# Patient Record
Sex: FEMALE | Race: WHITE | NOT HISPANIC OR LATINO | Employment: OTHER | URBAN - METROPOLITAN AREA
[De-identification: names, ages, dates, MRNs, and addresses within clinical notes are randomized per-mention and may not be internally consistent; named-entity substitution may affect disease eponyms.]

---

## 2017-01-24 ENCOUNTER — TELEPHONE (OUTPATIENT)
Dept: PULMONOLOGY | Facility: CLINIC | Age: 77
End: 2017-01-24

## 2017-01-24 NOTE — TELEPHONE ENCOUNTER
----- Message from Christina Chauhan sent at 1/24/2017 10:07 AM CST -----  Contact: Casie Rock  Patient stated that she had an order in for  anoro ellipta, but there is no orders in, Please call he rat [phone].    thanks  Td

## 2017-01-26 DIAGNOSIS — J44.9 COPD, VERY SEVERE: Chronic | ICD-10-CM

## 2017-01-26 NOTE — TELEPHONE ENCOUNTER
----- Message from Krishan Hansen sent at 1/26/2017  9:49 AM CST -----  [facility], #[phone] needs a order for the pt's inhaler faxed to [phone]

## 2017-05-12 ENCOUNTER — TELEPHONE (OUTPATIENT)
Dept: PULMONOLOGY | Facility: CLINIC | Age: 77
End: 2017-05-12

## 2017-05-12 NOTE — TELEPHONE ENCOUNTER
Need a updated phone number for contact number is incorrect. Was unable to contact pt to confirm appt

## 2017-05-15 ENCOUNTER — OFFICE VISIT (OUTPATIENT)
Dept: PULMONOLOGY | Facility: CLINIC | Age: 77
End: 2017-05-15
Payer: MEDICARE

## 2017-05-15 VITALS
RESPIRATION RATE: 18 BRPM | HEIGHT: 65 IN | SYSTOLIC BLOOD PRESSURE: 140 MMHG | OXYGEN SATURATION: 90 % | BODY MASS INDEX: 25.75 KG/M2 | WEIGHT: 154.56 LBS | DIASTOLIC BLOOD PRESSURE: 62 MMHG | HEART RATE: 77 BPM

## 2017-05-15 DIAGNOSIS — J96.11 CHRONIC RESPIRATORY FAILURE WITH HYPOXIA: ICD-10-CM

## 2017-05-15 DIAGNOSIS — C34.12 MALIGNANT NEOPLASM OF UPPER LOBE OF LEFT LUNG: Primary | Chronic | ICD-10-CM

## 2017-05-15 DIAGNOSIS — J44.9 COPD, VERY SEVERE: Chronic | ICD-10-CM

## 2017-05-15 DIAGNOSIS — F17.210 CIGARETTE SMOKER: Chronic | ICD-10-CM

## 2017-05-15 PROCEDURE — 99999 PR PBB SHADOW E&M-EST. PATIENT-LVL III: CPT | Mod: PBBFAC,,, | Performed by: INTERNAL MEDICINE

## 2017-05-15 PROCEDURE — 99215 OFFICE O/P EST HI 40 MIN: CPT | Mod: 25,S$PBB,, | Performed by: INTERNAL MEDICINE

## 2017-05-15 PROCEDURE — 99213 OFFICE O/P EST LOW 20 MIN: CPT | Mod: PBBFAC | Performed by: INTERNAL MEDICINE

## 2017-05-15 NOTE — ASSESSMENT & PLAN NOTE
Assistance with smoking cessation was offered, including:  [x]  Medications  []  Counseling  []  Printed Information on Smoking Cessation  []  Referral to a Smoking Cessation Program    Patient was counseled regarding smoking for 3-10 minutes.

## 2017-05-15 NOTE — PROGRESS NOTES
Subjective:      Established patient    Patient ID: Rachel Long is a 76 y.o. female.  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    Chief Complaint: COPD      HPI     Her lung cancer is in remission. COPD is stable.  She reports that she is dong well and denies any new onset respiratory symptoms. She denies cough sputum, hemoptysis,  pain with breathing, wheezing, asthma. She has relapsed with cigarette smoking. She currently admits to smoking 2 cigarettes per day. She is currently on supplemental oxygen at 3 L /min. Her current respiratory regimen is ANORO which provided some relief. She is adherent with her regimen. Her CAT score today is 18.      A full  review of systems, past , family  and social histories was performed except as mentioned in the note above, these are non contributory to the main issues discussed today.      Previous Report Reviewed: office notes and radiology reports     The following portions of the patient's history were reviewed and updated as appropriate: She  has a past medical history of Acute upper respiratory infection; Anemia; Bipolar mood disorder; Coordination abnormal; COPD exacerbation (5/9/2016); Coronary artery disease; Difficulty walking; Hypertension; Hypothyroidism, adult; Insomnia; Malignant neoplasm of colon; Muscle weakness; Neuralgia and neuritis; Schizoaffective disorder; SOB (shortness of breath); Spinal stenosis; Thyroid disease; and Urinary tract infection.  She  has a past surgical history that includes Coronary stent placement; Colon surgery; and Hysterectomy (1970's).  Her family history is not on file.  She  reports that she has been smoking Cigarettes.  She has  "smoked for the past 50.00 years. She does not have any smokeless tobacco history on file. She reports that she does not drink alcohol or use illicit drugs.  She has a current medication list which includes the following prescription(s): abilify, acetaminophen, albuterol, alrex, baclofen, brexpiprazole, clonidine, cyanocobalamin, cyclosporine, diazepam, docusate sodium, donepezil, duloxetine, erythromycin, fluticasone, guaifenesin, hydrocodone-acetaminophen 5-325mg, hydroxyzine pamoate, lactulose, levothyroxine, lisinopril, multivitamin, oxcarbazepine, pataday, polyvinyl alcohol (artificial tears), potassium chloride, sertraline, tolterodine, tramadol, triamcinolone acetonide 0.1%, umeclidinium-vilanterol, white petrolatum-mineral oil, zolpidem, and albuterol-ipratropium 2.5mg-0.5mg/3ml.  She is allergic to sulfa (sulfonamide antibiotics) and benadryl [diphenhydramine hcl]..    Review of Systems   Constitutional: Negative for chills, fatigue and night sweats.   HENT: Negative for nosebleeds, postnasal drip, congestion and hearing loss.    Respiratory: Positive for shortness of breath and dyspnea on extertion. Negative for cough, sputum production and wheezing.    Cardiovascular: Positive for chest pain. Negative for palpitations and leg swelling.   Genitourinary: Positive for hematuria. Negative for difficulty urinating.   Endocrine: Negative for cold intolerance and heat intolerance.    Musculoskeletal: Positive for back pain. Negative for arthralgias.   Skin: Positive for rash.        Skin cancer   Gastrointestinal: Positive for nausea. Negative for vomiting and acid reflux.        Constipation alternating with diarrhea.    Neurological: Positive for dizziness, weakness and headaches.   Hematological: Does not bruise/bleed easily and no excessive bruising.   All other systems reviewed and are negative.       Objective:     BP (!) 140/62  Pulse 77  Resp 18  Ht 5' 5" (1.651 m)  Wt 70.1 kg (154 lb 8.7 oz)  LMP  " (LMP Unknown)  SpO2 (!) 90%  BMI 25.72 kg/m2  Body mass index is 25.72 kg/(m^2).     Physical Exam   Constitutional: She appears well-developed and well-nourished.   HENT:   Head: Normocephalic and atraumatic.   Eyes: Pupils are equal, round, and reactive to light.   Neck: Normal range of motion.   Cardiovascular: Normal rate and regular rhythm.    No murmur heard.  Pulmonary/Chest: Effort normal. She has no decreased breath sounds. She has no wheezes. She has rales (Bibasilar) in the right lower field and the left lower field.   Abdominal: Soft. Bowel sounds are normal.   Musculoskeletal: Normal range of motion.   Neurological: She is alert.   Masked facies   Skin: Skin is warm and dry.   Psychiatric: She has a normal mood and affect. Her behavior is normal.   Nursing note and vitals reviewed.      Personal Diagnostic Review      Exercise pulse oximetry:    Oxygen Saturation on room air at rest : 92%  HR 71  Oxygen Saturation on room air with exercise: 90%  %        Assessment:     1. Malignant neoplasm of upper lobe of left lung Stable   2. COPD, very severe Stable   3. Cigarette smoker Stable   4. Chronic respiratory failure with hypoxia      Outpatient Encounter Prescriptions as of 5/15/2017   Medication Sig Dispense Refill    ABILIFY 5 mg Tab Take 1 tablet by mouth every evening.      acetaminophen (TYLENOL) 325 MG tablet Take 325 mg by mouth every 6 (six) hours as needed. Give 2 by mouth every 4 hours PRN      albuterol (ACCUNEB) 1.25 mg/3 mL Nebu Take 6 mLs (2.5 mg total) by nebulization every 6 (six) hours as needed. 30 vial 0    ALREX 0.2 % DrpS Place 1 drop into both eyes once daily.      baclofen (LIORESAL) 10 MG tablet Take 10 mg by mouth 3 (three) times daily.      brexpiprazole (REXULTI) 1 mg Tab Take by mouth.      clonidine (CATAPRES) 0.1 MG tablet Take 0.2 mg by mouth 3 (three) times daily.       cyanocobalamin 1,000 mcg/mL injection Inject 1,000 mcg into the skin every 30 days.       cycloSPORINE (RESTASIS) 0.05 % ophthalmic emulsion 1 drop 2 (two) times daily.      diazepam (VALIUM) 2 MG tablet Take 2 mg by mouth daily as needed for Anxiety.      docusate sodium (COLACE) 100 MG capsule Take 100 mg by mouth 2 (two) times daily.      donepezil (ARICEPT) 5 MG tablet Take 5 mg by mouth every evening.      duloxetine (CYMBALTA) 30 MG capsule       erythromycin (ILOTYCIN) ophthalmic ointment Place into both eyes every evening.      fluticasone (FLONASE) 50 mcg/actuation nasal spray 1 spray by Each Nare route 2 (two) times daily.      guaifenesin (MUCINEX) 600 mg 12 hr tablet Take 600 mg by mouth once daily.      hydrocodone-acetaminophen 5-325mg (NORCO) 5-325 mg per tablet Take 1 tablet by mouth every 6 (six) hours as needed for Pain.      hydrOXYzine pamoate (VISTARIL) 25 MG Cap Take 25 mg by mouth 3 (three) times daily.      lactulose (CEPHULAC) 10 gram packet Take 20 g by mouth daily as needed.      levothyroxine (SYNTHROID) 88 MCG tablet Take 88 mcg by mouth before breakfast.      lisinopril (PRINIVIL,ZESTRIL) 40 MG tablet Take 40 mg by mouth once daily.      MULTIVITAMIN ORAL Take by mouth once daily.      oxcarbazepine (TRILEPTAL) 150 MG Tab Take 150 mg by mouth every evening.      PATADAY 0.2 % Drop       polyvinyl alcohol, artificial tears, (ARTIFICIAL TEARS, POLYVIN ALC,) 1.4 % ophthalmic solution 1 drop as needed.      potassium chloride (KLOR-CON) 10 MEQ TbSR Take 40 mEq by mouth once daily.       sertraline (ZOLOFT) 100 MG tablet Take 100 mg by mouth once daily.      tolterodine (DETROL LA) 4 MG 24 hr capsule Take 4 mg by mouth once daily.      tramadol (ULTRAM) 50 mg tablet Take 50 mg by mouth every 6 (six) hours as needed.      triamcinolone acetonide 0.1% (KENALOG) 0.1 % cream       umeclidinium-vilanterol (ANORO ELLIPTA) 62.5-25 mcg/actuation DsDv Inhale 1 puff into the lungs once daily. 30 each 11    white petrolatum-mineral oil (ARTIFICIAL TEARS,  CODEY/MIN,) 83-15 % Oint 4 (four) times daily as needed.      zolpidem (AMBIEN) 5 MG Tab Take 5 mg by mouth nightly as needed.      albuterol-ipratropium 2.5mg-0.5mg/3mL (DUO-NEB) 0.5 mg-3 mg(2.5 mg base)/3 mL nebulizer solution Take 3 mLs by nebulization every 6 (six) hours while awake. 120 vial 11     No facility-administered encounter medications on file as of 5/15/2017.      Orders Placed This Encounter   Procedures    X-Ray Chest PA And Lateral     Standing Status:   Future     Standing Expiration Date:   6/25/2018    Six Minute Walk Test to qualify for Home Oxygen     Standing Status:   Future     Standing Expiration Date:   5/15/2018    Complete PFT with bronchodilator     Standing Status:   Future     Standing Expiration Date:   6/25/2018    Stress test, pulmonary     Standing Status:   Future     Standing Expiration Date:   6/25/2018    PULM - Arterial Blood Gases--in addition to PFT only     Standing Status:   Future     Standing Expiration Date:   5/15/2018     Plan:     Discussed diagnosis, its evaluation, treatment and usual course. All questions answered.    COPD, very severe  COPD ROS: taking medications as instructed, no medication side effects noted, no significant ongoing wheezing or shortness of breath, using bronchodilator MDI less than twice a week.   New concerns: None.   Exam: appears well, vitals normal, no respiratory distress, acyanotic, normal RR.   Assessment:  COPD well controlled.   Plan: ANORO, ACCUNEB. Current treatment plan is effective, no change in therapy.     -     Complete PFT with bronchodilator; Future; Expected date: 11/15/17  -     PULM - Arterial Blood Gases--in addition to PFT only; Future; Expected date: 11/15/17  -     X-Ray Chest PA And Lateral; Future; Expected date: 11/15/17  -     Stress test, pulmonary; Future; Expected date: 11/15/17    Return in 6 months.     Malignant neoplasm of upper lobe of left lung  Stable and controlled. Follow up with oncology as  previously scheduled.     Cigarette smoker  Assistance with smoking cessation was offered, including:  [x]  Medications  []  Counseling  []  Printed Information on Smoking Cessation  []  Referral to a Smoking Cessation Program    Patient was counseled regarding smoking for 3-10 minutes.        Chronic respiratory failure with hypoxia  Continue oxygen supplementation.         TIME SPENT WITH PATIENT: Time spent: 40 minutes in face to face  discussion concerning diagnosis, prognosis, review of lab and test results, benefits of treatment as well as management of disease, counseling of patient and coordination of care between various health  care providers . Greater than half the time spent was used for coordination of care and counseling of patient.       Return in about 6 months (around 11/15/2017) for COPD, Lung Cancer, Chronic Respiratory Failure, Tobacco use disorder.

## 2017-05-15 NOTE — PATIENT INSTRUCTIONS
Lung Anatomy  Your lungs take air in to give your body oxygen, which the body needs to work. Your lungs, like all the tissues in your body, are made up of billions of tiny specialized cells. Old lung cells die and are replaced by new, identical lung cells. This natural process helps ensure healthy lungs.    Date Last Reviewed: 11/1/2016  © 2266-4291 [employer]. [address] [place] [address]. All rights reserved. This information is not intended as a substitute for professional medical care. Always follow your healthcare professional's instructions.

## 2017-05-15 NOTE — ASSESSMENT & PLAN NOTE
COPD ROS: taking medications as instructed, no medication side effects noted, no significant ongoing wheezing or shortness of breath, using bronchodilator MDI less than twice a week.   New concerns: None.   Exam: appears well, vitals normal, no respiratory distress, acyanotic, normal RR.   Assessment:  COPD well controlled.   Plan: ANORO, ACCUNEB. Current treatment plan is effective, no change in therapy.     -     Complete PFT with bronchodilator; Future; Expected date: 11/15/17  -     PULM - Arterial Blood Gases--in addition to PFT only; Future; Expected date: 11/15/17  -     X-Ray Chest PA And Lateral; Future; Expected date: 11/15/17  -     Stress test, pulmonary; Future; Expected date: 11/15/17    Return in 6 months.

## 2017-05-16 ENCOUNTER — TELEPHONE (OUTPATIENT)
Dept: PULMONOLOGY | Facility: CLINIC | Age: 77
End: 2017-05-16

## 2017-05-16 NOTE — TELEPHONE ENCOUNTER
----- Message from Fabiola Nicholson sent at 5/16/2017 10:08 AM CDT -----  Shelryn with [facility] at [phone]//states pt has 5 appts in November and she is needing earlier appts//need in morning//please call to reschedule//thanks/nicole

## 2017-05-16 NOTE — TELEPHONE ENCOUNTER
Message left for Sherlyn to return call to clinic. Appointments rescheduled for an earlier time and mailed to [place] age

## 2017-09-22 ENCOUNTER — HOSPITAL ENCOUNTER (EMERGENCY)
Facility: HOSPITAL | Age: 77
Discharge: HOME | End: 2017-09-22
Attending: EMERGENCY MEDICINE
Payer: MEDICARE

## 2017-09-22 VITALS
OXYGEN SATURATION: 100 % | TEMPERATURE: 99 F | BODY MASS INDEX: 25.83 KG/M2 | SYSTOLIC BLOOD PRESSURE: 163 MMHG | WEIGHT: 155 LBS | HEART RATE: 81 BPM | DIASTOLIC BLOOD PRESSURE: 84 MMHG | RESPIRATION RATE: 22 BRPM | HEIGHT: 65 IN

## 2017-09-22 DIAGNOSIS — R19.7 DIARRHEA: ICD-10-CM

## 2017-09-22 DIAGNOSIS — R41.0 CONFUSION: ICD-10-CM

## 2017-09-22 LAB
ALBUMIN SERPL BCP-MCNC: 3.5 G/DL
ALP SERPL-CCNC: 91 U/L
ALT SERPL W/O P-5'-P-CCNC: 9 U/L
ANION GAP SERPL CALC-SCNC: 10 MMOL/L
AST SERPL-CCNC: 17 U/L
BASOPHILS # BLD AUTO: 0.01 K/UL
BASOPHILS NFR BLD: 0.1 %
BILIRUB SERPL-MCNC: 0.3 MG/DL
BILIRUB UR QL STRIP: NEGATIVE
BUN SERPL-MCNC: 16 MG/DL
CALCIUM SERPL-MCNC: 10 MG/DL
CHLORIDE SERPL-SCNC: 97 MMOL/L
CLARITY UR: CLEAR
CO2 SERPL-SCNC: 30 MMOL/L
COLOR UR: YELLOW
CREAT SERPL-MCNC: 0.8 MG/DL
DIFFERENTIAL METHOD: ABNORMAL
EOSINOPHIL # BLD AUTO: 0.1 K/UL
EOSINOPHIL NFR BLD: 1.8 %
ERYTHROCYTE [DISTWIDTH] IN BLOOD BY AUTOMATED COUNT: 16.9 %
EST. GFR  (AFRICAN AMERICAN): >60 ML/MIN/1.73 M^2
EST. GFR  (NON AFRICAN AMERICAN): >60 ML/MIN/1.73 M^2
GLUCOSE SERPL-MCNC: 94 MG/DL
GLUCOSE UR QL STRIP: NEGATIVE
HCT VFR BLD AUTO: 41.7 %
HGB BLD-MCNC: 13.4 G/DL
HGB UR QL STRIP: ABNORMAL
KETONES UR QL STRIP: NEGATIVE
LEUKOCYTE ESTERASE UR QL STRIP: NEGATIVE
LIPASE SERPL-CCNC: 34 U/L
LYMPHOCYTES # BLD AUTO: 1.1 K/UL
LYMPHOCYTES NFR BLD: 15.2 %
MCH RBC QN AUTO: 27 PG
MCHC RBC AUTO-ENTMCNC: 32.1 G/DL
MCV RBC AUTO: 84 FL
MONOCYTES # BLD AUTO: 0.9 K/UL
MONOCYTES NFR BLD: 11.5 %
NEUTROPHILS # BLD AUTO: 5.3 K/UL
NEUTROPHILS NFR BLD: 71.4 %
NITRITE UR QL STRIP: NEGATIVE
PH UR STRIP: 6 [PH] (ref 5–8)
PLATELET # BLD AUTO: 183 K/UL
PMV BLD AUTO: 10.5 FL
POTASSIUM SERPL-SCNC: 4.3 MMOL/L
PROT SERPL-MCNC: 7.6 G/DL
PROT UR QL STRIP: NEGATIVE
RBC # BLD AUTO: 4.96 M/UL
SODIUM SERPL-SCNC: 137 MMOL/L
SP GR UR STRIP: 1.01 (ref 1–1.03)
URN SPEC COLLECT METH UR: ABNORMAL
UROBILINOGEN UR STRIP-ACNC: NEGATIVE EU/DL
WBC # BLD AUTO: 7.41 K/UL

## 2017-09-22 PROCEDURE — 83690 ASSAY OF LIPASE: CPT

## 2017-09-22 PROCEDURE — 99285 EMERGENCY DEPT VISIT HI MDM: CPT | Mod: 25

## 2017-09-22 PROCEDURE — 80053 COMPREHEN METABOLIC PANEL: CPT

## 2017-09-22 PROCEDURE — 85025 COMPLETE CBC W/AUTO DIFF WBC: CPT

## 2017-09-22 PROCEDURE — P9612 CATHETERIZE FOR URINE SPEC: HCPCS

## 2017-09-22 PROCEDURE — 25500020 PHARM REV CODE 255: Performed by: EMERGENCY MEDICINE

## 2017-09-22 PROCEDURE — 81003 URINALYSIS AUTO W/O SCOPE: CPT

## 2017-09-22 RX ORDER — DIPHENOXYLATE HYDROCHLORIDE AND ATROPINE SULFATE 2.5; .025 MG/1; MG/1
1 TABLET ORAL 4 TIMES DAILY PRN
Qty: 8 TABLET | Refills: 0 | Status: SHIPPED | OUTPATIENT
Start: 2017-09-22 | End: 2017-10-02

## 2017-09-22 RX ORDER — IPRATROPIUM BROMIDE 42 UG/1
2 SPRAY, METERED NASAL 4 TIMES DAILY
COMMUNITY
End: 2018-08-02

## 2017-09-22 RX ORDER — LORATADINE 10 MG/1
10 TABLET ORAL DAILY
COMMUNITY
End: 2018-08-02

## 2017-09-22 RX ADMIN — IOHEXOL 75 ML: 350 INJECTION, SOLUTION INTRAVENOUS at 07:09

## 2017-09-22 NOTE — ED PROVIDER NOTES
SCRIBE #1 NOTE: I, Tara Newby, am scribing for, and in the presence of, Seth Rock MD. I have scribed the entire note.      History      Chief Complaint   Patient presents with    Flank Pain     bilateral for 2 days. diagnosed with kidney stones       Review of patient's allergies indicates:   Allergen Reactions    Sulfa (sulfonamide antibiotics)     Benadryl [diphenhydramine hcl] Anxiety        HPI   HPI    9/22/2017, 5:18 PM   History obtained from the patient      History of Present Illness: Rachel Long is a 77 y.o. female patient who presents to the Emergency Department for bilateral flank pain which onset gradually 2 months ago. Symptoms are constant and moderate in severity. Pt was recently dx with nephrolithiasis. Pt also c/o diarrhea onset one month ago. Pt states no relief with imodium. Pt has a change in mental status oonset one month ago. Pt has been more forgetful.  Pt fell 1 week ago and hit her head while trying to retrieve shoes from underneath the bed. Pt currently lives in the nursing home. No mitigating or exacerbating factors reported. No associated sxs. Patient denies any fever, n/v, abd pain, CP, SOB, hematochezia, dysuria, hematuria, frequency, urgency, difficulty urinating, HA, dizziness, numbness, weakness, and all other sxs at this time. No further complaints or concerns at this time.       Arrival mode: Personal vehicle    PCP: Annie Breaux MD       Past Medical History:  Past Medical History:   Diagnosis Date    Acute upper respiratory infection     Anemia     Bipolar mood disorder     Coordination abnormal     COPD exacerbation 5/9/2016    Coronary artery disease     Difficulty walking     Hypertension     on meds    Hypothyroidism, adult     Insomnia     Malignant neoplasm of colon     Muscle weakness     Neuralgia and neuritis     Schizoaffective disorder     SOB (shortness of breath)     Spinal stenosis     Thyroid disease     Urinary tract infection         Past Surgical History:  Past Surgical History:   Procedure Laterality Date    COLON SURGERY      CORONARY STENT PLACEMENT      HYSTERECTOMY  1970's         Family History:  History reviewed. No pertinent family history.    Social History:  Social History     Social History Main Topics    Smoking status: Current Every Day Smoker     Years: 50.00     Types: Cigarettes    Smokeless tobacco: Never Used    Alcohol use No    Drug use: No    Sexual activity: No       ROS   Review of Systems   Constitutional: Negative for fever.   HENT: Negative for sore throat.    Respiratory: Negative for shortness of breath.    Cardiovascular: Negative for chest pain.   Gastrointestinal: Positive for diarrhea. Negative for abdominal distention, abdominal pain, anal bleeding, blood in stool, constipation, nausea and vomiting.   Genitourinary: Positive for flank pain (bilateral). Negative for decreased urine volume, difficulty urinating, dysuria, frequency and hematuria.   Musculoskeletal: Negative for back pain.   Skin: Negative for rash.   Neurological: Negative for dizziness, weakness, light-headedness, numbness and headaches.   Hematological: Does not bruise/bleed easily.   Psychiatric/Behavioral: Positive for confusion.     Physical Exam      Initial Vitals [09/22/17 1700]   BP Pulse Resp Temp SpO2   (!) 161/85 80 (!) 24 98.6 °F (37 °C) 97 %      MAP       110.33          Physical Exam  Nursing Notes and Vital Signs Reviewed.  Constitutional: Patient is in no acute distress. Well-developed and well-nourished.  Head: Atraumatic. Normocephalic.  Eyes: PERRL. EOM intact. Conjunctivae are not pale. No scleral icterus.  ENT: Mucous membranes are moist. Oropharynx is clear and symmetric.    Neck: Supple. Full ROM. No lymphadenopathy.  Cardiovascular: Regular rate. Regular rhythm. No murmurs, rubs, or gallops. Distal pulses are 2+ and symmetric.  Pulmonary/Chest: No respiratory distress. Clear to auscultation bilaterally. No  "wheezing, rales, or rhonchi.  Abdominal: Soft and non-distended.  There is no tenderness.  No rebound, guarding, or rigidity. Good bowel sounds. No pulsatile masses.  Genitourinary: No CVA tenderness  Musculoskeletal: Moves all extremities. No obvious deformities. No edema. No calf tenderness.  Skin: Warm and dry.  Neurological:  Alert, awake, and appropriate.  Normal speech.  No acute focal neurological deficits are appreciated. Grossly neurologically intact.  Psychiatric: Normal affect. Oriented to person, place, and time. Good eye contact. Appropriate in content.    ED Course    Procedures  ED Vital Signs:  Vitals:    09/22/17 1700 09/22/17 1701 09/22/17 1750   BP: (!) 161/85 (!) 154/80 (!) 166/81   Pulse: 80 79 77   Resp: (!) 24     Temp: 98.6 °F (37 °C)     TempSrc: Oral     SpO2: 97% 98% 99%   Weight: 70.3 kg (155 lb)     Height: 5' 5" (1.651 m)         Abnormal Lab Results:  Labs Reviewed   CBC W/ AUTO DIFFERENTIAL - Abnormal; Notable for the following:        Result Value    RDW 16.9 (*)     Lymph% 15.2 (*)     All other components within normal limits   COMPREHENSIVE METABOLIC PANEL - Abnormal; Notable for the following:     CO2 30 (*)     ALT 9 (*)     All other components within normal limits   URINALYSIS - Abnormal; Notable for the following:     Occult Blood UA Trace (*)     All other components within normal limits   LIPASE        All Lab Results:  Results for orders placed or performed during the hospital encounter of 09/22/17   CBC auto differential   Result Value Ref Range    WBC 7.41 3.90 - 12.70 K/uL    RBC 4.96 4.00 - 5.40 M/uL    Hemoglobin 13.4 12.0 - 16.0 g/dL    Hematocrit 41.7 37.0 - 48.5 %    MCV 84 82 - 98 fL    MCH 27.0 27.0 - 31.0 pg    MCHC 32.1 32.0 - 36.0 g/dL    RDW 16.9 (H) 11.5 - 14.5 %    Platelets 183 150 - 350 K/uL    MPV 10.5 9.2 - 12.9 fL    Gran # 5.3 1.8 - 7.7 K/uL    Lymph # 1.1 1.0 - 4.8 K/uL    Mono # 0.9 0.3 - 1.0 K/uL    Eos # 0.1 0.0 - 0.5 K/uL    Baso # 0.01 0.00 - " 0.20 K/uL    Gran% 71.4 38.0 - 73.0 %    Lymph% 15.2 (L) 18.0 - 48.0 %    Mono% 11.5 4.0 - 15.0 %    Eosinophil% 1.8 0.0 - 8.0 %    Basophil% 0.1 0.0 - 1.9 %    Differential Method Automated    Comprehensive metabolic panel   Result Value Ref Range    Sodium 137 136 - 145 mmol/L    Potassium 4.3 3.5 - 5.1 mmol/L    Chloride 97 95 - 110 mmol/L    CO2 30 (H) 23 - 29 mmol/L    Glucose 94 70 - 110 mg/dL    BUN, Bld 16 8 - 23 mg/dL    Creatinine 0.8 0.5 - 1.4 mg/dL    Calcium 10.0 8.7 - 10.5 mg/dL    Total Protein 7.6 6.0 - 8.4 g/dL    Albumin 3.5 3.5 - 5.2 g/dL    Total Bilirubin 0.3 0.1 - 1.0 mg/dL    Alkaline Phosphatase 91 55 - 135 U/L    AST 17 10 - 40 U/L    ALT 9 (L) 10 - 44 U/L    Anion Gap 10 8 - 16 mmol/L    eGFR if African American >60 >60 mL/min/1.73 m^2    eGFR if non African American >60 >60 mL/min/1.73 m^2   Lipase   Result Value Ref Range    Lipase 34 4 - 60 U/L   Urinalysis   Result Value Ref Range    Specimen UA Urine, Catheterized     Color, UA Yellow Yellow, Straw, [place]    Appearance, UA Clear Clear    pH, UA 6.0 5.0 - 8.0    Specific Gravity, UA 1.010 1.005 - 1.030    Protein, UA Negative Negative    Glucose, UA Negative Negative    Ketones, UA Negative Negative    Bilirubin (UA) Negative Negative    Occult Blood UA Trace (A) Negative    Nitrite, UA Negative Negative    Urobilinogen, UA Negative <2.0 EU/dL    Leukocytes, UA Negative Negative       Imaging Results:  Imaging Results          CT Head Without Contrast (Final result)  Result time 09/22/17 19:23:02    Final result by Malick Bucio MD (09/22/17 19:23:02)                 Impression:         No acute findings        All CT scans at this facility use dose modulation, iterative reconstruction, and/or weight based dosing when appropriate to reduce radiation dose to as low as reasonably achievable.       Electronically signed by: MALICK BUCIO MD  Date:     09/22/17  Time:    19:23              Narrative:    CT HEAD WITHOUT  CONTRAST    Date: 09/22/17 18:39:46    Clinical indication: Disorientation, transient alteration of awareness     Technique:  Standard noncontrast CT scan of the brain.      Findings:  There is mild to moderate patchy white matter low density microangiopathy. Ventricular system is normal. No evidence of intracranial hemorrhage or acute focal parenchymal abnormality. The cranium is intact. Visualized paranasal sinuses and mastoid air cells are clear.                             CT Abdomen Pelvis With Contrast (Final result)  Result time 09/22/17 19:30:17    Final result by Malick Bucio MD (09/22/17 19:30:17)                 Impression:      No acute or suspicious findings.    All CT scans at this facility use dose modulation, iterative reconstruction and/or weight based dosing when appropriate to reduce radiation dose to as low as reasonably achievable.         Electronically signed by: MALICK BUCIO MD  Date:     09/22/17  Time:    19:30              Narrative:    Exam: CT ABDOMEN PELVIS WITH CONTRAST,    Date:  09/22/17 18:39:46    History: Bilateral flank pain with onset gradually 2 months ago, diarrhea onset one month ago    Comparison:  7/21/16 CT    Findings: No abnormality of the liver, spleen, pancreas, gallbladder, kidneys, or right adrenal is seen. The previously described 17 mm left adrenal nodule is stable and unchanged since 7/21/16. There is a staple line in the right colon. No evidence of acute inflammatory process in the abdomen or pelvis. No free fluid. Evidence of hysterectomy. Diffuse advanced degenerative disc disease of the lumbar spine. The lung bases are clear.                             X-Ray Chest 1 View (Final result)  Result time 09/22/17 18:12:15    Final result by Malick Bucio MD (09/22/17 18:12:15)                 Impression:     No acute findings. Small right suprahilar nodular opacity as above.      Electronically signed by: MALICK BUCIO MD  Date:      09/22/17  Time:    18:12              Narrative:    History: Disorientation, shortness of breath    Normal heart size. No infiltrates. Small nodular opacity in the right suprahilar region may be associated with the anterior first rib. Can't exclude pulmonary nodule.. Recommend repeat with PA and lateral. Stimulator leads in the thoracic spinal canal.                                      The Emergency Provider reviewed the vital signs and test results, which are outlined above.    ED Discussion     7:49 PM: Reassessed pt at this time. Discussed with pt all pertinent ED information and results. Discussed pt dx and plan of tx. Gave pt all f/u and return to the ED instructions. All questions and concerns were addressed at this time. Pt expresses understanding of information and instructions, and is comfortable with plan to discharge. Pt is stable for discharge.      ED Medication(s):  Medications   omnipaque 350 iohexol 75 mL (75 mLs Intravenous Given 9/22/17 1918)       New Prescriptions    DIPHENOXYLATE-ATROPINE 2.5-0.025 MG (LOMOTIL) 2.5-0.025 MG PER TABLET    Take 1 tablet by mouth 4 (four) times daily as needed for Diarrhea.             Medical Decision Making    Medical Decision Making:   Clinical Tests:   Lab Tests: Ordered and Reviewed  Radiological Study: Ordered and Reviewed           Scribe Attestation:   Scribe #1: I performed the above scribed service and the documentation accurately describes the services I performed. I attest to the accuracy of the note.    Attending:   Physician Attestation Statement for Scribe #1: I, Seth Rock MD, personally performed the services described in this documentation, as scribed by Tara Newby, in my presence, and it is both accurate and complete.          Clinical Impression       ICD-10-CM ICD-9-CM   1. Confusion R41.0 298.9   2. Diarrhea R19.7 787.91   3. Confusion R41.0 298.9       Disposition:   Disposition: Discharged  Condition: Stable         Seth CURRY  MD Pranav  09/26/17 0900

## 2017-09-22 NOTE — ED NOTES
Pt resting in ER stretcher, aaox4, rr e/u, NAD noted. Pt remains on BP and pulse ox with vss noted. Bed low and locked, call light in reach, side rails up x2.Daughter-in-law at bedside.  Pt and family verbalized understanding of status and POC; denies further needs. Will continue to monitor.

## 2017-09-23 NOTE — ED NOTES
Pt back from CT.  Awaiting disposition.  Daughter at bedside. No complaints at this time. SR up x2.

## 2018-08-01 ENCOUNTER — DOCUMENTATION ONLY (OUTPATIENT)
Dept: HEMATOLOGY/ONCOLOGY | Facility: CLINIC | Age: 78
End: 2018-08-01

## 2018-08-01 NOTE — PROGRESS NOTES
Nurse received message to follow up on Ms Long who was scheduled  an EP visit for Vaginal bleeding on [facility] NP today.   Halima read last hematologist noted, contact Dr Mir to advise on Ms Long's bleeding,  and he explained to [facility] to have nursing home set Ms Long up with GYN for her vag bleeding.  Ms esqueda received the call, she is ms long's nurse today at the nursing home.  she was informed by nurse to  have Ms Long see GYN, as per Dr Mir.  Ms Esqueda, the nurse stated I am getting her an appointment to be seen with GYN.   The nursing home has stated they will get ms long an appointment to be seen by GYN.

## 2018-08-02 ENCOUNTER — HOSPITAL ENCOUNTER (EMERGENCY)
Facility: HOSPITAL | Age: 78
Discharge: HOME | End: 2018-08-02
Attending: EMERGENCY MEDICINE
Payer: MEDICARE

## 2018-08-02 ENCOUNTER — OFFICE VISIT (OUTPATIENT)
Dept: OBSTETRICS AND GYNECOLOGY | Facility: CLINIC | Age: 78
End: 2018-08-02
Payer: MEDICARE

## 2018-08-02 VITALS
SYSTOLIC BLOOD PRESSURE: 145 MMHG | HEIGHT: 65 IN | WEIGHT: 144.5 LBS | TEMPERATURE: 99 F | OXYGEN SATURATION: 97 % | HEART RATE: 95 BPM | BODY MASS INDEX: 24.07 KG/M2 | RESPIRATION RATE: 17 BRPM | DIASTOLIC BLOOD PRESSURE: 83 MMHG

## 2018-08-02 VITALS
SYSTOLIC BLOOD PRESSURE: 138 MMHG | DIASTOLIC BLOOD PRESSURE: 80 MMHG | BODY MASS INDEX: 24.36 KG/M2 | HEIGHT: 65 IN | WEIGHT: 146.19 LBS

## 2018-08-02 DIAGNOSIS — S41.112A SKIN TEAR OF LEFT UPPER ARM WITHOUT COMPLICATION, INITIAL ENCOUNTER: ICD-10-CM
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
## 2018-08-02 DIAGNOSIS — S09.93XA FACIAL TRAUMA, INITIAL ENCOUNTER: ICD-10-CM

## 2018-08-02 DIAGNOSIS — R55 SYNCOPE: Primary | ICD-10-CM

## 2018-08-02 LAB
ALBUMIN SERPL BCP-MCNC: 3.9 G/DL
ALP SERPL-CCNC: 101 U/L
ALT SERPL W/O P-5'-P-CCNC: 11 U/L
ANION GAP SERPL CALC-SCNC: 9 MMOL/L
APTT BLDCRRT: 24.5 SEC
AST SERPL-CCNC: 17 U/L
BASOPHILS # BLD AUTO: 0.01 K/UL
BASOPHILS NFR BLD: 0.2 %
BILIRUB SERPL-MCNC: 0.4 MG/DL
BUN SERPL-MCNC: 31 MG/DL
CALCIUM SERPL-MCNC: 9.9 MG/DL
CHLORIDE SERPL-SCNC: 103 MMOL/L
CO2 SERPL-SCNC: 24 MMOL/L
CREAT SERPL-MCNC: 0.9 MG/DL
DIFFERENTIAL METHOD: ABNORMAL
EOSINOPHIL # BLD AUTO: 0.1 K/UL
EOSINOPHIL NFR BLD: 1.7 %
ERYTHROCYTE [DISTWIDTH] IN BLOOD BY AUTOMATED COUNT: 18.3 %
EST. GFR  (AFRICAN AMERICAN): >60 ML/MIN/1.73 M^2
EST. GFR  (NON AFRICAN AMERICAN): >60 ML/MIN/1.73 M^2
GLUCOSE SERPL-MCNC: 102 MG/DL
HCT VFR BLD AUTO: 35.2 %
HGB BLD-MCNC: 11.8 G/DL
INR PPP: 1
LYMPHOCYTES # BLD AUTO: 1.7 K/UL
LYMPHOCYTES NFR BLD: 28.6 %
MCH RBC QN AUTO: 26.5 PG
MCHC RBC AUTO-ENTMCNC: 33.5 G/DL
MCV RBC AUTO: 79 FL
MONOCYTES # BLD AUTO: 0.6 K/UL
MONOCYTES NFR BLD: 10.7 %
NEUTROPHILS # BLD AUTO: 3.4 K/UL
NEUTROPHILS NFR BLD: 58.8 %
PLATELET # BLD AUTO: 159 K/UL
PMV BLD AUTO: 9.5 FL
POTASSIUM SERPL-SCNC: 4.6 MMOL/L
PROT SERPL-MCNC: 7.4 G/DL
PROTHROMBIN TIME: 10 SEC
RBC # BLD AUTO: 4.46 M/UL
SODIUM SERPL-SCNC: 136 MMOL/L
TROPONIN I SERPL DL<=0.01 NG/ML-MCNC: 0.01 NG/ML
WBC # BLD AUTO: 5.77 K/UL

## 2018-08-02 PROCEDURE — 25000003 PHARM REV CODE 250: Performed by: EMERGENCY MEDICINE

## 2018-08-02 PROCEDURE — 99203 OFFICE O/P NEW LOW 30 MIN: CPT | Mod: S$PBB,,, | Performed by: NURSE PRACTITIONER

## 2018-08-02 PROCEDURE — 36415 COLL VENOUS BLD VENIPUNCTURE: CPT

## 2018-08-02 PROCEDURE — 85730 THROMBOPLASTIN TIME PARTIAL: CPT

## 2018-08-02 PROCEDURE — 99284 EMERGENCY DEPT VISIT MOD MDM: CPT | Mod: 25,27

## 2018-08-02 PROCEDURE — 85025 COMPLETE CBC W/AUTO DIFF WBC: CPT

## 2018-08-02 PROCEDURE — 99999 PR PBB SHADOW E&M-EST. PATIENT-LVL III: CPT | Mod: PBBFAC,,, | Performed by: NURSE PRACTITIONER

## 2018-08-02 PROCEDURE — 84484 ASSAY OF TROPONIN QUANT: CPT

## 2018-08-02 PROCEDURE — 99213 OFFICE O/P EST LOW 20 MIN: CPT | Mod: PBBFAC,25 | Performed by: NURSE PRACTITIONER

## 2018-08-02 PROCEDURE — 93010 ELECTROCARDIOGRAM REPORT: CPT | Mod: ,,, | Performed by: INTERNAL MEDICINE

## 2018-08-02 PROCEDURE — 80053 COMPREHEN METABOLIC PANEL: CPT

## 2018-08-02 PROCEDURE — 85610 PROTHROMBIN TIME: CPT

## 2018-08-02 RX ORDER — AMLODIPINE BESYLATE 10 MG/1
TABLET ORAL DAILY
Status: ON HOLD | COMMUNITY
Start: 2018-08-01 | End: 2020-01-03 | Stop reason: HOSPADM

## 2018-08-02 RX ORDER — ARIPIPRAZOLE 5 MG/1
TABLET ORAL DAILY
COMMUNITY
End: 2018-08-02

## 2018-08-02 RX ORDER — QUETIAPINE FUMARATE 200 MG/1
150 TABLET, FILM COATED ORAL NIGHTLY
COMMUNITY
Start: 2018-07-27 | End: 2018-12-27 | Stop reason: SDUPTHER

## 2018-08-02 RX ORDER — DULOXETIN HYDROCHLORIDE 60 MG/1
60 CAPSULE, DELAYED RELEASE ORAL 2 TIMES DAILY
Status: ON HOLD | COMMUNITY
Start: 2018-07-30 | End: 2023-04-06

## 2018-08-02 RX ORDER — ALPRAZOLAM 0.25 MG/1
0.25 TABLET ORAL DAILY
Status: ON HOLD | COMMUNITY
Start: 2018-07-30 | End: 2020-01-03 | Stop reason: HOSPADM

## 2018-08-02 RX ADMIN — BACITRACIN, NEOMYCIN, POLYMYXIN B 1 EACH: 400; 3.5; 5 OINTMENT TOPICAL at 05:08

## 2018-08-02 NOTE — ED PROVIDER NOTES
"SCRIBE #1 NOTE: I, Maegan Burton, am scribing for, and in the presence of, Alice Valencia MD. I have scribed the entire note.      History      Chief Complaint   Patient presents with    Fall     Caregiver states, "She fell and hit her face and her arms and they wanted her to come and get a CT of her head."       Review of patient's allergies indicates:   Allergen Reactions    Sulfa (sulfonamide antibiotics)     Benadryl [diphenhydramine hcl] Anxiety        HPI   HPI    8/2/2018, 4:38 PM   History obtained from the patient      History of Present Illness: Rachel Long is a 78 y.o. female patient with HTN who presents to the Emergency Department for evaluation of syncope which onset suddenly PTA while in the bathroom. Sxs are episodic and moderate in severity. She states that she fell and hit her head and now has pain and bruising to the L orbital region. She also has a skin tear to the L forearm. She reports no modifying factors or other sxs. Patient denies neck pain, back pain, CP, SOB, pain to the BUE and BLE, hip pain, additional wounds, visual disturbances, and all other sxs at this time. No further complaints or concerns at this time.       Arrival mode: Personal vehicle    PCP: Sharmaine English MD       Past Medical History:  Past Medical History:   Diagnosis Date    Acute upper respiratory infection     Anemia     Bipolar mood disorder     Coordination abnormal     COPD exacerbation 5/9/2016    Coronary artery disease     Difficulty walking     Hypertension     on meds    Hypothyroidism, adult     Insomnia     Malignant neoplasm of colon     Muscle weakness     Neuralgia and neuritis     Schizoaffective disorder     SOB (shortness of breath)     Spinal stenosis     Thyroid disease     Urinary tract infection        Past Surgical History:  Past Surgical History:   Procedure Laterality Date    COLON SURGERY      CORONARY STENT PLACEMENT      HYSTERECTOMY  1970's         Family " History:  History reviewed. No pertinent family history.    Social History:  Social History     Tobacco Use    Smoking status: Former Smoker     Years: 50.00     Types: Cigarettes    Smokeless tobacco: Former User   Substance and Sexual Activity    Alcohol use: No    Drug use: No    Sexual activity: No       ROS   Review of Systems   Constitutional: Negative for chills, diaphoresis and fever.   HENT: Negative for sore throat.    Eyes: Negative for visual disturbance.   Respiratory: Negative for shortness of breath.    Cardiovascular: Negative for chest pain.   Gastrointestinal: Negative for nausea and vomiting.   Genitourinary: Negative for dysuria.   Musculoskeletal: Negative for arthralgias, back pain, gait problem, joint swelling and neck pain.        (+) facial pain   Skin: Positive for color change (facial bruising) and wound (skin tear to L forearm). Negative for rash.   Neurological: Positive for syncope. Negative for seizures, weakness and light-headedness.   Hematological: Does not bruise/bleed easily.   All other systems reviewed and are negative.      Physical Exam      Initial Vitals [08/02/18 1542]   BP Pulse Resp Temp SpO2   131/88 110 20 98.7 °F (37.1 °C) (!) 94 %      MAP       --          Physical Exam  Nursing Notes and Vital Signs Reviewed.  Constitutional: Patient is in no acute distress. Awake and alert. Elderly. Well-nourished.   Head: Normocephalic. No contusion, facial instability, or step-offs. There is no head or scalp trauma.  Eyes: PERRL. EOM intact. Conjunctivae are not pale. No scleral icterus. Ecchymosis to the L inferior orbital ridge and L superior orbital ridge.  ENT: Mucous membranes are moist. Oropharynx is clear and symmetric.    Neck: Supple. Full ROM. No midline C-spine TTP.  Cardiovascular: Regular rate. Regular rhythm. No murmurs, rubs, or gallops. Distal pulses are 2+ and symmetric.  Pulmonary/Chest: No respiratory distress. Clear to auscultation bilaterally. No  "wheezing, rales, or rhonchi.  Abdominal: Soft and non-distended.  There is no tenderness.  No rebound, guarding, or rigidity.  Good bowel sounds.    Musculoskeletal: Moves all extremities. No obvious deformities or TTP to the L forearm. No edema. No calf tenderness. Pelvis is stable.  Skin: Warm and dry. Skin tear to dorsum L mid forearm with no active bleeding.  Neurological:  GCS 15. Alert, awake, and appropriate. Oriented x3.  Normal speech. Normal strength in BUE and BLE. No acute focal neurological deficits are appreciated.  Psychiatric: Normal affect. Good eye contact. Appropriate in content.    ED Course    Procedures  ED Vital Signs:  Vitals:    08/02/18 1542 08/02/18 1557 08/02/18 1558 08/02/18 1636   BP: 131/88   (!) 146/86   Pulse: 110 95 96 93   Resp: 20      Temp: 98.7 °F (37.1 °C)      TempSrc: Oral      SpO2: (!) 94%      Weight: 65.5 kg (144 lb 8.2 oz)      Height: 5' 5" (1.651 m)       08/02/18 1638 08/02/18 1640 08/02/18 1700 08/02/18 1736   BP: 129/77 135/82 (!) 168/78 (!) 145/83   Pulse: 100 103 88 95   Resp:   17 17   Temp:       TempSrc:       SpO2:   96% 97%   Weight:       Height:           Abnormal Lab Results:  Labs Reviewed   CBC W/ AUTO DIFFERENTIAL - Abnormal; Notable for the following components:       Result Value    Hemoglobin 11.8 (*)     Hematocrit 35.2 (*)     MCV 79 (*)     MCH 26.5 (*)     RDW 18.3 (*)     All other components within normal limits   COMPREHENSIVE METABOLIC PANEL - Abnormal; Notable for the following components:    BUN, Bld 31 (*)     All other components within normal limits   TROPONIN I   PROTIME-INR   APTT        All Lab Results:  Results for orders placed or performed during the hospital encounter of 08/02/18   CBC auto differential   Result Value Ref Range    WBC 5.77 3.90 - 12.70 K/uL    RBC 4.46 4.00 - 5.40 M/uL    Hemoglobin 11.8 (L) 12.0 - 16.0 g/dL    Hematocrit 35.2 (L) 37.0 - 48.5 %    MCV 79 (L) 82 - 98 fL    MCH 26.5 (L) 27.0 - 31.0 pg    MCHC 33.5 " 32.0 - 36.0 g/dL    RDW 18.3 (H) 11.5 - 14.5 %    Platelets 159 150 - 350 K/uL    MPV 9.5 9.2 - 12.9 fL    Gran # (ANC) 3.4 1.8 - 7.7 K/uL    Lymph # 1.7 1.0 - 4.8 K/uL    Mono # 0.6 0.3 - 1.0 K/uL    Eos # 0.1 0.0 - 0.5 K/uL    Baso # 0.01 0.00 - 0.20 K/uL    Gran% 58.8 38.0 - 73.0 %    Lymph% 28.6 18.0 - 48.0 %    Mono% 10.7 4.0 - 15.0 %    Eosinophil% 1.7 0.0 - 8.0 %    Basophil% 0.2 0.0 - 1.9 %    Differential Method Automated    Comprehensive metabolic panel   Result Value Ref Range    Sodium 136 136 - 145 mmol/L    Potassium 4.6 3.5 - 5.1 mmol/L    Chloride 103 95 - 110 mmol/L    CO2 24 23 - 29 mmol/L    Glucose 102 70 - 110 mg/dL    BUN, Bld 31 (H) 8 - 23 mg/dL    Creatinine 0.9 0.5 - 1.4 mg/dL    Calcium 9.9 8.7 - 10.5 mg/dL    Total Protein 7.4 6.0 - 8.4 g/dL    Albumin 3.9 3.5 - 5.2 g/dL    Total Bilirubin 0.4 0.1 - 1.0 mg/dL    Alkaline Phosphatase 101 55 - 135 U/L    AST 17 10 - 40 U/L    ALT 11 10 - 44 U/L    Anion Gap 9 8 - 16 mmol/L    eGFR if African American >60 >60 mL/min/1.73 m^2    eGFR if non African American >60 >60 mL/min/1.73 m^2   Troponin I #1   Result Value Ref Range    Troponin I 0.009 0.000 - 0.026 ng/mL   Protime-INR   Result Value Ref Range    Prothrombin Time 10.0 9.0 - 12.5 sec    INR 1.0 0.8 - 1.2   APTT   Result Value Ref Range    aPTT 24.5 21.0 - 32.0 sec     Imaging Results:  Imaging Results          CT Head Without Contrast (Final result)  Result time 08/02/18 17:32:51    Final result by Jose J Schulz MD (08/02/18 17:32:51)                 Impression:      No acute findings.    All CT scans at this facility are performed  using dose modulation techniques as appropriate to performed exam including the following:  automated exposure control; adjustment of mA and/or kV according to the patients size (this includes techniques or standardized protocols for targeted exams where dose is matched to indication/reason for exam: i.e. extremities or head);  iterative reconstruction  technique.      Electronically signed by: Jose J Schulz MD  Date:    08/02/2018  Time:    17:32             Narrative:    EXAMINATION:  CT HEAD WITHOUT CONTRAST    CLINICAL HISTORY:  head injury;    FINDINGS:  No intracranial hemorrhage or acute findings are demonstrated.  There is some encephalomalacia in the left temporal lobe.  The visualized paranasal sinuses are clear. The calvarium is intact.                               CT Maxillofacial Without Contrast (Final result)  Result time 08/02/18 17:32:04    Final result by Jose J Schulz MD (08/02/18 17:32:04)                 Impression:      No acute findings are identified.    All CT scans at this facility are performed  using dose modulation techniques as appropriate to performed exam including the following:  automated exposure control; adjustment of mA and/or kV according to the patients size (this includes techniques or standardized protocols for targeted exams where dose is matched to indication/reason for exam: i.e. extremities or head);  iterative reconstruction technique.      Electronically signed by: Jose J Schulz MD  Date:    08/02/2018  Time:    17:32             Narrative:    EXAMINATION:  CT MAXILLOFACIAL WITHOUT CONTRAST    CLINICAL HISTORY:  Facial trauma, fx suspected;    FINDINGS:  T mild mucosal thickening right maxillary sinus.  The remainder of the sinuses are clear.    No fractures are identified.    The orbital structures appear intact.    No intracranial hemorrhage is identified.                               The EKG was ordered, reviewed, and independently interpreted by the ED provider.  Interpretation time: 15:57  Rate: 92 BPM  Rhythm: NSR with sinus arrhythmia  Interpretation: No acute ST changes. No STEMI.    The Emergency Provider reviewed the vital signs and test results, which are outlined above.    ED Discussion     CT head ordered, patient with head trauma s/p fall, GCS is 15.     5:36 PM: Discussed with pt all pertinent ED  information and results. Discussed pt dx and plan of tx. Gave pt all f/u and return to the ED instructions. All questions and concerns were addressed at this time. Pt expresses understanding of information and instructions, and is comfortable with plan to discharge. Pt is stable for discharge.    Patient presents with a syncopal or near-syncopal episode. I have no suspicion that the event is secondary to an arrhythmia such as WPW, prolonged QT syndrome, or ventricular tachycardia. I have no suspicion for a seizure episode, intracranial hemorrhage, pulmonary embolus, myocardial infarction, sepsis, ectopic pregnancy, hemorrhagic shock, or hypoglycemia. Based on my evaluation, there is no emergent medical condition. Syncope precautions were discussed with the patient and/or caretaker, specifically not to swim or bathe unattended, not to operate motor vehicles or other machinery, and to avoid heights or other areas where falls may occur until cleared by primary care physician. Patient is safe for discharge.      ED Medication(s):  Medications   neomycin-bacitracnZn-polymyxnB packet 1 each (1 each Topical (Top) Given 8/2/18 3104)       Discharge Medication List as of 8/2/2018  5:40 PM          Follow-up Information     Sharmaine English MD. Schedule an appointment as soon as possible for a visit in 2 days.    Specialty:  Internal Medicine  Why:  Return to the Emergency Room, If symptoms worsen  Contact information:  [address] [facility] [address]  [phone]                    Medical Decision Making    Medical Decision Making:   Clinical Tests:   Lab Tests: Ordered and Reviewed  Radiological Study: Ordered and Reviewed  Medical Tests: Ordered and Reviewed  Pt's [place] syncope score is 0 which puts her at low risk for adverse event.           Scribe Attestation:   Scribe #1: I performed the above scribed service and the documentation accurately describes the services I performed. I attest to the accuracy of  the note.    Attending:   Physician Attestation Statement for Scribe #1: I, Alice Valencia MD, personally performed the services described in this documentation, as scribed by Maegan Burton, in my presence, and it is both accurate and complete.          Clinical Impression       ICD-10-CM ICD-9-CM   1. Syncope R55 780.2   2. Facial trauma, initial encounter S09.93XA 959.09   3. Skin tear of left upper arm without complication, initial encounter S41.112A 880.03       Disposition:   Disposition: Discharged  Condition: Stable         Alice Valencia MD  08/03/18 1046       Alice Valencia MD  08/22/18 1245

## 2018-08-02 NOTE — PROGRESS NOTES
"CC: Fall    Rachel Long is a 77 y.o. female  presents for c/o " falling in the bathroom, hitting her head, left eye and having LOC".Patient reports that she fell at the nursing home last night.  Patient is presenting with caregiver from nursing home. Patient is s/p benign hysterectomy and was coming to see GYN NP for pink discharge, that has resolved.     Past Medical History:   Diagnosis Date    Acute upper respiratory infection     Anemia     Bipolar mood disorder     Coordination abnormal     COPD exacerbation 2016    Coronary artery disease     Difficulty walking     Hypertension     on meds    Hypothyroidism, adult     Insomnia     Malignant neoplasm of colon     Muscle weakness     Neuralgia and neuritis     Schizoaffective disorder     SOB (shortness of breath)     Spinal stenosis     Thyroid disease     Urinary tract infection      Past Surgical History:   Procedure Laterality Date    COLON SURGERY      CORONARY STENT PLACEMENT      HYSTERECTOMY       History reviewed. No pertinent family history.  Social History   Substance Use Topics    Smoking status: Former Smoker     Years: 50.00     Types: Cigarettes    Smokeless tobacco: Former User    Alcohol use No     OB History      Para Term  AB Living    4 4 4     4    SAB TAB Ectopic Multiple Live Births                       /80 (BP Location: Right arm, Patient Position: Sitting, BP Method: Medium (Manual))   Ht 5' 5" (1.651 m)   Wt 66.3 kg (146 lb 2.6 oz)   LMP  (LMP Unknown)   BMI 24.32 kg/m²     ROS:  HEAD: HPI  CHEST: Denies chest pain or shortness of breath.   CARDIOVASCULAR: Denies palpitations or left sided chest pain.   ABDOMEN: No abdominal pain, constipation, diarrhea, nausea, vomiting or rectal bleeding.   URINARY: No frequency, dysuria, hematuria, or burning on urination.  REPRODUCTIVE: See HPI.   PE:   APPEARANCE: Well nourished, well developed, in no acute distress.  AFFECT: WNL, " alert and oriented     1. Fall, initial encounter      Patient sent to ED for CT scan and additional evaluation.

## 2018-08-28 NOTE — PROGRESS NOTES
Subjective:       Patient ID: Rachel Long is a 78 y.o. female.    Chief Complaint: Female  Problem and Lung Cancer (follow-up)    HPI:  Patient presents by herself today with a complaint of bright red discharge for approximately 2 days in her underwear and in the toilet.  She thinks this occurred about 6 weeks ago.  She has not had any further episodes.    Patient denies any abdominal or vaginal pain.  No dysuria.  She denies any sexual encounters prior to onset.  She is unsure if the bloody discharge was coming from her vagina verses in her urine.    In review of her records in Care everywhere she does have a history of gross hematuria that was being worked up by an outside urologist in September of 2017.  It appears that the patient has not followed up since then.    Patient states that she has not had any more episodes of bleeding.    The patient is known to our department for a history of left upper lung squamous cell carcinoma for which she was diagnosed in 2016.  Patient underwent stereotactic radiation therapy for clinical stage I squamous cell carcinoma of the left upper lobe on June 14, 2016.  Her last visit with our department and oncologist- Dr. Mir wast 7/21/18.  At that time there was a decrease in the size of the mass in the left upper lobe there is no evidence of metastatic disease on PET scan.  It was recommended that she follow up every 4-6 months with scans, at that point the patient was lost to follow-up.    Patient relates being in several different nursing homes due to the [place] in 2016 and is now on [facility].     Review of Systems   Constitutional: Negative.  Negative for appetite change, chills, fatigue, fever and unexpected weight change.   HENT: Negative.    Eyes: Negative.    Respiratory: Negative.  Negative for shortness of breath.    Cardiovascular: Negative.  Negative for chest pain and leg swelling.   Gastrointestinal: Negative.  Negative for abdominal distention, abdominal  pain, constipation, diarrhea, nausea, rectal pain and vomiting.        No reflux   Endocrine: Negative.    Genitourinary: Positive for hematuria. Negative for dysuria and frequency.   Musculoskeletal: Negative.    Skin: Negative.    Allergic/Immunologic: Negative.    Neurological: Negative.  Negative for light-headedness.   Hematological: Negative.  Negative for adenopathy.   Psychiatric/Behavioral: Negative.    Breast: Denies any breast lumps- has lance saline implants  Pt somewhat forgetful with instructions- asked same questions several times  Objective:      Physical Exam   Constitutional: She is oriented to person, place, and time. She appears well-developed and well-nourished.   HENT:   Head: Normocephalic and atraumatic.   Right Ear: External ear normal.   Left Ear: External ear normal.   Mouth/Throat: No oropharyngeal exudate.   Eyes: Conjunctivae and EOM are normal. Pupils are equal, round, and reactive to light. Right eye exhibits no discharge. Left eye exhibits no discharge. No scleral icterus.   Neck: Normal range of motion. Neck supple. No thyromegaly present.   Cardiovascular: Normal rate, regular rhythm and normal heart sounds.   Pulmonary/Chest: Effort normal and breath sounds normal. Right breast exhibits no inverted nipple, no mass, no nipple discharge, no skin change and no tenderness. Left breast exhibits no inverted nipple, no mass, no nipple discharge, no skin change and no tenderness.   Lance implants are soft and mobile.    Abdominal: Soft. Bowel sounds are normal.   Musculoskeletal: Normal range of motion. She exhibits no edema.        Right shoulder: She exhibits no crepitus and normal strength.   Lymphadenopathy:        Head (right side): No submental, no submandibular, no tonsillar, no preauricular, no posterior auricular and no occipital adenopathy present.        Head (left side): No submental, no submandibular, no tonsillar, no preauricular, no posterior auricular and no occipital  adenopathy present.     She has no cervical adenopathy.        Right cervical: No superficial cervical and no posterior cervical adenopathy present.       Left cervical: No superficial cervical and no posterior cervical adenopathy present.     She has no axillary adenopathy.        Right: No supraclavicular adenopathy present.        Left: No supraclavicular adenopathy present.   Neurological: She is alert and oriented to person, place, and time. She has normal reflexes.   Skin: Skin is warm and dry. No rash noted. No erythema. No pallor.   Psychiatric: She has a normal mood and affect. Her behavior is normal. Judgment and thought content normal.       Assessment:       1. Malignant neoplasm of upper lobe of left lung    2. Cigarette smoker    3. COPD, very severe    4. Chronic respiratory failure with hypoxia    5. History of hematuria        Plan:   1.       Due to history of gross hematuria one year ago- was seeing Dr. Dena Barcenas- will obtain a urinalysis, cbc, cmp iron and ferritin  2.        History of left upper lobe lung cancer lost to follow-up- recommend CT of chest/abdomen and pelvis for follow-up.   3.        Referred to gyn for exam to make sure no abnormality noted to cause bleeding.   4.        RTC in one month for review of labs and ct results- information written in nursing home progress notes.

## 2018-08-30 ENCOUNTER — OFFICE VISIT (OUTPATIENT)
Dept: OBSTETRICS AND GYNECOLOGY | Facility: CLINIC | Age: 78
End: 2018-08-30
Payer: MEDICARE

## 2018-08-30 ENCOUNTER — OFFICE VISIT (OUTPATIENT)
Dept: HEMATOLOGY/ONCOLOGY | Facility: CLINIC | Age: 78
End: 2018-08-30
Payer: MEDICARE

## 2018-08-30 ENCOUNTER — LAB VISIT (OUTPATIENT)
Dept: LAB | Facility: HOSPITAL | Age: 78
End: 2018-08-30
Attending: INTERNAL MEDICINE
Payer: MEDICARE

## 2018-08-30 VITALS
SYSTOLIC BLOOD PRESSURE: 132 MMHG | BODY MASS INDEX: 24.61 KG/M2 | WEIGHT: 147.69 LBS | HEART RATE: 80 BPM | HEIGHT: 65 IN | DIASTOLIC BLOOD PRESSURE: 79 MMHG | TEMPERATURE: 98 F | OXYGEN SATURATION: 96 %

## 2018-08-30 VITALS
SYSTOLIC BLOOD PRESSURE: 132 MMHG | WEIGHT: 222.25 LBS | HEIGHT: 65 IN | BODY MASS INDEX: 37.03 KG/M2 | DIASTOLIC BLOOD PRESSURE: 72 MMHG

## 2018-08-30 DIAGNOSIS — F17.210 CIGARETTE SMOKER: ICD-10-CM

## 2018-08-30 DIAGNOSIS — C34.12 MALIGNANT NEOPLASM OF UPPER LOBE OF LEFT LUNG: Chronic | ICD-10-CM

## 2018-08-30 DIAGNOSIS — C34.12 MALIGNANT NEOPLASM OF UPPER LOBE OF LEFT LUNG: Primary | Chronic | ICD-10-CM

## 2018-08-30 DIAGNOSIS — J96.11 CHRONIC RESPIRATORY FAILURE WITH HYPOXIA: ICD-10-CM

## 2018-08-30 DIAGNOSIS — J44.9 COPD, VERY SEVERE: Chronic | ICD-10-CM

## 2018-08-30 DIAGNOSIS — Z87.448 HISTORY OF HEMATURIA: ICD-10-CM

## 2018-08-30 DIAGNOSIS — N95.2 VAGINAL ATROPHY: Primary | ICD-10-CM

## 2018-08-30 LAB
BACTERIA #/AREA URNS HPF: ABNORMAL /HPF
BILIRUB UR QL STRIP: NEGATIVE
CLARITY UR: CLEAR
COLOR UR: YELLOW
GLUCOSE UR QL STRIP: NEGATIVE
HGB UR QL STRIP: NEGATIVE
KETONES UR QL STRIP: NEGATIVE
LEUKOCYTE ESTERASE UR QL STRIP: ABNORMAL
MICROSCOPIC COMMENT: ABNORMAL
NITRITE UR QL STRIP: NEGATIVE
PH UR STRIP: 6 [PH] (ref 5–8)
PROT UR QL STRIP: NEGATIVE
RBC #/AREA URNS HPF: 5 /HPF (ref 0–4)
SP GR UR STRIP: 1.02 (ref 1–1.03)
SQUAMOUS #/AREA URNS HPF: 5 /HPF
URN SPEC COLLECT METH UR: ABNORMAL
UROBILINOGEN UR STRIP-ACNC: NEGATIVE EU/DL
WBC #/AREA URNS HPF: 10 /HPF (ref 0–5)

## 2018-08-30 PROCEDURE — 99214 OFFICE O/P EST MOD 30 MIN: CPT | Mod: S$PBB,,, | Performed by: NURSE PRACTITIONER

## 2018-08-30 PROCEDURE — 99999 PR PBB SHADOW E&M-EST. PATIENT-LVL III: CPT | Mod: PBBFAC,,, | Performed by: NURSE PRACTITIONER

## 2018-08-30 PROCEDURE — 81000 URINALYSIS NONAUTO W/SCOPE: CPT

## 2018-08-30 PROCEDURE — 99999 PR PBB SHADOW E&M-EST. PATIENT-LVL V: CPT | Mod: PBBFAC,,, | Performed by: NURSE PRACTITIONER

## 2018-08-30 PROCEDURE — 99213 OFFICE O/P EST LOW 20 MIN: CPT | Mod: S$PBB,,, | Performed by: NURSE PRACTITIONER

## 2018-08-30 PROCEDURE — 99213 OFFICE O/P EST LOW 20 MIN: CPT | Mod: PBBFAC | Performed by: NURSE PRACTITIONER

## 2018-08-30 PROCEDURE — 99215 OFFICE O/P EST HI 40 MIN: CPT | Mod: PBBFAC,27 | Performed by: NURSE PRACTITIONER

## 2018-08-30 RX ORDER — OLOPATADINE HYDROCHLORIDE 2 MG/ML
SOLUTION/ DROPS OPHTHALMIC
COMMUNITY
Start: 2018-08-27 | End: 2018-09-18

## 2018-08-30 RX ORDER — ARIPIPRAZOLE 5 MG/1
1 TABLET ORAL DAILY
Status: ON HOLD | COMMUNITY
Start: 2014-10-03 | End: 2020-01-03 | Stop reason: HOSPADM

## 2018-08-30 RX ORDER — TEMAZEPAM 15 MG/1
15 CAPSULE ORAL
COMMUNITY
Start: 2018-08-06 | End: 2018-09-05

## 2018-08-30 RX ORDER — LOTEPREDNOL ETABONATE 2 MG/ML
1 SUSPENSION/ DROPS OPHTHALMIC DAILY
Status: ON HOLD | COMMUNITY
Start: 2018-08-27 | End: 2020-01-03 | Stop reason: HOSPADM

## 2018-08-30 RX ORDER — BENZTROPINE MESYLATE 1 MG/1
TABLET ORAL
Status: ON HOLD | COMMUNITY
End: 2023-04-06

## 2018-08-30 NOTE — PROGRESS NOTES
"CC: Follow-up vaginal spotting     Rachel oLng is a 78 y.o. female  presents for follow-up to vaginal spotting. Patient was seen several weeks ago after falling in her bathroom at the nursing home. Patient was sent to ED for additional evaluation. Patient reports slight " pink vaginal discharge" that has resolved. Is  Not sexually active and is s/p benign hysterectomy. No pelvic pain.     Past Medical History:   Diagnosis Date    Acute upper respiratory infection     Anemia     Bipolar mood disorder     Coordination abnormal     COPD exacerbation 2016    Coronary artery disease     Difficulty walking     Hypertension     on meds    Hypothyroidism, adult     Insomnia     Malignant neoplasm of colon     Muscle weakness     Neuralgia and neuritis     Schizoaffective disorder     SOB (shortness of breath)     Spinal stenosis     Thyroid disease     Urinary tract infection      Past Surgical History:   Procedure Laterality Date    COLON SURGERY      CORONARY STENT PLACEMENT      HYSTERECTOMY  's     Family History   Problem Relation Age of Onset    Asthma Father     Eczema Father     No Known Problems Mother     Other Brother         shoulder surgery      Social History     Tobacco Use    Smoking status: Former Smoker     Years: 50.00     Types: Cigarettes    Smokeless tobacco: Former User   Substance Use Topics    Alcohol use: No    Drug use: No     OB History      Para Term  AB Living    4 4 4     4    SAB TAB Ectopic Multiple Live Births                       /72   Ht 5' 5" (1.651 m)   Wt 100.8 kg (222 lb 3.6 oz)   LMP  (LMP Unknown)   BMI 36.98 kg/m²     ROS:  GENERAL: Denies weight gain or weight loss. Feeling well overall.   SKIN: Denies rash or lesions.   HEAD: Denies head injury or headache.   NODES: Denies enlarged lymph nodes.   CHEST: Denies chest pain or shortness of breath.   CARDIOVASCULAR: Denies palpitations or left sided chest pain. "   ABDOMEN: No abdominal pain, constipation, diarrhea, nausea, vomiting or rectal bleeding.   URINARY: No frequency, dysuria, hematuria, or burning on urination.  REPRODUCTIVE: See HPI.   BREASTS: The patient performs breast self-examination and denies pain, lumps, or nipple discharge.   HEMATOLOGIC: No easy bruisability or excessive bleeding.   MUSCULOSKELETAL: Denies joint pain or swelling.   NEUROLOGIC: Denies syncope or weakness.   PSYCHIATRIC: Denies depression, anxiety or mood swings.    PE:   APPEARANCE: Well nourished, well developed, in no acute distress.  AFFECT: WNL, alert and oriented x 3.  PELVIC: Normal external female genitalia without lesions. Normal hair distribution. Adequate perineal body, normal urethral meatus. Vagina atrophic without lesions or discharge. No significant cystocele or rectocele. Bimanual exam shows uterus and cervix to be surgically absent. Adnexa without masses or tenderness.  RECTAL: Rectovaginal exam confirms above with normal sphincter tone, no masses.  EXTREMITIES: No edema.     PLAN:  Issue has resolve, no additional evaluation needed  Patient was counseled today on A.C.S. Pap guidelines and recommendations for yearly pelvic exams, mammograms and monthly self breast exams; to see her PCP for other health maintenance.

## 2018-09-03 DIAGNOSIS — D50.0 IRON DEFICIENCY ANEMIA DUE TO CHRONIC BLOOD LOSS: Primary | ICD-10-CM

## 2018-09-06 DIAGNOSIS — D50.8 OTHER IRON DEFICIENCY ANEMIA: Primary | ICD-10-CM

## 2018-09-06 DIAGNOSIS — Z85.118 PERSONAL HISTORY OF LUNG CANCER: ICD-10-CM

## 2018-09-12 ENCOUNTER — HOSPITAL ENCOUNTER (OUTPATIENT)
Dept: RADIOLOGY | Facility: HOSPITAL | Age: 78
Discharge: HOME | End: 2018-09-12
Attending: NURSE PRACTITIONER
Payer: MEDICARE

## 2018-09-12 DIAGNOSIS — F17.210 CIGARETTE SMOKER: ICD-10-CM

## 2018-09-12 DIAGNOSIS — C34.12 MALIGNANT NEOPLASM OF UPPER LOBE OF LEFT LUNG: Chronic | ICD-10-CM

## 2018-09-12 DIAGNOSIS — J44.9 COPD, VERY SEVERE: Chronic | ICD-10-CM

## 2018-09-12 DIAGNOSIS — J96.11 CHRONIC RESPIRATORY FAILURE WITH HYPOXIA: ICD-10-CM

## 2018-09-12 DIAGNOSIS — Z87.448 HISTORY OF HEMATURIA: ICD-10-CM

## 2018-09-12 PROCEDURE — 25500020 PHARM REV CODE 255: Performed by: NURSE PRACTITIONER

## 2018-09-12 PROCEDURE — 74178 CT ABD&PLV WO CNTR FLWD CNTR: CPT | Mod: TC

## 2018-09-12 RX ADMIN — IOHEXOL 30 ML: 350 INJECTION, SOLUTION INTRAVENOUS at 01:09

## 2018-09-12 RX ADMIN — IOHEXOL 75 ML: 350 INJECTION, SOLUTION INTRAVENOUS at 03:09

## 2018-09-12 NOTE — PROGRESS NOTES
Small bilateral nares emboli reported on CT scan performed this afternoon 9/12/2018.  I phoned her nursing home and spoke to her nurse.  Her vital signs are stable without tachycardia or hypoxia.  We will proceed with Eliquis 10 mg by mouth twice a day ×7 days then 5 mg twice a day.   Patient will need to be monitored closely for bleeding as she has a history of hematuria.  ER precautions were reported to the nurse and she will follow-up as scheduled.

## 2018-09-18 ENCOUNTER — OFFICE VISIT (OUTPATIENT)
Dept: HEMATOLOGY/ONCOLOGY | Facility: CLINIC | Age: 78
End: 2018-09-18
Payer: MEDICARE

## 2018-09-18 VITALS
WEIGHT: 145.75 LBS | HEART RATE: 92 BPM | SYSTOLIC BLOOD PRESSURE: 120 MMHG | BODY MASS INDEX: 24.28 KG/M2 | TEMPERATURE: 98 F | HEIGHT: 65 IN | DIASTOLIC BLOOD PRESSURE: 78 MMHG | OXYGEN SATURATION: 97 % | RESPIRATION RATE: 18 BRPM

## 2018-09-18 DIAGNOSIS — C34.12 MALIGNANT NEOPLASM OF UPPER LOBE OF LEFT LUNG: Primary | Chronic | ICD-10-CM

## 2018-09-18 DIAGNOSIS — R91.1 LUNG NODULE SEEN ON IMAGING STUDY: ICD-10-CM

## 2018-09-18 PROCEDURE — 99999 PR PBB SHADOW E&M-EST. PATIENT-LVL III: CPT | Mod: PBBFAC,,, | Performed by: INTERNAL MEDICINE

## 2018-09-18 PROCEDURE — 99215 OFFICE O/P EST HI 40 MIN: CPT | Mod: S$PBB,,, | Performed by: INTERNAL MEDICINE

## 2018-09-18 PROCEDURE — 99213 OFFICE O/P EST LOW 20 MIN: CPT | Mod: PBBFAC,PO | Performed by: INTERNAL MEDICINE

## 2018-09-18 NOTE — PROGRESS NOTES
Hematology/Oncology Office Note    Reason for referral:  Squamous cell carcinoma left upper lung    CC:  Lung cancer    Referred by:  No ref. 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  PET scan has failed to identify distant metastases.  She has a history of medication-induced parkinsonian symptoms and she remains on therapy to control significant bipolar disease.  She has no specific complaints today and reports that shortness of breath is chronic stable.  She denies significant pains or focal deficits.  Performance status = 2     I have reviewed and updated the HPI, ROS, PMHx, Social Hx, Family Hx and treatment history.      Today's visit:  Patient has been lost to follow-up for the previous 2 years.  She presents today to discuss results of CT scan performed last week.  She has no complaints today and reports that she has had no bleeding complications while on Eliquis    Past Medical History:   Diagnosis Date    Acute upper respiratory infection     Anemia     Bipolar mood disorder     Coordination abnormal     COPD exacerbation 5/9/2016    Coronary artery disease     Difficulty walking     Hypertension     on meds    Hypothyroidism, adult     Insomnia     Malignant neoplasm of colon     Muscle weakness     Neuralgia and neuritis     Schizoaffective disorder     SOB (shortness of breath)     Spinal stenosis     Thyroid disease     Urinary tract infection          Social History:  Current every day smoker with a history of 50 pack years      Family History: family history includes Asthma in her father; Eczema in her father; No Known Problems in her mother; Other in her  "brother.  No reported family history of malignancy      HPI    Review of Systems   Constitutional: Positive for fatigue. Negative for activity change, appetite change, chills, diaphoresis, fever and unexpected weight change.   HENT: Negative for congestion, dental problem, drooling, ear discharge, ear pain, hearing loss, nosebleeds, postnasal drip, sneezing, tinnitus, trouble swallowing and voice change.    Eyes: Negative.  Negative for visual disturbance.   Respiratory: Negative for apnea, cough, shortness of breath, wheezing and stridor.    Cardiovascular: Negative for chest pain, palpitations and leg swelling.   Gastrointestinal: Negative for abdominal distention, blood in stool, constipation, nausea and vomiting.   Endocrine: Negative.    Genitourinary: Negative for difficulty urinating, dysuria, enuresis, frequency, genital sores, hematuria, pelvic pain and vaginal bleeding.   Musculoskeletal: Positive for arthralgias, back pain and myalgias. Negative for gait problem, joint swelling, neck pain and neck stiffness.   Skin: Negative for color change, pallor, rash and wound.   Allergic/Immunologic: Negative.    Neurological: Positive for tremors. Negative for dizziness, seizures, syncope, facial asymmetry, weakness, light-headedness, numbness and headaches.   Hematological: Negative for adenopathy. Does not bruise/bleed easily.   Psychiatric/Behavioral: Negative for agitation, behavioral problems, confusion and dysphoric mood. The patient is not nervous/anxious and is not hyperactive.        Objective:       Vitals:    09/18/18 0918   BP: 120/78   Pulse: 92   Resp: 18   Temp: 98 °F (36.7 °C)   TempSrc: Oral   SpO2: 97%   Weight: 66.1 kg (145 lb 11.6 oz)   Height: 5' 5" (1.651 m)     Physical Exam   Constitutional: She is oriented to person, place, and time. She appears well-developed and well-nourished. No distress.   HENT:   Head: Normocephalic and atraumatic.   Right Ear: External ear normal.   Left Ear: " External ear normal.   Nose: Nose normal.   Mouth/Throat: Uvula is midline, oropharynx is clear and moist and mucous membranes are normal. No oral lesions. Normal dentition. No oropharyngeal exudate.   Eyes: Conjunctivae and EOM are normal. Pupils are equal, round, and reactive to light. Right eye exhibits no discharge. Left eye exhibits no discharge.   Neck: Trachea normal and normal range of motion. Neck supple. No JVD present. No thyromegaly present.   Cardiovascular: Normal rate, regular rhythm, normal heart sounds and intact distal pulses. Exam reveals no gallop and no friction rub.   No murmur heard.  Pulmonary/Chest: Effort normal. No accessory muscle usage or stridor. No respiratory distress. She has no decreased breath sounds. She has no wheezes. She has no rhonchi. She has no rales. She exhibits no tenderness.   Abdominal: Soft. Bowel sounds are normal. She exhibits no distension and no mass. There is no tenderness. There is no rebound and no guarding.   Musculoskeletal: Normal range of motion. She exhibits no edema or deformity.   Lymphadenopathy:        Head (right side): No submental and no submandibular adenopathy present.        Head (left side): No submental and no submandibular adenopathy present.     She has no cervical adenopathy.     She has no axillary adenopathy.        Right: No supraclavicular adenopathy present.        Left: No supraclavicular adenopathy present.   Neurological: She is alert and oriented to person, place, and time. She displays normal reflexes. No cranial nerve deficit or sensory deficit. She exhibits normal muscle tone. Coordination normal.   Skin: Skin is warm and dry. No rash noted. She is not diaphoretic. No cyanosis. No pallor. Nails show no clubbing.   Psychiatric: She has a normal mood and affect. Her behavior is normal. Judgment and thought content normal.       Lab Results   Component Value Date    WBC 5.23 08/30/2018    HGB 12.1 08/30/2018    HCT 37.0 08/30/2018     MCV 81 (L) 08/30/2018     08/30/2018     Lab Results   Component Value Date    CREATININE 0.8 08/30/2018    BUN 23 08/30/2018     08/30/2018    K 4.7 08/30/2018     08/30/2018    CO2 26 08/30/2018     Lab Results   Component Value Date    ALT 5 (L) 08/30/2018    AST 14 08/30/2018    ALKPHOS 82 08/30/2018    BILITOT 0.4 08/30/2018 5/4/16 PET:   Hypermetabolic left upper lobe lung mass highly suspicious for bronchogenic malignancy.  No FDG avid regional lymphadenopathy or distant metastatic disease demonstrated.    7/21/16 CT of [facility]:    1. Findings consistent with treatment response with decrease in size of the mass within the left upper lobe.  No definite signs of metastatic disease within the chest, abdomen or pelvis.    2.  Enhancing nodule within the left upper quadrant adjacent to left adrenal gland likely representing a splenule and stable in size when compared to the prior study.        Assessment:       75-year-old female with 2.9 cm left upper lobe squamous cell carcinoma s/p definitive SBRT.  Patient was a poor surgical candidate therefore went on to SBRT which was completed on 6/10/16.  Restaging CT scans following SBRT demonstrated significant reduction in size of left upper lobe mass indicating positive response to treatment.  She was subsequently lost to follow-up and has not been seen since 20016.  She presents for follow-up today with repeat CT scans which demonstrate progression of left upper lobe mass and new right lower lobe nodule.  These findings were consistent with recurrent disease.  We will re-stage with PET scan and MRI of the brain.  She will follow up after imaging to discuss results    Non-small cell lung cancer/squamous cell carcinoma left upper lobe:  --s/p SBRT completed on 6/10/16  --09/2018 progression of nodule in left upper lobe and new nodule in right lower lobe  --PET scan/MRI of the brain  --follow up after imaging to discuss results    Small multiple  pulmonary emboli noted on CT scan performed in 09/2018:  --patient has been on Eliquis 10 mg p.o. b.i.d. for 1 week  --we will decrease dose to 5 mg p.o. B.i.d.

## 2018-09-21 ENCOUNTER — TELEPHONE (OUTPATIENT)
Dept: RADIOLOGY | Facility: HOSPITAL | Age: 78
End: 2018-09-21

## 2018-09-24 ENCOUNTER — HOSPITAL ENCOUNTER (OUTPATIENT)
Dept: RADIOLOGY | Facility: HOSPITAL | Age: 78
Discharge: HOME | End: 2018-09-24
Attending: INTERNAL MEDICINE
Payer: MEDICARE

## 2018-09-24 DIAGNOSIS — R91.1 LUNG NODULE SEEN ON IMAGING STUDY: ICD-10-CM

## 2018-09-24 DIAGNOSIS — C34.12 MALIGNANT NEOPLASM OF UPPER LOBE OF LEFT LUNG: Chronic | ICD-10-CM

## 2018-09-24 DIAGNOSIS — C34.91 MALIGNANT NEOPLASM OF RIGHT LUNG, UNSPECIFIED PART OF LUNG: Primary | ICD-10-CM

## 2018-09-24 DIAGNOSIS — C34.91 MALIGNANT NEOPLASM OF RIGHT LUNG, UNSPECIFIED PART OF LUNG: ICD-10-CM

## 2018-09-24 PROCEDURE — 78815 PET IMAGE W/CT SKULL-THIGH: CPT | Mod: TC

## 2018-09-24 PROCEDURE — 70470 CT HEAD/BRAIN W/O & W/DYE: CPT | Mod: TC

## 2018-09-24 PROCEDURE — 25500020 PHARM REV CODE 255: Performed by: INTERNAL MEDICINE

## 2018-09-24 PROCEDURE — 78815 PET IMAGE W/CT SKULL-THIGH: CPT | Mod: 26,PI,, | Performed by: RADIOLOGY

## 2018-09-24 PROCEDURE — A9552 F18 FDG: HCPCS

## 2018-09-24 RX ADMIN — IOHEXOL 75 ML: 350 INJECTION, SOLUTION INTRAVENOUS at 12:09

## 2018-09-27 ENCOUNTER — OFFICE VISIT (OUTPATIENT)
Dept: HEMATOLOGY/ONCOLOGY | Facility: CLINIC | Age: 78
End: 2018-09-27
Payer: MEDICARE

## 2018-09-27 ENCOUNTER — INITIAL CONSULT (OUTPATIENT)
Dept: RADIATION ONCOLOGY | Facility: CLINIC | Age: 78
End: 2018-09-27
Payer: MEDICARE

## 2018-09-27 VITALS
DIASTOLIC BLOOD PRESSURE: 82 MMHG | HEART RATE: 71 BPM | WEIGHT: 145.06 LBS | HEIGHT: 65 IN | OXYGEN SATURATION: 98 % | SYSTOLIC BLOOD PRESSURE: 158 MMHG | BODY MASS INDEX: 24.17 KG/M2 | RESPIRATION RATE: 18 BRPM | TEMPERATURE: 98 F

## 2018-09-27 VITALS
BODY MASS INDEX: 24.17 KG/M2 | RESPIRATION RATE: 18 BRPM | TEMPERATURE: 98 F | HEART RATE: 71 BPM | HEIGHT: 65 IN | WEIGHT: 145.06 LBS | DIASTOLIC BLOOD PRESSURE: 82 MMHG | SYSTOLIC BLOOD PRESSURE: 158 MMHG | OXYGEN SATURATION: 98 %

## 2018-09-27 DIAGNOSIS — C34.12 MALIGNANT NEOPLASM OF UPPER LOBE OF LEFT LUNG: Primary | Chronic | ICD-10-CM

## 2018-09-27 DIAGNOSIS — C34.91 MALIGNANT NEOPLASM OF RIGHT LUNG, UNSPECIFIED PART OF LUNG: Primary | ICD-10-CM

## 2018-09-27 DIAGNOSIS — F17.210 CIGARETTE SMOKER: ICD-10-CM

## 2018-09-27 DIAGNOSIS — R91.1 LUNG NODULE SEEN ON IMAGING STUDY: ICD-10-CM

## 2018-09-27 DIAGNOSIS — J44.9 COPD, VERY SEVERE: ICD-10-CM

## 2018-09-27 DIAGNOSIS — C34.12 MALIGNANT NEOPLASM OF UPPER LOBE OF LEFT LUNG: ICD-10-CM

## 2018-09-27 PROCEDURE — 99999 PR PBB SHADOW E&M-EST. PATIENT-LVL IV: CPT | Mod: PBBFAC,,, | Performed by: INTERNAL MEDICINE

## 2018-09-27 PROCEDURE — 99215 OFFICE O/P EST HI 40 MIN: CPT | Mod: PBBFAC,27 | Performed by: RADIOLOGY

## 2018-09-27 PROCEDURE — 99214 OFFICE O/P EST MOD 30 MIN: CPT | Mod: PBBFAC | Performed by: INTERNAL MEDICINE

## 2018-09-27 PROCEDURE — 99205 OFFICE O/P NEW HI 60 MIN: CPT | Mod: S$PBB,,, | Performed by: RADIOLOGY

## 2018-09-27 PROCEDURE — 99999 PR PBB SHADOW E&M-EST. PATIENT-LVL V: CPT | Mod: PBBFAC,,, | Performed by: RADIOLOGY

## 2018-09-27 PROCEDURE — 99215 OFFICE O/P EST HI 40 MIN: CPT | Mod: S$PBB,,, | Performed by: INTERNAL MEDICINE

## 2018-09-27 RX ORDER — TEMAZEPAM 15 MG/1
15 CAPSULE ORAL NIGHTLY PRN
Status: ON HOLD | COMMUNITY
End: 2018-11-29 | Stop reason: HOSPADM

## 2018-09-27 RX ORDER — ALBUTEROL SULFATE 90 UG/1
2 AEROSOL, METERED RESPIRATORY (INHALATION) EVERY 6 HOURS PRN
Status: ON HOLD | COMMUNITY
End: 2023-04-06

## 2018-09-27 RX ORDER — ACETAMINOPHEN 325 MG/1
650 TABLET ORAL 4 TIMES DAILY PRN
Status: ON HOLD | COMMUNITY
End: 2023-04-06

## 2018-09-27 RX ORDER — UMECLIDINIUM BROMIDE AND VILANTEROL TRIFENATATE 62.5; 25 UG/1; UG/1
POWDER RESPIRATORY (INHALATION)
Status: ON HOLD | COMMUNITY
Start: 2018-09-24 | End: 2020-01-03 | Stop reason: HOSPADM

## 2018-09-27 RX ORDER — FERROUS SULFATE 325(65) MG
325 TABLET ORAL 2 TIMES DAILY
COMMUNITY
End: 2023-04-02

## 2018-09-27 NOTE — PROGRESS NOTES
Hematology/Oncology Office Note    Reason for referral:  Squamous cell carcinoma left upper lung    CC:  Lung cancer    Referred by:  No ref. 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  PET scan has failed to identify distant metastases.  She has a history of medication-induced parkinsonian symptoms and she remains on therapy to control significant bipolar disease.  She has no specific complaints today and reports that shortness of breath is chronic stable.  She denies significant pains or focal deficits.  Performance status = 2     I have reviewed and updated the HPI, ROS, PMHx, Social Hx, Family Hx and treatment history.      Today's visit:  Patient is without new complaints today.  She presents with her son and daughter-in-law to discuss results of PET scan and CT scan of the brain.    Past Medical History:   Diagnosis Date    Acute upper respiratory infection     Anemia     Bipolar mood disorder     Coordination abnormal     COPD exacerbation 5/9/2016    Coronary artery disease     Difficulty walking     Hypertension     on meds    Hypothyroidism, adult     Insomnia     Malignant neoplasm of colon     Muscle weakness     Neuralgia and neuritis     Schizoaffective disorder     SOB (shortness of breath)     Spinal stenosis     Thyroid disease     Urinary tract infection          Social History:  Current every day smoker with a history of 50 pack years      Family History: family history includes Asthma in her father; Eczema in her father; No Known Problems in her mother; Other in her brother.  No reported family history of malignancy      HPI    Review of Systems  "  Constitutional: Positive for fatigue. Negative for activity change, appetite change, chills, diaphoresis, fever and unexpected weight change.   HENT: Negative for congestion, dental problem, drooling, ear discharge, ear pain, hearing loss, nosebleeds, postnasal drip, rhinorrhea, sinus pain and sneezing.    Eyes: Negative.  Negative for visual disturbance.   Respiratory: Negative for apnea, cough, choking, chest tightness and shortness of breath.    Cardiovascular: Negative for chest pain and leg swelling.   Gastrointestinal: Negative for abdominal distention, abdominal pain, anal bleeding, blood in stool, constipation, diarrhea and nausea.   Endocrine: Negative.    Genitourinary: Negative for difficulty urinating, dysuria, enuresis, frequency, genital sores and hematuria.   Musculoskeletal: Positive for arthralgias, back pain and myalgias. Negative for gait problem, joint swelling, neck pain and neck stiffness.   Skin: Negative for color change, pallor, rash and wound.   Allergic/Immunologic: Negative.    Neurological: Negative for dizziness, syncope, facial asymmetry, light-headedness, numbness and headaches.   Hematological: Negative for adenopathy. Does not bruise/bleed easily.   Psychiatric/Behavioral: Negative for agitation, behavioral problems, confusion and dysphoric mood. The patient is not nervous/anxious and is not hyperactive.        Objective:       Vitals:    09/27/18 1339   BP: (!) 158/82   Pulse: 71   Resp: 18   Temp: 98.1 °F (36.7 °C)   TempSrc: Oral   SpO2: 98%   Weight: 65.8 kg (145 lb 1 oz)   Height: 5' 5" (1.651 m)     Physical Exam   Constitutional: She is oriented to person, place, and time. She appears well-developed and well-nourished. No distress.   HENT:   Head: Normocephalic and atraumatic.   Right Ear: External ear normal.   Left Ear: External ear normal.   Nose: Nose normal.   Mouth/Throat: Uvula is midline, oropharynx is clear and moist and mucous membranes are normal. No oral " lesions. Normal dentition. No oropharyngeal exudate.   Eyes: Conjunctivae and EOM are normal. Pupils are equal, round, and reactive to light. Right eye exhibits no discharge. Left eye exhibits no discharge.   Neck: Trachea normal and normal range of motion. Neck supple. No JVD present. No thyromegaly present.   Cardiovascular: Normal rate, regular rhythm, normal heart sounds and intact distal pulses. Exam reveals no gallop and no friction rub.   No murmur heard.  Pulmonary/Chest: Effort normal. No accessory muscle usage or stridor. No respiratory distress. She has no decreased breath sounds. She has no wheezes. She has no rhonchi. She has no rales. She exhibits no tenderness.   Abdominal: Soft. Bowel sounds are normal. She exhibits no distension and no mass. There is no tenderness. There is no rebound and no guarding.   Musculoskeletal: Normal range of motion. She exhibits no edema or deformity.   Lymphadenopathy:        Head (right side): No submental and no submandibular adenopathy present.        Head (left side): No submental and no submandibular adenopathy present.     She has no cervical adenopathy.     She has no axillary adenopathy.        Right: No supraclavicular adenopathy present.        Left: No supraclavicular adenopathy present.   Neurological: She is alert and oriented to person, place, and time. She displays normal reflexes. No cranial nerve deficit or sensory deficit. She exhibits normal muscle tone. Coordination normal.   Skin: Skin is warm and dry. Capillary refill takes less than 2 seconds. No abrasion, no bruising and no ecchymosis noted. Rash is not pustular and not urticarial. No cyanosis. Nails show no clubbing.   Psychiatric: She has a normal mood and affect. Her behavior is normal. Judgment and thought content normal.       Lab Results   Component Value Date    WBC 5.23 08/30/2018    HGB 12.1 08/30/2018    HCT 37.0 08/30/2018    MCV 81 (L) 08/30/2018     08/30/2018     Lab Results    Component Value Date    CREATININE 0.8 08/30/2018    BUN 23 08/30/2018     08/30/2018    K 4.7 08/30/2018     08/30/2018    CO2 26 08/30/2018     Lab Results   Component Value Date    ALT 5 (L) 08/30/2018    AST 14 08/30/2018    ALKPHOS 82 08/30/2018    BILITOT 0.4 08/30/2018 5/4/16 PET:   Hypermetabolic left upper lobe lung mass highly suspicious for bronchogenic malignancy.  No FDG avid regional lymphadenopathy or distant metastatic disease demonstrated.    7/21/16 CT of [facility]:    1. Findings consistent with treatment response with decrease in size of the mass within the left upper lobe.  No definite signs of metastatic disease within the chest, abdomen or pelvis.    2.  Enhancing nodule within the left upper quadrant adjacent to left adrenal gland likely representing a splenule and stable in size when compared to the prior study.        Assessment:       75-year-old female with 2.9 cm left upper lobe squamous cell carcinoma s/p definitive SBRT.  Patient was a poor surgical candidate therefore went on to SBRT which was completed on 6/10/16.  Restaging CT scans following SBRT demonstrated significant reduction in size of left upper lobe mass indicating positive response to treatment.  She was subsequently lost to follow-up and has not been seen since 20016.  She presents for follow-up today with repeat CT scans which demonstrate progression of left upper lobe mass and new right lower lobe nodule.    PET scan performed on 09/24/2018 was consistent with recurrent disease.  CT scan of the brain was negative for metastatic disease.  I have recommended radiation oncology evaluation for SBRT.  I informed the patient that Dr. Mora may want to repeat biopsy prior to treatment.  She and family verbalized understanding and are eager to meet with Dr. Mora.    Non-small cell lung cancer/squamous cell carcinoma left upper lobe:  --s/p SBRT completed on 6/10/16  --09/2018 progression of nodule in left upper  lobe and new nodule in right lower lobe      Small multiple pulmonary emboli noted on CT scan performed in 09/2018:  --Eliquis 5 mg p.o. b.i.d..
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
## 2018-09-27 NOTE — LETTER
September 27, 2018      Chace Mir Jr., MD  [address]  [facility] [address]           [facility] - Radiation Oncology  [address] [place] [address]  Phone: [phone]  Fax: [phone]          Patient: Rachel Long   MR Number: [id number]   YOB: 1940   Date of Visit: 9/27/2018       Dear Dr. Chace Mir Jr.:    Thank you for referring Rachel Long to me for evaluation. Attached you will find relevant portions of my assessment and plan of care.    If you have questions, please do not hesitate to call me. I look forward to following Rachel Long along with you.    Sincerely,    Dar Mora II, MD    Enclosure  CC:  No Recipients    If you would like to receive this communication electronically, please contact [email or web address] or [phone] to request more information on [employer] access.    For providers and/or their staff who would like to refer a patient to [facility], please contact us through our one-stop-shop provider referral line, [facility] [place], at [phone].    If you feel you have received this communication in error or would no longer like to receive these types of communications, please e-mail [email or web address]

## 2018-09-28 ENCOUNTER — TELEPHONE (OUTPATIENT)
Dept: HEMATOLOGY/ONCOLOGY | Facility: CLINIC | Age: 78
End: 2018-09-28

## 2018-09-28 ENCOUNTER — TELEPHONE (OUTPATIENT)
Dept: RADIATION ONCOLOGY | Facility: CLINIC | Age: 78
End: 2018-09-28

## 2018-09-28 NOTE — TELEPHONE ENCOUNTER
----- Message from Clifford Woodall sent at 9/28/2018 10:09 AM CDT -----  Contact: Tracey Daniel (Anne)  Please give Anne a call at [phone] regarding a update regarding the pt status.

## 2018-09-28 NOTE — TELEPHONE ENCOUNTER
Returned call to Anne at [facility] regarding Mrs Long's visit summary. Notes were faxed to Anne @  and f/u appt's reviewed. Mrs Rodríguez verbalized understanding.

## 2018-10-03 ENCOUNTER — TELEPHONE (OUTPATIENT)
Dept: RADIATION ONCOLOGY | Facility: CLINIC | Age: 78
End: 2018-10-03

## 2018-10-03 NOTE — TELEPHONE ENCOUNTER
Notified Anne at [facility] about patient's appt date change in radiation oncology with Dr Mora to Oct 18th at 1pm due to biopsy report will not be back on 10-11-18. Anne verbalized understanding. Patient's daughter Macie notified of the same.

## 2018-10-09 DIAGNOSIS — C34.12 MALIGNANT NEOPLASM OF UPPER LOBE OF LEFT LUNG: ICD-10-CM

## 2018-10-09 DIAGNOSIS — J44.9 COPD, VERY SEVERE: Primary | Chronic | ICD-10-CM

## 2018-10-09 DIAGNOSIS — R79.1 ABNORMAL BLOOD COAGULATION PROFILE: ICD-10-CM

## 2018-10-09 DIAGNOSIS — J96.11 CHRONIC RESPIRATORY FAILURE WITH HYPOXIA: ICD-10-CM

## 2018-10-09 DIAGNOSIS — I10 ESSENTIAL HYPERTENSION: ICD-10-CM

## 2018-10-09 DIAGNOSIS — G21.19 PARKINSONISM DUE TO DRUGS: ICD-10-CM

## 2018-10-11 DIAGNOSIS — C34.12 MALIGNANT NEOPLASM OF UPPER LOBE OF LEFT LUNG: Primary | ICD-10-CM

## 2018-10-12 ENCOUNTER — DOCUMENTATION ONLY (OUTPATIENT)
Dept: INFUSION THERAPY | Facility: HOSPITAL | Age: 78
End: 2018-10-12

## 2018-10-12 NOTE — PROGRESS NOTES
Patient was presented by Dr. Dar Mora and discussed at the [facility] multi-disciplinary Tumor Conference on Oct. 11, 2018, with a diagnosis of malignant neoplasm of upper lobe of left lung. The recommendation of the board was to perform biopsy to left lung lesion and obtain a PDL-1 on previous biopsy. Also, perform local radiation to both right and left sides.

## 2018-10-19 ENCOUNTER — TELEPHONE (OUTPATIENT)
Dept: RADIOLOGY | Facility: HOSPITAL | Age: 78
End: 2018-10-19

## 2018-10-19 NOTE — PROGRESS NOTES
[facility] [place]  RADIATION ONCOLOGY FOLLOW UP    Name: Rachel Long  : 1940      DIAGNOSIS:  Likely metachronous non-small cell lung cancer  1. 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 18 CT chest new right lower lobe 1.5 x 1.7 cm nodule and left lower lobe 2.6 x 3.5 cm opacity in area of prior lung cancer 2016  3. 18 PET scan 5.7 SUV left upper lobe area of prior SBRT, 3.7 SUV right lower lobe  4. 10/22/18 CT-guided biopsy left upper lobe prior area of stereotactic body radiation therapy returned squamous cell carcinoma    CURRENT STATUS: Rachel Long is a pleasant 78 y.o. female who presents today for follow-up.  She had a biopsy with the results as above.  She has some mild chest wall pain since the biopsy.  She has no dyspnea on exertion or shortness of breath at rest other than baseline.    PHYSICAL EXAM:   Constitutional: well appearing, no acute distress, ECOG 0 - Fully Active  Vitals:     Vitals:    10/25/18 1324   BP: (!) 160/90   Pulse: 81   Resp: 20   Temp: 97.5 °F (36.4 °C)     Laboratory & X-Ray Findings: Per above.      ASSESSMENT:  Biopsy-proven stereotactic body radiation therapy failure left lung, FDG avid new spiculated suspicious right lung medial lesion    PLAN:  The left lung can be treated with either ablated techniques with interventional Radiology or stereotactic body radiation therapy.  She has had prior stereotactic body radiation therapy and it is close to her major vessels, so I favor ablate of techniques and I have referred her to interventional Radiology to have this done as soon as possible.    Regarding the right lung lesion, I offered a biopsy but she was not very interested given that she has some chest wall pain from her left-sided biopsy.  This is reasonable given the new lesion, spiculation, history of lung cancer, biopsy proven left-sided recurrence and FDG uptake.  This is probably a  metachronous new lung cancer and we will treated with curative intent.  I will treated with stereotactic body radiation therapy 50 Gy in 5 fractions at the completion of her left lung cryotherapy.    We discussed the anticipated and possible acute and late toxicities of radiation.  I answered her and her family's questions to their apparent satisfaction.    JOBY Mora M.D.  Radiation Oncology  [facility] [address] [place] [address]  Ph: [phone]  [email or web address]

## 2018-10-22 ENCOUNTER — HOSPITAL ENCOUNTER (OUTPATIENT)
Facility: HOSPITAL | Age: 78
Discharge: HOME | End: 2018-10-22
Attending: RADIOLOGY | Admitting: RADIOLOGY
Payer: MEDICARE

## 2018-10-22 ENCOUNTER — TELEPHONE (OUTPATIENT)
Dept: RADIOLOGY | Facility: HOSPITAL | Age: 78
End: 2018-10-22

## 2018-10-22 VITALS
TEMPERATURE: 98 F | HEART RATE: 63 BPM | HEIGHT: 65 IN | RESPIRATION RATE: 20 BRPM | SYSTOLIC BLOOD PRESSURE: 152 MMHG | WEIGHT: 152 LBS | BODY MASS INDEX: 25.33 KG/M2 | DIASTOLIC BLOOD PRESSURE: 94 MMHG | OXYGEN SATURATION: 98 %

## 2018-10-22 DIAGNOSIS — C34.12 MALIGNANT NEOPLASM OF UPPER LOBE OF LEFT LUNG: Chronic | ICD-10-CM

## 2018-10-22 PROCEDURE — 88305 TISSUE EXAM BY PATHOLOGIST: CPT | Mod: 26,,, | Performed by: PATHOLOGY

## 2018-10-22 PROCEDURE — 63600175 PHARM REV CODE 636 W HCPCS: Performed by: RADIOLOGY

## 2018-10-22 PROCEDURE — 88305 TISSUE EXAM BY PATHOLOGIST: CPT | Performed by: PATHOLOGY

## 2018-10-22 PROCEDURE — 63600175 PHARM REV CODE 636 W HCPCS

## 2018-10-22 RX ORDER — MIDAZOLAM HYDROCHLORIDE 1 MG/ML
INJECTION INTRAMUSCULAR; INTRAVENOUS CODE/TRAUMA/SEDATION MEDICATION
Status: COMPLETED | OUTPATIENT
Start: 2018-10-22 | End: 2018-10-22

## 2018-10-22 RX ORDER — FENTANYL CITRATE 50 UG/ML
25 INJECTION, SOLUTION INTRAMUSCULAR; INTRAVENOUS ONCE
Status: COMPLETED | OUTPATIENT
Start: 2018-10-22 | End: 2018-10-22

## 2018-10-22 RX ORDER — FENTANYL CITRATE 50 UG/ML
50 INJECTION, SOLUTION INTRAMUSCULAR; INTRAVENOUS ONCE
Status: COMPLETED | OUTPATIENT
Start: 2018-10-22 | End: 2018-10-22

## 2018-10-22 RX ORDER — FENTANYL CITRATE 50 UG/ML
INJECTION, SOLUTION INTRAMUSCULAR; INTRAVENOUS CODE/TRAUMA/SEDATION MEDICATION
Status: COMPLETED | OUTPATIENT
Start: 2018-10-22 | End: 2018-10-22

## 2018-10-22 RX ADMIN — FENTANYL CITRATE 25 MCG: 50 INJECTION, SOLUTION INTRAMUSCULAR; INTRAVENOUS at 12:10

## 2018-10-22 RX ADMIN — MIDAZOLAM HYDROCHLORIDE 0.5 MG: 1 INJECTION, SOLUTION INTRAMUSCULAR; INTRAVENOUS at 10:10

## 2018-10-22 RX ADMIN — FENTANYL CITRATE 25 MCG: 50 INJECTION, SOLUTION INTRAMUSCULAR; INTRAVENOUS at 10:10

## 2018-10-22 RX ADMIN — FENTANYL CITRATE 50 MCG: 50 INJECTION, SOLUTION INTRAMUSCULAR; INTRAVENOUS at 11:10

## 2018-10-22 NOTE — NURSING
Report called to MAGDY Bernal at [facility]. Ok to d/c per Dr. combs's orders. Pt is stable, denies pain/sob. bandaid in place c/d/i. D/c instructions explained and copy given to patient. Pt verbalized understanding and all questions answered. Pt wheeled to transportation van provided by [facility] [place].

## 2018-10-22 NOTE — DISCHARGE SUMMARY
Sterile technique was performed in the left anterior chest, lidocaine was used as a local anesthetic.  Multiple samples taken from left chest mass.  Pt tolerated the procedure well with small pneumothorax.  Please see radiologist report for details. F/u with PCP and/or ordering physician.

## 2018-10-22 NOTE — DISCHARGE INSTRUCTIONS
Recovery After Procedural Sedation (Adult)  You have been given medicine by vein to make you sleep during your surgery. This may have included both a pain medicine and sleeping medicine. Most of the effects have worn off. But you may still have some drowsiness for the next 6 to 8 hours.  Home care  Follow these guidelines when you get home:  · For the next 8 hours, you should be watched by a responsible adult. This person should make sure your condition is not getting worse.  · Don't drink any alcohol for the next 24 hours.  · Don't drive, operate dangerous machinery, or make important business or personal decisions during the next 24 hours.  Note: Your healthcare provider may tell you not to take any medicine by mouth for pain or sleep in the next 4 hours. These medicines may react with the medicines you were given in the hospital. This could cause a much stronger response than usual.  Follow-up care  Follow up with your healthcare provider if you are not alert and back to your usual level of activity within 12 hours.  When to seek medical advice  Call your healthcare provider right away if any of these occur:  · Drowsiness gets worse  · Weakness or dizziness gets worse  · Repeated vomiting  · You can't be awakened   Date Last Reviewed: 10/18/2016  © 2214-4448 [employer]. [address] [place] [address], [place] [address]. All rights reserved. This information is not intended as a substitute for professional medical care. Always follow your healthcare professional's instructions.        CT-Guided Lung Biopsy  CT-guided lung biopsy is a procedure to collect small tissue samples from an abnormal area in your lung. During the procedure, an imaging method called CT (computed tomography) is used to show live pictures of your lung. Then a thin needle is used to remove the tissue samples. The samples are tested in a lab for cancer and other problems.     For the biopsy, a thin needle is used to remove samples of  tissue from an abnormal area in the lung.   Getting ready for your procedure  Follow any instructions from your healthcare provider.  Tell your provider about any medicines you are taking. It is important for your provider to know if you are taking any blood-thinning medicines or have a bleeding disorder.  You may need to stop taking all or some medicine before the procedure. This includes:  · All prescription medicines  · Blood-thinning medicines (anticoagulants)  · Over-the-counter medicines such as aspirin or ibuprofen  · Street drugs  · Herbs, vitamins, and other supplements  Also tell your provider if you:  · Are pregnant or think you may be pregnant  · Are breastfeeding  · Are allergic to or have intolerances to any medicines  · Have a chronic cough, new cough, or other illness  · Use oxygen therapy at home  · Smoke or drink alcohol on a regular basis  Follow any directions youre given for not eating or drinking before the procedure.  The day of your procedure  The procedure takes about 60 minutes. The entire procedure (including time to prepare and recover) takes several hours. Youll likely go home the same day.  Before the procedure begins:  · An IV (intravenous) line may be put into a vein in your hand or arm. This line supplies fluids and medicines.  · To keep you free of pain during the procedure, you may be given anesthesia. Depending on the type of anesthesia used, you may be awake, drowsy, or in a deep sleep for the procedure.  During the procedure:  · Youll lie on a CT scan table. You may be on your back, side, or stomach. Pictures of your lung are then taken using the CT scanner. This helps your provider find the best place to position the needle in your lungs.  · A kash is made on your skin where the needle will be inserted (biopsy site). The site is injected with numbing medicine.  · Using the CT pictures as a guide, the needle is passed through the numbed skin between your ribs and into your  lung. Samples of tissue are then removed from the abnormal area in your lung. The samples are sent to a lab to be checked for problems.  · When the procedure is complete, the needle is removed. Pressure is applied to the biopsy site to help stop any bleeding. The site is then bandaged.  After the procedure:  · Youll be taken to a room to rest until the anesthesia wears off.  · A chest X-ray may be done. This is to make sure there was no damage to your lungs or the area where the needle was placed.  · When its time for you to go home, have an adult family member or friend ready to drive you.  Recovering at home  You may cough up a small amount of blood shortly after the procedure. Later, you may also have some soreness around the biopsy site. Once at home, follow any instructions youre given. Be sure to:  · Take all medicines as directed.  · Care for the biopsy site as instructed.  · Check for signs of infection at the biopsy site (see below).  · Do not bathe or shower until your provider says it's OK. If you wish, you may wash with a sponge or washcloth.  · Avoid heavy lifting and other strenuous activities as directed.  · If you plan any air travel, ask your provider when you can do so. You may be told not to fly for a few weeks. This is because pressure changes may affect your lungs.  When to call your provider  Call 911 or your local emergency number if you have sudden, severe difficulty breathing. This could be a life-threatening emergency.  Call your healthcare provider for less severe symptoms that are still concerning. If you are unable to speak with your provider, go to the emergency room if you have any of the following symptoms:  · Fever of 100.4°F (38°C) or higher, or as directed by your provider  · Sudden chest pain, shortness of breath, or fainting  · Coughing up increasing amounts of blood  · Signs of infection at the biopsy site, such as increased redness or swelling, warmth, more pain, bleeding,  or bad-smelling drainage   Follow-up  The provider who ordered your test will discuss the biopsy results with you during a follow-up visit. Results are usually ready within 1 to 2 weeks. If more tests or treatments are needed, your provider will discuss these with you.  Risks and possible complications  All procedures have some risk. Possible risks of this procedure include:  · An air leak in your lung (pneumothorax). This may require a stay in the hospital and treatment to re-inflate the lung.  · Bleeding into or around the lung  · Infection in the skin or lung  · Injury to other structures in the chest  · Risks of anesthesia. This will be discussed with you before the procedure.   Date Last Reviewed: 6/11/2015  © 5103-1680 [employer]. [address] [place] [address], [place] [address]. All rights reserved. This information is not intended as a substitute for professional medical care. Always follow your healthcare professional's instructions.

## 2018-10-22 NOTE — SEDATION DOCUMENTATION
Pt lying on ct table supine with left arm above head. Pt vss, cardiac monitoring in place. Pt verbalized understanding and all questions answered

## 2018-10-25 ENCOUNTER — OFFICE VISIT (OUTPATIENT)
Dept: HEMATOLOGY/ONCOLOGY | Facility: CLINIC | Age: 78
End: 2018-10-25
Payer: MEDICARE

## 2018-10-25 ENCOUNTER — OFFICE VISIT (OUTPATIENT)
Dept: RADIATION ONCOLOGY | Facility: CLINIC | Age: 78
End: 2018-10-25
Payer: MEDICARE

## 2018-10-25 VITALS
OXYGEN SATURATION: 96 % | RESPIRATION RATE: 20 BRPM | OXYGEN SATURATION: 96 % | WEIGHT: 157.88 LBS | TEMPERATURE: 98 F | SYSTOLIC BLOOD PRESSURE: 160 MMHG | BODY MASS INDEX: 26.3 KG/M2 | HEIGHT: 65 IN | RESPIRATION RATE: 20 BRPM | DIASTOLIC BLOOD PRESSURE: 90 MMHG | BODY MASS INDEX: 26.3 KG/M2 | DIASTOLIC BLOOD PRESSURE: 90 MMHG | TEMPERATURE: 98 F | HEIGHT: 65 IN | HEART RATE: 81 BPM | SYSTOLIC BLOOD PRESSURE: 160 MMHG | HEART RATE: 81 BPM | WEIGHT: 157.88 LBS

## 2018-10-25 DIAGNOSIS — F17.210 CIGARETTE SMOKER: ICD-10-CM

## 2018-10-25 DIAGNOSIS — C34.12 MALIGNANT NEOPLASM OF UPPER LOBE OF LEFT LUNG: Primary | Chronic | ICD-10-CM

## 2018-10-25 DIAGNOSIS — C34.91 MALIGNANT NEOPLASM OF RIGHT LUNG, UNSPECIFIED PART OF LUNG: ICD-10-CM

## 2018-10-25 DIAGNOSIS — R91.1 LUNG NODULE SEEN ON IMAGING STUDY: ICD-10-CM

## 2018-10-25 DIAGNOSIS — J44.9 COPD, VERY SEVERE: ICD-10-CM

## 2018-10-25 PROCEDURE — 99999 PR PBB SHADOW E&M-EST. PATIENT-LVL III: CPT | Mod: PBBFAC,,, | Performed by: INTERNAL MEDICINE

## 2018-10-25 PROCEDURE — 99215 OFFICE O/P EST HI 40 MIN: CPT | Mod: PBBFAC | Performed by: RADIOLOGY

## 2018-10-25 PROCEDURE — 99213 OFFICE O/P EST LOW 20 MIN: CPT | Mod: PBBFAC,27 | Performed by: INTERNAL MEDICINE

## 2018-10-25 PROCEDURE — 99999 PR PBB SHADOW E&M-EST. PATIENT-LVL V: CPT | Mod: PBBFAC,,, | Performed by: RADIOLOGY

## 2018-10-25 PROCEDURE — 99214 OFFICE O/P EST MOD 30 MIN: CPT | Mod: S$PBB,,, | Performed by: RADIOLOGY

## 2018-10-25 PROCEDURE — 99215 OFFICE O/P EST HI 40 MIN: CPT | Mod: S$PBB,,, | Performed by: INTERNAL MEDICINE

## 2018-10-25 RX ORDER — CYCLOSPORINE 0.5 MG/ML
0.05 EMULSION OPHTHALMIC 2 TIMES DAILY
Status: ON HOLD | COMMUNITY
Start: 2018-10-01 | End: 2020-01-03 | Stop reason: HOSPADM

## 2018-10-25 NOTE — PROGRESS NOTES
Hematology/Oncology Office Note    Reason for referral:  Squamous cell carcinoma left upper lung    CC:  Lung cancer    Referred by:  No ref. 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  PET scan has failed to identify distant metastases.  She has a history of medication-induced parkinsonian symptoms and she remains on therapy to control significant bipolar disease.  She has no specific complaints today and reports that shortness of breath is chronic stable.  She denies significant pains or focal deficits.  Performance status = 2     I have reviewed and updated the HPI, ROS, PMHx, Social Hx, Family Hx and treatment history.      Today's visit:  Patient is without new complaints today.  She presents status post CT-guided biopsy of left upper lobe mass the.  She remains on Eliquis without reported bleeding complications.    Past Medical History:   Diagnosis Date    Acute upper respiratory infection     Anemia     Bipolar mood disorder     Coordination abnormal     COPD exacerbation 5/9/2016    Coronary artery disease     Difficulty walking     Hypertension     on meds    Hypothyroidism, adult     Insomnia     Malignant neoplasm of colon     Muscle weakness     Neuralgia and neuritis     Schizoaffective disorder     SOB (shortness of breath)     Spinal stenosis     Thyroid disease     Urinary tract infection          Social History:  Current every day smoker with a history of 50 pack years      Family History: family history includes Asthma in her father; Eczema in her father; No Known Problems in her mother; Other in her brother.  No reported family history of  "malignancy      HPI    Review of Systems   Constitutional: Negative for activity change, appetite change, chills, diaphoresis, fatigue, fever and unexpected weight change.   HENT: Negative for drooling, ear discharge, ear pain, hearing loss, nosebleeds, postnasal drip, rhinorrhea and sinus pain.    Eyes: Negative.    Respiratory: Negative for apnea, cough, choking, chest tightness and shortness of breath.    Cardiovascular: Negative for chest pain and leg swelling.   Gastrointestinal: Negative for abdominal distention, abdominal pain, anal bleeding, blood in stool, constipation and diarrhea.   Endocrine: Negative.    Genitourinary: Negative for difficulty urinating, dyspareunia, dysuria, enuresis, flank pain and frequency.   Musculoskeletal: Positive for arthralgias. Negative for back pain, gait problem, joint swelling, myalgias, neck pain and neck stiffness.   Skin: Negative for color change, pallor and rash.   Allergic/Immunologic: Negative.    Neurological: Negative for dizziness, seizures, syncope, speech difficulty, light-headedness, numbness and headaches.   Hematological: Negative for adenopathy. Does not bruise/bleed easily.   Psychiatric/Behavioral: Negative for agitation, behavioral problems, confusion, decreased concentration and dysphoric mood.       Objective:       Vitals:    10/25/18 1307   BP: (!) 160/90   Pulse: 81   Resp: 20   Temp: 97.5 °F (36.4 °C)   TempSrc: Oral   SpO2: 96%   Weight: 71.6 kg (157 lb 13.6 oz)   Height: 5' 5" (1.651 m)     Physical Exam   Constitutional: She is oriented to person, place, and time. She appears well-developed and well-nourished. No distress.   HENT:   Head: Normocephalic and atraumatic.   Right Ear: External ear normal.   Left Ear: External ear normal.   Nose: Nose normal.   Mouth/Throat: Uvula is midline, oropharynx is clear and moist and mucous membranes are normal. No oral lesions. Normal dentition. No oropharyngeal exudate.   Eyes: Conjunctivae and EOM are " normal. Pupils are equal, round, and reactive to light. Right eye exhibits no discharge. Left eye exhibits no discharge.   Neck: Trachea normal and normal range of motion. Neck supple. No JVD present. No thyromegaly present.   Cardiovascular: Normal rate, regular rhythm, normal heart sounds and intact distal pulses. Exam reveals no gallop and no friction rub.   No murmur heard.  Pulmonary/Chest: Effort normal. No stridor. No respiratory distress. She has decreased breath sounds. She has no wheezes. She has no rhonchi. She has no rales. She exhibits no tenderness.   Abdominal: Soft. Bowel sounds are normal. She exhibits no distension and no mass. There is no tenderness. There is no rebound and no guarding.   Musculoskeletal: Normal range of motion. She exhibits no edema or deformity.   Lymphadenopathy:        Head (right side): No submental and no submandibular adenopathy present.        Head (left side): No submental and no submandibular adenopathy present.     She has no cervical adenopathy.     She has no axillary adenopathy.        Right: No supraclavicular adenopathy present.        Left: No supraclavicular adenopathy present.   Neurological: She is alert and oriented to person, place, and time. She displays normal reflexes. No cranial nerve deficit or sensory deficit. She exhibits normal muscle tone. Coordination normal.   Skin: Skin is warm, dry and intact. Capillary refill takes less than 2 seconds. No rash noted. She is not diaphoretic.   Psychiatric: She has a normal mood and affect. Her behavior is normal. Judgment and thought content normal.       Lab Results   Component Value Date    WBC 4.81 10/22/2018    HGB 12.4 10/22/2018    HCT 37.6 10/22/2018    MCV 87 10/22/2018     (L) 10/22/2018     Lab Results   Component Value Date    CREATININE 0.8 08/30/2018    BUN 23 08/30/2018     08/30/2018    K 4.7 08/30/2018     08/30/2018    CO2 26 08/30/2018     Lab Results   Component Value Date     ALT 5 (L) 08/30/2018    AST 14 08/30/2018    ALKPHOS 82 08/30/2018    BILITOT 0.4 08/30/2018 5/4/16 PET:   Hypermetabolic left upper lobe lung mass highly suspicious for bronchogenic malignancy.  No FDG avid regional lymphadenopathy or distant metastatic disease demonstrated.    7/21/16 CT of [facility]:    1. Findings consistent with treatment response with decrease in size of the mass within the left upper lobe.  No definite signs of metastatic disease within the chest, abdomen or pelvis.    2.  Enhancing nodule within the left upper quadrant adjacent to left adrenal gland likely representing a splenule and stable in size when compared to the prior study.        Assessment:       75-year-old female with 2.9 cm left upper lobe squamous cell carcinoma s/p definitive SBRT.  Patient was a poor surgical candidate therefore went on to SBRT which was completed on 6/10/16.  Restaging CT scans following SBRT demonstrated significant reduction in size of left upper lobe mass indicating positive response to treatment.  She was subsequently lost to follow-up and has not been seen since 20016.  She presents for follow-up today with repeat CT scans which demonstrate progression of left upper lobe mass and new right lower lobe nodule.    PET scan performed on 09/24/2018 was consistent with recurrent disease.  CT scan of the brain was negative for metastatic disease.   Repeat biopsy of left upper lobe mass was consistent with squamous cell carcinoma with PDL1 pending.  The patient is planning to meet with Dr. Mora to discuss local regional treatment with SBRT and possibly cryotherapy.  Plan surveillance after the local therapy      Non-small cell lung cancer/squamous cell carcinoma left upper lobe:  --s/p SBRT completed on 6/10/16  --09/2018 progression of nodule in left upper lobe and new nodule in right lower lobe  --the patient to meet with Dr. Mora today to discuss local regional therapy with SBRT/cryotherapy      Small  multiple pulmonary emboli noted on CT scan performed in 09/2018:  --Eliquis 5 mg p.o. B.i.d..  --continue to monitor closely    Iron deficiency:  --currently on ferrous sulfate 325 mg p.o. B.i.d.  --consider endoscopies after addressing lung cancer        Distress Screening Results: Psychosocial Distress screening score of Distress Score: 0 noted and reviewed. No intervention indicated.

## 2018-10-26 ENCOUNTER — HOSPITAL ENCOUNTER (OUTPATIENT)
Dept: RADIOLOGY | Facility: HOSPITAL | Age: 78
Discharge: HOME | End: 2018-10-26
Attending: RADIOLOGY
Payer: MEDICARE

## 2018-10-26 ENCOUNTER — HOSPITAL ENCOUNTER (OUTPATIENT)
Dept: RADIATION THERAPY | Facility: HOSPITAL | Age: 78
Discharge: HOME | End: 2018-10-26
Attending: RADIOLOGY
Payer: MEDICARE

## 2018-10-26 PROCEDURE — 77290 THER RAD SIMULAJ FIELD CPLX: CPT | Mod: 26,,, | Performed by: RADIOLOGY

## 2018-10-26 PROCEDURE — 77263 THER RADIOLOGY TX PLNG CPLX: CPT | Mod: ,,, | Performed by: RADIOLOGY

## 2018-10-26 PROCEDURE — 77334 RADIATION TREATMENT AID(S): CPT | Mod: 26,,, | Performed by: RADIOLOGY

## 2018-10-26 PROCEDURE — 77290 THER RAD SIMULAJ FIELD CPLX: CPT | Mod: TC | Performed by: RADIOLOGY

## 2018-10-26 PROCEDURE — 77014 HC CT GUIDANCE RADIATION THERAPY FLDS PLACEMENT: CPT | Mod: TC | Performed by: RADIOLOGY

## 2018-10-26 PROCEDURE — 77334 RADIATION TREATMENT AID(S): CPT | Mod: TC | Performed by: RADIOLOGY

## 2018-11-01 ENCOUNTER — HOSPITAL ENCOUNTER (OUTPATIENT)
Dept: RADIATION THERAPY | Facility: HOSPITAL | Age: 78
Discharge: HOME | End: 2018-11-01
Attending: RADIOLOGY
Payer: MEDICARE

## 2018-11-06 ENCOUNTER — DOCUMENTATION ONLY (OUTPATIENT)
Dept: RADIATION ONCOLOGY | Facility: CLINIC | Age: 78
End: 2018-11-06

## 2018-11-06 PROCEDURE — 77014 HC CT GUIDANCE RADIATION THERAPY FLDS PLACEMENT: CPT | Mod: TC | Performed by: RADIOLOGY

## 2018-11-06 NOTE — PLAN OF CARE
Problem: Patient Care Overview  Goal: Plan of Care Review  Outcome: Ongoing (interventions implemented as appropriate)  Film only for xrt to lung today. Short term side effects handout and verbal instructions given. Contact info given. Skin care and side effects reviewed. Patient verbalized understanding.

## 2018-11-08 PROCEDURE — 77014 HC CT GUIDANCE RADIATION THERAPY FLDS PLACEMENT: CPT | Mod: TC,59 | Performed by: RADIOLOGY

## 2018-11-08 PROCEDURE — 77373 STRTCTC BDY RAD THER TX DLVR: CPT | Performed by: RADIOLOGY

## 2018-11-12 PROCEDURE — 77014 HC CT GUIDANCE RADIATION THERAPY FLDS PLACEMENT: CPT | Mod: TC,59 | Performed by: RADIOLOGY

## 2018-11-12 PROCEDURE — 77373 STRTCTC BDY RAD THER TX DLVR: CPT | Performed by: RADIOLOGY

## 2018-11-14 ENCOUNTER — DOCUMENTATION ONLY (OUTPATIENT)
Dept: RADIATION ONCOLOGY | Facility: CLINIC | Age: 78
End: 2018-11-14

## 2018-11-14 DIAGNOSIS — C34.12 MALIGNANT NEOPLASM OF UPPER LOBE OF LEFT LUNG: Primary | Chronic | ICD-10-CM

## 2018-11-14 DIAGNOSIS — C80.1 CANCER: Primary | ICD-10-CM

## 2018-11-14 PROCEDURE — 77373 STRTCTC BDY RAD THER TX DLVR: CPT | Performed by: RADIOLOGY

## 2018-11-14 PROCEDURE — 77014 HC CT GUIDANCE RADIATION THERAPY FLDS PLACEMENT: CPT | Mod: TC,59 | Performed by: RADIOLOGY

## 2018-11-14 NOTE — PLAN OF CARE
Problem: Patient Care Overview  Goal: Plan of Care Review  Outcome: Ongoing (interventions implemented as appropriate)  Day 3 xrt to lung. Cryoablation moved back to 28th to complete xrt. Tolerating therapy well. Will continue to monitor.

## 2018-11-15 PROCEDURE — 77373 STRTCTC BDY RAD THER TX DLVR: CPT | Performed by: RADIOLOGY

## 2018-11-15 PROCEDURE — 77014 HC CT GUIDANCE RADIATION THERAPY FLDS PLACEMENT: CPT | Mod: TC | Performed by: RADIOLOGY

## 2018-11-16 ENCOUNTER — DOCUMENTATION ONLY (OUTPATIENT)
Dept: RADIATION ONCOLOGY | Facility: CLINIC | Age: 78
End: 2018-11-16

## 2018-11-16 PROCEDURE — 77014 HC CT GUIDANCE RADIATION THERAPY FLDS PLACEMENT: CPT | Mod: TC,59 | Performed by: RADIOLOGY

## 2018-11-16 PROCEDURE — 77373 STRTCTC BDY RAD THER TX DLVR: CPT | Performed by: RADIOLOGY

## 2018-11-16 NOTE — PLAN OF CARE
Problem: Patient Care Overview  Goal: Plan of Care Review  Outcome: Outcome(s) achieved Date Met: 11/16/18  Completed xrt to lung today 11-16-18. Will make f/u appt.

## 2018-11-19 PROCEDURE — 77336 RADIATION PHYSICS CONSULT: CPT | Performed by: RADIOLOGY

## 2018-11-27 ENCOUNTER — ANESTHESIA EVENT (OUTPATIENT)
Dept: SURGERY | Facility: HOSPITAL | Age: 78
End: 2018-11-27
Payer: MEDICARE

## 2018-11-27 ENCOUNTER — TELEPHONE (OUTPATIENT)
Dept: RADIOLOGY | Facility: HOSPITAL | Age: 78
End: 2018-11-27

## 2018-11-27 DIAGNOSIS — M47.816 LUMBAR SPONDYLOSIS: ICD-10-CM

## 2018-11-27 DIAGNOSIS — J96.11 CHRONIC RESPIRATORY FAILURE WITH HYPOXIA: ICD-10-CM

## 2018-11-27 DIAGNOSIS — G21.19 PARKINSONISM DUE TO DRUGS: ICD-10-CM

## 2018-11-27 DIAGNOSIS — J44.9 COPD, VERY SEVERE: Chronic | ICD-10-CM

## 2018-11-27 DIAGNOSIS — C34.12 MALIGNANT NEOPLASM OF UPPER LOBE OF LEFT LUNG: Chronic | ICD-10-CM

## 2018-11-27 DIAGNOSIS — I10 ESSENTIAL HYPERTENSION: ICD-10-CM

## 2018-11-27 DIAGNOSIS — R79.1 ABNORMAL BLOOD COAGULATION PROFILE: Primary | ICD-10-CM

## 2018-11-28 ENCOUNTER — ANESTHESIA (OUTPATIENT)
Dept: SURGERY | Facility: HOSPITAL | Age: 78
End: 2018-11-28
Payer: MEDICARE

## 2018-11-28 ENCOUNTER — HOSPITAL ENCOUNTER (OUTPATIENT)
Dept: RADIOLOGY | Facility: HOSPITAL | Age: 78
Discharge: HOME | End: 2018-11-28
Attending: RADIOLOGY | Admitting: RADIOLOGY
Payer: MEDICARE

## 2018-11-28 ENCOUNTER — HOSPITAL ENCOUNTER (OUTPATIENT)
Facility: HOSPITAL | Age: 78
Discharge: HOME | End: 2018-11-29
Attending: RADIOLOGY | Admitting: INTERNAL MEDICINE
Payer: MEDICARE

## 2018-11-28 VITALS
TEMPERATURE: 98 F | BODY MASS INDEX: 25.99 KG/M2 | SYSTOLIC BLOOD PRESSURE: 152 MMHG | HEART RATE: 65 BPM | WEIGHT: 156 LBS | HEIGHT: 65 IN | RESPIRATION RATE: 18 BRPM | DIASTOLIC BLOOD PRESSURE: 87 MMHG | OXYGEN SATURATION: 97 %

## 2018-11-28 DIAGNOSIS — C34.12 MALIGNANT NEOPLASM OF UPPER LOBE OF LEFT LUNG: Chronic | ICD-10-CM

## 2018-11-28 DIAGNOSIS — R91.8 MASS OF LEFT LUNG: Primary | ICD-10-CM

## 2018-11-28 PROBLEM — F25.9 SCHIZOAFFECTIVE DISORDER: Status: ACTIVE | Noted: 2018-11-28

## 2018-11-28 PROCEDURE — 32994 ABLATE PULM TUMOR PERQ CRYBL: CPT

## 2018-11-28 PROCEDURE — 63600175 PHARM REV CODE 636 W HCPCS: Performed by: NURSE ANESTHETIST, CERTIFIED REGISTERED

## 2018-11-28 PROCEDURE — 37000009 HC ANESTHESIA EA ADD 15 MINS: Performed by: RADIOLOGY

## 2018-11-28 PROCEDURE — 63600175 PHARM REV CODE 636 W HCPCS

## 2018-11-28 PROCEDURE — 37000008 HC ANESTHESIA 1ST 15 MINUTES: Performed by: RADIOLOGY

## 2018-11-28 PROCEDURE — 63600175 PHARM REV CODE 636 W HCPCS: Performed by: RADIOLOGY

## 2018-11-28 PROCEDURE — 25000003 PHARM REV CODE 250: Performed by: NURSE ANESTHETIST, CERTIFIED REGISTERED

## 2018-11-28 PROCEDURE — 94640 AIRWAY INHALATION TREATMENT: CPT

## 2018-11-28 PROCEDURE — 25000003 PHARM REV CODE 250: Performed by: ANESTHESIOLOGY

## 2018-11-28 PROCEDURE — 25000003 PHARM REV CODE 250: Performed by: NURSE PRACTITIONER

## 2018-11-28 PROCEDURE — 77013 CT GUIDE FOR TISSUE ABLATION: CPT | Mod: TC

## 2018-11-28 PROCEDURE — 25000242 PHARM REV CODE 250 ALT 637 W/ HCPCS: Performed by: NURSE PRACTITIONER

## 2018-11-28 RX ORDER — SODIUM CHLORIDE 0.9 % (FLUSH) 0.9 %
3 SYRINGE (ML) INJECTION
Status: DISCONTINUED | OUTPATIENT
Start: 2018-11-28 | End: 2018-11-28 | Stop reason: HOSPADM

## 2018-11-28 RX ORDER — LEVOTHYROXINE SODIUM 88 UG/1
88 TABLET ORAL
Status: DISCONTINUED | OUTPATIENT
Start: 2018-11-29 | End: 2018-11-29 | Stop reason: HOSPADM

## 2018-11-28 RX ORDER — BENZTROPINE MESYLATE 1 MG/1
1 TABLET ORAL 2 TIMES DAILY
Status: DISCONTINUED | OUTPATIENT
Start: 2018-11-28 | End: 2018-11-29 | Stop reason: HOSPADM

## 2018-11-28 RX ORDER — ONDANSETRON 2 MG/ML
4 INJECTION INTRAMUSCULAR; INTRAVENOUS DAILY PRN
Status: DISCONTINUED | OUTPATIENT
Start: 2018-11-28 | End: 2018-11-28 | Stop reason: HOSPADM

## 2018-11-28 RX ORDER — LEVOFLOXACIN 500 MG/1
500 TABLET, FILM COATED ORAL DAILY
Status: DISCONTINUED | OUTPATIENT
Start: 2018-11-28 | End: 2018-11-29 | Stop reason: HOSPADM

## 2018-11-28 RX ORDER — SUCCINYLCHOLINE CHLORIDE 20 MG/ML
INJECTION INTRAMUSCULAR; INTRAVENOUS
Status: DISCONTINUED | OUTPATIENT
Start: 2018-11-28 | End: 2018-11-28

## 2018-11-28 RX ORDER — HYDROMORPHONE HYDROCHLORIDE 2 MG/ML
0.2 INJECTION, SOLUTION INTRAMUSCULAR; INTRAVENOUS; SUBCUTANEOUS EVERY 5 MIN PRN
Status: DISCONTINUED | OUTPATIENT
Start: 2018-11-28 | End: 2018-11-28 | Stop reason: HOSPADM

## 2018-11-28 RX ORDER — MEPERIDINE HYDROCHLORIDE 50 MG/ML
12.5 INJECTION INTRAMUSCULAR; INTRAVENOUS; SUBCUTANEOUS ONCE AS NEEDED
Status: DISCONTINUED | OUTPATIENT
Start: 2018-11-28 | End: 2018-11-28 | Stop reason: HOSPADM

## 2018-11-28 RX ORDER — PHENYLEPHRINE HYDROCHLORIDE 10 MG/ML
INJECTION INTRAVENOUS
Status: DISCONTINUED | OUTPATIENT
Start: 2018-11-28 | End: 2018-11-28

## 2018-11-28 RX ORDER — HYDROCODONE BITARTRATE AND ACETAMINOPHEN 5; 325 MG/1; MG/1
1 TABLET ORAL EVERY 6 HOURS PRN
Status: DISCONTINUED | OUTPATIENT
Start: 2018-11-28 | End: 2018-11-29 | Stop reason: HOSPADM

## 2018-11-28 RX ORDER — PROPOFOL 10 MG/ML
VIAL (ML) INTRAVENOUS
Status: DISCONTINUED | OUTPATIENT
Start: 2018-11-28 | End: 2018-11-28

## 2018-11-28 RX ORDER — DULOXETIN HYDROCHLORIDE 30 MG/1
60 CAPSULE, DELAYED RELEASE ORAL DAILY
Status: DISCONTINUED | OUTPATIENT
Start: 2018-11-29 | End: 2018-11-29 | Stop reason: HOSPADM

## 2018-11-28 RX ORDER — FLUTICASONE PROPIONATE 50 MCG
1 SPRAY, SUSPENSION (ML) NASAL 2 TIMES DAILY
Status: DISCONTINUED | OUTPATIENT
Start: 2018-11-28 | End: 2018-11-29 | Stop reason: HOSPADM

## 2018-11-28 RX ORDER — ROCURONIUM BROMIDE 10 MG/ML
INJECTION, SOLUTION INTRAVENOUS
Status: DISCONTINUED | OUTPATIENT
Start: 2018-11-28 | End: 2018-11-28

## 2018-11-28 RX ORDER — IPRATROPIUM BROMIDE AND ALBUTEROL SULFATE 2.5; .5 MG/3ML; MG/3ML
3 SOLUTION RESPIRATORY (INHALATION) EVERY 6 HOURS
Status: DISCONTINUED | OUTPATIENT
Start: 2018-11-28 | End: 2018-11-29 | Stop reason: HOSPADM

## 2018-11-28 RX ORDER — RAMELTEON 8 MG/1
8 TABLET ORAL NIGHTLY PRN
Status: DISCONTINUED | OUTPATIENT
Start: 2018-11-28 | End: 2018-11-29 | Stop reason: HOSPADM

## 2018-11-28 RX ORDER — OXYCODONE HYDROCHLORIDE 5 MG/1
5 TABLET ORAL
Status: DISCONTINUED | OUTPATIENT
Start: 2018-11-28 | End: 2018-11-28 | Stop reason: HOSPADM

## 2018-11-28 RX ORDER — FENTANYL CITRATE 50 UG/ML
25 INJECTION, SOLUTION INTRAMUSCULAR; INTRAVENOUS EVERY 5 MIN PRN
Status: DISCONTINUED | OUTPATIENT
Start: 2018-11-28 | End: 2018-11-28 | Stop reason: HOSPADM

## 2018-11-28 RX ORDER — SODIUM CHLORIDE 0.9 % (FLUSH) 0.9 %
3 SYRINGE (ML) INJECTION EVERY 8 HOURS
Status: DISCONTINUED | OUTPATIENT
Start: 2018-11-28 | End: 2018-11-28 | Stop reason: HOSPADM

## 2018-11-28 RX ORDER — SODIUM CHLORIDE 9 MG/ML
INJECTION, SOLUTION INTRAVENOUS CONTINUOUS
Status: DISCONTINUED | OUTPATIENT
Start: 2018-11-28 | End: 2018-11-29 | Stop reason: HOSPADM

## 2018-11-28 RX ORDER — AMLODIPINE BESYLATE 10 MG/1
10 TABLET ORAL DAILY
Status: DISCONTINUED | OUTPATIENT
Start: 2018-11-28 | End: 2018-11-29 | Stop reason: HOSPADM

## 2018-11-28 RX ORDER — LIDOCAINE HYDROCHLORIDE 10 MG/ML
INJECTION INFILTRATION; PERINEURAL
Status: DISCONTINUED | OUTPATIENT
Start: 2018-11-28 | End: 2018-11-28

## 2018-11-28 RX ORDER — SODIUM CHLORIDE, SODIUM LACTATE, POTASSIUM CHLORIDE, CALCIUM CHLORIDE 600; 310; 30; 20 MG/100ML; MG/100ML; MG/100ML; MG/100ML
INJECTION, SOLUTION INTRAVENOUS CONTINUOUS PRN
Status: DISCONTINUED | OUTPATIENT
Start: 2018-11-28 | End: 2018-11-28

## 2018-11-28 RX ORDER — IPRATROPIUM BROMIDE AND ALBUTEROL SULFATE 2.5; .5 MG/3ML; MG/3ML
3 SOLUTION RESPIRATORY (INHALATION) EVERY 6 HOURS
Status: DISCONTINUED | OUTPATIENT
Start: 2018-11-28 | End: 2018-11-28

## 2018-11-28 RX ORDER — ARIPIPRAZOLE 5 MG/1
5 TABLET ORAL DAILY
Status: DISCONTINUED | OUTPATIENT
Start: 2018-11-29 | End: 2018-11-29 | Stop reason: HOSPADM

## 2018-11-28 RX ORDER — ONDANSETRON 2 MG/ML
INJECTION INTRAMUSCULAR; INTRAVENOUS
Status: DISCONTINUED | OUTPATIENT
Start: 2018-11-28 | End: 2018-11-28

## 2018-11-28 RX ORDER — ALPRAZOLAM 0.25 MG/1
0.25 TABLET ORAL DAILY
Status: DISCONTINUED | OUTPATIENT
Start: 2018-11-29 | End: 2018-11-29 | Stop reason: HOSPADM

## 2018-11-28 RX ORDER — SODIUM CHLORIDE, SODIUM LACTATE, POTASSIUM CHLORIDE, CALCIUM CHLORIDE 600; 310; 30; 20 MG/100ML; MG/100ML; MG/100ML; MG/100ML
INJECTION, SOLUTION INTRAVENOUS CONTINUOUS
Status: DISCONTINUED | OUTPATIENT
Start: 2018-11-28 | End: 2018-11-28

## 2018-11-28 RX ADMIN — SUCCINYLCHOLINE CHLORIDE 120 MG: 20 INJECTION, SOLUTION INTRAMUSCULAR; INTRAVENOUS at 11:11

## 2018-11-28 RX ADMIN — QUETIAPINE 150 MG: 100 TABLET ORAL at 08:11

## 2018-11-28 RX ADMIN — HYDROCODONE BITARTRATE AND ACETAMINOPHEN 1 TABLET: 5; 325 TABLET ORAL at 05:11

## 2018-11-28 RX ADMIN — PROPOFOL 40 MG: 10 INJECTION, EMULSION INTRAVENOUS at 11:11

## 2018-11-28 RX ADMIN — PHENYLEPHRINE HYDROCHLORIDE 100 MCG: 10 INJECTION INTRAVENOUS at 12:11

## 2018-11-28 RX ADMIN — BENZTROPINE MESYLATE 1 MG: 1 TABLET ORAL at 08:11

## 2018-11-28 RX ADMIN — LEVOFLOXACIN 500 MG: 500 TABLET, FILM COATED ORAL at 05:11

## 2018-11-28 RX ADMIN — ROCURONIUM BROMIDE 5 MG: 10 INJECTION, SOLUTION INTRAVENOUS at 11:11

## 2018-11-28 RX ADMIN — FLUTICASONE PROPIONATE 50 MCG: 50 SPRAY, METERED NASAL at 08:11

## 2018-11-28 RX ADMIN — AMLODIPINE BESYLATE 10 MG: 10 TABLET ORAL at 05:11

## 2018-11-28 RX ADMIN — CEFTRIAXONE 1 G: 1 INJECTION, SOLUTION INTRAVENOUS at 12:11

## 2018-11-28 RX ADMIN — SODIUM CHLORIDE: 0.9 INJECTION, SOLUTION INTRAVENOUS at 05:11

## 2018-11-28 RX ADMIN — PROPOFOL 110 MG: 10 INJECTION, EMULSION INTRAVENOUS at 11:11

## 2018-11-28 RX ADMIN — SODIUM CHLORIDE, SODIUM LACTATE, POTASSIUM CHLORIDE, AND CALCIUM CHLORIDE: .6; .31; .03; .02 INJECTION, SOLUTION INTRAVENOUS at 11:11

## 2018-11-28 RX ADMIN — ONDANSETRON 4 MG: 2 INJECTION, SOLUTION INTRAMUSCULAR; INTRAVENOUS at 12:11

## 2018-11-28 RX ADMIN — OXYCODONE HYDROCHLORIDE 5 MG: 5 TABLET ORAL at 02:11

## 2018-11-28 RX ADMIN — LIDOCAINE HYDROCHLORIDE 50 MG: 10 INJECTION, SOLUTION INFILTRATION; PERINEURAL at 11:11

## 2018-11-28 RX ADMIN — IPRATROPIUM BROMIDE AND ALBUTEROL SULFATE 3 ML: .5; 3 SOLUTION RESPIRATORY (INHALATION) at 08:11

## 2018-11-28 NOTE — ASSESSMENT & PLAN NOTE
S/p CT guided lung mass cryoablation per IR today.   Dr. Mota with IR recommended overnight observation for hydration and pain control. He also recommended empiric antibiotics.    Monitor   Po Levaquin

## 2018-11-28 NOTE — H&P
[facility] Hospital Medicine  History & Physical    Patient Name: Rachel Long  MRN: [id number]  Admission Date: 11/28/2018  Attending Physician:Dr. Stone  'Primary Care Provider: Sharmaine English MD         Patient information was obtained from patient and ER records.     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 Dr. Mota with IR recommended overnight observation for hydration and pain control. He also recommended empiric antibiotics.   Pt feels well. + pain to left chest. Denies any other complaints         Past Medical History:   Diagnosis Date    Acute upper respiratory infection     Anemia     Bipolar mood disorder     Coordination abnormal     COPD exacerbation 5/9/2016    Coronary artery disease     Difficulty walking     Hypertension     on meds    Hypothyroidism, adult     Insomnia     Malignant neoplasm of colon     Muscle weakness     Neuralgia and neuritis     Schizoaffective disorder     SOB (shortness of breath)     Spinal stenosis     Thyroid disease     Urinary tract infection        Past Surgical History:   Procedure Laterality Date    COLON SURGERY      CORONARY STENT PLACEMENT      HYSTERECTOMY  1970's       Review of patient's allergies indicates:   Allergen Reactions    Benadryl [diphenhydramine hcl] Anxiety    Sulfa (sulfonamide antibiotics) Rash       No current facility-administered medications on file prior to encounter.      Current Outpatient Medications on File Prior to Encounter   Medication Sig    acetaminophen (TYLENOL) 325 MG tablet Take 650 mg by mouth 4 (four) times daily as needed for Pain.    albuterol  (PROVENTIL/VENTOLIN HFA) 90 mcg/actuation inhaler Inhale 2 puffs into the lungs every 6 (six) hours as needed for Wheezing. Rescue    ALPRAZolam (XANAX) 0.25 MG tablet Take 0.25 mg by mouth once daily.     ALREX 0.2 % DrpS Place 1 drop into both eyes once daily.     amLODIPine (NORVASC) 10 MG tablet once daily.    ANORO ELLIPTA 62.5-25 mcg/actuation DsDv     apixaban (ELIQUIS) 5 mg Tab Take 5 mg by mouth 2 (two) times daily.    ARIPiprazole (ABILIFY) 5 MG Tab Take 1 tablet by mouth once daily.     benztropine (COGENTIN) 1 MG tablet Take 1 mg by mouth 2 (two) times daily.    cyanocobalamin 1,000 mcg/mL injection Inject 1,000 mcg into the skin every 30 days.    DULoxetine (CYMBALTA) 60 MG capsule once daily.    ferrous sulfate (IRON) 325 mg (65 mg iron) Tab tablet Take 325 mg by mouth 2 (two) times daily.    fluticasone (FLONASE) 50 mcg/actuation nasal spray 1 spray by Each Nare route 2 (two) times daily.    lactulose (CEPHULAC) 10 gram packet Take 20 g by mouth daily as needed.    levothyroxine (SYNTHROID) 88 MCG tablet Take 88 mcg by mouth before breakfast.    MULTIVITAMIN ORAL Take by mouth once daily.    QUEtiapine (SEROQUEL) 200 MG Tab 150 mg nightly.     RESTASIS 0.05 % ophthalmic emulsion Place 0.05 drops into both eyes 2 (two) times daily.    temazepam (RESTORIL) 15 mg Cap Take 15 mg by mouth nightly as needed.    zolpidem (AMBIEN) 5 MG Tab Take 5 mg by mouth nightly as needed.     Family History     Problem Relation (Age of Onset)    Asthma Father    Eczema Father    No Known Problems Mother    Other Brother        Tobacco Use    Smoking status: Former Smoker     Years: 50.00     Types: Cigarettes    Smokeless tobacco: Former User   Substance and Sexual Activity    Alcohol use: No    Drug use: No    Sexual activity: No     Review of Systems   Constitutional: Positive for chills. Negative for appetite change, diaphoresis, fatigue, fever and unexpected weight change.   HENT: Negative for  congestion, nosebleeds, sinus pressure and sore throat.    Eyes: Negative for pain, discharge and visual disturbance.   Respiratory: Positive for cough (chronic) and shortness of breath (chronic ). Negative for chest tightness, wheezing and stridor.    Cardiovascular: Positive for chest pain (post procedural ). Negative for palpitations and leg swelling.   Gastrointestinal: Negative for abdominal distention, abdominal pain, blood in stool, constipation, diarrhea, nausea and vomiting.   Endocrine: Negative for cold intolerance and heat intolerance.   Genitourinary: Negative for difficulty urinating, dysuria, flank pain, frequency and urgency.   Musculoskeletal: Negative for arthralgias, back pain, joint swelling, myalgias, neck pain and neck stiffness.   Skin: Negative for rash and wound.   Allergic/Immunologic: Negative for food allergies and immunocompromised state.   Neurological: Negative for dizziness, seizures, syncope, facial asymmetry, speech difficulty, weakness, light-headedness, numbness and headaches.   Hematological: Negative for adenopathy.   Psychiatric/Behavioral: Negative for agitation, confusion and hallucinations.     Objective:     Vital Signs (Most Recent):  Temp: 97.7 °F (36.5 °C) (11/28/18 1548)  Pulse: 67 (11/28/18 1617)  Resp: 16 (11/28/18 1548)  BP: (!) 159/74 (11/28/18 1548)  SpO2: 99 % (11/28/18 1548) Vital Signs (24h Range):  Temp:  [97.7 °F (36.5 °C)-98.2 °F (36.8 °C)] 97.7 °F (36.5 °C)  Pulse:  [65-83] 67  Resp:  [14-20] 16  SpO2:  [95 %-100 %] 99 %  BP: (127-162)/(58-87) 159/74     Weight: 70.8 kg (156 lb)  Body mass index is 25.96 kg/m².    Physical Exam   Constitutional: She is oriented to person, place, and time. She appears well-developed and well-nourished. No distress.   HENT:   Head: Normocephalic and atraumatic.   Nose: Nose normal.   Mouth/Throat: Oropharynx is clear and moist.   Eyes: Conjunctivae are normal. No scleral icterus.   Neck: Normal range of motion. Neck supple. No  tracheal deviation present.   Cardiovascular: Normal rate, regular rhythm, normal heart sounds and intact distal pulses. Exam reveals no gallop and no friction rub.   No murmur heard.  Pulmonary/Chest: Effort normal. No stridor. No respiratory distress. She has wheezes (bilateral ). She has no rales. She exhibits no tenderness.   Abdominal: Soft. Bowel sounds are normal. She exhibits no distension and no mass. There is no tenderness. There is no rebound and no guarding.   Musculoskeletal: Normal range of motion. She exhibits no edema, tenderness or deformity.   Neurological: She is alert and oriented to person, place, and time. No cranial nerve deficit. She exhibits normal muscle tone. Coordination normal.   Skin: Skin is warm and dry. No rash noted. She is not diaphoretic. No erythema. No pallor.   Psychiatric: She has a normal mood and affect. Her behavior is normal. Thought content normal.   Nursing note and vitals reviewed.          Significant Labs:   BMP: No results for input(s): GLU, NA, K, CL, CO2, BUN, CREATININE, CALCIUM, MG in the last 48 hours.  CBC:   Recent Labs   Lab 11/28/18  1009   WBC 4.59   HGB 12.1   HCT 37.0   *     CMP: No results for input(s): NA, K, CL, CO2, GLU, BUN, CREATININE, CALCIUM, PROT, ALBUMIN, BILITOT, ALKPHOS, AST, ALT, ANIONGAP, EGFRNONAA in the last 48 hours.    Invalid input(s): [place]  All pertinent labs within the past 24 hours have been reviewed.    Significant Imaging:         Assessment/Plan:     * Malignant neoplasm of upper lobe of left lung    S/p CT guided lung mass cryoablation per IR today.   Dr. Mota with IR recommended overnight observation for hydration and pain control. He also recommended empiric antibiotics.    Monitor   Po Levaquin      Schizoaffective disorder    Cont home meds       Chronic respiratory failure with hypoxia    Cont oxygen        COPD, very severe    Cont neb txs        Multiple Pulmonary Emboli   Restart Eliquis ASAP   VTE Risk  Mitigation (From admission, onward)    None             Juanita Nichols NP  Department of Hospital Medicine   [facility]

## 2018-11-28 NOTE — HOSPITAL COURSE
The pt is a 79 yo female [facility] resident with Bipolar, schizoaffective d/o, and Non-small cell lung cancer/Squamous cell carcinoma the left upper lobe placed in observation s/p CT guided lung mass cryoablation per IR. Dr. Mota with IR recommended overnight observation for hydration and pain control.  No acute events overnight. No hemoptysis. H/H remained stable. Dr. Mota recommended empiric antibiotics. Pt will be discharged on oral Augmentin.

## 2018-11-28 NOTE — TRANSFER OF CARE
Anesthesia Transfer of Care Note    Patient: Rachel Long    Procedure(s) Performed: Procedure(s) (LRB):  ABLATION-CRYO in CT (N/A)    Patient location: PACU    Anesthesia Type: general    Transport from OR: Transported from OR on room air with adequate spontaneous ventilation    Post pain: adequate analgesia    Post assessment: no apparent anesthetic complications    Post vital signs: stable    Level of consciousness: awake, alert and oriented    Nausea/Vomiting: no nausea/vomiting    Complications: none    Transfer of care protocol was followed      Last vitals:   Visit Vitals  LMP  (LMP Unknown)

## 2018-11-28 NOTE — H&P
[facility] -   History & Physical    Subjective:      Chief Complaint/Reason for Admission: lung mass    Rachel Long is a 78 y.o. female.    Past Medical History:   Diagnosis Date    Acute upper respiratory infection     Anemia     Bipolar mood disorder     Coordination abnormal     COPD exacerbation 5/9/2016    Coronary artery disease     Difficulty walking     Hypertension     on meds    Hypothyroidism, adult     Insomnia     Malignant neoplasm of colon     Muscle weakness     Neuralgia and neuritis     Schizoaffective disorder     SOB (shortness of breath)     Spinal stenosis     Thyroid disease     Urinary tract infection      Past Surgical History:   Procedure Laterality Date    COLON SURGERY      CORONARY STENT PLACEMENT      HYSTERECTOMY  1970's     Family History   Problem Relation Age of Onset    Asthma Father     Eczema Father     No Known Problems Mother     Other Brother         shoulder surgery      Social History     Tobacco Use    Smoking status: Former Smoker     Years: 50.00     Types: Cigarettes    Smokeless tobacco: Former User   Substance Use Topics    Alcohol use: No    Drug use: No         (Not in a hospital admission)  Review of patient's allergies indicates:   Allergen Reactions    Benadryl [diphenhydramine hcl] Anxiety    Sulfa (sulfonamide antibiotics) Rash        Review of Systems   Constitutional: Negative.    HENT: Negative.    Respiratory: Negative.    Cardiovascular: Negative.    Gastrointestinal: Negative.    Skin: Negative.        Objective:      Vital Signs (Most Recent)       Vital Signs Range (Last 24H):       Physical Exam   Constitutional: She appears well-developed and well-nourished.   HENT:   Head: Atraumatic.   Eyes: EOM are normal.   Neck: Neck supple.   Cardiovascular: Normal rate.   Pulmonary/Chest: Effort normal.   Abdominal: Soft.       Data Review:    CBC:   Lab Results   Component Value Date    WBC 4.59 11/28/2018    RBC  4.16 11/28/2018    HGB 12.1 11/28/2018    HCT 37.0 11/28/2018     (L) 11/28/2018      ECG: no prior ECG.     Assessment:      There are no hospital problems to display for this patient.      Plan:    CT guided lung mass cryoablation

## 2018-11-28 NOTE — SUBJECTIVE & OBJECTIVE
Past Medical History:   Diagnosis Date    Acute upper respiratory infection     Anemia     Bipolar mood disorder     Coordination abnormal     COPD exacerbation 5/9/2016    Coronary artery disease     Difficulty walking     Hypertension     on meds    Hypothyroidism, adult     Insomnia     Malignant neoplasm of colon     Muscle weakness     Neuralgia and neuritis     Schizoaffective disorder     SOB (shortness of breath)     Spinal stenosis     Thyroid disease     Urinary tract infection        Past Surgical History:   Procedure Laterality Date    COLON SURGERY      CORONARY STENT PLACEMENT      HYSTERECTOMY  1970's       Review of patient's allergies indicates:   Allergen Reactions    Benadryl [diphenhydramine hcl] Anxiety    Sulfa (sulfonamide antibiotics) Rash       No current facility-administered medications on file prior to encounter.      Current Outpatient Medications on File Prior to Encounter   Medication Sig    acetaminophen (TYLENOL) 325 MG tablet Take 650 mg by mouth 4 (four) times daily as needed for Pain.    albuterol (PROVENTIL/VENTOLIN HFA) 90 mcg/actuation inhaler Inhale 2 puffs into the lungs every 6 (six) hours as needed for Wheezing. Rescue    ALPRAZolam (XANAX) 0.25 MG tablet Take 0.25 mg by mouth once daily.     ALREX 0.2 % DrpS Place 1 drop into both eyes once daily.     amLODIPine (NORVASC) 10 MG tablet once daily.    ANORO ELLIPTA 62.5-25 mcg/actuation DsDv     apixaban (ELIQUIS) 5 mg Tab Take 5 mg by mouth 2 (two) times daily.    ARIPiprazole (ABILIFY) 5 MG Tab Take 1 tablet by mouth once daily.     benztropine (COGENTIN) 1 MG tablet Take 1 mg by mouth 2 (two) times daily.    cyanocobalamin 1,000 mcg/mL injection Inject 1,000 mcg into the skin every 30 days.    DULoxetine (CYMBALTA) 60 MG capsule once daily.    ferrous sulfate (IRON) 325 mg (65 mg iron) Tab tablet Take 325 mg by mouth 2 (two) times daily.    fluticasone (FLONASE) 50 mcg/actuation nasal  spray 1 spray by Each Nare route 2 (two) times daily.    lactulose (CEPHULAC) 10 gram packet Take 20 g by mouth daily as needed.    levothyroxine (SYNTHROID) 88 MCG tablet Take 88 mcg by mouth before breakfast.    MULTIVITAMIN ORAL Take by mouth once daily.    QUEtiapine (SEROQUEL) 200 MG Tab 150 mg nightly.     RESTASIS 0.05 % ophthalmic emulsion Place 0.05 drops into both eyes 2 (two) times daily.    temazepam (RESTORIL) 15 mg Cap Take 15 mg by mouth nightly as needed.    zolpidem (AMBIEN) 5 MG Tab Take 5 mg by mouth nightly as needed.     Family History     Problem Relation (Age of Onset)    Asthma Father    Eczema Father    No Known Problems Mother    Other Brother        Tobacco Use    Smoking status: Former Smoker     Years: 50.00     Types: Cigarettes    Smokeless tobacco: Former User   Substance and Sexual Activity    Alcohol use: No    Drug use: No    Sexual activity: No     Review of Systems   Constitutional: Positive for chills. Negative for appetite change, diaphoresis, fatigue, fever and unexpected weight change.   HENT: Negative for congestion, nosebleeds, sinus pressure and sore throat.    Eyes: Negative for pain, discharge and visual disturbance.   Respiratory: Positive for cough (chronic) and shortness of breath (chronic ). Negative for chest tightness, wheezing and stridor.    Cardiovascular: Positive for chest pain (post procedural ). Negative for palpitations and leg swelling.   Gastrointestinal: Negative for abdominal distention, abdominal pain, blood in stool, constipation, diarrhea, nausea and vomiting.   Endocrine: Negative for cold intolerance and heat intolerance.   Genitourinary: Negative for difficulty urinating, dysuria, flank pain, frequency and urgency.   Musculoskeletal: Negative for arthralgias, back pain, joint swelling, myalgias, neck pain and neck stiffness.   Skin: Negative for rash and wound.   Allergic/Immunologic: Negative for food allergies and immunocompromised  state.   Neurological: Negative for dizziness, seizures, syncope, facial asymmetry, speech difficulty, weakness, light-headedness, numbness and headaches.   Hematological: Negative for adenopathy.   Psychiatric/Behavioral: Negative for agitation, confusion and hallucinations.     Objective:     Vital Signs (Most Recent):  Temp: 97.7 °F (36.5 °C) (11/28/18 1548)  Pulse: 67 (11/28/18 1617)  Resp: 16 (11/28/18 1548)  BP: (!) 159/74 (11/28/18 1548)  SpO2: 99 % (11/28/18 1548) Vital Signs (24h Range):  Temp:  [97.7 °F (36.5 °C)-98.2 °F (36.8 °C)] 97.7 °F (36.5 °C)  Pulse:  [65-83] 67  Resp:  [14-20] 16  SpO2:  [95 %-100 %] 99 %  BP: (127-162)/(58-87) 159/74     Weight: 70.8 kg (156 lb)  Body mass index is 25.96 kg/m².    Physical Exam   Constitutional: She is oriented to person, place, and time. She appears well-developed and well-nourished. No distress.   HENT:   Head: Normocephalic and atraumatic.   Nose: Nose normal.   Mouth/Throat: Oropharynx is clear and moist.   Eyes: Conjunctivae are normal. No scleral icterus.   Neck: Normal range of motion. Neck supple. No tracheal deviation present.   Cardiovascular: Normal rate, regular rhythm, normal heart sounds and intact distal pulses. Exam reveals no gallop and no friction rub.   No murmur heard.  Pulmonary/Chest: Effort normal. No stridor. No respiratory distress. She has wheezes (bilateral ). She has no rales. She exhibits no tenderness.   Abdominal: Soft. Bowel sounds are normal. She exhibits no distension and no mass. There is no tenderness. There is no rebound and no guarding.   Musculoskeletal: Normal range of motion. She exhibits no edema, tenderness or deformity.   Neurological: She is alert and oriented to person, place, and time. No cranial nerve deficit. She exhibits normal muscle tone. Coordination normal.   Skin: Skin is warm and dry. No rash noted. She is not diaphoretic. No erythema. No pallor.   Psychiatric: She has a normal mood and affect. Her behavior  is normal. Thought content normal.   Nursing note and vitals reviewed.          Significant Labs:   BMP: No results for input(s): GLU, NA, K, CL, CO2, BUN, CREATININE, CALCIUM, MG in the last 48 hours.  CBC:   Recent Labs   Lab 11/28/18  1009   WBC 4.59   HGB 12.1   HCT 37.0   *     CMP: No results for input(s): NA, K, CL, CO2, GLU, BUN, CREATININE, CALCIUM, PROT, ALBUMIN, BILITOT, ALKPHOS, AST, ALT, ANIONGAP, EGFRNONAA in the last 48 hours.    Invalid input(s): ESTGF[place]  All pertinent labs within the past 24 hours have been reviewed.    Significant Imaging:

## 2018-11-28 NOTE — ANESTHESIA PREPROCEDURE EVALUATION
11/28/2018  Rachel Long is a 78 y.o., female.    Anesthesia Evaluation    I have reviewed the Patient Summary Reports.    I have reviewed the Nursing Notes.   I have reviewed the Medications.     Review of Systems  Anesthesia Hx:  No problems with previous Anesthesia    Social:  Former Smoker    Cardiovascular:   Hypertension CAD  CABG/stent  ECG has been reviewed.  Hypertension, Essential Hypertension    Pulmonary:   COPD, severe Denies Asthma. Shortness of breath (on exertion; no recent exacerbations or ER visits)  Chronic Obstructive Pulmonary Disease (COPD): Inhaler use is maintenance inhaler daily and inhaled steroid use currently.  Chest Tumor/Mass:  left, upper lobe. Lung Cancer    Hepatic/GI:  Bowel Conditions: past history, s/p surgical resection   Musculoskeletal:   Arthritis   Musculoskeletal General/Symptoms: muscle weakness, generalized.    Neurological:   Denies Neuromuscular Disease.    Endocrine:   Hypothyroidism  Thyroid Disease Hypothyroidism    Psych:   Psychiatric History  Psychotic Disorder, Schizophrenia and Bipolar disorder.          Physical Exam  General:  Well nourished    Airway/Jaw/Neck:  Airway Findings: Mouth Opening: Normal Tongue: Normal  General Airway Assessment: Adult  Mallampati: II  TM Distance: 4 - 6 cm      Dental:  Dental Findings: Edentulous   Chest/Lungs:  Chest/Lungs Findings: Normal Respiratory Rate, Expiratory Wheezes, Mild  Right sided mild expiratory wheeze     Heart/Vascular:  Heart Findings:          Prothrombin Time 9.0 - 12.5 sec 10.0    INR 0.8 - 1.2 0.9      WBC 3.90 - 12.70 K/uL 4.59  4.81    RBC 4.00 - 5.40 M/uL 4.16  4.33    Hemoglobin 12.0 - 16.0 g/dL 12.1  12.4    Hematocrit 37.0 - 48.5 % 37.0  37.6    MCV 82 - 98 fL 89  87    MCH 27.0 - 31.0 pg 29.1  28.6    MCHC 32.0 - 36.0 g/dL 32.7  33.0    RDW 11.5 - 14.5 % 16.1 Abnormally high   16.9 Abnormally  high     Platelets 150 - 350 K/uL 143 Abnormally low             Anesthesia Plan  Type of Anesthesia, risks & benefits discussed:  Anesthesia Type:  general  Patient's Preference:   Intra-op Monitoring Plan: standard ASA monitors  Intra-op Monitoring Plan Comments:   Post Op Pain Control Plan:   Post Op Pain Control Plan Comments:   Induction:   IV  Beta Blocker:  Patient is not currently on a Beta-Blocker (No further documentation required).       Informed Consent: Patient understands risks and agrees with Anesthesia plan.  Questions answered. Anesthesia consent signed with patient.  ASA Score: 3     Day of Surgery Review of History & Physical: I have interviewed and examined the patient. I have reviewed the patient's H&P dated:  There are no significant changes.          Ready For Surgery From Anesthesia Perspective.

## 2018-11-28 NOTE — DISCHARGE SUMMARY
Sterile technique was performed in the anterior left chest, lidocaine was used as a local anesthetic.  Cryoablation performed on the left lung mass.  Cxrs to follow.  Pt tolerated the procedure well without immediate complications.  Please see radiologist report for details. F/u with PCP and/or ordering physician.

## 2018-11-28 NOTE — HPI
The pt is a 79 yo female [facility] resident with Bipolar, schizoaffective d/o, and Non-small cell lung cancer/Squamous cell carcinoma the left upper lobe who underwent CT guided lung mass cryoablation per IR today. Dr. Mota with IR recommended overnight observation for hydration and pain control. He also recommended empiric antibiotics.   Pt feels well. + pain to left chest. Denies any other complaints

## 2018-11-29 VITALS
WEIGHT: 156 LBS | SYSTOLIC BLOOD PRESSURE: 147 MMHG | BODY MASS INDEX: 25.99 KG/M2 | OXYGEN SATURATION: 95 % | TEMPERATURE: 98 F | DIASTOLIC BLOOD PRESSURE: 66 MMHG | HEIGHT: 65 IN | RESPIRATION RATE: 18 BRPM | HEART RATE: 85 BPM

## 2018-11-29 PROBLEM — Z98.890 STATUS POST CRYOABLATION: Status: ACTIVE | Noted: 2018-11-29

## 2018-11-29 LAB
ALBUMIN SERPL BCP-MCNC: 3.4 G/DL
ALP SERPL-CCNC: 58 U/L
ALT SERPL W/O P-5'-P-CCNC: 6 U/L
ANION GAP SERPL CALC-SCNC: 7 MMOL/L
AST SERPL-CCNC: 19 U/L
BASOPHILS # BLD AUTO: 0.01 K/UL
BASOPHILS NFR BLD: 0.2 %
BILIRUB SERPL-MCNC: 0.5 MG/DL
BUN SERPL-MCNC: 21 MG/DL
CALCIUM SERPL-MCNC: 9 MG/DL
CHLORIDE SERPL-SCNC: 103 MMOL/L
CO2 SERPL-SCNC: 27 MMOL/L
CREAT SERPL-MCNC: 0.8 MG/DL
DIFFERENTIAL METHOD: ABNORMAL
EOSINOPHIL # BLD AUTO: 0.1 K/UL
EOSINOPHIL NFR BLD: 1.7 %
ERYTHROCYTE [DISTWIDTH] IN BLOOD BY AUTOMATED COUNT: 16.3 %
EST. GFR  (AFRICAN AMERICAN): >60 ML/MIN/1.73 M^2
EST. GFR  (NON AFRICAN AMERICAN): >60 ML/MIN/1.73 M^2
GLUCOSE SERPL-MCNC: 104 MG/DL
HCT VFR BLD AUTO: 34.1 %
HGB BLD-MCNC: 11 G/DL
LYMPHOCYTES # BLD AUTO: 1 K/UL
LYMPHOCYTES NFR BLD: 15.2 %
MCH RBC QN AUTO: 29.2 PG
MCHC RBC AUTO-ENTMCNC: 32.3 G/DL
MCV RBC AUTO: 91 FL
MONOCYTES # BLD AUTO: 0.8 K/UL
MONOCYTES NFR BLD: 12.3 %
NEUTROPHILS # BLD AUTO: 4.6 K/UL
NEUTROPHILS NFR BLD: 70.6 %
PLATELET # BLD AUTO: 131 K/UL
PMV BLD AUTO: 9.7 FL
POTASSIUM SERPL-SCNC: 3.9 MMOL/L
PROT SERPL-MCNC: 6.9 G/DL
RBC # BLD AUTO: 3.77 M/UL
SODIUM SERPL-SCNC: 137 MMOL/L
WBC # BLD AUTO: 6.53 K/UL

## 2018-11-29 PROCEDURE — 94640 AIRWAY INHALATION TREATMENT: CPT

## 2018-11-29 PROCEDURE — 36415 COLL VENOUS BLD VENIPUNCTURE: CPT

## 2018-11-29 PROCEDURE — 25000242 PHARM REV CODE 250 ALT 637 W/ HCPCS: Performed by: NURSE PRACTITIONER

## 2018-11-29 PROCEDURE — 85025 COMPLETE CBC W/AUTO DIFF WBC: CPT

## 2018-11-29 PROCEDURE — 80053 COMPREHEN METABOLIC PANEL: CPT

## 2018-11-29 PROCEDURE — 25000003 PHARM REV CODE 250: Performed by: NURSE PRACTITIONER

## 2018-11-29 PROCEDURE — 27000221 HC OXYGEN, UP TO 24 HOURS

## 2018-11-29 RX ORDER — LEVOFLOXACIN 500 MG/1
500 TABLET, FILM COATED ORAL DAILY
Qty: 5 TABLET | Refills: 0 | Status: SHIPPED | OUTPATIENT
Start: 2018-11-30 | End: 2018-11-29 | Stop reason: HOSPADM

## 2018-11-29 RX ORDER — AMOXICILLIN AND CLAVULANATE POTASSIUM 875; 125 MG/1; MG/1
1 TABLET, FILM COATED ORAL 2 TIMES DAILY
Qty: 10 TABLET | Refills: 0 | Status: SHIPPED | OUTPATIENT
Start: 2018-11-29 | End: 2018-12-04

## 2018-11-29 RX ORDER — RAMELTEON 8 MG/1
8 TABLET ORAL NIGHTLY PRN
Status: ON HOLD
Start: 2018-11-29 | End: 2020-01-03 | Stop reason: HOSPADM

## 2018-11-29 RX ADMIN — IPRATROPIUM BROMIDE AND ALBUTEROL SULFATE 3 ML: .5; 3 SOLUTION RESPIRATORY (INHALATION) at 07:11

## 2018-11-29 RX ADMIN — ARIPIPRAZOLE 5 MG: 5 TABLET ORAL at 09:11

## 2018-11-29 RX ADMIN — FLUTICASONE PROPIONATE 50 MCG: 50 SPRAY, METERED NASAL at 09:11

## 2018-11-29 RX ADMIN — AMLODIPINE BESYLATE 10 MG: 10 TABLET ORAL at 09:11

## 2018-11-29 RX ADMIN — HYDROCODONE BITARTRATE AND ACETAMINOPHEN 1 TABLET: 5; 325 TABLET ORAL at 10:11

## 2018-11-29 RX ADMIN — LEVOFLOXACIN 500 MG: 500 TABLET, FILM COATED ORAL at 09:11

## 2018-11-29 RX ADMIN — DULOXETINE 60 MG: 30 CAPSULE, DELAYED RELEASE ORAL at 09:11

## 2018-11-29 RX ADMIN — LEVOTHYROXINE SODIUM 88 MCG: 88 TABLET ORAL at 05:11

## 2018-11-29 RX ADMIN — IPRATROPIUM BROMIDE AND ALBUTEROL SULFATE 3 ML: .5; 3 SOLUTION RESPIRATORY (INHALATION) at 12:11

## 2018-11-29 RX ADMIN — BENZTROPINE MESYLATE 1 MG: 1 TABLET ORAL at 09:11

## 2018-11-29 RX ADMIN — ALPRAZOLAM 0.25 MG: 0.25 TABLET ORAL at 09:11

## 2018-11-29 NOTE — ANESTHESIA POSTPROCEDURE EVALUATION
"Anesthesia Post Evaluation    Patient: Rachel Long    Procedure(s) Performed: Procedure(s) (LRB):  ABLATION-CRYO in CT (N/A)    Final Anesthesia Type: general  Patient location during evaluation: PACU  Patient participation: Yes- Able to Participate  Level of consciousness: awake and alert  Post-procedure vital signs: reviewed and stable  Pain management: adequate  Airway patency: patent  PONV status at discharge: No PONV  Anesthetic complications: no      Cardiovascular status: blood pressure returned to baseline  Respiratory status: unassisted  Hydration status: euvolemic  Follow-up needed         Visit Vitals  BP (!) 159/74 (BP Location: Right arm, Patient Position: Lying)   Pulse 67   Temp 36.5 °C (97.7 °F) (Oral)   Resp 16   Ht 5' 5" (1.651 m)   Wt 70.8 kg (156 lb)   LMP  (LMP Unknown)   SpO2 99%   Breastfeeding? No   BMI 25.96 kg/m²       Pain/Royer Score: Pain Assessment Performed: Yes (11/28/2018  2:30 PM)  Presence of Pain: complains of pain/discomfort (11/28/2018  2:30 PM)  Pain Rating Prior to Med Admin: 6 (11/28/2018  5:10 PM)  Pain Rating Post Med Admin: 0 (11/28/2018  3:41 PM)  Royer Score: 10 (11/28/2018  2:30 PM)        "

## 2018-11-29 NOTE — PLAN OF CARE
Problem: Patient Care Overview  Goal: Plan of Care Review  Outcome: Ongoing (interventions implemented as appropriate)  Admitted pt to room and unit. Explained fall risk to patient. Verbalized understanding. VSS. 12 hr chart check complete. Will report off.

## 2018-11-29 NOTE — NURSING
Patient adequate for discharge. IV removed, catheter tip intact. Discharge paperwork given. Questions answered. Rolled out in a wheelchair.

## 2018-11-29 NOTE — PLAN OF CARE
DC Plan: Discharging back to [facility]. Report to be called to Vanna  @ [phone].  time will be given when report called.      CM informed Macie Long, daughter in law, of patient's discharge.

## 2018-11-29 NOTE — DISCHARGE SUMMARY
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 Dr. Mota with IR recommended overnight observation for hydration and pain control. He also recommended empiric antibiotics.   Pt feels well. + pain to left chest. Denies any other complaints         Procedure(s) (LRB):  ABLATION-CRYO in CT (N/A)      Hospital Course:   The pt is a 79 yo female [facility] resident with Bipolar, schizoaffective d/o, and Non-small cell lung cancer/Squamous cell carcinoma the left upper lobe placed in observation s/p CT guided lung mass cryoablation per IR. Dr. Mota with IR recommended overnight observation for hydration and pain control.  No acute events overnight. No hemoptysis. H/H remained stable. Dr. Mota recommended empiric antibiotics. Pt will be discharged on oral Augmentin.          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    Specialty:  Internal  Medicine  Contact information:  [address]  [place] [address]  [phone]             Chace Mir Jr, MD In 1 week.    Specialty:  Hematology and Oncology  Contact information:  [address] [place] [id number] [phone]                 Patient Instructions:      Diet Cardiac     Activity as tolerated       Significant Diagnostic Studies:       Pending Diagnostic Studies:     None         Medications:  Reconciled Home Medications:      Medication List      START taking these medications    amoxicillin-clavulanate 875-125mg 875-125 mg per tablet  Commonly known as:  AUGMENTIN  Take 1 tablet by mouth 2 (two) times daily. for 5 days     ramelteon 8 mg tablet  Commonly known as:  ROZEREM  Take 1 tablet (8 mg total) by mouth nightly as needed for Insomnia.        CONTINUE taking these medications    acetaminophen 325 MG tablet  Commonly known as:  TYLENOL  Take 650 mg by mouth 4 (four) times daily as needed for Pain.     albuterol 90 mcg/actuation inhaler  Commonly known as:  PROVENTIL/VENTOLIN HFA  Inhale 2 puffs into the lungs every 6 (six) hours as needed for Wheezing. Rescue     ALPRAZolam 0.25 MG tablet  Commonly known as:  XANAX  Take 0.25 mg by mouth once daily.     ALREX 0.2 % Drps  Generic drug:  loteprednol  Place 1 drop into both eyes once daily.     amLODIPine 10 MG tablet  Commonly known as:  NORVASC  once daily.     ANORO ELLIPTA 62.5-25 mcg/actuation Dsdv  Generic drug:  umeclidinium-vilanterol     apixaban 5 mg Tab  Commonly known as:  ELIQUIS  Take 1 tablet (5 mg total) by mouth 2 (two) times daily.  Start taking on:  11/30/2018     ARIPiprazole 5 MG Tab  Commonly known as:  ABILIFY  Take 1 tablet by mouth once daily.     benztropine 1 MG tablet  Commonly known as:  COGENTIN  Take 1 mg by mouth 2 (two) times daily.     cyanocobalamin 1,000 mcg/mL injection  Inject 1,000 mcg into the skin every 30 days.     DULoxetine 60 MG capsule  Commonly known as:  CYMBALTA  once daily.      fluticasone 50 mcg/actuation nasal spray  Commonly known as:  FLONASE  1 spray by Each Nare route 2 (two) times daily.     IRON 325 mg (65 mg iron) Tab tablet  Generic drug:  ferrous sulfate  Take 325 mg by mouth 2 (two) times daily.     lactulose 10 gram packet  Commonly known as:  CEPHULAC  Take 20 g by mouth daily as needed.     levothyroxine 88 MCG tablet  Commonly known as:  SYNTHROID  Take 88 mcg by mouth before breakfast.     MULTIVITAMIN ORAL  Take by mouth once daily.     QUEtiapine 200 MG Tab  Commonly known as:  SEROQUEL  150 mg nightly.     RESTASIS 0.05 % ophthalmic emulsion  Generic drug:  cycloSPORINE  Place 0.05 drops into both eyes 2 (two) times daily.        STOP taking these medications    RESTORIL 15 mg Cap  Generic drug:  temazepam     zolpidem 5 MG Tab  Commonly known as:  AMBIEN            Indwelling Lines/Drains at time of discharge:   Lines/Drains/Airways          None          Time spent on the discharge of patient: 42 minutes  Patient was seen and examined on the date of discharge and determined to be suitable for discharge.         Juanita Nichols NP  Department of Hospital Medicine  [facility]

## 2018-12-11 ENCOUNTER — OFFICE VISIT (OUTPATIENT)
Dept: HEMATOLOGY/ONCOLOGY | Facility: CLINIC | Age: 78
End: 2018-12-11
Payer: MEDICARE

## 2018-12-11 VITALS
HEART RATE: 85 BPM | OXYGEN SATURATION: 95 % | WEIGHT: 169.31 LBS | DIASTOLIC BLOOD PRESSURE: 84 MMHG | TEMPERATURE: 98 F | HEIGHT: 65 IN | RESPIRATION RATE: 18 BRPM | BODY MASS INDEX: 28.21 KG/M2 | SYSTOLIC BLOOD PRESSURE: 160 MMHG

## 2018-12-11 DIAGNOSIS — Z98.890 STATUS POST CRYOABLATION: ICD-10-CM

## 2018-12-11 DIAGNOSIS — C34.12 MALIGNANT NEOPLASM OF UPPER LOBE OF LEFT LUNG: Primary | Chronic | ICD-10-CM

## 2018-12-11 DIAGNOSIS — J44.9 COPD, VERY SEVERE: Chronic | ICD-10-CM

## 2018-12-11 PROCEDURE — 99999 PR PBB SHADOW E&M-EST. PATIENT-LVL III: CPT | Mod: PBBFAC,,, | Performed by: INTERNAL MEDICINE

## 2018-12-11 PROCEDURE — 99213 OFFICE O/P EST LOW 20 MIN: CPT | Mod: PBBFAC,PO | Performed by: INTERNAL MEDICINE

## 2018-12-11 PROCEDURE — 99214 OFFICE O/P EST MOD 30 MIN: CPT | Mod: S$PBB,,, | Performed by: INTERNAL MEDICINE

## 2018-12-11 NOTE — PROGRESS NOTES
Hematology/Oncology Office Note    Reason for referral:  Squamous cell carcinoma left upper lung    CC:  Lung cancer    Referred by:  No ref. 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  PET scan has failed to identify distant metastases.  She has a history of medication-induced parkinsonian symptoms and she remains on therapy to control significant bipolar disease.  She has no specific complaints today and reports that shortness of breath is chronic stable.  She denies significant pains or focal deficits.  Performance status = 2     I have reviewed and updated the HPI, ROS, PMHx, Social Hx, Family Hx and treatment history.      Today's visit:  Status post cryoablation of left upper lobe mass on 11/28/18 and SBRT to right lower lobe 11/14/18.          Past Medical History:   Diagnosis Date    Acute upper respiratory infection     Anemia     Bipolar mood disorder     Coordination abnormal     COPD exacerbation 5/9/2016    Coronary artery disease     Difficulty walking     Hypertension     on meds    Hypothyroidism, adult     Insomnia     Malignant neoplasm of colon     Muscle weakness     Neuralgia and neuritis     Schizoaffective disorder     SOB (shortness of breath)     Spinal stenosis     Thyroid disease     Urinary tract infection          Social History:  Current every day smoker with a history of 50 pack years      Family History: family history includes Asthma in her father; Eczema in her father; No Known Problems in her mother; Other in her brother.  No reported family history of malignancy      HPI    Review of Systems   Constitutional: Negative for activity change,  "diaphoresis, fatigue, fever and unexpected weight change.   HENT: Negative for congestion, ear discharge, ear pain, hearing loss, nosebleeds and postnasal drip.    Eyes: Negative.    Respiratory: Positive for shortness of breath. Negative for apnea, cough, wheezing and stridor.         Cough of productive sputum   Cardiovascular: Negative for chest pain and leg swelling.   Gastrointestinal: Negative for abdominal distention, abdominal pain, blood in stool, constipation and diarrhea.   Endocrine: Negative.    Genitourinary: Negative for difficulty urinating, dyspareunia, dysuria, enuresis, flank pain and frequency.   Musculoskeletal: Negative for arthralgias, back pain, gait problem, joint swelling, myalgias, neck pain and neck stiffness.   Skin: Negative for color change, pallor, rash and wound.   Allergic/Immunologic: Negative.    Neurological: Negative for dizziness, tremors, seizures, syncope, facial asymmetry, light-headedness, numbness and headaches.   Hematological: Negative for adenopathy. Does not bruise/bleed easily.   Psychiatric/Behavioral: Positive for confusion. Negative for agitation, behavioral problems, decreased concentration and dysphoric mood.       Objective:       Vitals:    12/11/18 1001   BP: (!) 160/84   Pulse: 85   Resp: 18   Temp: 97.7 °F (36.5 °C)   TempSrc: Oral   SpO2: 95%   Weight: 76.8 kg (169 lb 5 oz)   Height: 5' 5" (1.651 m)     Physical Exam   Constitutional: She is oriented to person, place, and time. She appears well-developed and well-nourished. No distress.   HENT:   Head: Normocephalic and atraumatic.   Right Ear: External ear normal.   Left Ear: External ear normal.   Nose: Nose normal.   Mouth/Throat: Uvula is midline, oropharynx is clear and moist and mucous membranes are normal. No oral lesions. Normal dentition. No oropharyngeal exudate.   Eyes: Conjunctivae and EOM are normal. Pupils are equal, round, and reactive to light. Right eye exhibits no discharge. Left eye " exhibits no discharge.   Neck: Trachea normal and normal range of motion. Neck supple. No JVD present. No thyromegaly present.   Cardiovascular: Normal rate, regular rhythm, normal heart sounds and intact distal pulses. Exam reveals no gallop and no friction rub.   No murmur heard.  Pulmonary/Chest: Effort normal. No stridor. No respiratory distress. She has decreased breath sounds. She has no wheezes. She has no rhonchi. She has no rales. She exhibits no tenderness.   Abdominal: Soft. Bowel sounds are normal. She exhibits no distension and no mass. There is no tenderness. There is no rebound and no guarding.   Musculoskeletal: Normal range of motion. She exhibits no edema or deformity.   Lymphadenopathy:        Head (right side): No submental and no submandibular adenopathy present.        Head (left side): No submental and no submandibular adenopathy present.     She has no cervical adenopathy.     She has no axillary adenopathy.        Right: No supraclavicular adenopathy present.        Left: No supraclavicular adenopathy present.   Neurological: She is alert and oriented to person, place, and time. She displays normal reflexes. No cranial nerve deficit or sensory deficit. She exhibits normal muscle tone. Coordination normal.   Skin: Skin is warm, dry and intact. Capillary refill takes less than 2 seconds. No abrasion, no bruising, no ecchymosis and no rash noted. She is not diaphoretic. No cyanosis. No pallor. Nails show no clubbing.   Psychiatric: She has a normal mood and affect. Her behavior is normal. Judgment and thought content normal.       Lab Results   Component Value Date    WBC 6.53 11/29/2018    HGB 11.0 (L) 11/29/2018    HCT 34.1 (L) 11/29/2018    MCV 91 11/29/2018     (L) 11/29/2018     Lab Results   Component Value Date    CREATININE 0.8 11/29/2018    BUN 21 11/29/2018     11/29/2018    K 3.9 11/29/2018     11/29/2018    CO2 27 11/29/2018     Lab Results   Component Value Date     ALT 6 (L) 11/29/2018    AST 19 11/29/2018    ALKPHOS 58 11/29/2018    BILITOT 0.5 11/29/2018 5/4/16 PET:   Hypermetabolic left upper lobe lung mass highly suspicious for bronchogenic malignancy.  No FDG avid regional lymphadenopathy or distant metastatic disease demonstrated.    7/21/16 CT of [facility]:    1. Findings consistent with treatment response with decrease in size of the mass within the left upper lobe.  No definite signs of metastatic disease within the chest, abdomen or pelvis.    2.  Enhancing nodule within the left upper quadrant adjacent to left adrenal gland likely representing a splenule and stable in size when compared to the prior study.        Assessment:       75-year-old female with 2.9 cm left upper lobe squamous cell carcinoma s/p definitive SBRT.  Patient was a poor surgical candidate therefore went on to SBRT which was completed on 6/10/16.  Restaging CT scans following SBRT demonstrated significant reduction in size of left upper lobe mass indicating positive response to treatment.  She was subsequently lost to follow-up and has not been seen since 20016.  She presents for follow-up today with repeat CT scans which demonstrate progression of left upper lobe mass and new right lower lobe nodule.    PET scan performed on 09/24/2018 was consistent with recurrent disease.  CT scan of the brain was negative for metastatic disease.   Repeat biopsy of left upper lobe mass was consistent with squamous cell carcinoma.  The patient underwent SP RT to right lower lobe mass and cryoablation to recurrent left upper lobe carcinoma.  The patient is currently being treated for pneumonia with oral antibiotics through her nursing home facility.  She has follow-up appointment with Dr. Mora and surveillance CT scans scheduled.  We will see the patient back in 2 months    Non-small cell lung cancer/squamous cell carcinoma left upper lobe:  --s/p SBRT completed on 6/10/16  --status post SB RT to right lower  lobe mass 11/2018  --status post cryoablation to recurrent left upper lobe squamous cell carcinoma    Small multiple pulmonary emboli noted on CT scan performed in 09/2018:  --Eliquis 5 mg p.o. B.i.d..  --continue to monitor closely    Iron deficiency:  --currently on ferrous sulfate 325 mg p.o. B.i.d.  --consider endoscopies after addressing lung cancer

## 2018-12-27 ENCOUNTER — OFFICE VISIT (OUTPATIENT)
Dept: RADIATION ONCOLOGY | Facility: CLINIC | Age: 78
End: 2018-12-27
Payer: MEDICARE

## 2018-12-27 VITALS
OXYGEN SATURATION: 96 % | RESPIRATION RATE: 20 BRPM | DIASTOLIC BLOOD PRESSURE: 98 MMHG | TEMPERATURE: 98 F | WEIGHT: 171.31 LBS | HEART RATE: 108 BPM | SYSTOLIC BLOOD PRESSURE: 155 MMHG | BODY MASS INDEX: 28.54 KG/M2 | HEIGHT: 65 IN

## 2018-12-27 DIAGNOSIS — C34.12 MALIGNANT NEOPLASM OF UPPER LOBE OF LEFT LUNG: Primary | Chronic | ICD-10-CM

## 2018-12-27 PROCEDURE — 99999 PR PBB SHADOW E&M-EST. PATIENT-LVL IV: CPT | Mod: PBBFAC,,, | Performed by: RADIOLOGY

## 2018-12-27 PROCEDURE — 99214 OFFICE O/P EST MOD 30 MIN: CPT | Mod: PBBFAC | Performed by: RADIOLOGY

## 2018-12-27 PROCEDURE — 99213 OFFICE O/P EST LOW 20 MIN: CPT | Mod: S$PBB,,, | Performed by: RADIOLOGY

## 2018-12-27 RX ORDER — OMEPRAZOLE 20 MG/1
CAPSULE, DELAYED RELEASE ORAL
COMMUNITY
Start: 2018-12-26 | End: 2019-08-20

## 2018-12-27 RX ORDER — TERBINAFINE HYDROCHLORIDE 250 MG/1
250 TABLET ORAL DAILY
Status: ON HOLD | COMMUNITY
Start: 2018-12-19 | End: 2020-01-03 | Stop reason: HOSPADM

## 2018-12-27 RX ORDER — QUETIAPINE FUMARATE 150 MG/1
150 TABLET, FILM COATED ORAL NIGHTLY
COMMUNITY
Start: 2018-12-22

## 2018-12-27 NOTE — PROGRESS NOTES
"[facility]  RADIATION ONCOLOGY FOLLOW UP    Name: Rachel Long  : 1940      DIAGNOSIS:  Likely metachronous non-small cell lung cancer  1. 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 18 CT chest new right lower lobe 1.5 x 1.7 cm nodule and left lower lobe 2.6 x 3.5 cm opacity in area of prior lung cancer 2016  3. 18 PET scan 5.7 SUV left upper lobe area of prior SBRT, 3.7 SUV right lower lobe  4. 10/22/18 CT-guided biopsy left upper lobe prior area of stereotactic body radiation therapy returned squamous cell carcinoma  5. 18 completed right lower lobe stereotactic body radiation therapy 50 Gy in 5 fraction  6. 28 cryotherapy recurrence left upper lobe biopsy-proven recurrence    CURRENT STATUS: Rachel Long is a pleasant 78 y.o. female who presents today for follow-up.  She saw Dr. Mir 18.  She was being treated for pneumonia with oral antibiotics to a nursing home.  She has 2-3 minutes of left anterior chest wall pain daily since the cryoablation.     PHYSICAL EXAM:   Constitutional: well appearing, no acute distress, ECOG 2 - Ambulates, capable of self care only  Vitals:    BP (!) 155/98   Pulse 108   Temp 98 °F (36.7 °C)   Resp 20   Ht 5' 5" (1.651 m)   Wt 77.7 kg (171 lb 4.8 oz)   LMP  (LMP Unknown)   SpO2 96%   BMI 28.51 kg/m²   Lymphatic: no cervical, supraclavicular  adenopathy  Cardiovascular: regular rate, no murmurs, no edema of the upper or lower extremities, radial pulse 2+  Respiratory: unlabored effort, clear to auscultation    Laboratory & X-Ray Findings: Per above.      ASSESSMENT:  Slight left chest wall pain since cryoablation    PLAN:  I have ordered a CT of the chest with contrast 19.  I will see her shortly after that appointment to review the images.  She is also following with Dr. Mir.    JOBY Mora M.D.  Radiation Oncology  [facility]  [id number] [address] [place] [address]  Ph: [phone]  [email or web address]

## 2019-02-04 NOTE — PROGRESS NOTES
"[facility] [place]  RADIATION ONCOLOGY FOLLOW UP    Name: Rachel Long  : 1940      DIAGNOSIS:  Likely metachronous non-small cell lung cancer  1. 6/10/16 stereotactic body radiation therapy left upper lobe squamous cell carcinoma stage I  2. 18 CT chest new right lower lobe 1.5 x 1.7 cm nodule and left lower lobe 2.6 x 3.5 cm opacity in area of prior lung cancer 2016  3. 18 PET scan 5.7 SUV left upper lobe area of prior SBRT, 3.7 SUV right lower lobe  4. 10/22/18 CT-guided biopsy left upper lobe prior area of stereotactic body radiation therapy returned squamous cell carcinoma  5. 18 completed right lower lobe stereotactic body radiation therapy 50 Gy in 5 fraction  6. 28 cryotherapy recurrence left upper lobe biopsy-proven recurrence    CURRENT STATUS: Rachel Long is a pleasant 78 y.o. female who presents today for follow-up.  19 CT chest showed a left upper lobe lesion to be increase in size measuring 4.7 x 2.3 compared with 3.5 x 2.6 on prior with surrounding atelectasis.  There was a 2 mm left lower lobe nodule.  The right lower lobe nodule was decreased measuring 0.6 x 0.5 cm compared with 1.5 x 1.5 cm prior.  The left adrenal gland nodule measured 1.8 x 1.5 cm compared with 2 x 1.4 cm prior.    New (left) and pre treatment right lower lobe      PHYSICAL EXAM:   Constitutional: well appearing, no acute distress, ECOG 1 - Ambulates, capable of light work  Vitals:    /82   Pulse 98   Temp 97.5 °F (36.4 °C)   Resp 18   Ht 5' 5" (1.651 m)   Wt 79.4 kg (175 lb 0.7 oz)   LMP  (LMP Unknown)   SpO2 97%   BMI 29.13 kg/m²   Lymphatic: no cervical, supraclavicular adenopathy  Cardiovascular: regular rate, no murmurs, no edema of the upper or lower extremities, radial pulse 2+  Respiratory: unlabored effort, clear to auscultation, no wheezes    Laboratory & X-Ray Findings: Per above.      ASSESSMENT:  Excellent response right-sided lesion to stereotactic " body radiation therapy, increased left-sided lesion post salvage cryotherapy for stereotactic body radiation therapy (2016) failure    PLAN:  The right-sided lesion is doing well post stereotactic body radiation therapy.  I discussed the left-sided changes post cryotherapy with Dr. Mota who performed the procedure.  He recommended PET scan, so I have ordered this scan, and I will call him to interpret the results.  I will see the patient after the PET scan.    JOBY Mora M.D.  Radiation Oncology  [facility] [address] [place] [address]  Ph: [phone]  [email or web address]

## 2019-02-11 ENCOUNTER — HOSPITAL ENCOUNTER (OUTPATIENT)
Dept: RADIOLOGY | Facility: HOSPITAL | Age: 79
Discharge: HOME | End: 2019-02-11
Attending: RADIOLOGY
Payer: MEDICARE

## 2019-02-11 DIAGNOSIS — C34.12 MALIGNANT NEOPLASM OF UPPER LOBE OF LEFT LUNG: Chronic | ICD-10-CM

## 2019-02-11 PROCEDURE — 71260 CT THORAX DX C+: CPT | Mod: 26,,, | Performed by: RADIOLOGY

## 2019-02-11 PROCEDURE — 71260 CT CHEST WITH CONTRAST: ICD-10-PCS | Mod: 26,,, | Performed by: RADIOLOGY

## 2019-02-11 PROCEDURE — 71260 CT THORAX DX C+: CPT | Mod: TC

## 2019-02-11 PROCEDURE — 25500020 PHARM REV CODE 255

## 2019-02-14 ENCOUNTER — OFFICE VISIT (OUTPATIENT)
Dept: RADIATION ONCOLOGY | Facility: CLINIC | Age: 79
End: 2019-02-14
Payer: MEDICARE

## 2019-02-14 VITALS
SYSTOLIC BLOOD PRESSURE: 137 MMHG | HEIGHT: 65 IN | WEIGHT: 175.06 LBS | DIASTOLIC BLOOD PRESSURE: 82 MMHG | TEMPERATURE: 98 F | OXYGEN SATURATION: 97 % | HEART RATE: 98 BPM | BODY MASS INDEX: 29.17 KG/M2 | RESPIRATION RATE: 18 BRPM

## 2019-02-14 DIAGNOSIS — C34.12 MALIGNANT NEOPLASM OF UPPER LOBE OF LEFT LUNG: Primary | Chronic | ICD-10-CM

## 2019-02-14 PROCEDURE — 99215 OFFICE O/P EST HI 40 MIN: CPT | Mod: PBBFAC | Performed by: RADIOLOGY

## 2019-02-14 PROCEDURE — 99999 PR PBB SHADOW E&M-EST. PATIENT-LVL V: ICD-10-PCS | Mod: PBBFAC,,, | Performed by: RADIOLOGY

## 2019-02-14 PROCEDURE — 99214 OFFICE O/P EST MOD 30 MIN: CPT | Mod: S$PBB,,, | Performed by: RADIOLOGY

## 2019-02-14 PROCEDURE — 99214 PR OFFICE/OUTPT VISIT, EST, LEVL IV, 30-39 MIN: ICD-10-PCS | Mod: S$PBB,,, | Performed by: RADIOLOGY

## 2019-02-14 PROCEDURE — 99999 PR PBB SHADOW E&M-EST. PATIENT-LVL V: CPT | Mod: PBBFAC,,, | Performed by: RADIOLOGY

## 2019-02-14 RX ORDER — TRAZODONE HYDROCHLORIDE 50 MG/1
50 TABLET ORAL NIGHTLY PRN
COMMUNITY
Start: 2019-02-02 | End: 2023-04-02 | Stop reason: ALTCHOICE

## 2019-02-21 ENCOUNTER — TELEPHONE (OUTPATIENT)
Dept: RADIOLOGY | Facility: HOSPITAL | Age: 79
End: 2019-02-21

## 2019-02-22 NOTE — PROGRESS NOTES
"[facility] [place]  RADIATION ONCOLOGY FOLLOW UP    Name: Rachel Long : 1940     DIAGNOSIS:  Likely metachronous non-small cell lung cancer  1. 6/10/16 stereotactic body radiation therapy left upper lobe squamous cell carcinoma stage I  2. 18 CT chest new right lower lobe 1.5 x 1.7 cm nodule and left lower lobe 2.6 x 3.5 cm opacity in area of prior lung cancer 2016  3. 18 PET scan 5.7 SUV left upper lobe area of prior SBRT, 3.7 SUV right lower lobe  4. 10/22/18 CT-guided biopsy left upper lobe prior area of stereotactic body radiation therapy returned squamous cell carcinoma  5. 18 completed right lower lobe stereotactic body radiation therapy 50 Gy in 5 fractions  6. 28 cryotherapy recurrence left upper lobe biopsy-proven recurrence  7. 19 CT chest showed a left upper lobe lesion to be increase in size measuring 4.7 x 2.3 compared with 3.5 x 2.6 on prior with surrounding atelectasis.  There was a 2 mm left lower lobe nodule.  The right lower lobe nodule was decreased measuring 0.6 x 0.5 cm compared with 1.5 x 1.5 cm prior.  The left adrenal gland nodule measured 1.8 x 1.5 cm compared with 2 x 1.4 cm prior.  8. 18 CT scan showing no FDG activity in the right upper lobe lesion and 2.2 SUV in the left upper lobe lesion    CURRENT STATUS: Rachel Long is a pleasant 78 y.o. female who presents today for follow-up.  She had a PET as above the diagnosis section.  I reviewed these images personally and with patient.    PHYSICAL EXAM:   Constitutional: well appearing, no acute distress, ECOG 2  Vitals:    /77   Pulse 85   Temp 96.8 °F (36 °C)   Resp 18   Ht 5' 2" (1.575 m)   Wt 78.7 kg (173 lb 8 oz)   LMP  (LMP Unknown)   SpO2 96%   BMI 31.73 kg/m²     Laboratory & X-Ray Findings: Per above.      ASSESSMENT:  Excellent response on PET    PLAN:  The PET scan is reassuring.  I will see her in 4 months with a CT of the chest.  She has some trouble " sleeping at the assisted care on which she attributes to a talkative roommate.  I recommend she discuss this with her assistant care staff.    JOBY Mora M.D.  Radiation Oncology  [facility] [address] [place] [address]  Ph: [phone]  [email or web address]

## 2019-02-25 ENCOUNTER — OFFICE VISIT (OUTPATIENT)
Dept: HEMATOLOGY/ONCOLOGY | Facility: CLINIC | Age: 79
End: 2019-02-25
Payer: MEDICARE

## 2019-02-25 ENCOUNTER — HOSPITAL ENCOUNTER (OUTPATIENT)
Dept: RADIOLOGY | Facility: HOSPITAL | Age: 79
Discharge: HOME | End: 2019-02-25
Attending: RADIOLOGY
Payer: MEDICARE

## 2019-02-25 VITALS
HEIGHT: 62 IN | RESPIRATION RATE: 18 BRPM | OXYGEN SATURATION: 97 % | BODY MASS INDEX: 31.77 KG/M2 | HEART RATE: 87 BPM | TEMPERATURE: 96 F | SYSTOLIC BLOOD PRESSURE: 157 MMHG | WEIGHT: 172.63 LBS | DIASTOLIC BLOOD PRESSURE: 88 MMHG

## 2019-02-25 DIAGNOSIS — Z98.890 STATUS POST CRYOABLATION: ICD-10-CM

## 2019-02-25 DIAGNOSIS — J44.9 COPD, VERY SEVERE: Chronic | ICD-10-CM

## 2019-02-25 DIAGNOSIS — R91.1 LUNG NODULE SEEN ON IMAGING STUDY: ICD-10-CM

## 2019-02-25 DIAGNOSIS — C34.12 MALIGNANT NEOPLASM OF UPPER LOBE OF LEFT LUNG: Primary | Chronic | ICD-10-CM

## 2019-02-25 DIAGNOSIS — D50.8 OTHER IRON DEFICIENCY ANEMIA: ICD-10-CM

## 2019-02-25 DIAGNOSIS — F17.210 CIGARETTE SMOKER: ICD-10-CM

## 2019-02-25 DIAGNOSIS — C34.12 MALIGNANT NEOPLASM OF UPPER LOBE OF LEFT LUNG: Chronic | ICD-10-CM

## 2019-02-25 DIAGNOSIS — C34.91 MALIGNANT NEOPLASM OF RIGHT LUNG, UNSPECIFIED PART OF LUNG: ICD-10-CM

## 2019-02-25 PROCEDURE — 78815 NM PET CT ROUTINE: ICD-10-PCS | Mod: 26,PS,, | Performed by: RADIOLOGY

## 2019-02-25 PROCEDURE — 99214 PR OFFICE/OUTPT VISIT, EST, LEVL IV, 30-39 MIN: ICD-10-PCS | Mod: S$PBB,,, | Performed by: INTERNAL MEDICINE

## 2019-02-25 PROCEDURE — 99214 OFFICE O/P EST MOD 30 MIN: CPT | Mod: PBBFAC,25 | Performed by: INTERNAL MEDICINE

## 2019-02-25 PROCEDURE — 99214 OFFICE O/P EST MOD 30 MIN: CPT | Mod: S$PBB,,, | Performed by: INTERNAL MEDICINE

## 2019-02-25 PROCEDURE — 78815 PET IMAGE W/CT SKULL-THIGH: CPT | Mod: 26,PS,, | Performed by: RADIOLOGY

## 2019-02-25 PROCEDURE — 99999 PR PBB SHADOW E&M-EST. PATIENT-LVL IV: ICD-10-PCS | Mod: PBBFAC,,, | Performed by: INTERNAL MEDICINE

## 2019-02-25 PROCEDURE — 78815 PET IMAGE W/CT SKULL-THIGH: CPT | Mod: TC,PS

## 2019-02-25 PROCEDURE — A9552 F18 FDG: HCPCS

## 2019-02-25 PROCEDURE — 99999 PR PBB SHADOW E&M-EST. PATIENT-LVL IV: CPT | Mod: PBBFAC,,, | Performed by: INTERNAL MEDICINE

## 2019-02-25 NOTE — PROGRESS NOTES
Hematology/Oncology Office Note    Reason for referral:  Squamous cell carcinoma left upper lung    CC:  Lung cancer    Referred by:  No ref. 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  PET scan has failed to identify distant metastases.  She has a history of medication-induced parkinsonian symptoms and she remains on therapy to control significant bipolar disease.  She has no specific complaints today and reports that shortness of breath is chronic stable.  She denies significant pains or focal deficits.  Performance status = 2     I have reviewed and updated the HPI, ROS, PMHx, Social Hx, Family Hx and treatment history.      Today's visit:  Status post cryoablation of left upper lobe mass on 11/28/18 and SBRT to right lower lobe 11/14/18.    02/11/2019 demonstrated increasing size of left upper lobe mass with repeat PET scan performed this afternoon.        Past Medical History:   Diagnosis Date    Acute upper respiratory infection     Anemia     Bipolar mood disorder     Coordination abnormal     COPD exacerbation 5/9/2016    Coronary artery disease     Difficulty walking     Hypertension     on meds    Hypothyroidism, adult     Insomnia     Malignant neoplasm of colon     Muscle weakness     Neuralgia and neuritis     Schizoaffective disorder     SOB (shortness of breath)     Spinal stenosis     Thyroid disease     Urinary tract infection          Social History:  Current every day smoker with a history of 50 pack years      Family History: family history includes Asthma in her father; Eczema in her father; No Known Problems in her mother; Other in her brother.  No  "reported family history of malignancy      HPI    Review of Systems   Constitutional: Positive for activity change and fatigue. Negative for appetite change, chills, diaphoresis, fever and unexpected weight change.   HENT: Negative for congestion, dental problem, drooling, ear discharge, ear pain, hearing loss, nosebleeds, postnasal drip, sinus pressure and sneezing.    Eyes: Negative.    Respiratory: Positive for shortness of breath. Negative for apnea, cough, choking, chest tightness, wheezing and stridor.         Cough of productive sputum   Cardiovascular: Negative for chest pain and leg swelling.   Gastrointestinal: Positive for nausea. Negative for abdominal distention, abdominal pain, blood in stool, constipation, diarrhea and rectal pain.   Endocrine: Negative.    Genitourinary: Negative for difficulty urinating, dyspareunia, dysuria, enuresis, flank pain and frequency.   Musculoskeletal: Negative for arthralgias, back pain, gait problem, joint swelling, myalgias, neck pain and neck stiffness.   Skin: Negative for color change, pallor, rash and wound.   Allergic/Immunologic: Negative.    Neurological: Negative for dizziness, tremors, seizures, syncope, speech difficulty, weakness, light-headedness, numbness and headaches.   Hematological: Negative for adenopathy. Does not bruise/bleed easily.   Psychiatric/Behavioral: Positive for confusion. Negative for agitation, behavioral problems, decreased concentration and dysphoric mood. The patient is nervous/anxious.        Objective:       Vitals:    02/25/19 1352   BP: (!) 157/88   Pulse: 87   Resp: 18   Temp: 96.4 °F (35.8 °C)   TempSrc: Oral   SpO2: 97%   Weight: 78.3 kg (172 lb 9.9 oz)   Height: 5' 2" (1.575 m)     Physical Exam   Constitutional: She is oriented to person, place, and time. She appears well-developed and well-nourished. No distress.   HENT:   Head: Normocephalic and atraumatic.   Right Ear: External ear normal.   Left Ear: External ear normal. "   Nose: Nose normal.   Mouth/Throat: Uvula is midline, oropharynx is clear and moist and mucous membranes are normal. No oral lesions. Normal dentition. No oropharyngeal exudate.   Eyes: Conjunctivae and EOM are normal. Pupils are equal, round, and reactive to light. Right eye exhibits no discharge. Left eye exhibits no discharge.   Neck: Trachea normal and normal range of motion. Neck supple. No JVD present. No thyromegaly present.   Cardiovascular: Normal rate, regular rhythm, normal heart sounds and intact distal pulses. Exam reveals no gallop and no friction rub.   No murmur heard.  Pulmonary/Chest: Effort normal. No accessory muscle usage or stridor. No respiratory distress. She has decreased breath sounds. She has no wheezes. She has no rhonchi. She has no rales.   Abdominal: Soft. Bowel sounds are normal. She exhibits no distension and no mass. There is no tenderness. There is no rebound and no guarding.   Musculoskeletal: Normal range of motion. She exhibits no edema or deformity.   Lymphadenopathy:        Head (right side): No submental and no submandibular adenopathy present.        Head (left side): No submental and no submandibular adenopathy present.     She has no cervical adenopathy.     She has no axillary adenopathy.        Right: No supraclavicular adenopathy present.        Left: No supraclavicular adenopathy present.   Neurological: She is alert and oriented to person, place, and time. She displays normal reflexes. No cranial nerve deficit. Coordination normal.   Skin: Skin is warm, dry and intact. Capillary refill takes less than 2 seconds. No abrasion and no rash noted. Rash is not pustular and not urticarial. She is not diaphoretic. No cyanosis. No pallor. Nails show no clubbing.   Psychiatric: She has a normal mood and affect. Her behavior is normal. Judgment and thought content normal.       Lab Results   Component Value Date    WBC 6.53 11/29/2018    HGB 11.0 (L) 11/29/2018    HCT 34.1 (L)  11/29/2018    MCV 91 11/29/2018     (L) 11/29/2018     Lab Results   Component Value Date    CREATININE 0.8 02/11/2019    BUN 21 11/29/2018     11/29/2018    K 3.9 11/29/2018     11/29/2018    CO2 27 11/29/2018     Lab Results   Component Value Date    ALT 6 (L) 11/29/2018    AST 19 11/29/2018    ALKPHOS 58 11/29/2018    BILITOT 0.5 11/29/2018 5/4/16 PET:   Hypermetabolic left upper lobe lung mass highly suspicious for bronchogenic malignancy.  No FDG avid regional lymphadenopathy or distant metastatic disease demonstrated.    7/21/16 CT of [facility]:    1. Findings consistent with treatment response with decrease in size of the mass within the left upper lobe.  No definite signs of metastatic disease within the chest, abdomen or pelvis.    2.  Enhancing nodule within the left upper quadrant adjacent to left adrenal gland likely representing a splenule and stable in size when compared to the prior study.        Assessment:       75-year-old female with 2.9 cm left upper lobe squamous cell carcinoma s/p definitive SBRT.  Patient was a poor surgical candidate therefore went on to SBRT which was completed on 6/10/16.  Restaging CT scans following SBRT demonstrated significant reduction in size of left upper lobe mass indicating positive response to treatment.  She was subsequently lost to follow-up and has not been seen since 20016.  She presents for follow-up today with repeat CT scans which demonstrate progression of left upper lobe mass and new right lower lobe nodule.    PET scan performed on 09/24/2018 was consistent with recurrent disease.  CT scan of the brain was negative for metastatic disease.   Repeat biopsy of left upper lobe mass was consistent with squamous cell carcinoma.  The patient underwent SP RT to right lower lobe mass and cryoablation to recurrent left upper lobe carcinoma.  02/11/2019 CT scan demonstrated increasing size left upper lobe mass with repeat PET scan performed today.   We will follow-up on PET scan and discussed treatment plan with radiation oncology/Dr. Mora.  We will have patient follow-up accordingly.    Non-small cell lung cancer/squamous cell carcinoma left upper lobe:  --s/p SBRT completed on 6/10/16  --status post SB RT to right lower lobe mass 11/2018  --status post cryoablation to recurrent left upper lobe squamous cell carcinoma  --follow-up on PET scan results today and discuss results with Dr. Mora    Small multiple pulmonary emboli noted on CT scan performed in 09/2018:  --Eliquis 5 mg p.o. B.i.d..  --continue to monitor closely    Iron deficiency:  --currently on ferrous sulfate 325 mg p.o. B.i.d.  --consider endoscopies after addressing lung cancer

## 2019-02-28 ENCOUNTER — OFFICE VISIT (OUTPATIENT)
Dept: RADIATION ONCOLOGY | Facility: CLINIC | Age: 79
End: 2019-02-28
Payer: MEDICARE

## 2019-02-28 VITALS
SYSTOLIC BLOOD PRESSURE: 135 MMHG | DIASTOLIC BLOOD PRESSURE: 77 MMHG | HEIGHT: 62 IN | TEMPERATURE: 97 F | BODY MASS INDEX: 31.93 KG/M2 | WEIGHT: 173.5 LBS | RESPIRATION RATE: 18 BRPM | HEART RATE: 85 BPM | OXYGEN SATURATION: 96 %

## 2019-02-28 DIAGNOSIS — C34.12 MALIGNANT NEOPLASM OF UPPER LOBE OF LEFT LUNG: Primary | ICD-10-CM

## 2019-02-28 DIAGNOSIS — C80.1 CANCER: Primary | ICD-10-CM

## 2019-02-28 PROCEDURE — 99214 OFFICE O/P EST MOD 30 MIN: CPT | Mod: S$PBB,,, | Performed by: RADIOLOGY

## 2019-02-28 PROCEDURE — 99215 OFFICE O/P EST HI 40 MIN: CPT | Mod: PBBFAC | Performed by: RADIOLOGY

## 2019-02-28 PROCEDURE — 99999 PR PBB SHADOW E&M-EST. PATIENT-LVL V: ICD-10-PCS | Mod: PBBFAC,,, | Performed by: RADIOLOGY

## 2019-02-28 PROCEDURE — 99999 PR PBB SHADOW E&M-EST. PATIENT-LVL V: CPT | Mod: PBBFAC,,, | Performed by: RADIOLOGY

## 2019-02-28 PROCEDURE — 99214 PR OFFICE/OUTPT VISIT, EST, LEVL IV, 30-39 MIN: ICD-10-PCS | Mod: S$PBB,,, | Performed by: RADIOLOGY

## 2019-04-01 ENCOUNTER — LAB VISIT (OUTPATIENT)
Dept: LAB | Facility: HOSPITAL | Age: 79
End: 2019-04-01
Attending: INTERNAL MEDICINE
Payer: MEDICARE

## 2019-04-01 ENCOUNTER — OFFICE VISIT (OUTPATIENT)
Dept: HEMATOLOGY/ONCOLOGY | Facility: CLINIC | Age: 79
End: 2019-04-01
Payer: MEDICARE

## 2019-04-01 VITALS
HEART RATE: 97 BPM | BODY MASS INDEX: 32.78 KG/M2 | OXYGEN SATURATION: 97 % | SYSTOLIC BLOOD PRESSURE: 130 MMHG | HEIGHT: 62 IN | DIASTOLIC BLOOD PRESSURE: 83 MMHG | WEIGHT: 178.13 LBS | RESPIRATION RATE: 18 BRPM

## 2019-04-01 DIAGNOSIS — C34.12 MALIGNANT NEOPLASM OF UPPER LOBE OF LEFT LUNG: Primary | ICD-10-CM

## 2019-04-01 DIAGNOSIS — J44.9 COPD, VERY SEVERE: Chronic | ICD-10-CM

## 2019-04-01 DIAGNOSIS — C34.12 MALIGNANT NEOPLASM OF UPPER LOBE OF LEFT LUNG: Chronic | ICD-10-CM

## 2019-04-01 DIAGNOSIS — D50.8 OTHER IRON DEFICIENCY ANEMIA: ICD-10-CM

## 2019-04-01 DIAGNOSIS — Z79.899 OTHER LONG TERM (CURRENT) DRUG THERAPY: ICD-10-CM

## 2019-04-01 LAB
ALBUMIN SERPL BCP-MCNC: 3.9 G/DL (ref 3.5–5.2)
ALP SERPL-CCNC: 89 U/L (ref 55–135)
ALT SERPL W/O P-5'-P-CCNC: 12 U/L (ref 10–44)
ANION GAP SERPL CALC-SCNC: 9 MMOL/L (ref 8–16)
AST SERPL-CCNC: 20 U/L (ref 10–40)
BASOPHILS # BLD AUTO: 0.01 K/UL (ref 0–0.2)
BASOPHILS NFR BLD: 0.2 % (ref 0–1.9)
BILIRUB SERPL-MCNC: 0.3 MG/DL (ref 0.1–1)
BUN SERPL-MCNC: 29 MG/DL (ref 8–23)
CALCIUM SERPL-MCNC: 9.8 MG/DL (ref 8.7–10.5)
CHLORIDE SERPL-SCNC: 101 MMOL/L (ref 95–110)
CO2 SERPL-SCNC: 27 MMOL/L (ref 23–29)
CREAT SERPL-MCNC: 0.8 MG/DL (ref 0.5–1.4)
DIFFERENTIAL METHOD: ABNORMAL
EOSINOPHIL # BLD AUTO: 0.2 K/UL (ref 0–0.5)
EOSINOPHIL NFR BLD: 3.4 % (ref 0–8)
ERYTHROCYTE [DISTWIDTH] IN BLOOD BY AUTOMATED COUNT: 16.5 % (ref 11.5–14.5)
EST. GFR  (AFRICAN AMERICAN): >60 ML/MIN/1.73 M^2
EST. GFR  (NON AFRICAN AMERICAN): >60 ML/MIN/1.73 M^2
GLUCOSE SERPL-MCNC: 121 MG/DL (ref 70–110)
HCT VFR BLD AUTO: 37.4 % (ref 37–48.5)
HGB BLD-MCNC: 11.9 G/DL (ref 12–16)
LDH SERPL L TO P-CCNC: 228 U/L (ref 110–260)
LYMPHOCYTES # BLD AUTO: 1.3 K/UL (ref 1–4.8)
LYMPHOCYTES NFR BLD: 23.4 % (ref 18–48)
MCH RBC QN AUTO: 28.3 PG (ref 27–31)
MCHC RBC AUTO-ENTMCNC: 31.8 G/DL (ref 32–36)
MCV RBC AUTO: 89 FL (ref 82–98)
MONOCYTES # BLD AUTO: 0.6 K/UL (ref 0.3–1)
MONOCYTES NFR BLD: 11.3 % (ref 4–15)
NEUTROPHILS # BLD AUTO: 3.4 K/UL (ref 1.8–7.7)
NEUTROPHILS NFR BLD: 61.7 % (ref 38–73)
PLATELET # BLD AUTO: 137 K/UL (ref 150–350)
PMV BLD AUTO: 10.1 FL (ref 9.2–12.9)
POTASSIUM SERPL-SCNC: 4.1 MMOL/L (ref 3.5–5.1)
PROT SERPL-MCNC: 8 G/DL (ref 6–8.4)
RBC # BLD AUTO: 4.2 M/UL (ref 4–5.4)
SODIUM SERPL-SCNC: 137 MMOL/L (ref 136–145)
WBC # BLD AUTO: 5.51 K/UL (ref 3.9–12.7)

## 2019-04-01 PROCEDURE — 99999 PR PBB SHADOW E&M-EST. PATIENT-LVL IV: CPT | Mod: PBBFAC,,, | Performed by: INTERNAL MEDICINE

## 2019-04-01 PROCEDURE — 99214 OFFICE O/P EST MOD 30 MIN: CPT | Mod: PBBFAC | Performed by: INTERNAL MEDICINE

## 2019-04-01 PROCEDURE — 36415 COLL VENOUS BLD VENIPUNCTURE: CPT

## 2019-04-01 PROCEDURE — 99214 OFFICE O/P EST MOD 30 MIN: CPT | Mod: S$PBB,,, | Performed by: INTERNAL MEDICINE

## 2019-04-01 PROCEDURE — 83615 LACTATE (LD) (LDH) ENZYME: CPT

## 2019-04-01 PROCEDURE — 99214 PR OFFICE/OUTPT VISIT, EST, LEVL IV, 30-39 MIN: ICD-10-PCS | Mod: S$PBB,,, | Performed by: INTERNAL MEDICINE

## 2019-04-01 PROCEDURE — 99999 PR PBB SHADOW E&M-EST. PATIENT-LVL IV: ICD-10-PCS | Mod: PBBFAC,,, | Performed by: INTERNAL MEDICINE

## 2019-04-01 PROCEDURE — 80053 COMPREHEN METABOLIC PANEL: CPT

## 2019-04-01 PROCEDURE — 85025 COMPLETE CBC W/AUTO DIFF WBC: CPT

## 2019-04-01 RX ORDER — CEFUROXIME AXETIL 500 MG/1
TABLET ORAL
COMMUNITY
Start: 2019-03-07 | End: 2019-08-20

## 2019-04-01 NOTE — PROGRESS NOTES
Hematology/Oncology Office Note    Reason for referral:  Squamous cell carcinoma left upper lung    CC:  Lung cancer    Referred by:  No ref. 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  PET scan has failed to identify distant metastases.  She has a history of medication-induced parkinsonian symptoms and she remains on therapy to control significant bipolar disease.  She has no specific complaints today and reports that shortness of breath is chronic stable.  She denies significant pains or focal deficits.  Performance status = 2     I have reviewed and updated the HPI, ROS, PMHx, Social Hx, Family Hx and treatment history.      Today's visit:  Status post cryoablation of left upper lobe mass on 11/28/18 and SBRT to right lower lobe 11/14/18.    Post treatment imaging has been reassuring and the patient has no new complaints today.      Past Medical History:   Diagnosis Date    Acute upper respiratory infection     Anemia     Bipolar mood disorder     Coordination abnormal     COPD exacerbation 5/9/2016    Coronary artery disease     Difficulty walking     Hypertension     on meds    Hypothyroidism, adult     Insomnia     Malignant neoplasm of colon     Muscle weakness     Neuralgia and neuritis     Schizoaffective disorder     SOB (shortness of breath)     Spinal stenosis     Thyroid disease     Urinary tract infection          Social History:  Current every day smoker with a history of 50 pack years      Family History: family history includes Asthma in her father; Eczema in her father; No Known Problems in her mother; Other in her brother.  No reported family history of  "malignancy      HPI    Review of Systems   Constitutional: Positive for fatigue. Negative for activity change, appetite change, chills, diaphoresis, fever and unexpected weight change.   HENT: Negative for congestion, dental problem, ear discharge, ear pain, facial swelling, hearing loss, nosebleeds, postnasal drip, rhinorrhea, sinus pressure and sneezing.    Eyes: Negative.    Respiratory: Negative for apnea, cough, choking, chest tightness, shortness of breath, wheezing and stridor.         Cough of productive sputum   Cardiovascular: Negative for chest pain, palpitations and leg swelling.   Gastrointestinal: Positive for nausea. Negative for abdominal distention, abdominal pain, anal bleeding, blood in stool, constipation, diarrhea and rectal pain.   Endocrine: Negative.    Genitourinary: Negative for difficulty urinating, dyspareunia, dysuria, enuresis, flank pain, frequency, hematuria, menstrual problem, pelvic pain and urgency.   Musculoskeletal: Negative for arthralgias, gait problem, joint swelling, myalgias, neck pain and neck stiffness.   Skin: Negative for color change, pallor, rash and wound.   Allergic/Immunologic: Negative.    Neurological: Negative for dizziness, tremors, seizures, syncope, speech difficulty, weakness, numbness and headaches.   Hematological: Does not bruise/bleed easily.   Psychiatric/Behavioral: Positive for confusion. Negative for agitation, behavioral problems, decreased concentration and hallucinations. The patient is nervous/anxious. The patient is not hyperactive.        Objective:       Vitals:    04/01/19 1307   BP: 130/83   Pulse: 97   Resp: 18   SpO2: 97%   Weight: 80.8 kg (178 lb 2.1 oz)   Height: 5' 2" (1.575 m)     Physical Exam   Constitutional: She is oriented to person, place, and time. She appears well-developed and well-nourished. No distress.   HENT:   Head: Normocephalic and atraumatic.   Right Ear: External ear normal.   Left Ear: External ear normal.   Nose: " Nose normal.   Mouth/Throat: Uvula is midline, oropharynx is clear and moist and mucous membranes are normal. No oral lesions. Normal dentition. No oropharyngeal exudate.   Eyes: Pupils are equal, round, and reactive to light. Conjunctivae and EOM are normal. Right eye exhibits no discharge. Left eye exhibits no discharge.   Neck: Trachea normal and normal range of motion. Neck supple. No JVD present. No thyromegaly present.   Cardiovascular: Normal rate, regular rhythm, normal heart sounds and intact distal pulses. Exam reveals no gallop and no friction rub.   No murmur heard.  Pulmonary/Chest: Effort normal. No accessory muscle usage or stridor. No respiratory distress. She has decreased breath sounds. She has no wheezes. She has no rhonchi. She has no rales.   Abdominal: Soft. Bowel sounds are normal. She exhibits no distension and no mass. There is no tenderness. There is no rebound and no guarding.   Musculoskeletal: Normal range of motion. She exhibits no edema or deformity.   Lymphadenopathy:        Head (right side): No submental and no submandibular adenopathy present.        Head (left side): No submental and no submandibular adenopathy present.     She has no cervical adenopathy.     She has no axillary adenopathy.        Right: No supraclavicular adenopathy present.        Left: No supraclavicular adenopathy present.   Neurological: She is alert and oriented to person, place, and time. She displays normal reflexes. No cranial nerve deficit. Coordination normal.   Skin: Skin is warm, dry and intact. Capillary refill takes less than 2 seconds. No abrasion, no bruising, no ecchymosis and no rash noted.   Psychiatric: She has a normal mood and affect. Her behavior is normal. Judgment and thought content normal.       Lab Results   Component Value Date    WBC 5.51 04/01/2019    HGB 11.9 (L) 04/01/2019    HCT 37.4 04/01/2019    MCV 89 04/01/2019     (L) 04/01/2019     Lab Results   Component Value Date     CREATININE 0.8 04/01/2019    BUN 29 (H) 04/01/2019     04/01/2019    K 4.1 04/01/2019     04/01/2019    CO2 27 04/01/2019     Lab Results   Component Value Date    ALT 12 04/01/2019    AST 20 04/01/2019    ALKPHOS 89 04/01/2019    BILITOT 0.3 04/01/2019 5/4/16 PET:   Hypermetabolic left upper lobe lung mass highly suspicious for bronchogenic malignancy.  No FDG avid regional lymphadenopathy or distant metastatic disease demonstrated.    7/21/16 CT of [facility]:    1. Findings consistent with treatment response with decrease in size of the mass within the left upper lobe.  No definite signs of metastatic disease within the chest, abdomen or pelvis.    2.  Enhancing nodule within the left upper quadrant adjacent to left adrenal gland likely representing a splenule and stable in size when compared to the prior study.        Assessment:       75-year-old female with 2.9 cm left upper lobe squamous cell carcinoma s/p definitive SBRT.  Patient was a poor surgical candidate therefore went on to SBRT which was completed on 6/10/16.  Restaging CT scans following SBRT demonstrated significant reduction in size of left upper lobe mass indicating positive response to treatment.  She was subsequently lost to follow-up and has not been seen since 20016.  She presents for follow-up today with repeat CT scans which demonstrate progression of left upper lobe mass and new right lower lobe nodule.    PET scan performed on 09/24/2018 was consistent with recurrent disease.  CT scan of the brain was negative for metastatic disease.   Repeat biopsy of left upper lobe mass was consistent with squamous cell carcinoma.  The patient underwent SP RT to right lower lobe mass and cryoablation to recurrent left upper lobe carcinoma.  Post treatment imaging has been reassuring and we will have the patient follow up after repeat CT scans in 06/2019.      Non-small cell lung cancer/squamous cell carcinoma left upper lobe:  --s/p SBRT  completed on 6/10/16  --status post SB RT to right lower lobe mass 11/2018  --status post cryoablation to recurrent left upper lobe squamous cell carcinoma  --follow up after CT scans in 06/2019    Small multiple pulmonary emboli noted on CT scan performed in 09/2018:  --Eliquis 5 mg p.o. B.i.d..  --continue to monitor closely    Iron deficiency:  --repeat iron studies at followup in 06/2019  --patient very reluctant to proceed with endoscopies and not interested at this point  --consider proceeding with endoscopies if no evidence of disease recurrence

## 2019-05-21 ENCOUNTER — TELEPHONE (OUTPATIENT)
Dept: RADIATION ONCOLOGY | Facility: CLINIC | Age: 79
End: 2019-05-21

## 2019-05-21 NOTE — TELEPHONE ENCOUNTER
Made call to notify Macie of appt date change from 6-13-19 to 6-24-19 and called & faxed appt notice to Tracey marte as well.

## 2019-06-10 ENCOUNTER — TELEPHONE (OUTPATIENT)
Dept: RADIOLOGY | Facility: HOSPITAL | Age: 79
End: 2019-06-10

## 2019-06-10 ENCOUNTER — TELEPHONE (OUTPATIENT)
Dept: GASTROENTEROLOGY | Facility: CLINIC | Age: 79
End: 2019-06-10

## 2019-06-10 NOTE — TELEPHONE ENCOUNTER
----- Message from Luisa Thibodeaux, RT sent at 6/10/2019  2:14 PM CDT -----  Contact: Radiology  We canceled patient appt because patient is in hospital at [facility] [address] and has been since 05/28,,,. per nurse Ms Hicks at [facility]

## 2019-06-24 ENCOUNTER — TELEPHONE (OUTPATIENT)
Dept: RADIATION ONCOLOGY | Facility: CLINIC | Age: 79
End: 2019-06-24

## 2019-06-24 ENCOUNTER — TELEPHONE (OUTPATIENT)
Dept: RADIOLOGY | Facility: HOSPITAL | Age: 79
End: 2019-06-24

## 2019-06-24 NOTE — TELEPHONE ENCOUNTER
Spoke with Nursing home who stated they were cancelling appts on 6-25-19 and 6-28-19 due to patient being on skilled nursing orders and will not transport now. NH stated the will r/s her appts when she comes out of skilled nursing.

## 2019-08-07 ENCOUNTER — TELEPHONE (OUTPATIENT)
Dept: HEMATOLOGY/ONCOLOGY | Facility: CLINIC | Age: 79
End: 2019-08-07

## 2019-08-07 NOTE — PROGRESS NOTES
[facility]  RADIATION ONCOLOGY FOLLOW UP    Name: Rachel Long : 1940     DIAGNOSIS:  Likely metachronous non-small cell lung cancer  1. 6/10/16 stereotactic body radiation therapy left upper lobe squamous cell carcinoma stage I  2. 18 CT chest new right lower lobe 1.5 x 1.7 cm nodule and left lower lobe 2.6 x 3.5 cm opacity in area of prior lung cancer 2016  3. 18 PET scan 5.7 SUV left upper lobe area of prior SBRT, 3.7 SUV right lower lobe  4. 10/22/18 CT-guided biopsy left upper lobe prior area of stereotactic body radiation therapy returned squamous cell carcinoma  5. 18 completed right lower lobe stereotactic body radiation therapy 50 Gy in 5 fractions  6. 28 cryotherapy to recurrence left upper lobe biopsy-proven recurrence  7. 19 CT chest showed a left upper lobe lesion to be increased in size measuring 4.7 x 2.3 compared with 3.5 x 2.6 on prior with surrounding atelectasis.  There was a 2 mm left lower lobe nodule.  The right lower lobe nodule was decreased measuring 0.6 x 0.5 cm compared with 1.5 x 1.5 cm prior.  The left adrenal gland nodule measured 1.8 x 1.5 cm compared with 2 x 1.4 cm prior.  8. 18 PET showing no FDG activity in the right upper lobe lesion and 2.2 SUV in the left upper lobe lesion  9. Prolonged hospital stay and skilled nursing facility causing loss of follow-up  10. 19 CT chest resolution of central right lower lobe nodule with ground-glass opacities in the adjacent lung parenchyma.  Left upper lobe mass decrease measuring 2.3 x 4 x 1.4 cm.     CURRENT STATUS: Rachel Long is a pleasant 78 y.o. female who presents today for follow-up.  She was at [facility] for an extended hospital stay.  She was then in a skilled nursing facility and was unable to make her follow ups.  She had a CT chest as above which I reviewed.  She has noticed no changes in her breathing other than occasional cough.  She has nail  "changes. She is back in her assisted living facility.    PHYSICAL EXAM:   Constitutional: well appearing, no acute distress, ECOG 2 - Ambulates, capable of self care only  Vitals:    BP (!) 140/89   Pulse 103   Temp 98.2 °F (36.8 °C)   Resp 20   Ht 5' 2" (1.575 m)   Wt 70 kg (154 lb 4.8 oz)   LMP  (LMP Unknown)   SpO2 (!) 92%   BMI 28.22 kg/m²   Neck: trachea midline  Lymphatic: no cervical, supraclavicular adenopathy  Cardiovascular: regular rate, no murmurs, no edema of the upper or lower extremities, radial pulse 2+  Respiratory: unlabored effort, clear to auscultation, no wheezes  Integument:  Thickened nails with yellow discoloration    Laboratory & X-Ray Findings: Per above.      ASSESSMENT:  Resolution of right lower lobe lesion post stereotactic body radiation therapy.  Post cryotherapy changes of left lung stereotactic body radiation therapy failure.    PLAN:    1. Lung cancer:  Her imaging looks excellent, without recurrence in either lung.  She will see Dr. Newby on August 20th.  I will see her in 3 months with a CT chest.  2. Cough:  This is mild and chronic.  She does not have post radiation changes which could cause a cough.  We will monitor.  3. Nail changes:  She is very concerned about her nails.  I recommend she discuss with her primary physician or dermatologist.    JOBY Mora M.D.  Radiation Oncology  [facility] [address] [place] [address]  Ph: [phone]  [email or web address]      "

## 2019-08-12 ENCOUNTER — TELEPHONE (OUTPATIENT)
Dept: RADIOLOGY | Facility: HOSPITAL | Age: 79
End: 2019-08-12

## 2019-08-13 ENCOUNTER — HOSPITAL ENCOUNTER (OUTPATIENT)
Dept: RADIOLOGY | Facility: HOSPITAL | Age: 79
Discharge: HOME | End: 2019-08-13
Attending: RADIOLOGY
Payer: MEDICARE

## 2019-08-13 ENCOUNTER — OFFICE VISIT (OUTPATIENT)
Dept: RADIATION ONCOLOGY | Facility: CLINIC | Age: 79
End: 2019-08-13
Payer: MEDICARE

## 2019-08-13 VITALS
WEIGHT: 154.31 LBS | BODY MASS INDEX: 28.39 KG/M2 | RESPIRATION RATE: 20 BRPM | SYSTOLIC BLOOD PRESSURE: 140 MMHG | DIASTOLIC BLOOD PRESSURE: 89 MMHG | TEMPERATURE: 98 F | OXYGEN SATURATION: 92 % | HEART RATE: 103 BPM | HEIGHT: 62 IN

## 2019-08-13 DIAGNOSIS — C34.12 MALIGNANT NEOPLASM OF UPPER LOBE OF LEFT LUNG: Primary | Chronic | ICD-10-CM

## 2019-08-13 DIAGNOSIS — C34.12 MALIGNANT NEOPLASM OF UPPER LOBE OF LEFT LUNG: ICD-10-CM

## 2019-08-13 DIAGNOSIS — C80.1 CANCER: Primary | ICD-10-CM

## 2019-08-13 DIAGNOSIS — C34.12 MALIGNANT NEOPLASM OF UPPER LOBE, LEFT BRONCHUS OR LUNG: Primary | ICD-10-CM

## 2019-08-13 PROCEDURE — 99999 PR PBB SHADOW E&M-EST. PATIENT-LVL V: CPT | Mod: PBBFAC,,, | Performed by: RADIOLOGY

## 2019-08-13 PROCEDURE — 99215 OFFICE O/P EST HI 40 MIN: CPT | Mod: PBBFAC,25 | Performed by: RADIOLOGY

## 2019-08-13 PROCEDURE — 99214 OFFICE O/P EST MOD 30 MIN: CPT | Mod: S$PBB,,, | Performed by: RADIOLOGY

## 2019-08-13 PROCEDURE — 25500020 PHARM REV CODE 255: Performed by: RADIOLOGY

## 2019-08-13 PROCEDURE — 99214 PR OFFICE/OUTPT VISIT, EST, LEVL IV, 30-39 MIN: ICD-10-PCS | Mod: S$PBB,,, | Performed by: RADIOLOGY

## 2019-08-13 PROCEDURE — 99999 PR PBB SHADOW E&M-EST. PATIENT-LVL V: ICD-10-PCS | Mod: PBBFAC,,, | Performed by: RADIOLOGY

## 2019-08-13 PROCEDURE — 71260 CT THORAX DX C+: CPT | Mod: TC

## 2019-08-13 RX ADMIN — IOHEXOL 75 ML: 350 INJECTION, SOLUTION INTRAVENOUS at 12:08

## 2019-08-20 ENCOUNTER — LAB VISIT (OUTPATIENT)
Dept: LAB | Facility: HOSPITAL | Age: 79
End: 2019-08-20
Attending: INTERNAL MEDICINE
Payer: MEDICARE

## 2019-08-20 ENCOUNTER — OFFICE VISIT (OUTPATIENT)
Dept: HEMATOLOGY/ONCOLOGY | Facility: CLINIC | Age: 79
End: 2019-08-20
Payer: MEDICARE

## 2019-08-20 VITALS
DIASTOLIC BLOOD PRESSURE: 87 MMHG | HEIGHT: 62 IN | HEART RATE: 87 BPM | TEMPERATURE: 98 F | BODY MASS INDEX: 28.52 KG/M2 | SYSTOLIC BLOOD PRESSURE: 139 MMHG | WEIGHT: 155 LBS | OXYGEN SATURATION: 91 %

## 2019-08-20 DIAGNOSIS — D53.9 NUTRITIONAL ANEMIA: ICD-10-CM

## 2019-08-20 DIAGNOSIS — C34.12 MALIGNANT NEOPLASM OF UPPER LOBE OF LEFT LUNG: Primary | Chronic | ICD-10-CM

## 2019-08-20 DIAGNOSIS — D50.0 ANEMIA DUE TO CHRONIC BLOOD LOSS: ICD-10-CM

## 2019-08-20 DIAGNOSIS — I26.99 PE (PULMONARY THROMBOEMBOLISM): ICD-10-CM

## 2019-08-20 DIAGNOSIS — C34.12 MALIGNANT NEOPLASM OF UPPER LOBE OF LEFT LUNG: Chronic | ICD-10-CM

## 2019-08-20 LAB
ALBUMIN SERPL BCP-MCNC: 3 G/DL (ref 3.5–5.2)
ALP SERPL-CCNC: 104 U/L (ref 55–135)
ALT SERPL W/O P-5'-P-CCNC: 7 U/L (ref 10–44)
ANION GAP SERPL CALC-SCNC: 9 MMOL/L (ref 8–16)
AST SERPL-CCNC: 15 U/L (ref 10–40)
BASOPHILS # BLD AUTO: 0.02 K/UL (ref 0–0.2)
BASOPHILS NFR BLD: 0.2 % (ref 0–1.9)
BILIRUB SERPL-MCNC: 0.6 MG/DL (ref 0.1–1)
BUN SERPL-MCNC: 11 MG/DL (ref 8–23)
CALCIUM SERPL-MCNC: 10 MG/DL (ref 8.7–10.5)
CHLORIDE SERPL-SCNC: 95 MMOL/L (ref 95–110)
CO2 SERPL-SCNC: 30 MMOL/L (ref 23–29)
CREAT SERPL-MCNC: 0.7 MG/DL (ref 0.5–1.4)
DIFFERENTIAL METHOD: ABNORMAL
EOSINOPHIL # BLD AUTO: 0.4 K/UL (ref 0–0.5)
EOSINOPHIL NFR BLD: 4.6 % (ref 0–8)
ERYTHROCYTE [DISTWIDTH] IN BLOOD BY AUTOMATED COUNT: 17.5 % (ref 11.5–14.5)
EST. GFR  (AFRICAN AMERICAN): >60 ML/MIN/1.73 M^2
EST. GFR  (NON AFRICAN AMERICAN): >60 ML/MIN/1.73 M^2
GLUCOSE SERPL-MCNC: 99 MG/DL (ref 70–110)
HCT VFR BLD AUTO: 34 % (ref 37–48.5)
HGB BLD-MCNC: 10.5 G/DL (ref 12–16)
LDH SERPL L TO P-CCNC: 194 U/L (ref 110–260)
LYMPHOCYTES # BLD AUTO: 1 K/UL (ref 1–4.8)
LYMPHOCYTES NFR BLD: 11.3 % (ref 18–48)
MCH RBC QN AUTO: 25.4 PG (ref 27–31)
MCHC RBC AUTO-ENTMCNC: 30.9 G/DL (ref 32–36)
MCV RBC AUTO: 82 FL (ref 82–98)
MONOCYTES # BLD AUTO: 1 K/UL (ref 0.3–1)
MONOCYTES NFR BLD: 11.9 % (ref 4–15)
NEUTROPHILS # BLD AUTO: 6.3 K/UL (ref 1.8–7.7)
NEUTROPHILS NFR BLD: 72.2 % (ref 38–73)
PLATELET # BLD AUTO: 241 K/UL (ref 150–350)
PMV BLD AUTO: 9.2 FL (ref 9.2–12.9)
POTASSIUM SERPL-SCNC: 4.1 MMOL/L (ref 3.5–5.1)
PROT SERPL-MCNC: 8 G/DL (ref 6–8.4)
RBC # BLD AUTO: 4.14 M/UL (ref 4–5.4)
SODIUM SERPL-SCNC: 134 MMOL/L (ref 136–145)
T4 FREE SERPL-MCNC: 1.1 NG/DL (ref 0.71–1.51)
TSH SERPL DL<=0.005 MIU/L-ACNC: 4.48 UIU/ML (ref 0.4–4)
WBC # BLD AUTO: 8.77 K/UL (ref 3.9–12.7)

## 2019-08-20 PROCEDURE — 82607 VITAMIN B-12: CPT

## 2019-08-20 PROCEDURE — 99999 PR PBB SHADOW E&M-EST. PATIENT-LVL III: ICD-10-PCS | Mod: PBBFAC,,, | Performed by: INTERNAL MEDICINE

## 2019-08-20 PROCEDURE — 84443 ASSAY THYROID STIM HORMONE: CPT

## 2019-08-20 PROCEDURE — 82746 ASSAY OF FOLIC ACID SERUM: CPT

## 2019-08-20 PROCEDURE — 99999 PR PBB SHADOW E&M-EST. PATIENT-LVL III: CPT | Mod: PBBFAC,,, | Performed by: INTERNAL MEDICINE

## 2019-08-20 PROCEDURE — 99214 PR OFFICE/OUTPT VISIT, EST, LEVL IV, 30-39 MIN: ICD-10-PCS | Mod: S$PBB,,, | Performed by: INTERNAL MEDICINE

## 2019-08-20 PROCEDURE — 83010 ASSAY OF HAPTOGLOBIN QUANT: CPT

## 2019-08-20 PROCEDURE — 99214 OFFICE O/P EST MOD 30 MIN: CPT | Mod: S$PBB,,, | Performed by: INTERNAL MEDICINE

## 2019-08-20 PROCEDURE — 36415 COLL VENOUS BLD VENIPUNCTURE: CPT

## 2019-08-20 PROCEDURE — 84165 PROTEIN E-PHORESIS SERUM: CPT

## 2019-08-20 PROCEDURE — 80053 COMPREHEN METABOLIC PANEL: CPT

## 2019-08-20 PROCEDURE — 99213 OFFICE O/P EST LOW 20 MIN: CPT | Mod: PBBFAC | Performed by: INTERNAL MEDICINE

## 2019-08-20 PROCEDURE — 84165 PROTEIN E-PHORESIS SERUM: CPT | Mod: 26,,, | Performed by: PATHOLOGY

## 2019-08-20 PROCEDURE — 84439 ASSAY OF FREE THYROXINE: CPT

## 2019-08-20 PROCEDURE — 83615 LACTATE (LD) (LDH) ENZYME: CPT

## 2019-08-20 PROCEDURE — 82728 ASSAY OF FERRITIN: CPT

## 2019-08-20 PROCEDURE — 83540 ASSAY OF IRON: CPT

## 2019-08-20 PROCEDURE — 85025 COMPLETE CBC W/AUTO DIFF WBC: CPT

## 2019-08-20 PROCEDURE — 84165 PATHOLOGIST INTERPRETATION SPE: ICD-10-PCS | Mod: 26,,, | Performed by: PATHOLOGY

## 2019-08-20 RX ORDER — POTASSIUM CHLORIDE 20 MEQ/1
40 TABLET, EXTENDED RELEASE ORAL DAILY
COMMUNITY
Start: 2019-06-11 | End: 2020-06-10

## 2019-08-20 RX ORDER — LANOLIN ALCOHOL/MO/W.PET/CERES
100 CREAM (GRAM) TOPICAL DAILY
Status: ON HOLD | COMMUNITY
End: 2023-04-06

## 2019-08-20 RX ORDER — PANTOPRAZOLE SODIUM 40 MG/1
40 TABLET, DELAYED RELEASE ORAL DAILY
Status: ON HOLD | COMMUNITY
Start: 2019-06-12 | End: 2023-04-06

## 2019-08-20 RX ORDER — AMOXICILLIN 250 MG
1 CAPSULE ORAL 2 TIMES DAILY
COMMUNITY
Start: 2019-06-11 | End: 2020-06-10

## 2019-08-20 RX ORDER — ATORVASTATIN CALCIUM 80 MG/1
80 TABLET, FILM COATED ORAL NIGHTLY
COMMUNITY
Start: 2019-06-11 | End: 2020-07-23 | Stop reason: SDUPTHER

## 2019-08-20 NOTE — PROGRESS NOTES
Subjective:       Patient ID: Rachel Long is a 79 y.o. female.    Chief Complaint: Malignant neoplasm of upper lobe of left lung [C34.12]  HPI: We have an opportunity to see Ms. Rachel Long in Hematology Oncology clinic at [facility] on 08/20/2019.  Ms. Rachel Long is a 79 y.o. 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  Patient was a poor surgical candidate therefore went on to SBRT which was completed on 6/10/16.  Restaging CT scans following SBRT demonstrated significant reduction in size of left upper lobe mass indicating positive response to treatment.  She was subsequently lost to follow-up and has not been seen since 20016.  She presents for follow-up today with repeat CT scans which demonstrate progression of left upper lobe mass and new right lower lobe nodule.    PET scan performed on 09/24/2018 was consistent with recurrent disease.  CT scan of the brain was negative for metastatic disease.   Repeat biopsy of left upper lobe mass was consistent with squamous cell carcinoma.  The patient underwent SP RT to right lower lobe mass and cryoablation to recurrent left upper lobe carcinoma.  02/11/2019 CT scan demonstrated increasing size left upper lobe mass with repeat PET scan performed today.  We will follow-up on PET scan and discussed treatment plan with radiation oncology/Dr. Mora.  We will have patient follow-up accordingly.     Non-small cell lung cancer/squamous cell carcinoma left upper lobe:  --s/p SBRT completed on 6/10/16  --status post SB RT to right lower lobe mass 11/2018  --status post cryoablation to recurrent left upper lobe squamous cell carcinoma  --follow-up on PET scan results today and discuss results with Dr. Mora     Small multiple pulmonary emboli noted on CT scan performed in 09/2018:  --Eliquis 5 mg p.o. B.i.d..       Iron deficiency:  --currently on ferrous sulfate 325 mg p.o. B.i.d.    Reported has had rectal bleeding.  Also  has falls at nursing home.       No history exists.     Past Medical History:   Diagnosis Date    Acute upper respiratory infection     Anemia     Bipolar mood disorder     Coordination abnormal     COPD exacerbation 5/9/2016    Coronary artery disease     Difficulty walking     Hypertension     on meds    Hypothyroidism, adult     Insomnia     Malignant neoplasm of colon     Muscle weakness     Neuralgia and neuritis     Schizoaffective disorder     SOB (shortness of breath)     Spinal stenosis     Thyroid disease     Urinary tract infection      Family History   Problem Relation Age of Onset    Asthma Father     Eczema Father     No Known Problems Mother     Other Brother         shoulder surgery      Social History     Socioeconomic History    Marital status: Single     Spouse name: Not on file    Number of children: Not on file    Years of education: Not on file    Highest education level: Not on file   Occupational History    Not on file   Social Needs    Financial resource strain: Not on file    Food insecurity:     Worry: Not on file     Inability: Not on file    Transportation needs:     Medical: Not on file     Non-medical: Not on file   Tobacco Use    Smoking status: Former Smoker     Years: 50.00     Types: Cigarettes    Smokeless tobacco: Former User   Substance and Sexual Activity    Alcohol use: No    Drug use: No    Sexual activity: Never   Lifestyle    Physical activity:     Days per week: Not on file     Minutes per session: Not on file    Stress: Not on file   Relationships    Social connections:     Talks on phone: Not on file     Gets together: Not on file     Attends Sikhism service: Not on file     Active member of club or organization: Not on file     Attends meetings of clubs or organizations: Not on file     Relationship status: Not on file   Other Topics Concern    Not on file   Social History Narrative    Not on file     Past Surgical History:    Procedure Laterality Date    ABLATION-CRYO in CT N/A 11/28/2018    Performed by [facility]    COLON SURGERY      CORONARY STENT PLACEMENT      HYSTERECTOMY  1970's     Current Outpatient Medications   Medication Sig Dispense Refill    ALREX 0.2 % DrpS Place 1 drop into both eyes once daily.       amLODIPine (NORVASC) 10 MG tablet once daily.      apixaban (ELIQUIS) 5 mg Tab Take 1 tablet (5 mg total) by mouth 2 (two) times daily.      ARIPiprazole (ABILIFY) 5 MG Tab Take 1 tablet by mouth once daily.       atorvastatin (LIPITOR) 80 MG tablet Take 80 mg by mouth every evening.      benztropine (COGENTIN) 1 MG tablet Take 1 mg by mouth 2 (two) times daily.      cyanocobalamin 1,000 mcg/mL injection Inject 1,000 mcg into the skin every 30 days.      DULoxetine (CYMBALTA) 60 MG capsule once daily.      ferrous sulfate (IRON) 325 mg (65 mg iron) Tab tablet Take 325 mg by mouth 2 (two) times daily.      fluticasone (FLONASE) 50 mcg/actuation nasal spray 1 spray by Each Nare route 2 (two) times daily.      levothyroxine (SYNTHROID) 88 MCG tablet Take 88 mcg by mouth before breakfast.      MULTIVITAMIN ORAL Take by mouth once daily.      pantoprazole (PROTONIX) 40 MG tablet Take 40 mg by mouth once daily.      potassium chloride SA (K-DUR,KLOR-CON) 20 MEQ tablet Take 40 mEq by mouth once daily. Give 20 ml to eaqual to 40 meq      QUEtiapine (SEROQUEL) 100 MG Tab       ramelteon (ROZEREM) 8 mg tablet Take 1 tablet (8 mg total) by mouth nightly as needed for Insomnia.      RESTASIS 0.05 % ophthalmic emulsion Place 0.05 drops into both eyes 2 (two) times daily.      senna-docusate 8.6-50 mg (PERICOLACE) 8.6-50 mg per tablet Take 1 tablet by mouth 2 (two) times daily.      thiamine 100 MG tablet Take 100 mg by mouth once daily.      traZODone (DESYREL) 50 MG tablet Take 50 mg by mouth nightly as needed.       acetaminophen (TYLENOL) 325 MG tablet Take 650 mg by mouth 4 (four) times  daily as needed for Pain.      albuterol (PROVENTIL/VENTOLIN HFA) 90 mcg/actuation inhaler Inhale 2 puffs into the lungs every 6 (six) hours as needed for Wheezing. Rescue      ALPRAZolam (XANAX) 0.25 MG tablet Take 0.25 mg by mouth once daily.       ANORO ELLIPTA 62.5-25 mcg/actuation DsDv       terbinafine HCl (LAMISIL) 250 mg tablet Take 250 mg by mouth once daily.        No current facility-administered medications for this visit.        Labs:  Lab Results   Component Value Date    WBC 5.51 04/01/2019    HGB 11.9 (L) 04/01/2019    HCT 37.4 04/01/2019    MCV 89 04/01/2019     (L) 04/01/2019     BMP  Lab Results   Component Value Date     04/01/2019    K 4.1 04/01/2019     04/01/2019    CO2 27 04/01/2019    BUN 29 (H) 04/01/2019    CREATININE 0.7 08/13/2019    CALCIUM 9.8 04/01/2019    ANIONGAP 9 04/01/2019    ESTGFRAFRICA >60 08/13/2019    EGFRNONAA >60 08/13/2019     Lab Results   Component Value Date    ALT 12 04/01/2019    AST 20 04/01/2019    ALKPHOS 89 04/01/2019    BILITOT 0.3 04/01/2019       Lab Results   Component Value Date    IRON 65 08/30/2018    TIBC 555 (H) 08/30/2018    FERRITIN 16 (L) 08/30/2018     Lab Results   Component Value Date    WKDDHPNG86 661 04/13/2011     No results found for: FOLATE  Lab Results   Component Value Date    TSH 0.14 (L) 04/13/2011       I have reviewed the radiology reports and examined the scan/xray images.    Review of Systems   Constitutional: Negative.    HENT: Negative.    Eyes: Negative.    Respiratory: Negative.    Cardiovascular: Negative.    Gastrointestinal: Negative.    Endocrine: Negative.    Genitourinary: Negative.    Musculoskeletal: Negative.    Skin: Negative.    Allergic/Immunologic: Negative.    Neurological: Negative.    Hematological: Negative.    Psychiatric/Behavioral: Negative.      ECOG SCORE              Objective:     Vitals:    08/20/19 1129   BP: 139/87   Pulse: 87   Temp: 97.9 °F (36.6 °C)   Body mass index is 28.35  kg/m².  Physical Exam   Constitutional: She is oriented to person, place, and time. She appears well-developed and well-nourished.   HENT:   Head: Normocephalic and atraumatic.   Eyes: Conjunctivae and EOM are normal.   Neck: Normal range of motion. Neck supple.   Cardiovascular: Normal rate and regular rhythm.   Pulmonary/Chest: Effort normal and breath sounds normal.   Abdominal: Soft. Bowel sounds are normal.   Musculoskeletal: Normal range of motion.   Neurological: She is alert and oriented to person, place, and time.   Skin: Skin is warm and dry.   Psychiatric: She has a normal mood and affect. Her behavior is normal. Judgment and thought content normal.   Nursing note and vitals reviewed.        Assessment:      1. Malignant neoplasm of upper lobe of left lung    2. Nutritional anemia    3. Anemia due to chronic blood loss    4. PE (pulmonary thromboembolism)           Plan:     Malignant neoplasm of upper lobe of left lung  CT showed GURINDER.  Will add PET CT to restage in 3 months.  -     Comprehensive metabolic panel; Future; Expected date: 08/20/2019  -     CBC auto differential; Future; Expected date: 08/20/2019  -     Cancel: NM PET CT Routine Skull to Mid Thigh; Future; Expected date: 09/19/2019  -     NM PET CT Routine Skull to Mid Thigh; Future; Expected date: 11/19/2019      Anemia due to chronic blood loss  Continue on oral iron.  Check ferritin, iron panel  -     Ambulatory consult to Gastroenterology  -     Ferritin; Future; Expected date: 08/20/2019  -     Iron and TIBC; Future; Expected date: 08/20/2019  -     Protein electrophoresis, serum; Future; Expected date: 08/20/2019  -     Haptoglobin; Future; Expected date: 08/20/2019  -     Lactate dehydrogenase; Future; Expected date: 08/20/2019    PE (pulmonary thromboembolism)  Given has rectal bleeding, frequent falls, and CT chest showed resolution of PE, would hold eliquis for now.  Consider resume eliquis at lower dose after see gastroenterology  for workup.

## 2019-08-21 LAB
ALBUMIN SERPL ELPH-MCNC: 2.96 G/DL (ref 3.35–5.55)
ALPHA1 GLOB SERPL ELPH-MCNC: 0.6 G/DL (ref 0.17–0.41)
ALPHA2 GLOB SERPL ELPH-MCNC: 1.14 G/DL (ref 0.43–0.99)
B-GLOBULIN SERPL ELPH-MCNC: 0.96 G/DL (ref 0.5–1.1)
FERRITIN SERPL-MCNC: 58 NG/ML (ref 20–300)
FOLATE SERPL-MCNC: 3.3 NG/ML (ref 4–24)
GAMMA GLOB SERPL ELPH-MCNC: 1.65 G/DL (ref 0.67–1.58)
HAPTOGLOB SERPL-MCNC: 331 MG/DL (ref 30–250)
IRON SERPL-MCNC: 23 UG/DL (ref 30–160)
PATHOLOGIST INTERPRETATION SPE: NORMAL
PROT SERPL-MCNC: 7.3 G/DL (ref 6–8.4)
SATURATED IRON: 6 % (ref 20–50)
TOTAL IRON BINDING CAPACITY: 395 UG/DL (ref 250–450)
TRANSFERRIN SERPL-MCNC: 267 MG/DL (ref 200–375)
VIT B12 SERPL-MCNC: 531 PG/ML (ref 210–950)

## 2019-08-22 DIAGNOSIS — D50.9 IRON DEFICIENCY ANEMIA, UNSPECIFIED IRON DEFICIENCY ANEMIA TYPE: ICD-10-CM

## 2019-08-22 DIAGNOSIS — E53.8 FOLIC ACID DEFICIENCY: ICD-10-CM

## 2019-08-22 RX ORDER — FOLIC ACID 1 MG/1
1 TABLET ORAL DAILY
Qty: 30 TABLET | Refills: 0 | Status: SHIPPED | OUTPATIENT
Start: 2019-08-22 | End: 2019-08-22 | Stop reason: SDUPTHER

## 2019-08-22 RX ORDER — SODIUM CHLORIDE 0.9 % (FLUSH) 0.9 %
10 SYRINGE (ML) INJECTION
Status: CANCELLED | OUTPATIENT
Start: 2019-08-22

## 2019-08-22 RX ORDER — HEPARIN 100 UNIT/ML
500 SYRINGE INTRAVENOUS
Status: CANCELLED | OUTPATIENT
Start: 2019-08-22

## 2019-08-22 RX ORDER — FOLIC ACID 1 MG/1
1 TABLET ORAL DAILY
Qty: 30 TABLET | Refills: 0 | Status: ON HOLD | OUTPATIENT
Start: 2019-08-22 | End: 2020-01-03 | Stop reason: HOSPADM

## 2019-09-05 ENCOUNTER — INFUSION (OUTPATIENT)
Dept: INFUSION THERAPY | Facility: HOSPITAL | Age: 79
End: 2019-09-05
Attending: INTERNAL MEDICINE
Payer: MEDICARE

## 2019-09-05 VITALS
HEART RATE: 88 BPM | TEMPERATURE: 98 F | DIASTOLIC BLOOD PRESSURE: 89 MMHG | SYSTOLIC BLOOD PRESSURE: 155 MMHG | OXYGEN SATURATION: 98 % | RESPIRATION RATE: 20 BRPM

## 2019-09-05 DIAGNOSIS — E53.8 FOLIC ACID DEFICIENCY: ICD-10-CM

## 2019-09-05 DIAGNOSIS — D50.9 IRON DEFICIENCY ANEMIA, UNSPECIFIED IRON DEFICIENCY ANEMIA TYPE: Primary | ICD-10-CM

## 2019-09-05 PROCEDURE — 96374 THER/PROPH/DIAG INJ IV PUSH: CPT

## 2019-09-05 PROCEDURE — 63600175 PHARM REV CODE 636 W HCPCS: Performed by: INTERNAL MEDICINE

## 2019-09-05 RX ADMIN — SODIUM CHLORIDE: 0.9 INJECTION, SOLUTION INTRAVENOUS at 11:09

## 2019-09-05 RX ADMIN — IRON SUCROSE 200 MG: 20 INJECTION, SOLUTION INTRAVENOUS at 11:09

## 2019-09-05 NOTE — DISCHARGE INSTRUCTIONS
[facility]  [address] [facility]  [address]  [phone] phone     [phone] fax  Hours of Operation: Monday- Friday 8:00am- 5:00pm  After hours phone  [phone]  Hematology / Oncology Physicians on call      Dr. Pankaj Newby      Please call with any concerns regarding your appointment today.    FALL PREVENTION   Falls often occur due to slipping, tripping or losing your balance. Here are ways to reduce your risk of falling again.   Was there anything that caused your fall that can be fixed, removed or replaced?   Make your home safe by keeping walkways clear of objects you may trip over.   Use non-slip pads under rugs.   Do not walk in poorly lit areas.   Do not stand on chairs or wobbly ladders.   Use caution when reaching overhead or looking upward. This position can cause a loss of balance.   Be sure your shoes fit properly, have non-slip bottoms and are in good condition.   Be cautious when going up and down stairs, curbs, and when walking on uneven sidewalks.   If your balance is poor, consider using a cane or walker.   If your fall was related to alcohol use, stop or limit alcohol intake.   If your fall was related to use of sleeping medicines, talk to your doctor about this. You may need to reduce your dosage at bedtime if you awaken during the night to go to the bathroom.   To reduce the need for nighttime bathroom trips:   Avoid drinking fluids for several hours before going to bed   Empty your bladder before going to bed   Men can keep a urinal at the bedside   © 4240-3547 [employer], [address] [place] [address]. All rights reserved. This information is not intended as a substitute for professional medical care. Always follow your healthcare professional's instructions.    Iron-Deficiency Anemia (Adult)  Red blood cells carry oxygen to the tissues of your body. Anemia is a condition  in which you have too few red blood cells. You need iron to make red cells. Anemia makes you feel tired and run down. When anemia becomes severe, your skin becomes pale. You may feel short of breath after physical activity. Other symptoms include:  · Headaches  · Dizziness  · Leg cramps with physical activity  · Drowsiness  · Restless legs  Your anemia is caused by not having enough iron in your body. This may be because of:  · Loss of blood. This can be caused by heavy menstrual periods. It can also be caused by bleeding from the stomach or intestines.  · Poor diet. You may not be eating enough foods that contain iron.  · Inability to absorb iron from the foods you eat  · Pregnancy  If your blood count is low enough, your healthcare provider may prescribe an iron supplement. It usually takes about 2 to 3 months of treatment with iron supplements to correct anemia. Severe cases of anemia need a blood transfusion to quickly ease symptoms and deliver more oxygen to the cells.  Home care  Follow these guidelines when caring for yourself at home:  · Eat foods high in iron. This will boost the amount of iron stored in your body. It is a natural way to build up the number of blood cells. Good sources of iron include beef, liver, spinach and other dark green leafy vegetables, whole grains, beans, and nuts.  · Do not overexert yourself.  · Talk with your healthcare provider before traveling by air or traveling to high altitudes.  Follow-up care  Follow up with your healthcare provider in 2 months, or as advised. This is to have another red blood cell count to be sure your anemia has been fixed.  When to seek medical advice  Call your healthcare provider right away if any of these occur:  · Shortness of breath or chest pain  · Dizziness or fainting  · Vomiting blood or passing red or black-colored stool   Date Last Reviewed: 2/25/2016  © 1881-3026 [employer]. [address] [place] [address], [place] [address]. All  rights reserved. This information is not intended as a substitute for professional medical care. Always follow your healthcare professional's instructions.

## 2019-09-06 ENCOUNTER — TELEPHONE (OUTPATIENT)
Dept: HEMATOLOGY/ONCOLOGY | Facility: CLINIC | Age: 79
End: 2019-09-06

## 2019-09-06 NOTE — TELEPHONE ENCOUNTER
" Progress Note    Gender: female BW: 7 lb 10.1 oz (3460 g)   Age: 41 hours OB:    Gestational Age at Birth: Gestational Age: 38w0d Pediatrician:         Objective     [place] Information     Vital Signs Temp:  [98.3 °F (36.8 °C)-98.9 °F (37.2 °C)] 98.3 °F (36.8 °C)  Heart Rate:  [128-142] 134  Resp:  [32-48] 36   Admission Vital Signs: Vitals  Temp: 98.7 °F (37.1 °C)  Temp src: Axillary  Heart Rate: 180  Heart Rate Source: Apical  Resp: 40  Resp Rate Source: Stethoscope  BP: 61/32 (67/49 (53) right arm)  Noninvasive MAP (mmHg): 40  BP Location: Right leg  BP Method: Automatic   Birth Weight: 7 lb 10.1 oz (3460 g)   Birth Length: 19.5   Birth Head circumference: Head Cir: 13.19\" (33.5 cm)   Current Weight: Weight: 7 lb 8.4 oz (3414 g)   Change in weight since birth: -1%     Physical Exam     General appearance Normal Term female   Skin  No rashes.  No jaundice   Head AFSF.  No caput. No cephalohematoma. No nuchal folds   Eyes  + RR bilaterally   Ears, Nose, Throat  Normal ears.  No ear pits. No ear tags.  Palate intact.   Thorax  Normal   Lungs BSBE - CTA. No distress.   Heart  Normal rate and rhythm.  No murmur, gallops. Peripheral pulses strong and equal in all 4 extremities.   Abdomen + BS.  Soft. NT. ND.  No mass/HSM   Genitalia  normal female exam   Anus Anus patent   Trunk and Spine Spine intact.  No sacral dimples.   Extremities  Clavicles intact.  No hip clicks/clunks.   Neuro + [place], grasp, suck.  Normal Tone       Intake and Output     Feeding: bottle feed        Labs and Radiology     Baby's Blood type:   ABO Type   Date Value Ref Range Status   2017 B  Final     RH type   Date Value Ref Range Status   2017 Negative  Final        Labs:   Recent Results (from the past 96 hour(s))   Cord Blood Evaluation    Collection Time: 17 11:12 PM   Result Value Ref Range    ABO Type B     RH type Negative     AVIS IgG Negative    POC Glucose Fingerstick    Collection Time: 17 11:57 PM " Spoke with Ms. Quijano, and let her know that patient was getting Iron infusions. All questions answered. Call ended well.      Result Value Ref Range    Glucose 40 (L) 75 - 110 mg/dL   POC Glucose Fingerstick    Collection Time: 17 11:58 PM   Result Value Ref Range    Glucose 40 (L) 75 - 110 mg/dL   POC Glucose Fingerstick    Collection Time: 17  2:16 AM   Result Value Ref Range    Glucose 55 (L) 75 - 110 mg/dL   POC Glucose Fingerstick    Collection Time: 17  3:46 AM   Result Value Ref Range    Glucose 58 (L) 75 - 110 mg/dL   POC Glucose Fingerstick    Collection Time: 17  7:16 AM   Result Value Ref Range    Glucose 65 (L) 75 - 110 mg/dL   POC Glucose Fingerstick    Collection Time: 17 11:24 AM   Result Value Ref Range    Glucose 51 (L) 75 - 110 mg/dL   Bilirubin,  Panel    Collection Time: 10/01/17  2:10 AM   Result Value Ref Range    Bilirubin, Indirect 10.7 (H) 0.6 - 10.5 mg/dL    Bilirubin, Direct 0.0 0.0 - 0.6 mg/dL    Bilirubin,  10.7 0.6 - 11.1 mg/dL     TCB Review (last 2 days)     Date/Time   TcB Point of Care testing   Calculation Age in Hours   Risk Assessment of Patient is Who       10/01/17 0150  9.4  27  (!)  High risk zone RM             Xrays:  No orders to display     Assessment/Plan     Discharge planning     Congenital Heart Disease Screen:  Blood Pressure/O2 Saturation/Weights   Vitals (last 7 days)     Date/Time   BP   BP Location   SpO2   Weight    10/01/17 0235  --  --  --  7 lb 8.4 oz (3414 g)    17  61/32  Right leg  --  --    BP: 67/49 (53) right arm at 17 2340    17 2308  --  --  --  7 lb 10.1 oz (3460 g)    Weight: Filed from Delivery Summary at 17               [employer] Testing  CCHD Initial CCHD Screening  SpO2: Pre-Ductal (Right Hand): 99 % (10/01/17 0145)  SpO2: Post-Ductal (Left Hand/Foot): 100 (10/01/17 0145)  Difference in oxygen saturation: 1 (10/01/17 0145)  CCHD Screening results: Pass (10/01/17 0145)   Car Seat Challenge Test     Hearing Screen Hearing Screen Date: 17 (17 1300)  Hearing Screen Left Ear Abr  (Auditory Brainstem Response): passed (17 1300)  Hearing Screen Right Ear Abr (Auditory Brainstem Response): passed (17 1300)     Screen         Immunization History   Administered Date(s) Administered   • Hep B, Adolescent or Pediatric 2017       Assessment and Plan     Assessment: TBL female; bottle fed. Down 1%, bilirubin 9.4.   Plan: Routine care. Close observation of bilirubin due to current risk zone.     Nemo Rouse DO  2017  4:13 PM   18-Jan-2017 02:33

## 2019-09-06 NOTE — TELEPHONE ENCOUNTER
----- Message from Aminah Jordan sent at 9/6/2019  3:10 PM CDT -----  Contact: Macie Long  ( power of atty)  Caller request a call back to verify why and what pt has chemo/infusions  for ... Call back: [phone]

## 2019-09-09 RX ORDER — HEPARIN 100 UNIT/ML
500 SYRINGE INTRAVENOUS
Status: CANCELLED | OUTPATIENT
Start: 2019-09-09

## 2019-09-09 RX ORDER — SODIUM CHLORIDE 0.9 % (FLUSH) 0.9 %
10 SYRINGE (ML) INJECTION
Status: CANCELLED | OUTPATIENT
Start: 2019-09-09

## 2019-09-12 ENCOUNTER — SOCIAL WORK (OUTPATIENT)
Dept: HEMATOLOGY/ONCOLOGY | Facility: CLINIC | Age: 79
End: 2019-09-12

## 2019-09-12 ENCOUNTER — INFUSION (OUTPATIENT)
Dept: INFUSION THERAPY | Facility: HOSPITAL | Age: 79
End: 2019-09-12
Attending: INTERNAL MEDICINE
Payer: MEDICARE

## 2019-09-12 VITALS
HEART RATE: 84 BPM | TEMPERATURE: 98 F | DIASTOLIC BLOOD PRESSURE: 74 MMHG | SYSTOLIC BLOOD PRESSURE: 134 MMHG | OXYGEN SATURATION: 96 % | RESPIRATION RATE: 16 BRPM

## 2019-09-12 DIAGNOSIS — E53.8 FOLIC ACID DEFICIENCY: ICD-10-CM

## 2019-09-12 DIAGNOSIS — D50.9 IRON DEFICIENCY ANEMIA, UNSPECIFIED IRON DEFICIENCY ANEMIA TYPE: Primary | ICD-10-CM

## 2019-09-12 PROCEDURE — 96374 THER/PROPH/DIAG INJ IV PUSH: CPT

## 2019-09-12 PROCEDURE — 63600175 PHARM REV CODE 636 W HCPCS: Performed by: INTERNAL MEDICINE

## 2019-09-12 RX ADMIN — SODIUM CHLORIDE: 0.9 INJECTION, SOLUTION INTRAVENOUS at 11:09

## 2019-09-12 RX ADMIN — IRON SUCROSE 200 MG: 20 INJECTION, SOLUTION INTRAVENOUS at 11:09

## 2019-09-12 NOTE — PROGRESS NOTES
Pt referred to SW for sadness and depression. Pt says it is due to her memory issues. Listened and provided support. She lives in [facility]. She has good family support. Validated her sadness due to memory difficulties. She appeared to cheer up more toward the end of the conversation. Provided her with SW contact info and encouraged her to call if needed for additional support.

## 2019-09-12 NOTE — PLAN OF CARE
Problem: Adult Inpatient Plan of Care  Goal: Plan of Care Review  Outcome: Ongoing (interventions implemented as appropriate)  Patient reported saddness.

## 2019-09-12 NOTE — PATIENT INSTRUCTIONS
Iron-Deficiency Anemia (Adult)  Red blood cells carry oxygen to the tissues of your body. Anemia is a condition in which you have too few red blood cells. You need iron to make red cells. Anemia makes you feel tired and run down. When anemia becomes severe, your skin becomes pale. You may feel short of breath after physical activity. Other symptoms include:  · Headaches  · Dizziness  · Leg cramps with physical activity  · Drowsiness  · Restless legs  Your anemia is caused by not having enough iron in your body. This may be because of:  · Loss of blood. This can be caused by heavy menstrual periods. It can also be caused by bleeding from the stomach or intestines.  · Poor diet. You may not be eating enough foods that contain iron.  · Inability to absorb iron from the foods you eat  · Pregnancy  If your blood count is low enough, your healthcare provider may prescribe an iron supplement. It usually takes about 2 to 3 months of treatment with iron supplements to correct anemia. Severe cases of anemia need a blood transfusion to quickly ease symptoms and deliver more oxygen to the cells.  Home care  Follow these guidelines when caring for yourself at home:  · Eat foods high in iron. This will boost the amount of iron stored in your body. It is a natural way to build up the number of blood cells. Good sources of iron include beef, liver, spinach and other dark green leafy vegetables, whole grains, beans, and nuts.  · Do not overexert yourself.  · Talk with your healthcare provider before traveling by air or traveling to high altitudes.  Follow-up care  Follow up with your healthcare provider in 2 months, or as advised. This is to have another red blood cell count to be sure your anemia has been fixed.  When to seek medical advice  Call your healthcare provider right away if any of these occur:  · Shortness of breath or chest pain  · Dizziness or fainting  · Vomiting blood or passing red or black-colored stool   Date  Last Reviewed: 2/25/2016 © 2000-2017 [employer]. [address] [place] [address], [place] [address]. All rights reserved. This information is not intended as a substitute for professional medical care. Always follow your healthcare professional's instructions.        Iron Sucrose injection  What is this medicine?  IRON SUCROSE (KAREEM jasso) is an iron complex. Iron is used to make healthy red blood cells, which carry oxygen and nutrients throughout the body. This medicine is used to treat iron deficiency anemia in people with chronic kidney disease.  How should I use this medicine?  This medicine is for infusion into a vein. It is given by a health care professional in a hospital or clinic setting.  Talk to your pediatrician regarding the use of this medicine in children. While this drug may be prescribed for children as young as 2 years for selected conditions, precautions do apply.  What side effects may I notice from receiving this medicine?  Side effects that you should report to your doctor or health care professional as soon as possible:  · allergic reactions like skin rash, itching or hives, swelling of the face, lips, or tongue  · breathing problems  · changes in blood pressure  · cough  · fast, irregular heartbeat  · feeling faint or lightheaded, falls  · fever or chills  · flushing, sweating, or hot feelings  · joint or muscle aches/pains  · seizures  · swelling of the ankles or feet  · unusually weak or tired  Side effects that usually do not require medical attention (report to your doctor or health care professional if they continue or are bothersome):  · diarrhea  · feeling achy  · headache  · irritation at site where injected  · nausea, vomiting  · stomach upset  · tiredness  What may interact with this medicine?  Do not take this medicine with any of the following medications:  · deferoxamine  · dimercaprol  · other iron products  This medicine may also interact with the following  medications:  · chloramphenicol  · deferasirox  What if I miss a dose?  It is important not to miss your dose. Call your doctor or health care professional if you are unable to keep an appointment.  Where should I keep my medicine?  This drug is given in a hospital or clinic and will not be stored at home.  What should I tell my health care provider before I take this medicine?  They need to know if you have any of these conditions:  · anemia not caused by low iron levels  · heart disease  · high levels of iron in the blood  · kidney disease  · liver disease  · an unusual or allergic reaction to iron, other medicines, foods, dyes, or preservatives  · pregnant or trying to get pregnant  · breast-feeding  What should I watch for while using this medicine?  Visit your doctor or healthcare professional regularly. Tell your doctor or healthcare professional if your symptoms do not start to get better or if they get worse. You may need blood work done while you are taking this medicine.  You may need to follow a special diet. Talk to your doctor. Foods that contain iron include: whole grains/cereals, dried fruits, beans, or peas, leafy green vegetables, and organ meats (liver, kidney).  NOTE:This sheet is a summary. It may not cover all possible information. If you have questions about this medicine, talk to your doctor, pharmacist, or health care provider. Copyright© 2017 [employer]

## 2019-09-12 NOTE — DISCHARGE INSTRUCTIONS
[facility]  [address]  [address]  [phone] phone     [phone] fax  Hours of Operation: Monday- Friday 8:00am- 5:00pm  After hours phone  [phone]  Hematology / Oncology Physicians on call      Dr. Pankaj Newby      Please call with any concerns regarding your appointment today.        FALL PREVENTION   Falls often occur due to slipping, tripping or losing your balance. Here are ways to reduce your risk of falling again.   Was there anything that caused your fall that can be fixed, removed or replaced?   Make your home safe by keeping walkways clear of objects you may trip over.   Use non-slip pads under rugs.   Do not walk in poorly lit areas.   Do not stand on chairs or wobbly ladders.   Use caution when reaching overhead or looking upward. This position can cause a loss of balance.   Be sure your shoes fit properly, have non-slip bottoms and are in good condition.   Be cautious when going up and down stairs, curbs, and when walking on uneven sidewalks.   If your balance is poor, consider using a cane or walker.   If your fall was related to alcohol use, stop or limit alcohol intake.   If your fall was related to use of sleeping medicines, talk to your doctor about this. You may need to reduce your dosage at bedtime if you awaken during the night to go to the bathroom.   To reduce the need for nighttime bathroom trips:   Avoid drinking fluids for several hours before going to bed   Empty your bladder before going to bed   Men can keep a urinal at the bedside   © 0702-9284 [employer], [address] [place] [address], [place] [address]. All rights reserved. This information is not intended as a substitute for professional medical care. Always follow your healthcare professional's instructions.

## 2019-09-16 RX ORDER — HEPARIN 100 UNIT/ML
500 SYRINGE INTRAVENOUS
Status: CANCELLED | OUTPATIENT
Start: 2019-09-16

## 2019-09-16 RX ORDER — SODIUM CHLORIDE 0.9 % (FLUSH) 0.9 %
10 SYRINGE (ML) INJECTION
Status: CANCELLED | OUTPATIENT
Start: 2019-09-16

## 2019-09-19 ENCOUNTER — INFUSION (OUTPATIENT)
Dept: INFUSION THERAPY | Facility: HOSPITAL | Age: 79
End: 2019-09-19
Attending: INTERNAL MEDICINE
Payer: MEDICARE

## 2019-09-19 VITALS
DIASTOLIC BLOOD PRESSURE: 73 MMHG | RESPIRATION RATE: 16 BRPM | SYSTOLIC BLOOD PRESSURE: 121 MMHG | HEART RATE: 70 BPM | TEMPERATURE: 97 F

## 2019-09-19 DIAGNOSIS — E53.8 FOLIC ACID DEFICIENCY: ICD-10-CM

## 2019-09-19 DIAGNOSIS — D50.9 IRON DEFICIENCY ANEMIA, UNSPECIFIED IRON DEFICIENCY ANEMIA TYPE: Primary | ICD-10-CM

## 2019-09-19 PROCEDURE — 96374 THER/PROPH/DIAG INJ IV PUSH: CPT

## 2019-09-19 PROCEDURE — 63600175 PHARM REV CODE 636 W HCPCS: Performed by: INTERNAL MEDICINE

## 2019-09-19 RX ADMIN — IRON SUCROSE 200 MG: 20 INJECTION, SOLUTION INTRAVENOUS at 08:09

## 2019-09-19 NOTE — DISCHARGE INSTRUCTIONS
[facility]  [address] [facility]  [address]  [phone] phone     [phone] fax  Hours of Operation: Monday- Friday 8:00am- 5:00pm  After hours phone  [phone]  Hematology / Oncology Physicians on call      Dr. Pankaj Newby      Please call with any concerns regarding your appointment today.    FALL PREVENTION   Falls often occur due to slipping, tripping or losing your balance. Here are ways to reduce your risk of falling again.   Was there anything that caused your fall that can be fixed, removed or replaced?   Make your home safe by keeping walkways clear of objects you may trip over.   Use non-slip pads under rugs.   Do not walk in poorly lit areas.   Do not stand on chairs or wobbly ladders.   Use caution when reaching overhead or looking upward. This position can cause a loss of balance.   Be sure your shoes fit properly, have non-slip bottoms and are in good condition.   Be cautious when going up and down stairs, curbs, and when walking on uneven sidewalks.   If your balance is poor, consider using a cane or walker.   If your fall was related to alcohol use, stop or limit alcohol intake.   If your fall was related to use of sleeping medicines, talk to your doctor about this. You may need to reduce your dosage at bedtime if you awaken during the night to go to the bathroom.   To reduce the need for nighttime bathroom trips:   Avoid drinking fluids for several hours before going to bed   Empty your bladder before going to bed   Men can keep a urinal at the bedside   © 2552-3145 [employer], [address] [place] [address], [place] [address]. All rights reserved. This information is not intended as a substitute for professional medical care. Always follow your healthcare professional's instructions.

## 2019-09-19 NOTE — PLAN OF CARE
Problem: Adult Inpatient Plan of Care  Goal: Patient-Specific Goal (Individualization)  Outcome: Ongoing (interventions implemented as appropriate)  Pt likes feet up, blanket, and pillow.

## 2019-09-19 NOTE — PLAN OF CARE
"Problem: Adult Inpatient Plan of Care  Goal: Plan of Care Review  Outcome: Ongoing (interventions implemented as appropriate)  Pt states " I did not get rest last night"      "

## 2019-09-20 RX ORDER — SODIUM CHLORIDE 0.9 % (FLUSH) 0.9 %
10 SYRINGE (ML) INJECTION
Status: CANCELLED | OUTPATIENT
Start: 2019-09-20

## 2019-09-20 RX ORDER — HEPARIN 100 UNIT/ML
500 SYRINGE INTRAVENOUS
Status: CANCELLED | OUTPATIENT
Start: 2019-09-20

## 2019-09-26 ENCOUNTER — INFUSION (OUTPATIENT)
Dept: INFUSION THERAPY | Facility: HOSPITAL | Age: 79
End: 2019-09-26
Attending: INTERNAL MEDICINE
Payer: MEDICARE

## 2019-09-26 VITALS
RESPIRATION RATE: 18 BRPM | SYSTOLIC BLOOD PRESSURE: 135 MMHG | OXYGEN SATURATION: 95 % | DIASTOLIC BLOOD PRESSURE: 80 MMHG | HEART RATE: 81 BPM | TEMPERATURE: 98 F

## 2019-09-26 DIAGNOSIS — E53.8 FOLIC ACID DEFICIENCY: ICD-10-CM

## 2019-09-26 DIAGNOSIS — D50.9 IRON DEFICIENCY ANEMIA, UNSPECIFIED IRON DEFICIENCY ANEMIA TYPE: Primary | ICD-10-CM

## 2019-09-26 PROCEDURE — 96374 THER/PROPH/DIAG INJ IV PUSH: CPT

## 2019-09-26 PROCEDURE — 63600175 PHARM REV CODE 636 W HCPCS: Performed by: INTERNAL MEDICINE

## 2019-09-26 RX ADMIN — IRON SUCROSE 200 MG: 20 INJECTION, SOLUTION INTRAVENOUS at 08:09

## 2019-09-26 NOTE — DISCHARGE INSTRUCTIONS
[facility]  [address] [facility]  [address]  [phone] phone     [phone] fax  Hours of Operation: Monday- Friday 8:00am- 5:00pm  After hours phone  [phone]  Hematology / Oncology Physicians on call      Dr. Pankaj Newby      Please call with any concerns regarding your appointment today.    FALL PREVENTION   Falls often occur due to slipping, tripping or losing your balance. Here are ways to reduce your risk of falling again.   Was there anything that caused your fall that can be fixed, removed or replaced?   Make your home safe by keeping walkways clear of objects you may trip over.   Use non-slip pads under rugs.   Do not walk in poorly lit areas.   Do not stand on chairs or wobbly ladders.   Use caution when reaching overhead or looking upward. This position can cause a loss of balance.   Be sure your shoes fit properly, have non-slip bottoms and are in good condition.   Be cautious when going up and down stairs, curbs, and when walking on uneven sidewalks.   If your balance is poor, consider using a cane or walker.   If your fall was related to alcohol use, stop or limit alcohol intake.   If your fall was related to use of sleeping medicines, talk to your doctor about this. You may need to reduce your dosage at bedtime if you awaken during the night to go to the bathroom.   To reduce the need for nighttime bathroom trips:   Avoid drinking fluids for several hours before going to bed   Empty your bladder before going to bed   Men can keep a urinal at the bedside   © 2555-8494 [employer], [address] [place] [address], [place] [address]. All rights reserved. This information is not intended as a substitute for professional medical care. Always follow your healthcare professional's instructions.

## 2019-10-03 ENCOUNTER — INFUSION (OUTPATIENT)
Dept: INFUSION THERAPY | Facility: HOSPITAL | Age: 79
End: 2019-10-03
Attending: INTERNAL MEDICINE
Payer: MEDICARE

## 2019-10-03 VITALS
RESPIRATION RATE: 18 BRPM | DIASTOLIC BLOOD PRESSURE: 88 MMHG | TEMPERATURE: 98 F | HEART RATE: 88 BPM | SYSTOLIC BLOOD PRESSURE: 149 MMHG | OXYGEN SATURATION: 95 %

## 2019-10-03 DIAGNOSIS — D50.9 IRON DEFICIENCY ANEMIA, UNSPECIFIED IRON DEFICIENCY ANEMIA TYPE: Primary | ICD-10-CM

## 2019-10-03 DIAGNOSIS — E53.8 FOLIC ACID DEFICIENCY: ICD-10-CM

## 2019-10-03 PROCEDURE — 96374 THER/PROPH/DIAG INJ IV PUSH: CPT

## 2019-10-03 PROCEDURE — 63600175 PHARM REV CODE 636 W HCPCS: Performed by: INTERNAL MEDICINE

## 2019-10-03 RX ORDER — SODIUM CHLORIDE 0.9 % (FLUSH) 0.9 %
10 SYRINGE (ML) INJECTION
Status: CANCELLED | OUTPATIENT
Start: 2019-10-04

## 2019-10-03 RX ORDER — SODIUM CHLORIDE 0.9 % (FLUSH) 0.9 %
10 SYRINGE (ML) INJECTION
Status: CANCELLED | OUTPATIENT
Start: 2019-10-03

## 2019-10-03 RX ORDER — HEPARIN 100 UNIT/ML
500 SYRINGE INTRAVENOUS
Status: CANCELLED | OUTPATIENT
Start: 2019-10-04

## 2019-10-03 RX ORDER — HEPARIN 100 UNIT/ML
500 SYRINGE INTRAVENOUS
Status: CANCELLED | OUTPATIENT
Start: 2019-10-03

## 2019-10-03 RX ADMIN — SODIUM CHLORIDE: 0.9 INJECTION, SOLUTION INTRAVENOUS at 11:10

## 2019-10-03 RX ADMIN — IRON SUCROSE 200 MG: 20 INJECTION, SOLUTION INTRAVENOUS at 11:10

## 2019-10-03 NOTE — PATIENT INSTRUCTIONS
Iron Sucrose injection  What is this medicine?  IRON SUCROSE (KAREEM jasso) is an iron complex. Iron is used to make healthy red blood cells, which carry oxygen and nutrients throughout the body. This medicine is used to treat iron deficiency anemia in people with chronic kidney disease.  How should I use this medicine?  This medicine is for infusion into a vein. It is given by a health care professional in a hospital or clinic setting.  Talk to your pediatrician regarding the use of this medicine in children. While this drug may be prescribed for children as young as 2 years for selected conditions, precautions do apply.  What side effects may I notice from receiving this medicine?  Side effects that you should report to your doctor or health care professional as soon as possible:  · allergic reactions like skin rash, itching or hives, swelling of the face, lips, or tongue  · breathing problems  · changes in blood pressure  · cough  · fast, irregular heartbeat  · feeling faint or lightheaded, falls  · fever or chills  · flushing, sweating, or hot feelings  · joint or muscle aches/pains  · seizures  · swelling of the ankles or feet  · unusually weak or tired  Side effects that usually do not require medical attention (report to your doctor or health care professional if they continue or are bothersome):  · diarrhea  · feeling achy  · headache  · irritation at site where injected  · nausea, vomiting  · stomach upset  · tiredness  What may interact with this medicine?  Do not take this medicine with any of the following medications:  · deferoxamine  · dimercaprol  · other iron products  This medicine may also interact with the following medications:  · chloramphenicol  · deferasirox  What if I miss a dose?  It is important not to miss your dose. Call your doctor or health care professional if you are unable to keep an appointment.  Where should I keep my medicine?  This drug is given in a hospital or clinic and  will not be stored at home.  What should I tell my health care provider before I take this medicine?  They need to know if you have any of these conditions:  · anemia not caused by low iron levels  · heart disease  · high levels of iron in the blood  · kidney disease  · liver disease  · an unusual or allergic reaction to iron, other medicines, foods, dyes, or preservatives  · pregnant or trying to get pregnant  · breast-feeding  What should I watch for while using this medicine?  Visit your doctor or healthcare professional regularly. Tell your doctor or healthcare professional if your symptoms do not start to get better or if they get worse. You may need blood work done while you are taking this medicine.  You may need to follow a special diet. Talk to your doctor. Foods that contain iron include: whole grains/cereals, dried fruits, beans, or peas, leafy green vegetables, and organ meats (liver, kidney).  NOTE:This sheet is a summary. It may not cover all possible information. If you have questions about this medicine, talk to your doctor, pharmacist, or health care provider. Copyright© 2017 [employer]

## 2019-10-10 ENCOUNTER — INFUSION (OUTPATIENT)
Dept: INFUSION THERAPY | Facility: HOSPITAL | Age: 79
End: 2019-10-10
Attending: INTERNAL MEDICINE
Payer: MEDICARE

## 2019-10-10 VITALS
TEMPERATURE: 98 F | HEART RATE: 79 BPM | OXYGEN SATURATION: 94 % | SYSTOLIC BLOOD PRESSURE: 142 MMHG | DIASTOLIC BLOOD PRESSURE: 77 MMHG | RESPIRATION RATE: 18 BRPM

## 2019-10-10 DIAGNOSIS — D50.9 IRON DEFICIENCY ANEMIA, UNSPECIFIED IRON DEFICIENCY ANEMIA TYPE: Primary | ICD-10-CM

## 2019-10-10 DIAGNOSIS — E53.8 FOLIC ACID DEFICIENCY: ICD-10-CM

## 2019-10-10 PROCEDURE — 63600175 PHARM REV CODE 636 W HCPCS: Performed by: INTERNAL MEDICINE

## 2019-10-10 PROCEDURE — 96374 THER/PROPH/DIAG INJ IV PUSH: CPT

## 2019-10-10 RX ADMIN — IRON SUCROSE 200 MG: 20 INJECTION, SOLUTION INTRAVENOUS at 11:10

## 2019-10-14 ENCOUNTER — OFFICE VISIT (OUTPATIENT)
Dept: GASTROENTEROLOGY | Facility: CLINIC | Age: 79
End: 2019-10-14
Payer: MEDICARE

## 2019-10-14 VITALS
HEART RATE: 76 BPM | BODY MASS INDEX: 26.12 KG/M2 | SYSTOLIC BLOOD PRESSURE: 140 MMHG | WEIGHT: 156.75 LBS | HEIGHT: 65 IN | DIASTOLIC BLOOD PRESSURE: 72 MMHG

## 2019-10-14 DIAGNOSIS — D50.0 IRON DEFICIENCY ANEMIA DUE TO CHRONIC BLOOD LOSS: Primary | ICD-10-CM

## 2019-10-14 DIAGNOSIS — C34.12 MALIGNANT NEOPLASM OF UPPER LOBE OF LEFT LUNG: Chronic | ICD-10-CM

## 2019-10-14 DIAGNOSIS — Z85.038 HISTORY OF COLON CANCER: ICD-10-CM

## 2019-10-14 DIAGNOSIS — R13.19 ESOPHAGEAL DYSPHAGIA: ICD-10-CM

## 2019-10-14 PROCEDURE — 99213 OFFICE O/P EST LOW 20 MIN: CPT | Mod: PBBFAC | Performed by: NURSE PRACTITIONER

## 2019-10-14 PROCEDURE — 99214 OFFICE O/P EST MOD 30 MIN: CPT | Mod: S$PBB,,, | Performed by: NURSE PRACTITIONER

## 2019-10-14 PROCEDURE — 99214 PR OFFICE/OUTPT VISIT, EST, LEVL IV, 30-39 MIN: ICD-10-PCS | Mod: S$PBB,,, | Performed by: NURSE PRACTITIONER

## 2019-10-14 PROCEDURE — 99999 PR PBB SHADOW E&M-EST. PATIENT-LVL III: ICD-10-PCS | Mod: PBBFAC,,, | Performed by: NURSE PRACTITIONER

## 2019-10-14 PROCEDURE — 99999 PR PBB SHADOW E&M-EST. PATIENT-LVL III: CPT | Mod: PBBFAC,,, | Performed by: NURSE PRACTITIONER

## 2019-10-14 NOTE — PROGRESS NOTES
Clinic Consult:  [facility] Consultation Note    Reason for Consult:  The primary encounter diagnosis was Iron deficiency anemia due to chronic blood loss. Diagnoses of Esophageal dysphagia, Malignant neoplasm of upper lobe of left lung, and History of colon cancer were also pertinent to this visit.    PCP: Sharmaine English   [address] [facility] [address] [place] [address]    HPI:  This is a 79 y.o. female here for evaluation of the above. She was referred by hematology/oncology for iron deficiency anemia secondary to chronic blood loss. She is a resident of nursing facility. She is not accompanied by anyone at today's visit. She is somewhat of a poor historian. She has iron deficiency anemia and lung cancer and is followed by Hematology/oncology. Per last hematology notes, she was previously on Eliquis for PE but is currently on hold 2/2 rectal bleeding. Considering restarting at low dose after GI workup completed. Rectal bleeding is bright red blood. She is unable to tell me when it started or how long it lasted but that she is no longer experiencing any bleeding. She also reports having dysphagia with a certain pill she takes. At one point in the visit, she denies any dysphagia with solids or liquids but then later admits to having trouble with fish. She describes these getting stuck in throat and upper chest. She has a personal history of colon cancer and last colonoscopy was in 2012. Evidence of end-to-end ileo-colonic anastomosis and 1 hyperplastic polyp.     Review of Systems   Constitutional: Negative for fever, malaise/fatigue and weight loss.   HENT: Negative for sore throat.    Respiratory: Negative for cough and wheezing.    Cardiovascular: Negative for chest pain and palpitations.   Gastrointestinal: Negative for abdominal pain, constipation, diarrhea, heartburn, melena, nausea and vomiting.        Dysphagia, rectal bleeding    Genitourinary: Negative for dysuria and frequency.   Musculoskeletal:  Negative for back pain, joint pain, myalgias and neck pain.   Skin: Negative for itching and rash.   Neurological: Negative for dizziness, speech change, seizures, loss of consciousness and headaches.   Psychiatric/Behavioral: Negative for depression and substance abuse. The patient is not nervous/anxious.        Medical History:  has a past medical history of Acute upper respiratory infection, Anemia, Bipolar mood disorder, Coordination abnormal, COPD exacerbation (5/9/2016), Coronary artery disease, Difficulty walking, Hypertension, Hypothyroidism, adult, Insomnia, Malignant neoplasm of colon, Muscle weakness, Neuralgia and neuritis, Schizoaffective disorder, SOB (shortness of breath), Spinal stenosis, Thyroid disease, and Urinary tract infection.    Surgical History:  has a past surgical history that includes Coronary stent placement; Colon surgery; and Hysterectomy (1970's).    Family History: family history includes Asthma in her father; Eczema in her father; No Known Problems in her mother; Other in her brother..     Social History:  reports that she has quit smoking. Her smoking use included cigarettes. She quit after 50.00 years of use. She has quit using smokeless tobacco. She reports that she does not drink alcohol or use drugs.    Allergies: Reviewed    Home Medications:   Current Outpatient Medications on File Prior to Visit   Medication Sig Dispense Refill    amLODIPine (NORVASC) 10 MG tablet once daily.      ANORO ELLIPTA 62.5-25 mcg/actuation DsDv       apixaban (ELIQUIS) 5 mg Tab Take 1 tablet (5 mg total) by mouth 2 (two) times daily.      ARIPiprazole (ABILIFY) 5 MG Tab Take 1 tablet by mouth once daily.       atorvastatin (LIPITOR) 80 MG tablet Take 80 mg by mouth every evening.      benztropine (COGENTIN) 1 MG tablet Take 1 mg by mouth 2 (two) times daily.      cyanocobalamin 1,000 mcg/mL injection Inject 1,000 mcg into the skin every 30 days.      DULoxetine (CYMBALTA) 60 MG capsule once  "daily.      ferrous sulfate (IRON) 325 mg (65 mg iron) Tab tablet Take 325 mg by mouth 2 (two) times daily.      fluticasone (FLONASE) 50 mcg/actuation nasal spray 1 spray by Each Nare route 2 (two) times daily.      folic acid (FOLVITE) 1 MG tablet Take 1 tablet (1 mg total) by mouth once daily. 30 tablet 0    MULTIVITAMIN ORAL Take by mouth once daily.      pantoprazole (PROTONIX) 40 MG tablet Take 40 mg by mouth once daily.      potassium chloride SA (K-DUR,KLOR-CON) 20 MEQ tablet Take 40 mEq by mouth once daily. Give 20 ml to eaqual to 40 meq      QUEtiapine (SEROQUEL) 100 MG Tab       terbinafine HCl (LAMISIL) 250 mg tablet Take 250 mg by mouth once daily.       thiamine 100 MG tablet Take 100 mg by mouth once daily.      acetaminophen (TYLENOL) 325 MG tablet Take 650 mg by mouth 4 (four) times daily as needed for Pain.      albuterol (PROVENTIL/VENTOLIN HFA) 90 mcg/actuation inhaler Inhale 2 puffs into the lungs every 6 (six) hours as needed for Wheezing. Rescue      ALPRAZolam (XANAX) 0.25 MG tablet Take 0.25 mg by mouth once daily.       ALREX 0.2 % DrpS Place 1 drop into both eyes once daily.       levothyroxine (SYNTHROID) 88 MCG tablet Take 88 mcg by mouth before breakfast.      ramelteon (ROZEREM) 8 mg tablet Take 1 tablet (8 mg total) by mouth nightly as needed for Insomnia. (Patient not taking: Reported on 10/14/2019)      RESTASIS 0.05 % ophthalmic emulsion Place 0.05 drops into both eyes 2 (two) times daily.      senna-docusate 8.6-50 mg (PERICOLACE) 8.6-50 mg per tablet Take 1 tablet by mouth 2 (two) times daily.      traZODone (DESYREL) 50 MG tablet Take 50 mg by mouth nightly as needed.        No current facility-administered medications on file prior to visit.        Physical Exam:  BP (!) 140/72   Pulse 76   Ht 5' 5" (1.651 m)   Wt 71.1 kg (156 lb 12 oz)   LMP  (LMP Unknown)   BMI 26.08 kg/m²   Body mass index is 26.08 kg/m².  Physical Exam   Constitutional: She is " oriented to person, place, and time and well-developed, well-nourished, and in no distress. No distress.   HENT:   Head: Normocephalic.   Eyes: Pupils are equal, round, and reactive to light. Conjunctivae are normal.   Cardiovascular: Normal rate, regular rhythm and normal heart sounds.   Pulmonary/Chest: Effort normal and breath sounds normal. No respiratory distress.   Abdominal: Soft. Bowel sounds are normal. She exhibits no distension. There is no tenderness.   Neurological: She is alert and oriented to person, place, and time. No cranial nerve deficit.   Skin: Skin is warm and dry. No rash noted.   Psychiatric: Mood and affect normal.       Labs: Pertinent labs reviewed.  Endoscopy:  none  CRC Screening: see HPI     Assessment:  1. Iron deficiency anemia due to chronic blood loss    2. Esophageal dysphagia    3. Malignant neoplasm of upper lobe of left lung    4. History of colon cancer         Recommendations:  - discussed risks and benefits of proceeding with EGD and colonoscopy. Patient verbalizes understanding and agrees with proceeding with endoscopies.   - encouraged her to also discuss with family.   - will have to have family present at time of endoscopy  - will have nursing home monitor for continued rectal bleeding.   -     Case request GI: ESOPHAGOGASTRODUODENOSCOPY (EGD), COLONOSCOPY    Follow up to be determined after procedure.    Thank you so much for allowing me to participate in the care of IFEOMA Slater

## 2019-10-14 NOTE — LETTER
October 14, 2019      Ralph Newby MD  [address]  [place] [address]           [facility]  [address] [place] [address]  Phone: [phone]  Fax: [phone]          Patient: Rachel Long   MR Number: [id number]   YOB: 1940   Date of Visit: 10/14/2019       Dear Dr. Ralph Newby:    Thank you for referring Rachel Long to me for evaluation. Attached you will find relevant portions of my assessment and plan of care.    If you have questions, please do not hesitate to call me. I look forward to following Rachel Long along with you.    Sincerely,    Simi Acosta, NP    Enclosure  CC:  No Recipients    If you would like to receive this communication electronically, please contact [email or web address] or [phone] to request more information on [employer] access.    For providers and/or their staff who would like to refer a patient to [facility], please contact us through our one-stop-shop provider referral line, [facility], at [phone].    If you feel you have received this communication in error or would no longer like to receive these types of communications, please e-mail [email or web address]

## 2019-10-14 NOTE — PATIENT INSTRUCTIONS
Instructions for patient:  - discuss procedures with children  - notify me if children have any questions or concerns about upper and lower endoscopies  - family member must be present at time of endoscopy.     Instructions for [place]:  - monitor and record any episodes of rectal bleeding.   - plan for EGD and colonoscopy. Please see attached information.

## 2019-10-16 RX ORDER — SODIUM CHLORIDE 0.9 % (FLUSH) 0.9 %
10 SYRINGE (ML) INJECTION
Status: CANCELLED | OUTPATIENT
Start: 2019-10-16

## 2019-10-16 RX ORDER — HEPARIN 100 UNIT/ML
500 SYRINGE INTRAVENOUS
Status: CANCELLED | OUTPATIENT
Start: 2019-10-16

## 2019-10-17 ENCOUNTER — INFUSION (OUTPATIENT)
Dept: INFUSION THERAPY | Facility: HOSPITAL | Age: 79
End: 2019-10-17
Attending: INTERNAL MEDICINE
Payer: MEDICARE

## 2019-10-17 VITALS
DIASTOLIC BLOOD PRESSURE: 83 MMHG | TEMPERATURE: 97 F | OXYGEN SATURATION: 94 % | SYSTOLIC BLOOD PRESSURE: 154 MMHG | HEART RATE: 82 BPM | RESPIRATION RATE: 20 BRPM

## 2019-10-17 DIAGNOSIS — D50.9 IRON DEFICIENCY ANEMIA, UNSPECIFIED IRON DEFICIENCY ANEMIA TYPE: Primary | ICD-10-CM

## 2019-10-17 DIAGNOSIS — E53.8 FOLIC ACID DEFICIENCY: ICD-10-CM

## 2019-10-17 PROCEDURE — 63600175 PHARM REV CODE 636 W HCPCS: Performed by: NURSE PRACTITIONER

## 2019-10-17 PROCEDURE — 96374 THER/PROPH/DIAG INJ IV PUSH: CPT

## 2019-10-17 RX ADMIN — IRON SUCROSE 200 MG: 20 INJECTION, SOLUTION INTRAVENOUS at 11:10

## 2019-10-17 NOTE — DISCHARGE INSTRUCTIONS
[facility] [place] [facility]  [address] [facility]  [address]  [phone] phone     [phone] fax  Hours of Operation: Monday- Friday 8:00am- 5:00pm  After hours phone  [phone]  Hematology / Oncology Physicians on call      Dr. Pankaj Newby      Please call with any concerns regarding your appointment today.

## 2019-10-21 DIAGNOSIS — D50.0 IRON DEFICIENCY ANEMIA DUE TO CHRONIC BLOOD LOSS: Primary | ICD-10-CM

## 2019-10-21 RX ORDER — SODIUM CHLORIDE 0.9 % (FLUSH) 0.9 %
10 SYRINGE (ML) INJECTION
OUTPATIENT
Start: 2019-10-21

## 2019-10-21 RX ORDER — HEPARIN 100 UNIT/ML
500 SYRINGE INTRAVENOUS
OUTPATIENT
Start: 2019-10-21

## 2019-10-24 ENCOUNTER — INFUSION (OUTPATIENT)
Dept: INFUSION THERAPY | Facility: HOSPITAL | Age: 79
End: 2019-10-24
Attending: INTERNAL MEDICINE
Payer: MEDICARE

## 2019-10-24 VITALS
HEART RATE: 79 BPM | OXYGEN SATURATION: 93 % | SYSTOLIC BLOOD PRESSURE: 157 MMHG | DIASTOLIC BLOOD PRESSURE: 84 MMHG | TEMPERATURE: 97 F

## 2019-10-24 DIAGNOSIS — D50.9 IRON DEFICIENCY ANEMIA, UNSPECIFIED IRON DEFICIENCY ANEMIA TYPE: Primary | ICD-10-CM

## 2019-10-24 DIAGNOSIS — E53.8 FOLIC ACID DEFICIENCY: ICD-10-CM

## 2019-10-24 PROCEDURE — 63600175 PHARM REV CODE 636 W HCPCS: Performed by: NURSE PRACTITIONER

## 2019-10-24 PROCEDURE — 96374 THER/PROPH/DIAG INJ IV PUSH: CPT

## 2019-10-24 RX ADMIN — SODIUM CHLORIDE: 9 INJECTION, SOLUTION INTRAVENOUS at 11:10

## 2019-10-24 RX ADMIN — IRON SUCROSE 200 MG: 20 INJECTION, SOLUTION INTRAVENOUS at 11:10

## 2019-11-14 ENCOUNTER — TELEPHONE (OUTPATIENT)
Dept: RADIOLOGY | Facility: HOSPITAL | Age: 79
End: 2019-11-14

## 2019-11-18 ENCOUNTER — TELEPHONE (OUTPATIENT)
Dept: RADIOLOGY | Facility: HOSPITAL | Age: 79
End: 2019-11-18

## 2019-11-18 NOTE — PROGRESS NOTES
[facility]  RADIATION ONCOLOGY FOLLOW UP    Name: Rachel Long : 1940     DIAGNOSIS:  Likely metachronous non-small cell lung cancer  1. 6/10/16 stereotactic body radiation therapy left upper lobe squamous cell carcinoma stage I  2. 18 CT chest new right lower lobe 1.5 x 1.7 cm nodule and left lower lobe 2.6 x 3.5 cm opacity in area of prior lung cancer 2016  3. 18 PET scan 5.7 SUV left upper lobe area of prior SBRT, 3.7 SUV right lower lobe  4. 10/22/18 CT-guided biopsy left upper lobe prior area of stereotactic body radiation therapy returned squamous cell carcinoma  5. 18 completed right lower lobe stereotactic body radiation therapy 50 Gy in 5 fractions  6. 28 cryotherapy to recurrence left upper lobe biopsy-proven recurrence  7. 19 CT chest showed a left upper lobe lesion to be increased in size measuring 4.7 x 2.3 compared with 3.5 x 2.6 on prior with surrounding atelectasis.  There was a 2 mm left lower lobe nodule.  The right lower lobe nodule was decreased measuring 0.6 x 0.5 cm compared with 1.5 x 1.5 cm prior.  The left adrenal gland nodule measured 1.8 x 1.5 cm compared with 2 x 1.4 cm prior.  8. 19 PET showing no FDG activity in the right upper lobe lesion and 2.2 SUV in the left upper lobe lesion  9. Prolonged hospital stay and skilled nursing facility causing loss of follow-up  10. 19 CT chest resolution of central right lower lobe nodule with ground-glass opacities in the adjacent lung parenchyma.  Left upper lobe mass decrease measuring 2.3 x 4 x 1.4 cm.  . 19 CT chest report pending    CURRENT STATUS: Rachel Long is a pleasant 79 y.o. female who presents today for follow-up.  She had a CT today which I reviewed but the report is not available.  The left upper lobe lesion is basically stable post radiation change in the right upper lobe lesion has more radiation changes without a discrete mass.  Her cough from last  visit has resolved.  She has mild anterior left chest wall tenderness present since cryotherapy.  She has no hemoptysis, increased dyspnea on exertion.  He remains at the nursing home.    PAST MEDICAL HISTORY:  Past Medical History:   Diagnosis Date    Acute upper respiratory infection     Anemia     Bipolar mood disorder     Coordination abnormal     COPD exacerbation 5/9/2016    Coronary artery disease     Difficulty walking     Hypertension     on meds    Hypothyroidism, adult     Insomnia     Malignant neoplasm of colon     Muscle weakness     Neuralgia and neuritis     Schizoaffective disorder     SOB (shortness of breath)     Spinal stenosis     Thyroid disease     Urinary tract infection        PAST SURGICAL HISTORY:  Past Surgical History:   Procedure Laterality Date    COLON SURGERY      CORONARY STENT PLACEMENT      HYSTERECTOMY  1970's       SOCIAL HISTORY:  Social History     Socioeconomic History    Marital status: Single     Spouse name: Not on file    Number of children: Not on file    Years of education: Not on file    Highest education level: Not on file   Occupational History    Not on file   Social Needs    Financial resource strain: Not on file    Food insecurity:     Worry: Not on file     Inability: Not on file    Transportation needs:     Medical: Not on file     Non-medical: Not on file   Tobacco Use    Smoking status: Former Smoker     Years: 50.00     Types: Cigarettes    Smokeless tobacco: Former User   Substance and Sexual Activity    Alcohol use: No    Drug use: No    Sexual activity: Never   Lifestyle    Physical activity:     Days per week: Not on file     Minutes per session: Not on file    Stress: Not on file   Relationships    Social connections:     Talks on phone: Not on file     Gets together: Not on file     Attends Baptism service: Not on file     Active member of club or organization: Not on file     Attends meetings of clubs or  "organizations: Not on file     Relationship status: Not on file   Other Topics Concern    Not on file   Social History Narrative    Not on file       FAMILY HISTORY:  Family History   Problem Relation Age of Onset    Asthma Father     Eczema Father     No Known Problems Mother     Other Brother         shoulder surgery        PHYSICAL EXAMINATION:  Constitutional/Vitals: well appearing, no acute distress, ECOG 2 - Ambulates, capable of self care only, BP (!) 141/90   Pulse 69   Temp 97.9 °F (36.6 °C)   Resp 18   Ht 5' 5" (1.651 m)   Wt 70 kg (154 lb 6.4 oz)   LMP  (LMP Unknown)   SpO2 (!) 91%   BMI 25.69 kg/m²   ELymphatic: no cervical, supraclavicular adenopathy  Cardiovascular: regular rate, no murmurs, no edema of the upper or lower extremities, radial pulse 2+  Respiratory: unlabored effort, clear to auscultation, no wheezes  Musculoskeletal:  Mild tenderness just left of the mid sternum, no mass, no skin changes    IMAGING AND LABORATORY FINDINGS: As per HPI; images, labs and medical records reviewed personally in [facility].    ASSESSMENT:  Post radiation changes on CT    PLAN:  I reviewed her CT which on my review so stable left rib radiation and cryotherapy changes and evolving right-sided radiation changes.  I will follow up report.  She has a PET ordered next week by Dr. Newby.  I ordered a CT in 4 months and will see her to review.      JOBY Mora M.D.  Radiation Oncology  [facility]  [id number] [place] [address] [place] [address]  Ph: [phone]  [email or web address]    "

## 2019-11-19 ENCOUNTER — HOSPITAL ENCOUNTER (OUTPATIENT)
Dept: RADIOLOGY | Facility: HOSPITAL | Age: 79
Discharge: HOME | End: 2019-11-19
Attending: RADIOLOGY
Payer: MEDICARE

## 2019-11-19 ENCOUNTER — OFFICE VISIT (OUTPATIENT)
Dept: RADIATION ONCOLOGY | Facility: CLINIC | Age: 79
End: 2019-11-19
Payer: MEDICARE

## 2019-11-19 VITALS
TEMPERATURE: 98 F | DIASTOLIC BLOOD PRESSURE: 90 MMHG | RESPIRATION RATE: 18 BRPM | OXYGEN SATURATION: 91 % | SYSTOLIC BLOOD PRESSURE: 141 MMHG | HEIGHT: 65 IN | HEART RATE: 69 BPM | WEIGHT: 154.38 LBS | BODY MASS INDEX: 25.72 KG/M2

## 2019-11-19 DIAGNOSIS — C34.12 MALIGNANT NEOPLASM OF UPPER LOBE OF LEFT LUNG: ICD-10-CM

## 2019-11-19 DIAGNOSIS — C34.12 MALIGNANT NEOPLASM OF UPPER LOBE OF LEFT LUNG: Primary | Chronic | ICD-10-CM

## 2019-11-19 DIAGNOSIS — C80.1 CANCER: Primary | ICD-10-CM

## 2019-11-19 PROCEDURE — 25500020 PHARM REV CODE 255: Performed by: RADIOLOGY

## 2019-11-19 PROCEDURE — 99214 OFFICE O/P EST MOD 30 MIN: CPT | Mod: S$PBB,,, | Performed by: RADIOLOGY

## 2019-11-19 PROCEDURE — 99999 PR PBB SHADOW E&M-EST. PATIENT-LVL V: CPT | Mod: PBBFAC,,, | Performed by: RADIOLOGY

## 2019-11-19 PROCEDURE — 99214 PR OFFICE/OUTPT VISIT, EST, LEVL IV, 30-39 MIN: ICD-10-PCS | Mod: S$PBB,,, | Performed by: RADIOLOGY

## 2019-11-19 PROCEDURE — 99999 PR PBB SHADOW E&M-EST. PATIENT-LVL V: ICD-10-PCS | Mod: PBBFAC,,, | Performed by: RADIOLOGY

## 2019-11-19 PROCEDURE — 99215 OFFICE O/P EST HI 40 MIN: CPT | Mod: PBBFAC,25 | Performed by: RADIOLOGY

## 2019-11-19 PROCEDURE — 71260 CT THORAX DX C+: CPT | Mod: TC

## 2019-11-19 RX ADMIN — IOHEXOL 100 ML: 350 INJECTION, SOLUTION INTRAVENOUS at 09:11

## 2019-11-21 ENCOUNTER — TELEPHONE (OUTPATIENT)
Dept: RADIOLOGY | Facility: HOSPITAL | Age: 79
End: 2019-11-21

## 2019-11-25 ENCOUNTER — HOSPITAL ENCOUNTER (OUTPATIENT)
Dept: RADIOLOGY | Facility: HOSPITAL | Age: 79
Discharge: HOME | End: 2019-11-25
Attending: INTERNAL MEDICINE
Payer: MEDICARE

## 2019-11-25 DIAGNOSIS — C34.12 MALIGNANT NEOPLASM OF UPPER LOBE OF LEFT LUNG: ICD-10-CM

## 2019-11-25 PROCEDURE — 78815 PET IMAGE W/CT SKULL-THIGH: CPT | Mod: TC

## 2019-11-25 PROCEDURE — 78815 NM PET CT ROUTINE: ICD-10-PCS | Mod: 26,PS,, | Performed by: RADIOLOGY

## 2019-11-25 PROCEDURE — 78815 PET IMAGE W/CT SKULL-THIGH: CPT | Mod: 26,PS,, | Performed by: RADIOLOGY

## 2019-11-25 PROCEDURE — A9552 F18 FDG: HCPCS

## 2020-01-02 ENCOUNTER — HOSPITAL ENCOUNTER (OUTPATIENT)
Facility: HOSPITAL | Age: 80
Discharge: HOME | End: 2020-01-03
Attending: EMERGENCY MEDICINE | Admitting: FAMILY MEDICINE
Payer: MEDICARE

## 2020-01-02 DIAGNOSIS — I48.91 ATRIAL FIBRILLATION WITH RVR: Primary | ICD-10-CM

## 2020-01-02 DIAGNOSIS — I48.91 ATRIAL FIBRILLATION, NEW ONSET: ICD-10-CM

## 2020-01-02 DIAGNOSIS — I48.91 A-FIB: ICD-10-CM

## 2020-01-02 DIAGNOSIS — R07.9 CHEST PAIN, UNSPECIFIED TYPE: ICD-10-CM

## 2020-01-02 DIAGNOSIS — R00.2 PALPITATIONS: ICD-10-CM

## 2020-01-02 PROBLEM — D69.6 THROMBOCYTOPENIA: Status: ACTIVE | Noted: 2020-01-02

## 2020-01-02 PROBLEM — E87.6 HYPOKALEMIA: Status: ACTIVE | Noted: 2020-01-02

## 2020-01-02 PROBLEM — G45.9 TIA (TRANSIENT ISCHEMIC ATTACK): Status: ACTIVE | Noted: 2020-01-02

## 2020-01-02 LAB
ALBUMIN SERPL BCP-MCNC: 3.7 G/DL (ref 3.5–5.2)
ALP SERPL-CCNC: 84 U/L (ref 55–135)
ALT SERPL W/O P-5'-P-CCNC: 9 U/L (ref 10–44)
ANION GAP SERPL CALC-SCNC: 12 MMOL/L (ref 8–16)
APTT BLDCRRT: 28.5 SEC (ref 21–32)
APTT BLDCRRT: 42.7 SEC (ref 21–32)
AST SERPL-CCNC: 14 U/L (ref 10–40)
BASOPHILS # BLD AUTO: 0.03 K/UL (ref 0–0.2)
BASOPHILS NFR BLD: 0.4 % (ref 0–1.9)
BILIRUB SERPL-MCNC: 0.9 MG/DL (ref 0.1–1)
BNP SERPL-MCNC: 26 PG/ML (ref 0–99)
BUN SERPL-MCNC: 15 MG/DL (ref 8–23)
CALCIUM SERPL-MCNC: 9.7 MG/DL (ref 8.7–10.5)
CHLORIDE SERPL-SCNC: 100 MMOL/L (ref 95–110)
CK SERPL-CCNC: 30 U/L (ref 20–180)
CO2 SERPL-SCNC: 29 MMOL/L (ref 23–29)
CREAT SERPL-MCNC: 0.7 MG/DL (ref 0.5–1.4)
DIASTOLIC DYSFUNCTION: NO
DIFFERENTIAL METHOD: ABNORMAL
EOSINOPHIL # BLD AUTO: 0.1 K/UL (ref 0–0.5)
EOSINOPHIL NFR BLD: 1.7 % (ref 0–8)
ERYTHROCYTE [DISTWIDTH] IN BLOOD BY AUTOMATED COUNT: 16.2 % (ref 11.5–14.5)
EST. GFR  (AFRICAN AMERICAN): >60 ML/MIN/1.73 M^2
EST. GFR  (NON AFRICAN AMERICAN): >60 ML/MIN/1.73 M^2
ESTIMATED PA SYSTOLIC PRESSURE: 30.67
GLUCOSE SERPL-MCNC: 101 MG/DL (ref 70–110)
HCT VFR BLD AUTO: 42.4 % (ref 37–48.5)
HGB BLD-MCNC: 13.6 G/DL (ref 12–16)
IMM GRANULOCYTES # BLD AUTO: 0.02 K/UL (ref 0–0.04)
IMM GRANULOCYTES NFR BLD AUTO: 0.3 % (ref 0–0.5)
INR PPP: 1 (ref 0.8–1.2)
LYMPHOCYTES # BLD AUTO: 1.2 K/UL (ref 1–4.8)
LYMPHOCYTES NFR BLD: 16.1 % (ref 18–48)
MCH RBC QN AUTO: 28.6 PG (ref 27–31)
MCHC RBC AUTO-ENTMCNC: 32.1 G/DL (ref 32–36)
MCV RBC AUTO: 89 FL (ref 82–98)
MONOCYTES # BLD AUTO: 0.6 K/UL (ref 0.3–1)
MONOCYTES NFR BLD: 8.6 % (ref 4–15)
NEUTROPHILS # BLD AUTO: 5.4 K/UL (ref 1.8–7.7)
NEUTROPHILS NFR BLD: 72.9 % (ref 38–73)
NRBC BLD-RTO: 0 /100 WBC
PLATELET # BLD AUTO: 105 K/UL (ref 150–350)
PMV BLD AUTO: 10.3 FL (ref 9.2–12.9)
POTASSIUM SERPL-SCNC: 3.4 MMOL/L (ref 3.5–5.1)
PROT SERPL-MCNC: 7.7 G/DL (ref 6–8.4)
PROTHROMBIN TIME: 11 SEC (ref 9–12.5)
RBC # BLD AUTO: 4.75 M/UL (ref 4–5.4)
RETIRED EF AND QEF - SEE NOTES: 55 (ref 55–65)
SODIUM SERPL-SCNC: 141 MMOL/L (ref 136–145)
TRICUSPID VALVE REGURGITATION: NORMAL
TROPONIN I SERPL DL<=0.01 NG/ML-MCNC: 0.02 NG/ML (ref 0–0.03)
WBC # BLD AUTO: 7.46 K/UL (ref 3.9–12.7)

## 2020-01-02 PROCEDURE — 85730 THROMBOPLASTIN TIME PARTIAL: CPT

## 2020-01-02 PROCEDURE — 85025 COMPLETE CBC W/AUTO DIFF WBC: CPT

## 2020-01-02 PROCEDURE — 96365 THER/PROPH/DIAG IV INF INIT: CPT

## 2020-01-02 PROCEDURE — 85730 THROMBOPLASTIN TIME PARTIAL: CPT | Mod: 91

## 2020-01-02 PROCEDURE — 96376 TX/PRO/DX INJ SAME DRUG ADON: CPT

## 2020-01-02 PROCEDURE — 93306 2D ECHO WITH COLOR FLOW DOPPLER: ICD-10-PCS | Mod: 26,,, | Performed by: INTERNAL MEDICINE

## 2020-01-02 PROCEDURE — G0378 HOSPITAL OBSERVATION PER HR: HCPCS

## 2020-01-02 PROCEDURE — 36415 COLL VENOUS BLD VENIPUNCTURE: CPT

## 2020-01-02 PROCEDURE — 84484 ASSAY OF TROPONIN QUANT: CPT

## 2020-01-02 PROCEDURE — 99291 CRITICAL CARE FIRST HOUR: CPT | Mod: 25

## 2020-01-02 PROCEDURE — 99284 EMERGENCY DEPT VISIT MOD MDM: CPT | Mod: 25,,, | Performed by: INTERNAL MEDICINE

## 2020-01-02 PROCEDURE — 63600175 PHARM REV CODE 636 W HCPCS: Performed by: EMERGENCY MEDICINE

## 2020-01-02 PROCEDURE — 93306 TTE W/DOPPLER COMPLETE: CPT

## 2020-01-02 PROCEDURE — 25000003 PHARM REV CODE 250: Performed by: NURSE PRACTITIONER

## 2020-01-02 PROCEDURE — 83880 ASSAY OF NATRIURETIC PEPTIDE: CPT

## 2020-01-02 PROCEDURE — 25000003 PHARM REV CODE 250: Performed by: EMERGENCY MEDICINE

## 2020-01-02 PROCEDURE — 96368 THER/DIAG CONCURRENT INF: CPT

## 2020-01-02 PROCEDURE — 85610 PROTHROMBIN TIME: CPT

## 2020-01-02 PROCEDURE — 96366 THER/PROPH/DIAG IV INF ADDON: CPT

## 2020-01-02 PROCEDURE — 99284 PR EMERGENCY DEPT VISIT,LEVEL IV: ICD-10-PCS | Mod: 25,,, | Performed by: INTERNAL MEDICINE

## 2020-01-02 PROCEDURE — 25000003 PHARM REV CODE 250: Performed by: INTERNAL MEDICINE

## 2020-01-02 PROCEDURE — 80053 COMPREHEN METABOLIC PANEL: CPT

## 2020-01-02 PROCEDURE — 82550 ASSAY OF CK (CPK): CPT

## 2020-01-02 PROCEDURE — 93306 TTE W/DOPPLER COMPLETE: CPT | Mod: 26,,, | Performed by: INTERNAL MEDICINE

## 2020-01-02 PROCEDURE — 96375 TX/PRO/DX INJ NEW DRUG ADDON: CPT | Mod: 59

## 2020-01-02 RX ORDER — PANTOPRAZOLE SODIUM 40 MG/1
40 TABLET, DELAYED RELEASE ORAL DAILY
Status: DISCONTINUED | OUTPATIENT
Start: 2020-01-03 | End: 2020-01-03 | Stop reason: HOSPADM

## 2020-01-02 RX ORDER — BENZTROPINE MESYLATE 1 MG/1
1 TABLET ORAL NIGHTLY
Status: DISCONTINUED | OUTPATIENT
Start: 2020-01-02 | End: 2020-01-03 | Stop reason: HOSPADM

## 2020-01-02 RX ORDER — METOPROLOL TARTRATE 25 MG/1
25 TABLET, FILM COATED ORAL 2 TIMES DAILY
Status: DISCONTINUED | OUTPATIENT
Start: 2020-01-02 | End: 2020-01-03

## 2020-01-02 RX ORDER — METOPROLOL TARTRATE 1 MG/ML
2.5 INJECTION, SOLUTION INTRAVENOUS EVERY 6 HOURS
Status: DISCONTINUED | OUTPATIENT
Start: 2020-01-02 | End: 2020-01-02

## 2020-01-02 RX ORDER — ACETAMINOPHEN 325 MG/1
650 TABLET ORAL EVERY 6 HOURS PRN
Status: DISCONTINUED | OUTPATIENT
Start: 2020-01-02 | End: 2020-01-03 | Stop reason: HOSPADM

## 2020-01-02 RX ORDER — DILTIAZEM HCL 1 MG/ML
5 INJECTION, SOLUTION INTRAVENOUS CONTINUOUS
Status: DISCONTINUED | OUTPATIENT
Start: 2020-01-02 | End: 2020-01-03

## 2020-01-02 RX ORDER — METOPROLOL TARTRATE 1 MG/ML
2.5 INJECTION, SOLUTION INTRAVENOUS
Status: COMPLETED | OUTPATIENT
Start: 2020-01-02 | End: 2020-01-02

## 2020-01-02 RX ORDER — TRAMADOL HYDROCHLORIDE 50 MG/1
50 TABLET ORAL EVERY 6 HOURS PRN
Status: DISCONTINUED | OUTPATIENT
Start: 2020-01-02 | End: 2020-01-03 | Stop reason: HOSPADM

## 2020-01-02 RX ORDER — THIAMINE HCL 100 MG
100 TABLET ORAL DAILY
Status: DISCONTINUED | OUTPATIENT
Start: 2020-01-03 | End: 2020-01-03 | Stop reason: HOSPADM

## 2020-01-02 RX ORDER — IPRATROPIUM BROMIDE AND ALBUTEROL SULFATE 2.5; .5 MG/3ML; MG/3ML
3 SOLUTION RESPIRATORY (INHALATION) EVERY 4 HOURS PRN
Status: DISCONTINUED | OUTPATIENT
Start: 2020-01-02 | End: 2020-01-03 | Stop reason: HOSPADM

## 2020-01-02 RX ORDER — POTASSIUM CHLORIDE 20 MEQ/1
40 TABLET, EXTENDED RELEASE ORAL DAILY
Status: DISCONTINUED | OUTPATIENT
Start: 2020-01-03 | End: 2020-01-03 | Stop reason: HOSPADM

## 2020-01-02 RX ORDER — HEPARIN SODIUM,PORCINE/D5W 25000/250
12 INTRAVENOUS SOLUTION INTRAVENOUS CONTINUOUS
Status: DISCONTINUED | OUTPATIENT
Start: 2020-01-02 | End: 2020-01-03

## 2020-01-02 RX ORDER — DULOXETIN HYDROCHLORIDE 30 MG/1
60 CAPSULE, DELAYED RELEASE ORAL DAILY
Status: DISCONTINUED | OUTPATIENT
Start: 2020-01-03 | End: 2020-01-03 | Stop reason: HOSPADM

## 2020-01-02 RX ORDER — AMOXICILLIN 250 MG
1 CAPSULE ORAL 2 TIMES DAILY
Status: DISCONTINUED | OUTPATIENT
Start: 2020-01-02 | End: 2020-01-03 | Stop reason: HOSPADM

## 2020-01-02 RX ORDER — FERROUS SULFATE 325(65) MG
325 TABLET, DELAYED RELEASE (ENTERIC COATED) ORAL 2 TIMES DAILY
Status: DISCONTINUED | OUTPATIENT
Start: 2020-01-02 | End: 2020-01-03 | Stop reason: HOSPADM

## 2020-01-02 RX ORDER — TRAZODONE HYDROCHLORIDE 50 MG/1
50 TABLET ORAL NIGHTLY PRN
Status: DISCONTINUED | OUTPATIENT
Start: 2020-01-02 | End: 2020-01-03 | Stop reason: HOSPADM

## 2020-01-02 RX ORDER — DILTIAZEM HYDROCHLORIDE 5 MG/ML
15 INJECTION INTRAVENOUS
Status: COMPLETED | OUTPATIENT
Start: 2020-01-02 | End: 2020-01-02

## 2020-01-02 RX ORDER — ATORVASTATIN CALCIUM 40 MG/1
80 TABLET, FILM COATED ORAL NIGHTLY
Status: DISCONTINUED | OUTPATIENT
Start: 2020-01-02 | End: 2020-01-03 | Stop reason: HOSPADM

## 2020-01-02 RX ADMIN — TRAZODONE HYDROCHLORIDE 50 MG: 50 TABLET ORAL at 08:01

## 2020-01-02 RX ADMIN — HEPARIN SODIUM 12 UNITS/KG/HR: 10000 INJECTION, SOLUTION INTRAVENOUS at 04:01

## 2020-01-02 RX ADMIN — FERROUS SULFATE TAB EC 325 MG (65 MG FE EQUIVALENT) 325 MG: 325 (65 FE) TABLET DELAYED RESPONSE at 08:01

## 2020-01-02 RX ADMIN — METOPROLOL TARTRATE 2.5 MG: 5 INJECTION INTRAVENOUS at 01:01

## 2020-01-02 RX ADMIN — SODIUM CHLORIDE 1000 ML: 0.9 INJECTION, SOLUTION INTRAVENOUS at 12:01

## 2020-01-02 RX ADMIN — METOPROLOL TARTRATE 25 MG: 25 TABLET ORAL at 09:01

## 2020-01-02 RX ADMIN — METOPROLOL TARTRATE 2.5 MG: 1 INJECTION, SOLUTION INTRAVENOUS at 07:01

## 2020-01-02 RX ADMIN — BENZTROPINE MESYLATE 1 MG: 1 TABLET ORAL at 08:01

## 2020-01-02 RX ADMIN — SENNOSIDES, DOCUSATE SODIUM 1 TABLET: 50; 8.6 TABLET, FILM COATED ORAL at 08:01

## 2020-01-02 RX ADMIN — QUETIAPINE FUMARATE 150 MG: 100 TABLET ORAL at 08:01

## 2020-01-02 RX ADMIN — DILTIAZEM HYDROCHLORIDE 15 MG: 5 INJECTION INTRAVENOUS at 12:01

## 2020-01-02 RX ADMIN — Medication 15 MG/HR: at 08:01

## 2020-01-02 RX ADMIN — ATORVASTATIN CALCIUM 80 MG: 40 TABLET, FILM COATED ORAL at 08:01

## 2020-01-02 RX ADMIN — HYPROMELLOSE 2910 1 DROP: 5 SOLUTION OPHTHALMIC at 08:01

## 2020-01-02 RX ADMIN — Medication 5 MG/HR: at 12:01

## 2020-01-02 NOTE — CONSULTS
[facility]  Cardiology  Consult Note    Patient Name: Rachel Long  MRN: [id number]  Admission Date: 1/2/2020  Hospital Length of Stay: 0 days  Code Status: Prior   Attending Provider: Kaiden Shen MD   Consulting Provider: Jose Calvert MD  Primary Care Physician: Sharmaine English MD  Principal Problem:<principal problem not specified>    Patient information was obtained from patient and ER records.     Inpatient consult to Cardiology  Consult performed by: Jose Calvert MD  Consult ordered by: Kaiden Shen MD        Subjective:       HPI:   78 yo female, consult for afib with RVR  [facility] home resident  PMH h/o TIA remote, twice, dementia, anxiety, h/o left NSCLC h/o chemoXR, h/o PE and former long time somoker  Pt states that she had fast heart beat when she was stressful  Pt came in colonoscopy due to recent GI bleeding. Eliquis was on hold  The procedure was cancelled due to afib with RVR  Pt denied chest pain, dizziness and faint  Has chronic SOARES and intermittent palpitation  On cardizme gtt and had metoprolol 2.5 mg ivp x1 in ER. HR at 95 bpm.     Past Medical History:   Diagnosis Date    Acute upper respiratory infection     Anemia     Arthritis     osteo    Bipolar mood disorder     Coordination abnormal     COPD exacerbation 5/9/2016    Coronary artery disease     Difficulty walking     Dysphasia     GERD (gastroesophageal reflux disease)     Hyperlipidemia     Hypertension     on meds    Hypothyroidism, adult     Insomnia     Malignant neoplasm of colon     Muscle weakness     Neuralgia and neuritis     Parkinson disease     Pulmonary embolism     Schizoaffective disorder     SOB (shortness of breath)     Spinal stenosis     Thyroid disease     Urinary tract infection        Past Surgical History:   Procedure Laterality Date    COLON SURGERY      CORONARY STENT PLACEMENT      FRACTURE SURGERY      HYSTERECTOMY  1970's       Review of patient's allergies indicates:    Allergen Reactions    Benadryl [diphenhydramine hcl] Anxiety    Sulfa (sulfonamide antibiotics) Rash       No current facility-administered medications on file prior to encounter.      Current Outpatient Medications on File Prior to Encounter   Medication Sig    acetaminophen (TYLENOL) 325 MG tablet Take 650 mg by mouth 4 (four) times daily as needed for Pain.    albuterol (PROVENTIL/VENTOLIN HFA) 90 mcg/actuation inhaler Inhale 2 puffs into the lungs every 6 (six) hours as needed for Wheezing. Rescue    ALPRAZolam (XANAX) 0.25 MG tablet Take 0.25 mg by mouth once daily.     ALREX 0.2 % DrpS Place 1 drop into both eyes once daily.     amLODIPine (NORVASC) 10 MG tablet once daily.    ANORO ELLIPTA 62.5-25 mcg/actuation DsDv     apixaban (ELIQUIS) 5 mg Tab Take 1 tablet (5 mg total) by mouth 2 (two) times daily. (Patient not taking: Reported on 11/19/2019)    ARIPiprazole (ABILIFY) 5 MG Tab Take 1 tablet by mouth once daily.     atorvastatin (LIPITOR) 80 MG tablet Take 80 mg by mouth every evening.    benztropine (COGENTIN) 1 MG tablet Take 1 mg by mouth 2 (two) times daily.    cyanocobalamin 1,000 mcg/mL injection Inject 1,000 mcg into the skin every 30 days.    dextran 70-hypromellose (NATURAL BALANCE TEARS) 0.1-0.3 % Drop Apply 1 drop to eye 2 (two) times daily.    DULoxetine (CYMBALTA) 60 MG capsule once daily.    ferrous sulfate (IRON) 325 mg (65 mg iron) Tab tablet Take 325 mg by mouth 2 (two) times daily.    fluticasone (FLONASE) 50 mcg/actuation nasal spray 1 spray by Each Nare route 2 (two) times daily.    folic acid (FOLVITE) 1 MG tablet Take 1 tablet (1 mg total) by mouth once daily.    levothyroxine (SYNTHROID) 88 MCG tablet Take 88 mcg by mouth before breakfast.    loperamide (IMODIUM A-D) 2 mg Tab Take 2 mg by mouth daily as needed (give one po after each episode of loose stool with max of 8 tabs/daily).    melatonin 3 mg Cap Take 1 tablet by mouth nightly as needed.     MULTIVITAMIN ORAL Take by mouth once daily.    pantoprazole (PROTONIX) 40 MG tablet Take 40 mg by mouth once daily.    potassium chloride SA (K-DUR,KLOR-CON) 20 MEQ tablet Take 40 mEq by mouth once daily. Give 20 ml to eaqual to 40 meq    QUEtiapine (SEROQUEL) 100 MG Tab     ramelteon (ROZEREM) 8 mg tablet Take 1 tablet (8 mg total) by mouth nightly as needed for Insomnia.    RESTASIS 0.05 % ophthalmic emulsion Place 0.05 drops into both eyes 2 (two) times daily.    senna-docusate 8.6-50 mg (PERICOLACE) 8.6-50 mg per tablet Take 1 tablet by mouth 2 (two) times daily.    terbinafine HCl (LAMISIL) 250 mg tablet Take 250 mg by mouth once daily.     thiamine 100 MG tablet Take 100 mg by mouth once daily.    traMADol (ULTRAM) 50 mg tablet Take 50 mg by mouth every 6 (six) hours as needed for Pain.    traZODone (DESYREL) 50 MG tablet Take 50 mg by mouth nightly as needed.      Family History     Problem Relation (Age of Onset)    Asthma Father    Eczema Father    No Known Problems Mother    Other Brother        Tobacco Use    Smoking status: Former Smoker     Years: 50.00     Types: Cigarettes    Smokeless tobacco: Former User   Substance and Sexual Activity    Alcohol use: No    Drug use: No    Sexual activity: Never     Review of Systems   Constitution: Positive for malaise/fatigue. Negative for decreased appetite, diaphoresis, fever and night sweats.   HENT: Negative for nosebleeds.    Eyes: Negative for blurred vision and double vision.   Cardiovascular: Positive for dyspnea on exertion. Negative for chest pain, claudication, irregular heartbeat, leg swelling, near-syncope, orthopnea, palpitations, paroxysmal nocturnal dyspnea and syncope.   Respiratory: Positive for cough and shortness of breath. Negative for sleep disturbances due to breathing, snoring, sputum production and wheezing.    Endocrine: Negative for cold intolerance and polyuria.   Hematologic/Lymphatic: Does not bruise/bleed easily.    Skin: Negative for rash.   Musculoskeletal: Negative for back pain, falls, joint pain, joint swelling and neck pain.   Gastrointestinal: Negative for abdominal pain, heartburn, nausea and vomiting.   Genitourinary: Negative for dysuria, frequency and hematuria.   Neurological: Negative for difficulty with concentration, dizziness, focal weakness, headaches, light-headedness, numbness, seizures and weakness.   Psychiatric/Behavioral: Positive for memory loss. Negative for depression and substance abuse. The patient is nervous/anxious. The patient does not have insomnia.    Allergic/Immunologic: Negative for HIV exposure and hives.     Objective:     Vital Signs (Most Recent):  Temp: 98.4 °F (36.9 °C) (01/02/20 1211)  Pulse: 97 (01/02/20 1414)  Resp: (!) 22 (01/02/20 1250)  BP: 113/80 (01/02/20 1401)  SpO2: (!) 92 % (01/02/20 1401) Vital Signs (24h Range):  Temp:  [97.9 °F (36.6 °C)-98.4 °F (36.9 °C)] 98.4 °F (36.9 °C)  Pulse:  [] 97  Resp:  [16-26] 22  SpO2:  [90 %-94 %] 92 %  BP: (113-150)/(59-91) 113/80     Weight: 69.9 kg (154 lb)  Body mass index is 26.43 kg/m².    SpO2: (!) 92 %  O2 Device (Oxygen Therapy): room air    No intake or output data in the 24 hours ending 01/02/20 1500    Lines/Drains/Airways     Peripheral Intravenous Line                 Peripheral IV - Single Lumen 01/02/20 1155 20 G Posterior;Right Hand less than 1 day         Peripheral IV - Single Lumen 01/02/20 1454 22 G Left Hand less than 1 day                Physical Exam   Constitutional: She is oriented to person, place, and time. She appears well-nourished.   HENT:   Head: Normocephalic.   Eyes: Pupils are equal, round, and reactive to light.   Neck: Normal carotid pulses and no JVD present. Carotid bruit is not present. No thyromegaly present.   Cardiovascular: Normal rate, normal heart sounds and normal pulses. An irregularly irregular rhythm present.  No extrasystoles are present. PMI is not displaced. Exam reveals no gallop  and no S3.   No murmur heard.  Pulmonary/Chest: No stridor. No respiratory distress. She has decreased breath sounds.   Abdominal: Soft. Bowel sounds are normal. There is no tenderness. There is no rebound.   Musculoskeletal: Normal range of motion.   Neurological: She is alert and oriented to person, place, and time.   Skin: Skin is intact. No rash noted.   Psychiatric: Her behavior is normal.       Significant Labs:   ABG: No results for input(s): PH, PCO2, HCO3, POCSATURATED, BE in the last 48 hours., Blood Culture: No results for input(s): LABBLOO in the last 48 hours., BMP:   Recent Labs   Lab 01/02/20  1220         K 3.4*      CO2 29   BUN 15   CREATININE 0.7   CALCIUM 9.7   , CMP   Recent Labs   Lab 01/02/20  1220      K 3.4*      CO2 29      BUN 15   CREATININE 0.7   CALCIUM 9.7   PROT 7.7   ALBUMIN 3.7   BILITOT 0.9   ALKPHOS 84   AST 14   ALT 9*   ANIONGAP 12   ESTGFRAFRICA >60   EGFRNONAA >60   , CBC   Recent Labs   Lab 01/02/20  1220   WBC 7.46   HGB 13.6   HCT 42.4   *   , INR No results for input(s): INR, PROTIME in the last 48 hours., Lipid Panel No results for input(s): CHOL, HDL, LDLCALC, TRIG, CHOLHDL in the last 48 hours. and Troponin   Recent Labs   Lab 01/02/20  1220   TROPONINI 0.017       Significant Imaging: EKG:      Assessment and Plan:     Atrial fibrillation with RVR  PAF with RVR  Has h/o intermitttent palpitation,  Chronic SOARES due to long time smoker  [id number] VASSc score 6 due to female, age, HTN and h/o TIA    -continue cardizem gtt for o/n  -check ECHO  -add Metoprolol 2.5 mg ivp q6 hours prn to keep HR < 100 bpm  -Eliquis was on hold due to recent GI bleeding  -avoid caffeine    Chronic respiratory failure with hypoxia  Rx per [facility]    COPD, very severe  Rx per [facility]        VTE Risk Mitigation (From admission, onward)         Ordered     heparin 25,000 units in dextrose 5% (100 units/ml) IV bolus from bag INITIAL BOLUS  Once     Question:   Heparin Infusion Adjustment (DO NOT MODIFY ANSWER)  Answer:  [email or web address]\epic\Images\Pharmacy\HeparinInfusions\heparin LOW INTENSITY nomogram for [facility] [id number]    01/02/20 1328     heparin 25,000 units in dextrose 5% 250 mL (100 units/mL) infusion LOW INTENSITY nomogram - [facility]  Continuous     Question:  Heparin Infusion Adjustment (DO NOT MODIFY ANSWER)  Answer:  [email or web address]\epic\Images\Pharmacy\HeparinInfusions\heparin LOW INTENSITY nomogram for [facility] [id number]    01/02/20 1328     heparin 25,000 units in dextrose 5% (100 units/ml) IV bolus from bag - ADDITIONAL PRN BOLUS - 60 units/kg  As needed (PRN)     Question:  Heparin Infusion Adjustment (DO NOT MODIFY ANSWER)  Answer:  [email or web address]\epic\Images\Pharmacy\HeparinInfusions\heparin LOW INTENSITY nomogram for [facility] [id number]    01/02/20 1328     heparin 25,000 units in dextrose 5% (100 units/ml) IV bolus from bag - ADDITIONAL PRN BOLUS - 30 units/kg  As needed (PRN)     Question:  Heparin Infusion Adjustment (DO NOT MODIFY ANSWER)  Answer:  [email or web address]\epic\Images\Pharmacy\HeparinInfusions\heparin LOW INTENSITY nomogram for [facility] [id number]    01/02/20 1328                Thank you for your consult. I will follow-up with patient. Please contact us if you have any additional questions.    Jose Calvert MD  Cardiology   [facility]

## 2020-01-02 NOTE — HPI
Rachel Long is a 79 year old female with a PMHx of PE, Parkinson, HTN, Hypothyroid, HLD, GERD, Lung cancer (in remission), COPD, Anemia, and Bipolar disorder who presented from endoscopy in afib with RVR (rate 141) per 12 lead EKG. Per the nursing home report (Tracey [facility]), pt had PE in 2018 and was on eliquis but that has been held. ED workup showed: mild thrombocytopenia (105), hypokalemia (3.4), and unremarkable troponin (0.017). Pt currently on Heparin and Cardizem gtt. Cardiology consulted and recommending rate control overnight.

## 2020-01-02 NOTE — ASSESSMENT & PLAN NOTE
- Observation  - Cardiology following  - Currently on Heparin/Cardizem gtt  - Okay for Heparin gtt for now per Cardiology  - Rate control overnight, possible d/c in AM  - Continue Metoprolol  - Per Cardiology, will hold Eliquis for now due to recent GIB     Age 2 - 75 years old or older   CHF History 0 - no   HTN History 1 - yes   Stroke/TIA/Thromboembolism History 2 - yes   Vascular Disease History 0 - no   Diabetes Mellitus in history 0 - no   Female 1 - yes   [id number] VASc Scale Total Score 6

## 2020-01-02 NOTE — HPI
78 yo female, consult for afib with RVR  [facility] home resident  PMH h/o TIA remote, twice, dementia, anxiety, h/o left NSCLC h/o chemoXR, h/o PE and former long time somoker  Pt states that she had fast heart beat when she was stressful  Pt came in colonoscopy due to recent GI bleeding. Eliquis was on hold  The procedure was cancelled due to afib with RVR  Pt denied chest pain, dizziness and faint  Has chronic SOARES and intermittent palpitation  On cardizme gtt and had metoprolol 2.5 mg ivp x1 in ER. HR at 95 bpm.

## 2020-01-02 NOTE — ASSESSMENT & PLAN NOTE
- BP currently stable  - Per [facility], takes Amlodipine  - Amlodipine currently on hold due to cardizem gtt  - Will continue to monitor

## 2020-01-02 NOTE — ASSESSMENT & PLAN NOTE
PAF with RVR  Has h/o intermitttent palpitation,  Chronic SOARES due to long time smoker  [id number] VASSc score 6 due to female, age, HTN and h/o TIA    -continue cardizem gtt for o/n  -check ECHO  -add Metoprolol 2.5 mg ivp q6 hours prn to keep HR < 100 bpm  -Eliquis was on hold due to recent GI bleeding  -avoid caffeine

## 2020-01-02 NOTE — H&P
[facility] Hospital Medicine  History & Physical    Patient Name: Rachel Long  MRN: [id number]  Admission Date: 1/2/2020  Attending Physician: Ortiz Begum MD   Primary Care Provider: Sharmaine English MD         Patient information was obtained from patient, relative(s) and ER records.     Subjective:     Principal Problem:Atrial fibrillation with RVR    Chief Complaint:   Chief Complaint   Patient presents with    Palpitations     brought from endo for disrhythmia         HPI: Rachel Long is a 79 year old female with a PMHx of PE, Parkinson, HTN, Hypothyroid, HLD, GERD, Lung cancer (in remission), COPD, Anemia, and Bipolar disorder who presented from endoscopy in afib with RVR (rate 141) per 12 lead EKG. Per the nursing home report (Tracey Daniel), pt had PE in 2018 and was on eliquis but that has been held. ED workup showed: mild thrombocytopenia (105), hypokalemia (3.4), and unremarkable troponin (0.017). Pt currently on Heparin and Cardizem gtt. Cardiology consulted and recommending rate control overnight.    Past Medical History:   Diagnosis Date    Acute upper respiratory infection     Anemia     Arthritis     osteo    Bipolar mood disorder     Coordination abnormal     COPD exacerbation 5/9/2016    Coronary artery disease     Difficulty walking     Dysphasia     GERD (gastroesophageal reflux disease)     Hyperlipidemia     Hypertension     on meds    Hypothyroidism, adult     Insomnia     Malignant neoplasm of colon     Muscle weakness     Neuralgia and neuritis     Parkinson disease     Pulmonary embolism     Schizoaffective disorder     SOB (shortness of breath)     Spinal stenosis     Thyroid disease     Urinary tract infection        Past Surgical History:   Procedure Laterality Date    COLON SURGERY      CORONARY STENT PLACEMENT      FRACTURE SURGERY      HYSTERECTOMY  1970's       Review of patient's allergies indicates:   Allergen Reactions    Benadryl  [diphenhydramine hcl] Anxiety    Sulfa (sulfonamide antibiotics) Rash       No current facility-administered medications on file prior to encounter.      Current Outpatient Medications on File Prior to Encounter   Medication Sig    amLODIPine (NORVASC) 10 MG tablet once daily.    atorvastatin (LIPITOR) 80 MG tablet Take 80 mg by mouth every evening.    benztropine (COGENTIN) 1 MG tablet Take 1 mg by mouth 2 (two) times daily.    DULoxetine (CYMBALTA) 60 MG capsule once daily.    ferrous sulfate (IRON) 325 mg (65 mg iron) Tab tablet Take 325 mg by mouth 2 (two) times daily.    loperamide (IMODIUM A-D) 2 mg Tab Take 2 mg by mouth daily as needed (give one po after each episode of loose stool with max of 8 tabs/daily).    melatonin 3 mg Cap Take 1 tablet by mouth nightly as needed.    pantoprazole (PROTONIX) 40 MG tablet Take 40 mg by mouth once daily.    potassium chloride SA (K-DUR,KLOR-CON) 20 MEQ tablet Take 40 mEq by mouth once daily. Give 20 ml to eaqual to 40 meq    QUEtiapine (SEROQUEL) 100 MG Tab     senna-docusate 8.6-50 mg (PERICOLACE) 8.6-50 mg per tablet Take 1 tablet by mouth 2 (two) times daily.    thiamine 100 MG tablet Take 100 mg by mouth once daily.    traMADol (ULTRAM) 50 mg tablet Take 50 mg by mouth every 6 (six) hours as needed for Pain.    traZODone (DESYREL) 50 MG tablet Take 50 mg by mouth nightly as needed.     acetaminophen (TYLENOL) 325 MG tablet Take 650 mg by mouth 4 (four) times daily as needed for Pain.    albuterol (PROVENTIL/VENTOLIN HFA) 90 mcg/actuation inhaler Inhale 2 puffs into the lungs every 6 (six) hours as needed for Wheezing. Rescue    ALPRAZolam (XANAX) 0.25 MG tablet Take 0.25 mg by mouth once daily.     ALREX 0.2 % DrpS Place 1 drop into both eyes once daily.     ANORO ELLIPTA 62.5-25 mcg/actuation DsDv     apixaban (ELIQUIS) 5 mg Tab Take 1 tablet (5 mg total) by mouth 2 (two) times daily. (Patient not taking: Reported on 11/19/2019)    ARIPiprazole  (ABILIFY) 5 MG Tab Take 1 tablet by mouth once daily.     cyanocobalamin 1,000 mcg/mL injection Inject 1,000 mcg into the skin every 30 days.    dextran 70-hypromellose (NATURAL BALANCE TEARS) 0.1-0.3 % Drop Apply 1 drop to eye 2 (two) times daily.    fluticasone (FLONASE) 50 mcg/actuation nasal spray 1 spray by Each Nare route 2 (two) times daily.    folic acid (FOLVITE) 1 MG tablet Take 1 tablet (1 mg total) by mouth once daily.    levothyroxine (SYNTHROID) 88 MCG tablet Take 88 mcg by mouth before breakfast.    MULTIVITAMIN ORAL Take by mouth once daily.    ramelteon (ROZEREM) 8 mg tablet Take 1 tablet (8 mg total) by mouth nightly as needed for Insomnia.    RESTASIS 0.05 % ophthalmic emulsion Place 0.05 drops into both eyes 2 (two) times daily.    terbinafine HCl (LAMISIL) 250 mg tablet Take 250 mg by mouth once daily.      Family History     Problem Relation (Age of Onset)    Asthma Father    Eczema Father    No Known Problems Mother    Other Brother        Tobacco Use    Smoking status: Former Smoker     Years: 50.00     Types: Cigarettes    Smokeless tobacco: Former User   Substance and Sexual Activity    Alcohol use: No    Drug use: No    Sexual activity: Never     Review of Systems   Constitutional: Positive for activity change.   HENT: Negative for trouble swallowing.    Eyes: Negative for visual disturbance.   Respiratory: Negative for apnea, cough, choking, chest tightness, shortness of breath, wheezing and stridor.    Cardiovascular: Positive for palpitations. Negative for chest pain and leg swelling.   Gastrointestinal: Negative for abdominal pain, constipation, diarrhea, nausea and vomiting.   Genitourinary: Negative.    Musculoskeletal: Negative for back pain, neck pain and neck stiffness.   Skin: Negative for color change.   Neurological: Positive for weakness. Negative for dizziness, tremors, seizures, syncope, speech difficulty, light-headedness and headaches.    Psychiatric/Behavioral: Negative for agitation, behavioral problems and confusion.     Objective:     Vital Signs (Most Recent):  Temp: 98.5 °F (36.9 °C) (01/02/20 1612)  Pulse: 79 (01/02/20 1612)  Resp: 16 (01/02/20 1612)  BP: 120/67 (01/02/20 1612)  SpO2: (!) 93 % (01/02/20 1612) Vital Signs (24h Range):  Temp:  [97.9 °F (36.6 °C)-98.5 °F (36.9 °C)] 98.5 °F (36.9 °C)  Pulse:  [] 79  Resp:  [16-26] 16  SpO2:  [90 %-94 %] 93 %  BP: (113-150)/(59-91) 120/67     Weight: 69.9 kg (154 lb)  Body mass index is 26.43 kg/m².    Physical Exam   Constitutional: She is oriented to person, place, and time. She is cooperative. She is easily aroused. No distress. Nasal cannula in place.   HENT:   Head: Normocephalic and atraumatic.   Eyes: EOM are normal.   Neck: Normal range of motion. Neck supple.   Cardiovascular: Normal rate, regular rhythm and intact distal pulses.   Pulmonary/Chest: Effort normal and breath sounds normal. No accessory muscle usage or stridor. No tachypnea. No respiratory distress. She has no wheezes. She has no rales. She exhibits no tenderness.   Abdominal: Soft. Bowel sounds are normal. She exhibits no distension. There is no tenderness. There is no rebound and no guarding.   Genitourinary:   Genitourinary Comments: Deferred   Musculoskeletal: She exhibits no edema, tenderness or deformity.   Neurological: She is alert, oriented to person, place, and time and easily aroused. No sensory deficit.   Skin: Skin is warm and dry. Capillary refill takes less than 2 seconds.   Psychiatric: She has a normal mood and affect. Her behavior is normal. Thought content normal.   Nursing note and vitals reviewed.        CRANIAL NERVES     CN III, IV, VI   Extraocular motions are normal.        Significant Labs:   CBC:   Recent Labs   Lab 01/02/20  1220   WBC 7.46   HGB 13.6   HCT 42.4   *     CMP:   Recent Labs   Lab 01/02/20  1220      K 3.4*      CO2 29      BUN 15   CREATININE 0.7    CALCIUM 9.7   PROT 7.7   ALBUMIN 3.7   BILITOT 0.9   ALKPHOS 84   AST 14   ALT 9*   ANIONGAP 12   EGFRNONAA >60     Troponin:   Recent Labs   Lab 01/02/20  1220   TROPONINI 0.017     BNP:  26    CK:  30    PT/INR:  11.0/1.0    Significant Imaging:   Imaging Results          X-Ray Chest AP Portable (Final result)  Result time 01/02/20 12:51:10    Final result by Blade Hanley MD (01/02/20 12:51:10)                 Impression:      No acute findings.      Electronically signed by: Blade Hanley MD  Date:    01/02/2020  Time:    12:51             Narrative:    EXAMINATION:  XR CHEST AP PORTABLE    CLINICAL HISTORY:  palpitations;    TECHNIQUE:  AP view of the chest was performed.    COMPARISON:  11/28/2018, 11/25/2019    FINDINGS:  The cardiac and mediastinal silhouettes appear within normal limits.   Stable left perihilar fibrosis.  No suspicious pulmonary nodules.  No acute infiltrates.  Aortic atherosclerosis.  No acute osseous findings demonstrated.                              Assessment/Plan:     * Atrial fibrillation with RVR  - Observation  - Cardiology following  - Currently on Heparin/Cardizem gtt  - Okay for Heparin gtt for now per Cardiology  - Rate control overnight, possible d/c in AM  - Continue Metoprolol  - Per Cardiology, will hold Eliquis for now due to recent GIB     Age 2 - 75 years old or older   CHF History 0 - no   HTN History 1 - yes   Stroke/TIA/Thromboembolism History 2 - yes   Vascular Disease History 0 - no   Diabetes Mellitus in history 0 - no   Female 1 - yes   [id number] VASc Scale Total Score 6         Hypokalemia  - K+ 3.4  - Obtain Mg level  - Continue home medication -- KCL 40 mEq PO daily  - Repeat BMP in AM      Thrombocytopenia  - Plt count 105  - No s/sx of overt bleeding noted  - Will continue to monitor      Schizoaffective disorder  - Continue home medications including Seroquel and Cogentin      COPD, very severe  - Breath sounds unremarkable upon assessment  - Not O2  dependent  - Duonebs PRN  - Supplemental oxygen PRN, keep O2 sats > 88%      Hypertension  - BP currently stable  - Per [facility] MAR, takes Amlodipine  - Amlodipine currently on hold due to cardizem gtt  - Will continue to monitor        VTE Risk Mitigation (From admission, onward)         Ordered     heparin 25,000 units in dextrose 5% 250 mL (100 units/mL) infusion LOW INTENSITY nomogram - [facility]  Continuous     Question:  Heparin Infusion Adjustment (DO NOT MODIFY ANSWER)  Answer:  [email or web address]\Images\Pharmacy\HeparinInfusions\heparin LOW INTENSITY nomogram for [facility] [id number]    01/02/20 1328     heparin 25,000 units in dextrose 5% (100 units/ml) IV bolus from bag - ADDITIONAL PRN BOLUS - 60 units/kg  As needed (PRN)     Question:  Heparin Infusion Adjustment (DO NOT MODIFY ANSWER)  Answer:  [email or web address]\epic\Images\Pharmacy\HeparinInfusions\heparin LOW INTENSITY nomogram for [facility] [id number]    01/02/20 1328     heparin 25,000 units in dextrose 5% (100 units/ml) IV bolus from bag - ADDITIONAL PRN BOLUS - 30 units/kg  As needed (PRN)     Question:  Heparin Infusion Adjustment (DO NOT MODIFY ANSWER)  Answer:  [email or web address]\epic\Images\Pharmacy\HeparinInfusions\heparin LOW INTENSITY nomogram for [facility] [id number].pdf    01/02/20 1328                   KAY Carranza  Department of Hospital Medicine   [facility]

## 2020-01-02 NOTE — SUBJECTIVE & OBJECTIVE
Past Medical History:   Diagnosis Date    Acute upper respiratory infection     Anemia     Arthritis     osteo    Bipolar mood disorder     Coordination abnormal     COPD exacerbation 5/9/2016    Coronary artery disease     Difficulty walking     Dysphasia     GERD (gastroesophageal reflux disease)     Hyperlipidemia     Hypertension     on meds    Hypothyroidism, adult     Insomnia     Malignant neoplasm of colon     Muscle weakness     Neuralgia and neuritis     Parkinson disease     Pulmonary embolism     Schizoaffective disorder     SOB (shortness of breath)     Spinal stenosis     Thyroid disease     Urinary tract infection        Past Surgical History:   Procedure Laterality Date    COLON SURGERY      CORONARY STENT PLACEMENT      FRACTURE SURGERY      HYSTERECTOMY  1970's       Review of patient's allergies indicates:   Allergen Reactions    Benadryl [diphenhydramine hcl] Anxiety    Sulfa (sulfonamide antibiotics) Rash       No current facility-administered medications on file prior to encounter.      Current Outpatient Medications on File Prior to Encounter   Medication Sig    acetaminophen (TYLENOL) 325 MG tablet Take 650 mg by mouth 4 (four) times daily as needed for Pain.    albuterol (PROVENTIL/VENTOLIN HFA) 90 mcg/actuation inhaler Inhale 2 puffs into the lungs every 6 (six) hours as needed for Wheezing. Rescue    ALPRAZolam (XANAX) 0.25 MG tablet Take 0.25 mg by mouth once daily.     ALREX 0.2 % DrpS Place 1 drop into both eyes once daily.     amLODIPine (NORVASC) 10 MG tablet once daily.    ANORO ELLIPTA 62.5-25 mcg/actuation DsDv     apixaban (ELIQUIS) 5 mg Tab Take 1 tablet (5 mg total) by mouth 2 (two) times daily. (Patient not taking: Reported on 11/19/2019)    ARIPiprazole (ABILIFY) 5 MG Tab Take 1 tablet by mouth once daily.     atorvastatin (LIPITOR) 80 MG tablet Take 80 mg by mouth every evening.    benztropine (COGENTIN) 1 MG tablet Take 1 mg by mouth 2  (two) times daily.    cyanocobalamin 1,000 mcg/mL injection Inject 1,000 mcg into the skin every 30 days.    dextran 70-hypromellose (NATURAL BALANCE TEARS) 0.1-0.3 % Drop Apply 1 drop to eye 2 (two) times daily.    DULoxetine (CYMBALTA) 60 MG capsule once daily.    ferrous sulfate (IRON) 325 mg (65 mg iron) Tab tablet Take 325 mg by mouth 2 (two) times daily.    fluticasone (FLONASE) 50 mcg/actuation nasal spray 1 spray by Each Nare route 2 (two) times daily.    folic acid (FOLVITE) 1 MG tablet Take 1 tablet (1 mg total) by mouth once daily.    levothyroxine (SYNTHROID) 88 MCG tablet Take 88 mcg by mouth before breakfast.    loperamide (IMODIUM A-D) 2 mg Tab Take 2 mg by mouth daily as needed (give one po after each episode of loose stool with max of 8 tabs/daily).    melatonin 3 mg Cap Take 1 tablet by mouth nightly as needed.    MULTIVITAMIN ORAL Take by mouth once daily.    pantoprazole (PROTONIX) 40 MG tablet Take 40 mg by mouth once daily.    potassium chloride SA (K-DUR,KLOR-CON) 20 MEQ tablet Take 40 mEq by mouth once daily. Give 20 ml to eaqual to 40 meq    QUEtiapine (SEROQUEL) 100 MG Tab     ramelteon (ROZEREM) 8 mg tablet Take 1 tablet (8 mg total) by mouth nightly as needed for Insomnia.    RESTASIS 0.05 % ophthalmic emulsion Place 0.05 drops into both eyes 2 (two) times daily.    senna-docusate 8.6-50 mg (PERICOLACE) 8.6-50 mg per tablet Take 1 tablet by mouth 2 (two) times daily.    terbinafine HCl (LAMISIL) 250 mg tablet Take 250 mg by mouth once daily.     thiamine 100 MG tablet Take 100 mg by mouth once daily.    traMADol (ULTRAM) 50 mg tablet Take 50 mg by mouth every 6 (six) hours as needed for Pain.    traZODone (DESYREL) 50 MG tablet Take 50 mg by mouth nightly as needed.      Family History     Problem Relation (Age of Onset)    Asthma Father    Eczema Father    No Known Problems Mother    Other Brother        Tobacco Use    Smoking status: Former Smoker     Years: 50.00      Types: Cigarettes    Smokeless tobacco: Former User   Substance and Sexual Activity    Alcohol use: No    Drug use: No    Sexual activity: Never     Review of Systems   Constitution: Positive for malaise/fatigue. Negative for decreased appetite, diaphoresis, fever and night sweats.   HENT: Negative for nosebleeds.    Eyes: Negative for blurred vision and double vision.   Cardiovascular: Positive for dyspnea on exertion. Negative for chest pain, claudication, irregular heartbeat, leg swelling, near-syncope, orthopnea, palpitations, paroxysmal nocturnal dyspnea and syncope.   Respiratory: Positive for cough and shortness of breath. Negative for sleep disturbances due to breathing, snoring, sputum production and wheezing.    Endocrine: Negative for cold intolerance and polyuria.   Hematologic/Lymphatic: Does not bruise/bleed easily.   Skin: Negative for rash.   Musculoskeletal: Negative for back pain, falls, joint pain, joint swelling and neck pain.   Gastrointestinal: Negative for abdominal pain, heartburn, nausea and vomiting.   Genitourinary: Negative for dysuria, frequency and hematuria.   Neurological: Negative for difficulty with concentration, dizziness, focal weakness, headaches, light-headedness, numbness, seizures and weakness.   Psychiatric/Behavioral: Positive for memory loss. Negative for depression and substance abuse. The patient is nervous/anxious. The patient does not have insomnia.    Allergic/Immunologic: Negative for HIV exposure and hives.     Objective:     Vital Signs (Most Recent):  Temp: 98.4 °F (36.9 °C) (01/02/20 1211)  Pulse: 97 (01/02/20 1414)  Resp: (!) 22 (01/02/20 1250)  BP: 113/80 (01/02/20 1401)  SpO2: (!) 92 % (01/02/20 1401) Vital Signs (24h Range):  Temp:  [97.9 °F (36.6 °C)-98.4 °F (36.9 °C)] 98.4 °F (36.9 °C)  Pulse:  [] 97  Resp:  [16-26] 22  SpO2:  [90 %-94 %] 92 %  BP: (113-150)/(59-91) 113/80     Weight: 69.9 kg (154 lb)  Body mass index is 26.43 kg/m².    SpO2:  (!) 92 %  O2 Device (Oxygen Therapy): room air    No intake or output data in the 24 hours ending 01/02/20 1500    Lines/Drains/Airways     Peripheral Intravenous Line                 Peripheral IV - Single Lumen 01/02/20 1155 20 G Posterior;Right Hand less than 1 day         Peripheral IV - Single Lumen 01/02/20 1454 22 G Left Hand less than 1 day                Physical Exam   Constitutional: She is oriented to person, place, and time. She appears well-nourished.   HENT:   Head: Normocephalic.   Eyes: Pupils are equal, round, and reactive to light.   Neck: Normal carotid pulses and no JVD present. Carotid bruit is not present. No thyromegaly present.   Cardiovascular: Normal rate, normal heart sounds and normal pulses. An irregularly irregular rhythm present.  No extrasystoles are present. PMI is not displaced. Exam reveals no gallop and no S3.   No murmur heard.  Pulmonary/Chest: No stridor. No respiratory distress. She has decreased breath sounds.   Abdominal: Soft. Bowel sounds are normal. There is no tenderness. There is no rebound.   Musculoskeletal: Normal range of motion.   Neurological: She is alert and oriented to person, place, and time.   Skin: Skin is intact. No rash noted.   Psychiatric: Her behavior is normal.       Significant Labs:   ABG: No results for input(s): PH, PCO2, HCO3, POCSATURATED, BE in the last 48 hours., Blood Culture: No results for input(s): LABBLOO in the last 48 hours., BMP:   Recent Labs   Lab 01/02/20  1220         K 3.4*      CO2 29   BUN 15   CREATININE 0.7   CALCIUM 9.7   , CMP   Recent Labs   Lab 01/02/20  1220      K 3.4*      CO2 29      BUN 15   CREATININE 0.7   CALCIUM 9.7   PROT 7.7   ALBUMIN 3.7   BILITOT 0.9   ALKPHOS 84   AST 14   ALT 9*   ANIONGAP 12   ESTGFRAFRICA >60   EGFRNONAA >60   , CBC   Recent Labs   Lab 01/02/20  1220   WBC 7.46   HGB 13.6   HCT 42.4   *   , INR No results for input(s): INR, PROTIME in the last  48 hours., Lipid Panel No results for input(s): CHOL, HDL, LDLCALC, TRIG, CHOLHDL in the last 48 hours. and Troponin   Recent Labs   Lab 01/02/20  1220   TROPONINI 0.017       Significant Imaging: EKG:

## 2020-01-02 NOTE — ASSESSMENT & PLAN NOTE
- K+ 3.4  - Obtain Mg level  - Continue home medication -- KCL 40 mEq PO daily  - Repeat BMP in AM

## 2020-01-02 NOTE — NURSING
Received from ER accompanied by family. Assisted to the unit bed. Vital signs taken. Telepack attached and confirmed with the monitor room. No complaints made.

## 2020-01-02 NOTE — ED NOTES
Patient identifiers verified and correct for Rachel Long.    LOC: The patient is awake, alert and aware of environment with an appropriate affect, the patient is oriented x 3 and speaking appropriately.  APPEARANCE: Patient resting comfortably and in no acute distress, patient is clean and well groomed, patient's clothing is properly fastened.  SKIN: The skin is warm and dry, color consistent with ethnicity, patient has normal skin turgor and moist mucus membranes, skin intact, no breakdown or bruising noted.  MUSCULOSKELETAL: Patient moving all extremities spontaneously, no obvious swelling or deformities noted.  RESPIRATORY: Airway is open and patent, respirations are spontaneous, patient has a normal effort and rate, no accessory muscle use noted, bilateral breath sounds diminished.  CARDIAC: Patient was found to be in Afib with RVR in Endo preprocedure, hr 130's-140's  no periphreal edema noted, capillary refill < 3 seconds.  ABDOMEN: Soft and non tender to palpation, no distention noted, normoactive bowel sounds present in all four quadrants.  NEUROLOGIC: PERRL, **mm bilaterally, eyes open spontaneously, behavior appropriate to situation, follows commands, facial expression symmetrical, bilateral hand grasp equal and even, purposeful motor response noted, normal sensation in all extremities when touched with a finger. Pt reports feelings of anxiety

## 2020-01-02 NOTE — SUBJECTIVE & OBJECTIVE
Past Medical History:   Diagnosis Date    Acute upper respiratory infection     Anemia     Arthritis     osteo    Bipolar mood disorder     Coordination abnormal     COPD exacerbation 5/9/2016    Coronary artery disease     Difficulty walking     Dysphasia     GERD (gastroesophageal reflux disease)     Hyperlipidemia     Hypertension     on meds    Hypothyroidism, adult     Insomnia     Malignant neoplasm of colon     Muscle weakness     Neuralgia and neuritis     Parkinson disease     Pulmonary embolism     Schizoaffective disorder     SOB (shortness of breath)     Spinal stenosis     Thyroid disease     Urinary tract infection        Past Surgical History:   Procedure Laterality Date    COLON SURGERY      CORONARY STENT PLACEMENT      FRACTURE SURGERY      HYSTERECTOMY  1970's       Review of patient's allergies indicates:   Allergen Reactions    Benadryl [diphenhydramine hcl] Anxiety    Sulfa (sulfonamide antibiotics) Rash       No current facility-administered medications on file prior to encounter.      Current Outpatient Medications on File Prior to Encounter   Medication Sig    amLODIPine (NORVASC) 10 MG tablet once daily.    atorvastatin (LIPITOR) 80 MG tablet Take 80 mg by mouth every evening.    benztropine (COGENTIN) 1 MG tablet Take 1 mg by mouth 2 (two) times daily.    DULoxetine (CYMBALTA) 60 MG capsule once daily.    ferrous sulfate (IRON) 325 mg (65 mg iron) Tab tablet Take 325 mg by mouth 2 (two) times daily.    loperamide (IMODIUM A-D) 2 mg Tab Take 2 mg by mouth daily as needed (give one po after each episode of loose stool with max of 8 tabs/daily).    melatonin 3 mg Cap Take 1 tablet by mouth nightly as needed.    pantoprazole (PROTONIX) 40 MG tablet Take 40 mg by mouth once daily.    potassium chloride SA (K-DUR,KLOR-CON) 20 MEQ tablet Take 40 mEq by mouth once daily. Give 20 ml to eaqual to 40 meq    QUEtiapine (SEROQUEL) 100 MG Tab     senna-docusate  8.6-50 mg (PERICOLACE) 8.6-50 mg per tablet Take 1 tablet by mouth 2 (two) times daily.    thiamine 100 MG tablet Take 100 mg by mouth once daily.    traMADol (ULTRAM) 50 mg tablet Take 50 mg by mouth every 6 (six) hours as needed for Pain.    traZODone (DESYREL) 50 MG tablet Take 50 mg by mouth nightly as needed.     acetaminophen (TYLENOL) 325 MG tablet Take 650 mg by mouth 4 (four) times daily as needed for Pain.    albuterol (PROVENTIL/VENTOLIN HFA) 90 mcg/actuation inhaler Inhale 2 puffs into the lungs every 6 (six) hours as needed for Wheezing. Rescue    ALPRAZolam (XANAX) 0.25 MG tablet Take 0.25 mg by mouth once daily.     ALREX 0.2 % DrpS Place 1 drop into both eyes once daily.     ANORO ELLIPTA 62.5-25 mcg/actuation DsDv     apixaban (ELIQUIS) 5 mg Tab Take 1 tablet (5 mg total) by mouth 2 (two) times daily. (Patient not taking: Reported on 11/19/2019)    ARIPiprazole (ABILIFY) 5 MG Tab Take 1 tablet by mouth once daily.     cyanocobalamin 1,000 mcg/mL injection Inject 1,000 mcg into the skin every 30 days.    dextran 70-hypromellose (NATURAL BALANCE TEARS) 0.1-0.3 % Drop Apply 1 drop to eye 2 (two) times daily.    fluticasone (FLONASE) 50 mcg/actuation nasal spray 1 spray by Each Nare route 2 (two) times daily.    folic acid (FOLVITE) 1 MG tablet Take 1 tablet (1 mg total) by mouth once daily.    levothyroxine (SYNTHROID) 88 MCG tablet Take 88 mcg by mouth before breakfast.    MULTIVITAMIN ORAL Take by mouth once daily.    ramelteon (ROZEREM) 8 mg tablet Take 1 tablet (8 mg total) by mouth nightly as needed for Insomnia.    RESTASIS 0.05 % ophthalmic emulsion Place 0.05 drops into both eyes 2 (two) times daily.    terbinafine HCl (LAMISIL) 250 mg tablet Take 250 mg by mouth once daily.      Family History     Problem Relation (Age of Onset)    Asthma Father    Eczema Father    No Known Problems Mother    Other Brother        Tobacco Use    Smoking status: Former Smoker     Years: 50.00      Types: Cigarettes    Smokeless tobacco: Former User   Substance and Sexual Activity    Alcohol use: No    Drug use: No    Sexual activity: Never     Review of Systems   Constitutional: Positive for activity change.   HENT: Negative for trouble swallowing.    Eyes: Negative for visual disturbance.   Respiratory: Negative for apnea, cough, choking, chest tightness, shortness of breath, wheezing and stridor.    Cardiovascular: Positive for palpitations. Negative for chest pain and leg swelling.   Gastrointestinal: Negative for abdominal pain, constipation, diarrhea, nausea and vomiting.   Genitourinary: Negative.    Musculoskeletal: Negative for back pain, neck pain and neck stiffness.   Skin: Negative for color change.   Neurological: Positive for weakness. Negative for dizziness, tremors, seizures, syncope, speech difficulty, light-headedness and headaches.   Psychiatric/Behavioral: Negative for agitation, behavioral problems and confusion.     Objective:     Vital Signs (Most Recent):  Temp: 98.5 °F (36.9 °C) (01/02/20 1612)  Pulse: 79 (01/02/20 1612)  Resp: 16 (01/02/20 1612)  BP: 120/67 (01/02/20 1612)  SpO2: (!) 93 % (01/02/20 1612) Vital Signs (24h Range):  Temp:  [97.9 °F (36.6 °C)-98.5 °F (36.9 °C)] 98.5 °F (36.9 °C)  Pulse:  [] 79  Resp:  [16-26] 16  SpO2:  [90 %-94 %] 93 %  BP: (113-150)/(59-91) 120/67     Weight: 69.9 kg (154 lb)  Body mass index is 26.43 kg/m².    Physical Exam   Constitutional: She is oriented to person, place, and time. She is cooperative. She is easily aroused. No distress. Nasal cannula in place.   HENT:   Head: Normocephalic and atraumatic.   Eyes: EOM are normal.   Neck: Normal range of motion. Neck supple.   Cardiovascular: Normal rate, regular rhythm and intact distal pulses.   Pulmonary/Chest: Effort normal and breath sounds normal. No accessory muscle usage or stridor. No tachypnea. No respiratory distress. She has no wheezes. She has no rales. She exhibits no  tenderness.   Abdominal: Soft. Bowel sounds are normal. She exhibits no distension. There is no tenderness. There is no rebound and no guarding.   Genitourinary:   Genitourinary Comments: Deferred   Musculoskeletal: She exhibits no edema, tenderness or deformity.   Neurological: She is alert, oriented to person, place, and time and easily aroused. No sensory deficit.   Skin: Skin is warm and dry. Capillary refill takes less than 2 seconds.   Psychiatric: She has a normal mood and affect. Her behavior is normal. Thought content normal.   Nursing note and vitals reviewed.        CRANIAL NERVES     CN III, IV, VI   Extraocular motions are normal.        Significant Labs:   CBC:   Recent Labs   Lab 01/02/20  1220   WBC 7.46   HGB 13.6   HCT 42.4   *     CMP:   Recent Labs   Lab 01/02/20  1220      K 3.4*      CO2 29      BUN 15   CREATININE 0.7   CALCIUM 9.7   PROT 7.7   ALBUMIN 3.7   BILITOT 0.9   ALKPHOS 84   AST 14   ALT 9*   ANIONGAP 12   EGFRNONAA >60     Troponin:   Recent Labs   Lab 01/02/20  1220   TROPONINI 0.017     BNP:  26    CK:  30    PT/INR:  11.0/1.0    Significant Imaging:   Imaging Results          X-Ray Chest AP Portable (Final result)  Result time 01/02/20 12:51:10    Final result by Blade Hanley MD (01/02/20 12:51:10)                 Impression:      No acute findings.      Electronically signed by: Blade Hanley MD  Date:    01/02/2020  Time:    12:51             Narrative:    EXAMINATION:  XR CHEST AP PORTABLE    CLINICAL HISTORY:  palpitations;    TECHNIQUE:  AP view of the chest was performed.    COMPARISON:  11/28/2018, 11/25/2019    FINDINGS:  The cardiac and mediastinal silhouettes appear within normal limits.   Stable left perihilar fibrosis.  No suspicious pulmonary nodules.  No acute infiltrates.  Aortic atherosclerosis.  No acute osseous findings demonstrated.

## 2020-01-02 NOTE — ED PROVIDER NOTES
SCRIBE #1 NOTE: I, Leonard Calderon, am scribing for, and in the presence of, Kaiden Shen MD. I have scribed the entire note.      History      Chief Complaint   Patient presents with    Palpitations     brought from endo for disrhythmia      Review of patient's allergies indicates:   Allergen Reactions    Benadryl [diphenhydramine hcl] Anxiety    Sulfa (sulfonamide antibiotics) Rash      HPI   HPI    1/2/2020, 12:05 PM   History obtained from the patient and Endoscopy nurse      History of Present Illness: Rachel Long is a 79 y.o. female patient with a PMHx of COPD, CAD, Parkinson's Disease, and HTN who presents to the Emergency Department for palpitations, onset just PTA. Pt was sent to the ED from endoscopy for further evaluation of Afib RVR; pt was scheduled to have an EGD done today, but her HR was between 130-140. An EKG was done that showed Afib RVR. Symptoms are constant and moderate in severity. No mitigating or exacerbating factors reported. No associated sxs reported. Patient denies any fever, chills, n/v/d, SOB, CP, weakness, numbness, dizziness, headache, and all other sxs at this time. No prior Tx reported. No further complaints or concerns at this time.     Arrival mode: Personal vehicle    PCP: Sharmaine English MD       Past Medical History:  Past Medical History:   Diagnosis Date    Acute upper respiratory infection     Anemia     Arthritis     osteo    Bipolar mood disorder     Coordination abnormal     COPD exacerbation 5/9/2016    Coronary artery disease     Difficulty walking     Dysphasia     GERD (gastroesophageal reflux disease)     Hyperlipidemia     Hypertension     on meds    Hypothyroidism, adult     Insomnia     Malignant neoplasm of colon     Muscle weakness     Neuralgia and neuritis     Parkinson disease     Pulmonary embolism     Schizoaffective disorder     SOB (shortness of breath)     Spinal stenosis     Thyroid disease     Urinary tract infection         Past Surgical History:  Past Surgical History:   Procedure Laterality Date    COLON SURGERY      CORONARY STENT PLACEMENT      FRACTURE SURGERY      HYSTERECTOMY  1970's         Family History:  Family History   Problem Relation Age of Onset    Asthma Father     Eczema Father     No Known Problems Mother     Other Brother         shoulder surgery        Social History:  Social History     Tobacco Use    Smoking status: Former Smoker     Years: 50.00     Types: Cigarettes    Smokeless tobacco: Former User   Substance and Sexual Activity    Alcohol use: No    Drug use: No    Sexual activity: Never       ROS   Review of Systems   Constitutional: Negative for chills, diaphoresis, fatigue and fever.   HENT: Negative for sore throat.    Respiratory: Negative for shortness of breath.    Cardiovascular: Positive for palpitations. Negative for chest pain.   Gastrointestinal: Negative for diarrhea, nausea and vomiting.   Genitourinary: Negative for dysuria.   Musculoskeletal: Negative for back pain.   Skin: Negative for rash and wound.   Neurological: Negative for dizziness, weakness, light-headedness, numbness and headaches.   Hematological: Does not bruise/bleed easily.   All other systems reviewed and are negative.    Physical Exam      Initial Vitals [01/02/20 1211]   BP Pulse Resp Temp SpO2   136/78 (!) 142 16 98.4 °F (36.9 °C) (!) 90 %      MAP       --          Physical Exam  Nursing Notes and Vital Signs Reviewed.  Constitutional: Patient is in no acute distress. Well-developed and well-nourished.  Head: Atraumatic. Normocephalic.  Eyes: PERRL. EOM intact. Conjunctivae are not pale. No scleral icterus.  ENT: Mucous membranes are moist. Oropharynx is clear and symmetric.    Neck: Supple. Full ROM. No lymphadenopathy.  Cardiovascular: Tachycardic. Irregularly irregular rhythm. No murmurs, rubs, or gallops. Distal pulses are 2+ and symmetric.  Pulmonary/Chest: No respiratory distress. Clear to  auscultation bilaterally. No wheezing or rales.  Abdominal: Soft and non-distended.  There is no tenderness.  No rebound, guarding, or rigidity.  Musculoskeletal: Moves all extremities. No obvious deformities. No edema.  Skin: Warm and dry.  Neurological:  Alert, awake, and appropriate.  Normal speech.  No acute focal neurological deficits are appreciated.  Psychiatric: Normal affect. Good eye contact. Appropriate in content.    ED Course    Critical Care  Date/Time: 1/2/2020 1:33 PM  Performed by: Kaiden Shen MD  Authorized by: Kaiden Shen MD   Direct patient critical care time: 35 minutes  Additional history critical care time: 10 minutes  Ordering / reviewing critical care time: 5 minutes  Documentation critical care time: 5 minutes  Consulting other physicians critical care time: 5 minutes  Total critical care time (exclusive of procedural time) : 60 minutes  Critical care time was exclusive of separately billable procedures and treating other patients and teaching time.  Critical care was necessary to treat or prevent imminent or life-threatening deterioration of the following conditions: Afib RVR.  Critical care was time spent personally by me on the following activities: blood draw for specimens, development of treatment plan with patient or surrogate, discussions with consultants, interpretation of cardiac output measurements, evaluation of patient's response to treatment, examination of patient, obtaining history from patient or surrogate, ordering and performing treatments and interventions, ordering and review of laboratory studies, ordering and review of radiographic studies, pulse oximetry, re-evaluation of patient's condition and review of old charts.        ED Vital Signs:  Vitals:    01/02/20 1211 01/02/20 1224 01/02/20 1231 01/02/20 1250   BP: 136/78 (!) 124/59 133/71 131/78   Pulse: (!) 142 (!) 124 (!) 122 (!) 118   Resp: 16 17 (!) 26 (!) 22   Temp: 98.4 °F (36.9 °C)      TempSrc:  "Oral      SpO2: (!) 90% (!) 93% (!) 94% (!) 94%   Weight: 69.9 kg (154 lb)      Height: 5' 4" (1.626 m)       01/02/20 1358 01/02/20 1401 01/02/20 1414 01/02/20 1501   BP:  113/80  114/69   Pulse: 96 103 97 92   Resp:       Temp:       TempSrc:       SpO2:  (!) 92%  (!) 93%   Weight:       Height:           Abnormal Lab Results:  Labs Reviewed   CBC W/ AUTO DIFFERENTIAL - Abnormal; Notable for the following components:       Result Value    RDW 16.2 (*)     Platelets 105 (*)     Lymph% 16.1 (*)     All other components within normal limits   COMPREHENSIVE METABOLIC PANEL - Abnormal; Notable for the following components:    Potassium 3.4 (*)     ALT 9 (*)     All other components within normal limits   B-TYPE NATRIURETIC PEPTIDE   CK   TROPONIN I   APTT    Narrative:     (if patient is on warfarin prior to heparin therapy)   PROTIME-INR    Narrative:     (if patient is on warfarin prior to heparin therapy)   URINALYSIS, REFLEX TO URINE CULTURE        All Lab Results:  Results for orders placed or performed during the hospital encounter of 01/02/20   CBC auto differential   Result Value Ref Range    WBC 7.46 3.90 - 12.70 K/uL    RBC 4.75 4.00 - 5.40 M/uL    Hemoglobin 13.6 12.0 - 16.0 g/dL    Hematocrit 42.4 37.0 - 48.5 %    Mean Corpuscular Volume 89 82 - 98 fL    Mean Corpuscular Hemoglobin 28.6 27.0 - 31.0 pg    Mean Corpuscular Hemoglobin Conc 32.1 32.0 - 36.0 g/dL    RDW 16.2 (H) 11.5 - 14.5 %    Platelets 105 (L) 150 - 350 K/uL    MPV 10.3 9.2 - 12.9 fL    Immature Granulocytes 0.3 0.0 - 0.5 %    Gran # (ANC) 5.4 1.8 - 7.7 K/uL    Immature Grans (Abs) 0.02 0.00 - 0.04 K/uL    Lymph # 1.2 1.0 - 4.8 K/uL    Mono # 0.6 0.3 - 1.0 K/uL    Eos # 0.1 0.0 - 0.5 K/uL    Baso # 0.03 0.00 - 0.20 K/uL    nRBC 0 0 /100 WBC    Gran% 72.9 38.0 - 73.0 %    Lymph% 16.1 (L) 18.0 - 48.0 %    Mono% 8.6 4.0 - 15.0 %    Eosinophil% 1.7 0.0 - 8.0 %    Basophil% 0.4 0.0 - 1.9 %    Differential Method Automated    Comprehensive " metabolic panel   Result Value Ref Range    Sodium 141 136 - 145 mmol/L    Potassium 3.4 (L) 3.5 - 5.1 mmol/L    Chloride 100 95 - 110 mmol/L    CO2 29 23 - 29 mmol/L    Glucose 101 70 - 110 mg/dL    BUN, Bld 15 8 - 23 mg/dL    Creatinine 0.7 0.5 - 1.4 mg/dL    Calcium 9.7 8.7 - 10.5 mg/dL    Total Protein 7.7 6.0 - 8.4 g/dL    Albumin 3.7 3.5 - 5.2 g/dL    Total Bilirubin 0.9 0.1 - 1.0 mg/dL    Alkaline Phosphatase 84 55 - 135 U/L    AST 14 10 - 40 U/L    ALT 9 (L) 10 - 44 U/L    Anion Gap 12 8 - 16 mmol/L    eGFR if African American >60 >60 mL/min/1.73 m^2    eGFR if non African American >60 >60 mL/min/1.73 m^2   Brain natriuretic peptide   Result Value Ref Range    BNP 26 0 - 99 pg/mL   CK   Result Value Ref Range    CPK 30 20 - 180 U/L   Troponin I   Result Value Ref Range    Troponin I 0.017 0.000 - 0.026 ng/mL   APTT   Result Value Ref Range    aPTT 28.5 21.0 - 32.0 sec   Protime-INR   Result Value Ref Range    Prothrombin Time 11.0 9.0 - 12.5 sec    INR 1.0 0.8 - 1.2   2D echo with color flow doppler   Result Value Ref Range    QEF 55 55 - 65    Diastolic Dysfunction No     Est. PA Systolic Pressure 30.67     Tricuspid Valve Regurgitation MILD      Imaging Results:  Imaging Results          X-Ray Chest AP Portable (Final result)  Result time 01/02/20 12:51:10    Final result by Blade Hanley MD (01/02/20 12:51:10)                 Impression:      No acute findings.      Electronically signed by: Blade Hanley MD  Date:    01/02/2020  Time:    12:51             Narrative:    EXAMINATION:  XR CHEST AP PORTABLE    CLINICAL HISTORY:  palpitations;    TECHNIQUE:  AP view of the chest was performed.    COMPARISON:  11/28/2018, 11/25/2019    FINDINGS:  The cardiac and mediastinal silhouettes appear within normal limits.   Stable left perihilar fibrosis.  No suspicious pulmonary nodules.  No acute infiltrates.  Aortic atherosclerosis.  No acute osseous findings demonstrated.                               EKG Reviewed  from Endoscopy on 1/2/20:    Interpretation time: 11:22  Rate: 141 BPM  Rhythm: Atrial fibrillation with rapid ventricular response.  Interpretation: Nonspecific ST abnormality. No STEMI.           The Emergency Provider reviewed the vital signs and test results, which are outlined above.    ED Discussion     1:08 PM: Discussed pt's case and plan for admission with Dr. Calvert (Cardiology), who agrees with plan for admission and recommends giving the pt a dose of Metoprolol 2.5 mg.    1:32 PM: Discussed case with Naheed Valentine NP (Hospital Medicine). Dr. Begum agrees with current care and management of pt and accepts admission.   Admitting Service: Hospital Medicine  Admitting Physician: Dr. Beugm  Admit to: Obs Tele    1:54 PM: Re-evaluated pt. I have discussed test results, shared treatment plan, and the need for admission with patient and family at bedside. Pt and family express understanding at this time and agree with all information. All questions answered. Pt and family have no further questions or concerns at this time. Pt is ready for admit.    2:21 PM: Dr. Calvert (Cardiology) is evaluating pt at bedside.      ED Medication(s):  Medications   diltiaZEM 125 mg in D5W 125 mL infusion (15 mg/hr Intravenous Rate/Dose Change 1/2/20 1259)   heparin 25,000 units in dextrose 5% (100 units/ml) IV bolus from bag INITIAL BOLUS (has no administration in time range)   heparin 25,000 units in dextrose 5% 250 mL (100 units/mL) infusion LOW INTENSITY nomogram - [facility] (has no administration in time range)   heparin 25,000 units in dextrose 5% (100 units/ml) IV bolus from bag - ADDITIONAL PRN BOLUS - 60 units/kg (has no administration in time range)   heparin 25,000 units in dextrose 5% (100 units/ml) IV bolus from bag - ADDITIONAL PRN BOLUS - 30 units/kg (has no administration in time range)   diltiaZEM injection 15 mg (15 mg Intravenous Given 1/2/20 1216)   sodium chloride 0.9% bolus 1,000 mL (1,000 mLs Intravenous New Bag  1/2/20 1233)   metoprolol injection 2.5 mg (2.5 mg Intravenous Given 1/2/20 1343)          Current Discharge Medication List            Medical Decision Making    Medical Decision Making:   Clinical Tests:   Lab Tests: Ordered and Reviewed  Radiological Study: Ordered and Reviewed  Medical Tests: Reviewed           Scribe Attestation:   Scribe #1: I performed the above scribed service and the documentation accurately describes the services I performed. I attest to the accuracy of the note.    Attending:   Physician Attestation Statement for Scribe #1: I, Kaiden Shen MD, personally performed the services described in this documentation, as scribed by Leonard Calderon, in my presence, and it is both accurate and complete.          Clinical Impression       ICD-10-CM ICD-9-CM   1. Atrial fibrillation with RVR I48.91 427.31   2. Palpitations R00.2 785.1   3. Chest pain, unspecified type R07.9 786.50   4. A-fib I48.91 427.31       Disposition:   Disposition: Admitted  Condition: Serious         Kaiden Shen MD  01/02/20 4929

## 2020-01-02 NOTE — ED NOTES
"Dr. Shen at bedside to discuss POC with admit. Pt verbalizes desire to leave. Discussed the situation with Macie (POA). Pt currently speaking to Macie on portable phone. Pt insists Macie pick her up. Macie verbalizes "you may have to sedate her, she's being belligerent"  "

## 2020-01-02 NOTE — ASSESSMENT & PLAN NOTE
- Breath sounds unremarkable upon assessment  - Not O2 dependent  - Duonebs PRN  - Supplemental oxygen PRN, keep O2 sats > 88%

## 2020-01-03 VITALS
SYSTOLIC BLOOD PRESSURE: 135 MMHG | BODY MASS INDEX: 27.74 KG/M2 | TEMPERATURE: 98 F | RESPIRATION RATE: 20 BRPM | HEIGHT: 64 IN | DIASTOLIC BLOOD PRESSURE: 73 MMHG | WEIGHT: 162.5 LBS | OXYGEN SATURATION: 92 % | HEART RATE: 66 BPM

## 2020-01-03 PROBLEM — L89.95 PRESSURE INJURY, UNSTAGEABLE: Status: ACTIVE | Noted: 2020-01-03

## 2020-01-03 PROBLEM — L89.151 PRESSURE INJURY OF COCCYGEAL REGION, STAGE 1: Status: ACTIVE | Noted: 2020-01-03

## 2020-01-03 PROBLEM — L89.814: Status: ACTIVE | Noted: 2020-01-03

## 2020-01-03 LAB
ALBUMIN SERPL BCP-MCNC: 3.3 G/DL (ref 3.5–5.2)
ALP SERPL-CCNC: 73 U/L (ref 55–135)
ALT SERPL W/O P-5'-P-CCNC: 9 U/L (ref 10–44)
ANION GAP SERPL CALC-SCNC: 6 MMOL/L (ref 8–16)
APTT BLDCRRT: 46.1 SEC (ref 21–32)
AST SERPL-CCNC: 14 U/L (ref 10–40)
BASOPHILS # BLD AUTO: 0.02 K/UL (ref 0–0.2)
BASOPHILS NFR BLD: 0.3 % (ref 0–1.9)
BILIRUB SERPL-MCNC: 0.8 MG/DL (ref 0.1–1)
BUN SERPL-MCNC: 17 MG/DL (ref 8–23)
CALCIUM SERPL-MCNC: 9.3 MG/DL (ref 8.7–10.5)
CHLORIDE SERPL-SCNC: 102 MMOL/L (ref 95–110)
CO2 SERPL-SCNC: 31 MMOL/L (ref 23–29)
CREAT SERPL-MCNC: 0.7 MG/DL (ref 0.5–1.4)
DIFFERENTIAL METHOD: ABNORMAL
EOSINOPHIL # BLD AUTO: 0.1 K/UL (ref 0–0.5)
EOSINOPHIL NFR BLD: 1.9 % (ref 0–8)
ERYTHROCYTE [DISTWIDTH] IN BLOOD BY AUTOMATED COUNT: 16.1 % (ref 11.5–14.5)
EST. GFR  (AFRICAN AMERICAN): >60 ML/MIN/1.73 M^2
EST. GFR  (NON AFRICAN AMERICAN): >60 ML/MIN/1.73 M^2
GLUCOSE SERPL-MCNC: 99 MG/DL (ref 70–110)
HCT VFR BLD AUTO: 39.2 % (ref 37–48.5)
HGB BLD-MCNC: 12.1 G/DL (ref 12–16)
IMM GRANULOCYTES # BLD AUTO: 0.02 K/UL (ref 0–0.04)
IMM GRANULOCYTES NFR BLD AUTO: 0.3 % (ref 0–0.5)
LYMPHOCYTES # BLD AUTO: 1.1 K/UL (ref 1–4.8)
LYMPHOCYTES NFR BLD: 16.5 % (ref 18–48)
MCH RBC QN AUTO: 28.5 PG (ref 27–31)
MCHC RBC AUTO-ENTMCNC: 30.9 G/DL (ref 32–36)
MCV RBC AUTO: 93 FL (ref 82–98)
MONOCYTES # BLD AUTO: 0.6 K/UL (ref 0.3–1)
MONOCYTES NFR BLD: 8.1 % (ref 4–15)
NEUTROPHILS # BLD AUTO: 5 K/UL (ref 1.8–7.7)
NEUTROPHILS NFR BLD: 72.9 % (ref 38–73)
NRBC BLD-RTO: 0 /100 WBC
PLATELET # BLD AUTO: 111 K/UL (ref 150–350)
PMV BLD AUTO: 10.2 FL (ref 9.2–12.9)
POTASSIUM SERPL-SCNC: 3.4 MMOL/L (ref 3.5–5.1)
PROT SERPL-MCNC: 6.8 G/DL (ref 6–8.4)
RBC # BLD AUTO: 4.24 M/UL (ref 4–5.4)
SODIUM SERPL-SCNC: 139 MMOL/L (ref 136–145)
WBC # BLD AUTO: 6.89 K/UL (ref 3.9–12.7)

## 2020-01-03 PROCEDURE — 85730 THROMBOPLASTIN TIME PARTIAL: CPT

## 2020-01-03 PROCEDURE — 99214 PR OFFICE/OUTPT VISIT, EST, LEVL IV, 30-39 MIN: ICD-10-PCS | Mod: ,,, | Performed by: INTERNAL MEDICINE

## 2020-01-03 PROCEDURE — 96366 THER/PROPH/DIAG IV INF ADDON: CPT

## 2020-01-03 PROCEDURE — 36415 COLL VENOUS BLD VENIPUNCTURE: CPT

## 2020-01-03 PROCEDURE — 25000003 PHARM REV CODE 250: Performed by: NURSE PRACTITIONER

## 2020-01-03 PROCEDURE — 80053 COMPREHEN METABOLIC PANEL: CPT

## 2020-01-03 PROCEDURE — 99214 OFFICE O/P EST MOD 30 MIN: CPT | Mod: ,,, | Performed by: INTERNAL MEDICINE

## 2020-01-03 PROCEDURE — 85025 COMPLETE CBC W/AUTO DIFF WBC: CPT

## 2020-01-03 PROCEDURE — G0378 HOSPITAL OBSERVATION PER HR: HCPCS

## 2020-01-03 RX ORDER — DILTIAZEM HYDROCHLORIDE 60 MG/1
60 TABLET, FILM COATED ORAL EVERY 8 HOURS
Status: DISCONTINUED | OUTPATIENT
Start: 2020-01-03 | End: 2020-01-03 | Stop reason: HOSPADM

## 2020-01-03 RX ORDER — MUPIROCIN 20 MG/G
OINTMENT TOPICAL 2 TIMES DAILY
Status: DISCONTINUED | OUTPATIENT
Start: 2020-01-03 | End: 2020-01-03 | Stop reason: HOSPADM

## 2020-01-03 RX ORDER — DILTIAZEM HYDROCHLORIDE 60 MG/1
60 TABLET, FILM COATED ORAL EVERY 8 HOURS
Qty: 90 TABLET | Refills: 1 | Status: SHIPPED | OUTPATIENT
Start: 2020-01-03 | End: 2020-07-23 | Stop reason: SDUPTHER

## 2020-01-03 RX ORDER — MUPIROCIN 20 MG/G
OINTMENT TOPICAL 2 TIMES DAILY
Qty: 1 TUBE | Refills: 0 | Status: ON HOLD | OUTPATIENT
Start: 2020-01-03 | End: 2020-07-09 | Stop reason: ALTCHOICE

## 2020-01-03 RX ADMIN — SENNOSIDES, DOCUSATE SODIUM 1 TABLET: 50; 8.6 TABLET, FILM COATED ORAL at 09:01

## 2020-01-03 RX ADMIN — DILTIAZEM HYDROCHLORIDE 60 MG: 60 TABLET, FILM COATED ORAL at 09:01

## 2020-01-03 RX ADMIN — POTASSIUM CHLORIDE 40 MEQ: 20 TABLET, EXTENDED RELEASE ORAL at 09:01

## 2020-01-03 RX ADMIN — DILTIAZEM HYDROCHLORIDE 60 MG: 60 TABLET, FILM COATED ORAL at 01:01

## 2020-01-03 RX ADMIN — FERROUS SULFATE TAB EC 325 MG (65 MG FE EQUIVALENT) 325 MG: 325 (65 FE) TABLET DELAYED RESPONSE at 09:01

## 2020-01-03 RX ADMIN — Medication 100 MG: at 09:01

## 2020-01-03 RX ADMIN — PANTOPRAZOLE SODIUM 40 MG: 40 TABLET, DELAYED RELEASE ORAL at 09:01

## 2020-01-03 RX ADMIN — DULOXETINE HYDROCHLORIDE 60 MG: 30 CAPSULE, DELAYED RELEASE ORAL at 09:01

## 2020-01-03 NOTE — PLAN OF CARE
01/03/20 1110   Discharge Assessment   Assessment Type Discharge Planning Assessment   Confirmed/corrected address and phone number on facesheet? Yes   Assessment information obtained from? Patient   Prior to hospitilization cognitive status: Alert/Oriented   Prior to hospitalization functional status: Independent;Assistive Equipment   Current cognitive status: Alert/Oriented   Current Functional Status: Assistive Equipment;Independent   Lives With facility resident   Able to Return to Prior Arrangements yes   Is patient able to care for self after discharge? Yes   Who are your caregiver(s) and their phone number(s)? Anam Long (son) [phone]   Patient's perception of discharge disposition nursing home   Readmission Within the Last 30 Days no previous admission in last 30 days   Patient currently being followed by outpatient case management? No   Patient currently receives any other outside agency services? Yes   Name and contact number of agency or person providing outside services [facility]   Is it the patient/care giver preference to resume care with the current outside agency? Yes   Equipment Currently Used at Home walker, rolling;wheelchair   Do you have any problems affording any of your prescribed medications? No   Is the patient taking medications as prescribed? yes   Does the patient have transportation home? Yes   Transportation Anticipated agency   Does the patient receive services at the [facility]? No   Discharge Plan A Return to nursing home   DME Needed Upon Discharge  none   Patient/Family in Agreement with Plan yes     Met with pt at bedside for DC assessment. Pt lives at [facility] and owns the above DME but reports that she does not use it. Pt stated that she came in yesterday for a colonoscopy when hospital staff noticed afib. Pt will likely be able to DC back to [facility] without any other CM needs. SWer provided a transitional care folder, information on advanced  directives, information on pharmacy bedside delivery, and discharge planning begins on admission with contact information for any needs/questions. Pt will not require bedside medication delivery as NH will provide.   Randolph Nicholson LMSW 1/3/2020 11:24 AM

## 2020-01-03 NOTE — DISCHARGE SUMMARY
[facility] Hospital Medicine  Discharge Summary      Patient Name: Rachel Long  MRN: [id number]  Admission Date: 1/2/2020  Hospital Length of Stay: 0 days  Discharge Date and Time:  01/03/2020 12:02 PM  Attending Physician: Karla Marina MD   Discharging Provider: KAY Carranza  Primary Care Provider: Sharmaine English MD      HPI:   Rachel Long is a 79 year old female with a PMHx of PE, Parkinson, HTN, Hypothyroid, HLD, GERD, Lung cancer (in remission), COPD, Anemia, and Bipolar disorder who presented from endoscopy in afib with RVR (rate 141) per 12 lead EKG. [facility] had [facility] in 2018 and was on eliquis but that has been held. ED workup showed: mild thrombocytopenia (105), hypokalemia (3.4), and unremarkable troponin (0.017). Pt currently on Heparin and Cardizem gtt. Cardiology consulted and recommending rate control overnight.    * No surgery found *      Hospital Course:   Rachel Long is a 79 year old female admitted for A-fib with RVR. Cardiology consulted. Pt was placed on Cardizem/Heparin gtt. Rate now controlled. Cardiology recommended Cardizem 60mg PO TID with no metoprolol upon discharge. Eliquis on hold until GI clears post endoscopic procedure given her recent GIB. She will follow up with Dr. Calvert (cardiology) within 1 week after discharge for hospital follow up. She will also follow up with Dr. Bhakta (GI) within 1 week after discharge to evaluate rescheduling endoscopic procedure. This patient was seen and examined on the date of discharge and determined suitable for discharge.        Consults:   Consults (From admission, onward)        Status Ordering Provider     Inpatient consult to Cardiology  Once     Provider:  Jose Calvert MD    Completed ZACHARY BABCOCK consult to case management  Once     Provider:  (Not yet assigned)    Acknowledged KARLA MARINA          Final Active Diagnoses:    Diagnosis Date Noted POA    PRINCIPAL PROBLEM:  Atrial  fibrillation with RVR [I48.91] 01/02/2020 Yes    Pressure injury, unstageable [L89.95] 01/03/2020 Yes    Pressure injury of coccygeal region, stage 1 [L89.151] 01/03/2020 Yes    Pressure injury of head, stage 4 [L89.814] 01/03/2020 Yes    TIA (transient ischemic attack) [G45.9] 01/02/2020 Unknown    Thrombocytopenia [D69.6] 01/02/2020 Yes    Hypokalemia [E87.6] 01/02/2020 Yes    Schizoaffective disorder [F25.9] 11/28/2018 Yes    Chronic respiratory failure with hypoxia [J96.11] 05/15/2017 Yes    COPD, very severe [J44.9] 06/23/2016 Yes     Chronic    Hypertension [I10]  Yes      Problems Resolved During this Admission:       Discharged Condition: stable    Disposition: Intermediate Care Facili*    Follow Up:  Follow-up Information     Sharmaine English MD. Schedule an appointment as soon as possible for a visit in 3 days.    Specialty:  Internal Medicine  Why:  hospital follow up  Contact information:  [address] [facility] [id number] [phone]             Mohit Calvert MD. Schedule an appointment as soon as possible for a visit in 1 week.    Specialties:  Cardiology, Cardiovascular Disease  Why:  hospital follow up  Contact information:  [address] [facility] [address] [phone]             Domenica Lyn MD. Schedule an appointment as soon as possible for a visit in 1 week.    Specialty:  Gastroenterology  Why:  hospital follow up  Contact information:  [address]  [place] [address] [phone]                 Patient Instructions:      Ambulatory Referral to Cardiology   Referral Priority: Routine Referral Type: Consultation   Referral Reason: Specialty Services Required   Referred to Provider: MOHIT CALVERT Requested Specialty: Cardiology   Number of Visits Requested: 1     Ambulatory Referral to Gastroenterology   Referral Priority: Routine Referral Type: Consultation   Referral Reason: Specialty Services Required   Referred to Provider: DOMENICA LYN Requested  Specialty: Gastroenterology   Number of Visits Requested: 1     Notify your health care provider if you experience any of the following:  increased confusion or weakness     Notify your health care provider if you experience any of the following:  persistent dizziness, light-headedness, or visual disturbances     Notify your health care provider if you experience any of the following:  difficulty breathing or increased cough     Notify your health care provider if you experience any of the following:  redness, tenderness, or signs of infection (pain, swelling, redness, odor or green/yellow discharge around incision site)     Activity as tolerated       Significant Diagnostic Studies: Labs:   CMP   Recent Labs   Lab 01/02/20  1220 01/03/20  0449    139   K 3.4* 3.4*    102   CO2 29 31*    99   BUN 15 17   CREATININE 0.7 0.7   CALCIUM 9.7 9.3   PROT 7.7 6.8   ALBUMIN 3.7 3.3*   BILITOT 0.9 0.8   ALKPHOS 84 73   AST 14 14   ALT 9* 9*   ANIONGAP 12 6*   ESTGFRAFRICA >60 >60   EGFRNONAA >60 >60    and CBC   Recent Labs   Lab 01/02/20  1220 01/03/20  0449   WBC 7.46 6.89   HGB 13.6 12.1   HCT 42.4 39.2   * 111*     Radiology:   Imaging Results          X-Ray Chest AP Portable (Final result)  Result time 01/02/20 12:51:10    Final result by Blade Hanley MD (01/02/20 12:51:10)                 Impression:      No acute findings.      Electronically signed by: Blade Hanley MD  Date:    01/02/2020  Time:    12:51             Narrative:    EXAMINATION:  XR CHEST AP PORTABLE    CLINICAL HISTORY:  palpitations;    TECHNIQUE:  AP view of the chest was performed.    COMPARISON:  11/28/2018, 11/25/2019    FINDINGS:  The cardiac and mediastinal silhouettes appear within normal limits.   Stable left perihilar fibrosis.  No suspicious pulmonary nodules.  No acute infiltrates.  Aortic atherosclerosis.  No acute osseous findings demonstrated.                              Cardiac Graphics: Echocardiogram:   2D  echo with color flow doppler:   Results for orders placed or performed during the hospital encounter of 01/02/20   2D echo with color flow doppler   Result Value Ref Range    QEF 55 55 - 65    Diastolic Dysfunction No     Est. PA Systolic Pressure 30.67     Tricuspid Valve Regurgitation MILD     Narrative    Date of Procedure: 01/02/2020        TEST DESCRIPTION   Technical Quality: This is a technically challenging study.     General: The patient was in an irregularly irregular rhythm throughout the study. A catheter is present in the right-sided cardiac chambers.     Aorta: The aortic root is normal in size, measuring 2.6 cm at sinotubular junction and 2.7 cm at Sinuses of Valsalva. The proximal ascending aorta is normal in size, measuring 2.3 cm across.     Left Atrium: The left atrial volume index is normal, measuring 26.53 cc/m2.     Left Ventricle: The left ventricle is normal in size, with an end-diastolic diameter of 3.3 cm, and an end-systolic diameter of 2.2 cm. LV wall thickness is normal, with the septum measuring 1.6 cm and the posterior wall measuring 1.4 cm across. Relative   wall thickness was increased at 0.85, and the LV mass index was increased at 119.5 g/m2 consistent with concentric left ventricular hypertrophy. There are no regional wall motion abnormalities. Left ventricular systolic function appears normal. Visually   estimated ejection fraction is 55-60%. The LV Doppler derived stroke volume equals 49.0 ccs.     Diastolic indices: E wave velocity 0.8 m/s, E/A ratio 0.7,  msec., E/e' ratio(avg) 7. Diastolic function is normal.     Right Atrium: The right atrium is normal in size, measuring 4.2 cm in length and 3.8 cm in width in the apical view.     Right Ventricle: The right ventricle is normal in size. Global right ventricular systolic function appears normal. Tricuspid annular plane systolic excursion (TAPSE) is 2.0 cm. The estimated PA systolic pressure is 31 mmHg.     Aortic Valve:   The aortic valve is normal in structure. The mean gradient obtained across the aortic valve is 9 mmHg.     Mitral Valve:  The mitral valve is normal in structure. The pressure half time is 65 msec. The calculated mitral valve area is 3.38 cm2. There is mitral annular calcification.     Tricuspid Valve:  The tricuspid valve is normal in structure. There is mild tricuspid regurgitation.     Pulmonary Valve:  The pulmonic valve is not well seen.     IVC: IVC is normal in size and collapses > 50% with a sniff, suggesting normal right atrial pressure of 3 mmHg.     Intracavitary: There is no evidence of pericardial effusion, intracavity mass, thrombi, or vegetation.         CONCLUSIONS     1 - Concentric hypertrophy.     2 - No wall motion abnormalities.     3 - Normal left ventricular systolic function (EF 55-60%).     4 - Normal left ventricular diastolic function.     5 - Normal right ventricular systolic function .     6 - The estimated PA systolic pressure is 31 mmHg.     7 - Mild tricuspid regurgitation.             This document has been electronically    SIGNED BY: Jose Calvert MD On: 01/02/2020 15:37       Pending Diagnostic Studies:     None         Medications:  Reconciled Home Medications:      Medication List      START taking these medications    diltiaZEM 60 MG tablet  Commonly known as:  CARDIZEM  Take 1 tablet (60 mg total) by mouth every 8 (eight) hours.     mupirocin 2 % ointment  Commonly known as:  BACTROBAN  Apply topically 2 (two) times daily. Apply to right ear x 7 days        CONTINUE taking these medications    acetaminophen 325 MG tablet  Commonly known as:  TYLENOL  Take 650 mg by mouth 4 (four) times daily as needed for Pain.     albuterol 90 mcg/actuation inhaler  Commonly known as:  PROVENTIL/VENTOLIN HFA  Inhale 2 puffs into the lungs every 6 (six) hours as needed for Wheezing. Rescue     atorvastatin 80 MG tablet  Commonly known as:  LIPITOR  Take 80 mg by mouth every evening.      benztropine 1 MG tablet  Commonly known as:  COGENTIN  Take 1 mg by mouth 2 (two) times daily.     DULoxetine 60 MG capsule  Commonly known as:  CYMBALTA  once daily.     iron 325 mg (65 mg iron) Tab tablet  Generic drug:  ferrous sulfate  Take 325 mg by mouth 2 (two) times daily.     loperamide 2 mg Tab  Commonly known as:  IMODIUM A-D  Take 2 mg by mouth daily as needed (give one po after each episode of loose stool with max of 8 tabs/daily).     melatonin 3 mg Cap  Take 1 tablet by mouth nightly as needed.     Natural Balance Tears 0.1-0.3 % Drop  Generic drug:  dextran 70-hypromellose  Apply 1 drop to eye 2 (two) times daily.     pantoprazole 40 MG tablet  Commonly known as:  PROTONIX  Take 40 mg by mouth once daily.     potassium chloride SA 20 MEQ tablet  Commonly known as:  K-DUR,KLOR-CON  Take 40 mEq by mouth once daily. Give 20 ml to eaqual to 40 meq     QUEtiapine 100 MG Tab  Commonly known as:  SEROQUEL     senna-docusate 8.6-50 mg 8.6-50 mg per tablet  Commonly known as:  PERICOLACE  Take 1 tablet by mouth 2 (two) times daily.     thiamine 100 MG tablet  Take 100 mg by mouth once daily.     traZODone 50 MG tablet  Commonly known as:  DESYREL  Take 50 mg by mouth nightly as needed.     Ultram 50 mg tablet  Generic drug:  traMADol  Take 50 mg by mouth every 6 (six) hours as needed for Pain.        STOP taking these medications    ALPRAZolam 0.25 MG tablet  Commonly known as:  XANAX     Alrex 0.2 % Drps  Generic drug:  loteprednol     amLODIPine 10 MG tablet  Commonly known as:  NORVASC     Anoro Ellipta 62.5-25 mcg/actuation Dsdv  Generic drug:  umeclidinium-vilanterol     apixaban 5 mg Tab  Commonly known as:  Eliquis     ARIPiprazole 5 MG Tab  Commonly known as:  ABILIFY     cyanocobalamin 1,000 mcg/mL injection     fluticasone propionate 50 mcg/actuation nasal spray  Commonly known as:  FLONASE     folic acid 1 MG tablet  Commonly known as:  FOLVITE     levothyroxine 88 MCG tablet  Commonly known as:   SYNTHROID     MULTIVITAMIN ORAL     ramelteon 8 mg tablet  Commonly known as:  Rozerem     Restasis 0.05 % ophthalmic emulsion  Generic drug:  cycloSPORINE     terbinafine HCl 250 mg tablet  Commonly known as:  LAMISIL            Indwelling Lines/Drains at time of discharge:   Lines/Drains/Airways     Pressure Ulcer                 Pressure Injury 01/02/20 Left lateral Ear Stage 4 1 day         Pressure Injury 01/02/20 Right anterior Ear Unstageable 1 day         Pressure Injury 01/02/20 Right posterior Ear Stage 4 1 day         Pressure Injury 01/03/20 Coccyx Stage 1 less than 1 day                Time spent on the discharge of patient: 35 minutes  Patient was seen and examined on the date of discharge and determined to be suitable for discharge.         KAY Carranza  Department of Hospital Medicine  [facility]

## 2020-01-03 NOTE — PLAN OF CARE
[facility] will be returning to [facility] CM spoke to Sarai who will call transportation to  patient. Floor nurse given contact information for [facility] to call report.        01/03/20 1512   Post-Acute Status   Post-Acute Authorization Placement   Post-Acute Placement Status Set-up Complete

## 2020-01-03 NOTE — HOSPITAL COURSE
Rachel Long is a 79 year old female admitted for A-fib with RVR. Cardiology consulted. Pt was placed on Cardizem/Heparin gtt. Rate now controlled. Cardiology recommended Cardizem 60mg PO TID with no metoprolol upon discharge. Eliquis on hold until GI clears post endoscopic procedure given her recent GIB. She will follow up with Dr. Calvert (cardiology) within 1 week after discharge for hospital follow up. She will also follow up with Dr. Bhakta (GI) within 1 week after discharge to evaluate rescheduling endoscopic procedure. This patient was seen and examined on the date of discharge and determined suitable for discharge.

## 2020-01-03 NOTE — NURSING
Patient awake, alert, and oriented x4.   Patient has no current c/o pain.   Patient voiced no concerns at this time.   Discharge instructions reviewed with patient verbal understanding given at this time.   [facility]  Follow up instructions and appointments reviewed with patient.   Patients IV discontinued without difficulty, along with Tele monitor.   Transportation provided by [employer].

## 2020-01-03 NOTE — HOSPITAL COURSE
1/3/2020-admitted yesterday with A-fib with RVR. Converted to NSR overnight after Cardizem. Eliquis on hold due to recent GIB. Has no CNS complaints to suggest TIA or CVA. Is on Heparin gtt with no abnormal bleeding. H/H stable

## 2020-01-03 NOTE — ASSESSMENT & PLAN NOTE
PAF with RVR  Has h/o intermitttent palpitation,  Chronic SOARES due to long time smoker  [id number] VASSc score 6 due to female, age, HTN and h/o TIA    -continue cardizem gtt for o/n  -check ECHO  -add Metoprolol 2.5 mg ivp q6 hours prn to keep HR < 100 bpm  -Eliquis was on hold due to recent GI bleeding  -avoid caffeine    1/3/2020  -patient has been examined and is clear for discharge   -Recommend Cardizem 60mg TID with no metoprolol  -Eliquis on hold until GI clears post colonoscopy given her recent GIB  -Needs to follow up in 1-2 weeks with Cardiology

## 2020-01-03 NOTE — PROGRESS NOTES
[facility]  Cardiology  Progress Note    Patient Name: Rachel Long  MRN: [id number]  Admission Date: 1/2/2020  Hospital Length of Stay: 0 days  Code Status: Prior   Attending Physician: Ortiz Begum MD   Primary Care Physician: Sharmaine English MD  Expected Discharge Date:   Principal Problem:Atrial fibrillation with RVR    Subjective:   Brief HPI:  80 yo female, consult for afib with RVR  [facility] home [facility]  PMH h/o TIA remote, twice, dementia, anxiety, h/o left NSCLC h/o chemoXR, h/o PE and former long time somoker  Pt states that she had fast heart beat when she was stressful  Pt came in colonoscopy due to recent GI bleeding. Eliquis was on hold  The procedure was cancelled due to afib with RVR  Pt denied chest pain, dizziness and faint  Has chronic SOARES and intermittent palpitation  On cardizme gtt and had metoprolol 2.5 mg ivp x1 in ER. HR at 95 bpm.       Hospital Course:   1/3/2020-admitted yesterday with A-fib with RVR. Converted to NSR overnight after Cardizem. Eliquis on hold due to recent GIB. Has no CNS complaints to suggest TIA or CVA. Is on Heparin gtt with no abnormal bleeding. H/H stable         Review of Systems   Constitution: Negative for diaphoresis, malaise/fatigue, weight gain and weight loss.   HENT: Negative for congestion and nosebleeds.    Cardiovascular: Negative for chest pain, claudication, cyanosis, dyspnea on exertion, irregular heartbeat, leg swelling, near-syncope, orthopnea, palpitations, paroxysmal nocturnal dyspnea and syncope.   Respiratory: Negative for cough, hemoptysis, shortness of breath, sleep disturbances due to breathing, snoring, sputum production and wheezing.    Hematologic/Lymphatic: Negative for bleeding problem. Does not bruise/bleed easily.   Skin: Negative for rash.   Musculoskeletal: Negative for arthritis, back pain, falls, joint pain, muscle cramps and muscle weakness.   Gastrointestinal: Negative for abdominal pain, constipation, diarrhea, heartburn,  hematemesis, hematochezia, melena, nausea and vomiting.   Genitourinary: Negative for dysuria, hematuria and nocturia.   Neurological: Negative for excessive daytime sleepiness, dizziness, headaches, light-headedness, loss of balance, numbness, vertigo and weakness.     Objective:     Vital Signs (Most Recent):  Temp: 97.5 °F (36.4 °C) (01/03/20 0749)  Pulse: 67 (01/03/20 0749)  Resp: 20 (01/03/20 0749)  BP: 136/71 (01/03/20 0749)  SpO2: (!) 92 % (01/03/20 0749) Vital Signs (24h Range):  Temp:  [97 °F (36.1 °C)-98.5 °F (36.9 °C)] 97.5 °F (36.4 °C)  Pulse:  [] 67  Resp:  [16-26] 20  SpO2:  [90 %-95 %] 92 %  BP: (106-150)/(55-91) 136/71     Weight: 73.7 kg (162 lb 7.7 oz)  Body mass index is 27.89 kg/m².     SpO2: (!) 92 %  O2 Device (Oxygen Therapy): nasal cannula      Intake/Output Summary (Last 24 hours) at 1/3/2020 0830  Last data filed at 1/3/2020 0400  Gross per 24 hour   Intake 590.72 ml   Output --   Net 590.72 ml       Lines/Drains/Airways     Peripheral Intravenous Line                 Peripheral IV - Single Lumen 01/02/20 1155 20 G Posterior;Right Hand less than 1 day         Peripheral IV - Single Lumen 01/02/20 1454 22 G Left Hand less than 1 day                Physical Exam   Constitutional: She is oriented to person, place, and time. She appears well-developed and well-nourished.   Neck: Neck supple. No JVD present.   Cardiovascular: Normal rate, regular rhythm, normal heart sounds and normal pulses. Exam reveals no friction rub.   No murmur heard.  Pulmonary/Chest: Effort normal and breath sounds normal. No respiratory distress. She has no wheezes. She has no rales.   Abdominal: Soft. Bowel sounds are normal. She exhibits no distension.   Musculoskeletal: She exhibits no edema or tenderness.   Neurological: She is alert and oriented to person, place, and time.   Skin: Skin is warm and dry. No rash noted.   Psychiatric: She has a normal mood and affect. Her behavior is normal.   Nursing note and  vitals reviewed.      Significant Labs:   All pertinent lab results from the last 24 hours have been reviewed. and   Recent Lab Results       01/03/20  0449   01/02/20  2232   01/02/20  1530   01/02/20  1453   01/02/20  1220        Albumin 3.3       3.7     Alkaline Phosphatase 73       84     ALT 9       9     Anion Gap 6       12     aPTT 46.1  Comment:  aPTT therapeutic range = 39-69 seconds 42.7  Comment:  aPTT therapeutic range = 39-69 seconds   28.5  Comment:  aPTT therapeutic range = 39-69 seconds       AST 14       14     Baso # 0.02       0.03     Basophil% 0.3       0.4     BILIRUBIN TOTAL 0.8  Comment:  For infants and newborns, interpretation of results should be based  on gestational age, weight and in agreement with clinical  observations.  Premature Infant recommended reference ranges:  Up to 24 hours.............<8.0 mg/dL  Up to 48 hours............<12.0 mg/dL  3-5 days..................<15.0 mg/dL  6-29 days.................<15.0 mg/dL         0.9  Comment:  For infants and newborns, interpretation of results should be based  on gestational age, weight and in agreement with clinical  observations.  Premature Infant recommended reference ranges:  Up to 24 hours.............<8.0 mg/dL  Up to 48 hours............<12.0 mg/dL  3-5 days..................<15.0 mg/dL  6-29 days.................<15.0 mg/dL       BNP         26  Comment:  Values of less than 100 pg/ml are consistent with non-CHF populations.     BUN, Bld 17       15     Calcium 9.3       9.7     QEF     55         Chloride 102       100     CO2 31       29     CPK         30     Creatinine 0.7       0.7     Diastolic Dysfunction     No         Differential Method Automated       Automated     eGFR if  >60       >60     eGFR if non  >60  Comment:  Calculation used to obtain the estimated glomerular filtration  rate (eGFR) is the CKD-EPI equation.          >60  Comment:  Calculation used to obtain the estimated  glomerular filtration  rate (eGFR) is the CKD-EPI equation.        Eos # 0.1       0.1     Eosinophil% 1.9       1.7     Glucose 99       101     Gran # (ANC) 5.0       5.4     Gran% 72.9       72.9     Hematocrit 39.2       42.4     Hemoglobin 12.1       13.6     Immature Grans (Abs) 0.02  Comment:  Mild elevation in immature granulocytes is non specific and   can be seen in a variety of conditions including stress response,   acute inflammation, trauma and pregnancy. Correlation with other   laboratory and clinical findings is essential.         0.02  Comment:  Mild elevation in immature granulocytes is non specific and   can be seen in a variety of conditions including stress response,   acute inflammation, trauma and pregnancy. Correlation with other   laboratory and clinical findings is essential.       Immature Granulocytes 0.3       0.3     Coumadin Monitoring INR       1.0  Comment:  Coumadin Therapy:  2.0 - 3.0 for INR for all indicators except mechanical heart valves  and antiphospholipid syndromes which should use 2.5 - 3.5.         Lymph # 1.1       1.2     Lymph% 16.5       16.1     MCH 28.5       28.6     MCHC 30.9       32.1     MCV 93       89     Mono # 0.6       0.6     Mono% 8.1       8.6     MPV 10.2       10.3     nRBC 0       0     Est. PA Systolic Pressure     30.67         Platelets 111       105     Potassium 3.4       3.4     PROTEIN TOTAL 6.8       7.7     Protime       11.0       RBC 4.24       4.75     RDW 16.1       16.2     Sodium 139       141     Troponin I         0.017  Comment:  The reference interval for Troponin I represents the 99th percentile   cutoff   for our facility and is consistent with 3rd generation assay   performance.       Tricuspid Valve Regurgitation     MILD         WBC 6.89       7.46                          Significant Imaging: Echocardiogram:   2D echo with color flow doppler:   Results for orders placed or performed during the hospital encounter of 01/02/20    2D echo with color flow doppler   Result Value Ref Range    QEF 55 55 - 65    Diastolic Dysfunction No     Est. PA Systolic Pressure 30.67     Tricuspid Valve Regurgitation MILD     Narrative    Date of Procedure: 01/02/2020        TEST DESCRIPTION   Technical Quality: This is a technically challenging study.     General: The patient was in an irregularly irregular rhythm throughout the study. A catheter is present in the right-sided cardiac chambers.     Aorta: The aortic root is normal in size, measuring 2.6 cm at sinotubular junction and 2.7 cm at Sinuses of Valsalva. The proximal ascending aorta is normal in size, measuring 2.3 cm across.     Left Atrium: The left atrial volume index is normal, measuring 26.53 cc/m2.     Left Ventricle: The left ventricle is normal in size, with an end-diastolic diameter of 3.3 cm, and an end-systolic diameter of 2.2 cm. LV wall thickness is normal, with the septum measuring 1.6 cm and the posterior wall measuring 1.4 cm across. Relative   wall thickness was increased at 0.85, and the LV mass index was increased at 119.5 g/m2 consistent with concentric left ventricular hypertrophy. There are no regional wall motion abnormalities. Left ventricular systolic function appears normal. Visually   estimated ejection fraction is 55-60%. The LV Doppler derived stroke volume equals 49.0 ccs.     Diastolic indices: E wave velocity 0.8 m/s, E/A ratio 0.7,  msec., E/e' ratio(avg) 7. Diastolic function is normal.     Right Atrium: The right atrium is normal in size, measuring 4.2 cm in length and 3.8 cm in width in the apical view.     Right Ventricle: The right ventricle is normal in size. Global right ventricular systolic function appears normal. Tricuspid annular plane systolic excursion (TAPSE) is 2.0 cm. The estimated PA systolic pressure is 31 mmHg.     Aortic Valve:  The aortic valve is normal in structure. The mean gradient obtained across the aortic valve is 9 mmHg.      Mitral Valve:  The mitral valve is normal in structure. The pressure half time is 65 msec. The calculated mitral valve area is 3.38 cm2. There is mitral annular calcification.     Tricuspid Valve:  The tricuspid valve is normal in structure. There is mild tricuspid regurgitation.     Pulmonary Valve:  The pulmonic valve is not well seen.     IVC: IVC is normal in size and collapses > 50% with a sniff, suggesting normal right atrial pressure of 3 mmHg.     Intracavitary: There is no evidence of pericardial effusion, intracavity mass, thrombi, or vegetation.         CONCLUSIONS     1 - Concentric hypertrophy.     2 - No wall motion abnormalities.     3 - Normal left ventricular systolic function (EF 55-60%).     4 - Normal left ventricular diastolic function.     5 - Normal right ventricular systolic function .     6 - The estimated PA systolic pressure is 31 mmHg.     7 - Mild tricuspid regurgitation.             This document has been electronically    SIGNED BY: Jose Calvert MD On: 01/02/2020 15:37     Assessment and Plan:       * Atrial fibrillation with RVR  PAF with RVR  Has h/o intermitttent palpitation,  Chronic SOARES due to long time smoker  [id number] VASSc score 6 due to female, age, HTN and h/o TIA    -continue cardizem gtt for o/n  -check ECHO  -add Metoprolol 2.5 mg ivp q6 hours prn to keep HR < 100 bpm  -Eliquis was on hold due to recent GI bleeding  -avoid caffeine    1/3/2020  -patient has been examined and is clear for discharge   -Recommend Cardizem 60mg TID with no metoprolol  -Eliquis on hold until GI clears post colonoscopy given her recent GIB  -Needs to follow up in 1-2 weeks with Cardiology     Chronic respiratory failure with hypoxia  Rx per [facility]    COPD, very severe  Rx per [facility]        VTE Risk Mitigation (From admission, onward)         Ordered     heparin 25,000 units in dextrose 5% 250 mL (100 units/mL) infusion LOW INTENSITY nomogram - [facility]  Continuous     Question:  Heparin Infusion Adjustment  (DO NOT MODIFY ANSWER)  Answer:  [email or web address]\Images\Pharmacy\HeparinInfusions\heparin LOW INTENSITY nomogram for [facility] [id number]    01/02/20 1328     heparin 25,000 units in dextrose 5% (100 units/ml) IV bolus from bag - ADDITIONAL PRN BOLUS - 60 units/kg  As needed (PRN)     Question:  Heparin Infusion Adjustment (DO NOT MODIFY ANSWER)  Answer:  [email or web address]\Images\Pharmacy\HeparinInfusions\heparin LOW INTENSITY nomogram for [facility] [id number]    01/02/20 1328     heparin 25,000 units in dextrose 5% (100 units/ml) IV bolus from bag - ADDITIONAL PRN BOLUS - 30 units/kg  As needed (PRN)     Question:  Heparin Infusion Adjustment (DO NOT MODIFY ANSWER)  Answer:  [email or web address]\epic\Images\Pharmacy\HeparinInfusions\heparin LOW INTENSITY nomogram for [facility] [id number]    01/02/20 1328            Chart reviewed. Patient examined by Dr. Calvert and agrees with plan that has been outlined.       Kirt Coronel, FNP  Cardiology  [facility]

## 2020-01-03 NOTE — SUBJECTIVE & OBJECTIVE
Review of Systems   Constitution: Negative for diaphoresis, malaise/fatigue, weight gain and weight loss.   HENT: Negative for congestion and nosebleeds.    Cardiovascular: Negative for chest pain, claudication, cyanosis, dyspnea on exertion, irregular heartbeat, leg swelling, near-syncope, orthopnea, palpitations, paroxysmal nocturnal dyspnea and syncope.   Respiratory: Negative for cough, hemoptysis, shortness of breath, sleep disturbances due to breathing, snoring, sputum production and wheezing.    Hematologic/Lymphatic: Negative for bleeding problem. Does not bruise/bleed easily.   Skin: Negative for rash.   Musculoskeletal: Negative for arthritis, back pain, falls, joint pain, muscle cramps and muscle weakness.   Gastrointestinal: Negative for abdominal pain, constipation, diarrhea, heartburn, hematemesis, hematochezia, melena, nausea and vomiting.   Genitourinary: Negative for dysuria, hematuria and nocturia.   Neurological: Negative for excessive daytime sleepiness, dizziness, headaches, light-headedness, loss of balance, numbness, vertigo and weakness.     Objective:     Vital Signs (Most Recent):  Temp: 97.5 °F (36.4 °C) (01/03/20 0749)  Pulse: 67 (01/03/20 0749)  Resp: 20 (01/03/20 0749)  BP: 136/71 (01/03/20 0749)  SpO2: (!) 92 % (01/03/20 0749) Vital Signs (24h Range):  Temp:  [97 °F (36.1 °C)-98.5 °F (36.9 °C)] 97.5 °F (36.4 °C)  Pulse:  [] 67  Resp:  [16-26] 20  SpO2:  [90 %-95 %] 92 %  BP: (106-150)/(55-91) 136/71     Weight: 73.7 kg (162 lb 7.7 oz)  Body mass index is 27.89 kg/m².     SpO2: (!) 92 %  O2 Device (Oxygen Therapy): nasal cannula      Intake/Output Summary (Last 24 hours) at 1/3/2020 0830  Last data filed at 1/3/2020 0400  Gross per 24 hour   Intake 590.72 ml   Output --   Net 590.72 ml       Lines/Drains/Airways     Peripheral Intravenous Line                 Peripheral IV - Single Lumen 01/02/20 1155 20 G Posterior;Right Hand less than 1 day         Peripheral IV - Single  Lumen 01/02/20 1454 22 G Left Hand less than 1 day                Physical Exam   Constitutional: She is oriented to person, place, and time. She appears well-developed and well-nourished.   Neck: Neck supple. No JVD present.   Cardiovascular: Normal rate, regular rhythm, normal heart sounds and normal pulses. Exam reveals no friction rub.   No murmur heard.  Pulmonary/Chest: Effort normal and breath sounds normal. No respiratory distress. She has no wheezes. She has no rales.   Abdominal: Soft. Bowel sounds are normal. She exhibits no distension.   Musculoskeletal: She exhibits no edema or tenderness.   Neurological: She is alert and oriented to person, place, and time.   Skin: Skin is warm and dry. No rash noted.   Psychiatric: She has a normal mood and affect. Her behavior is normal.   Nursing note and vitals reviewed.      Significant Labs:   All pertinent lab results from the last 24 hours have been reviewed. and   Recent Lab Results       01/03/20  0449   01/02/20  2232   01/02/20  1530   01/02/20  1453   01/02/20  1220        Albumin 3.3       3.7     Alkaline Phosphatase 73       84     ALT 9       9     Anion Gap 6       12     aPTT 46.1  Comment:  aPTT therapeutic range = 39-69 seconds 42.7  Comment:  aPTT therapeutic range = 39-69 seconds   28.5  Comment:  aPTT therapeutic range = 39-69 seconds       AST 14       14     Baso # 0.02       0.03     Basophil% 0.3       0.4     BILIRUBIN TOTAL 0.8  Comment:  For infants and newborns, interpretation of results should be based  on gestational age, weight and in agreement with clinical  observations.  Premature Infant recommended reference ranges:  Up to 24 hours.............<8.0 mg/dL  Up to 48 hours............<12.0 mg/dL  3-5 days..................<15.0 mg/dL  6-29 days.................<15.0 mg/dL         0.9  Comment:  For infants and newborns, interpretation of results should be based  on gestational age, weight and in agreement with  clinical  observations.  Premature Infant recommended reference ranges:  Up to 24 hours.............<8.0 mg/dL  Up to 48 hours............<12.0 mg/dL  3-5 days..................<15.0 mg/dL  6-29 days.................<15.0 mg/dL       BNP         26  Comment:  Values of less than 100 pg/ml are consistent with non-CHF populations.     BUN, Bld 17       15     Calcium 9.3       9.7     QEF     55         Chloride 102       100     CO2 31       29     CPK         30     Creatinine 0.7       0.7     Diastolic Dysfunction     No         Differential Method Automated       Automated     eGFR if  >60       >60     eGFR if non  >60  Comment:  Calculation used to obtain the estimated glomerular filtration  rate (eGFR) is the CKD-EPI equation.          >60  Comment:  Calculation used to obtain the estimated glomerular filtration  rate (eGFR) is the CKD-EPI equation.        Eos # 0.1       0.1     Eosinophil% 1.9       1.7     Glucose 99       101     Gran # (ANC) 5.0       5.4     Gran% 72.9       72.9     Hematocrit 39.2       42.4     Hemoglobin 12.1       13.6     Immature Grans (Abs) 0.02  Comment:  Mild elevation in immature granulocytes is non specific and   can be seen in a variety of conditions including stress response,   acute inflammation, trauma and pregnancy. Correlation with other   laboratory and clinical findings is essential.         0.02  Comment:  Mild elevation in immature granulocytes is non specific and   can be seen in a variety of conditions including stress response,   acute inflammation, trauma and pregnancy. Correlation with other   laboratory and clinical findings is essential.       Immature Granulocytes 0.3       0.3     Coumadin Monitoring INR       1.0  Comment:  Coumadin Therapy:  2.0 - 3.0 for INR for all indicators except mechanical heart valves  and antiphospholipid syndromes which should use 2.5 - 3.5.         Lymph # 1.1       1.2     Lymph% 16.5       16.1      MCH 28.5       28.6     MCHC 30.9       32.1     MCV 93       89     Mono # 0.6       0.6     Mono% 8.1       8.6     MPV 10.2       10.3     nRBC 0       0     Est. PA Systolic Pressure     30.67         Platelets 111       105     Potassium 3.4       3.4     PROTEIN TOTAL 6.8       7.7     Protime       11.0       RBC 4.24       4.75     RDW 16.1       16.2     Sodium 139       141     Troponin I         0.017  Comment:  The reference interval for Troponin I represents the 99th percentile   cutoff   for our facility and is consistent with 3rd generation assay   performance.       Tricuspid Valve Regurgitation     MILD         WBC 6.89       7.46                          Significant Imaging: Echocardiogram:   2D echo with color flow doppler:   Results for orders placed or performed during the hospital encounter of 01/02/20   2D echo with color flow doppler   Result Value Ref Range    QEF 55 55 - 65    Diastolic Dysfunction No     Est. PA Systolic Pressure 30.67     Tricuspid Valve Regurgitation MILD     Narrative    Date of Procedure: 01/02/2020        TEST DESCRIPTION   Technical Quality: This is a technically challenging study.     General: The patient was in an irregularly irregular rhythm throughout the study. A catheter is present in the right-sided cardiac chambers.     Aorta: The aortic root is normal in size, measuring 2.6 cm at sinotubular junction and 2.7 cm at Sinuses of Valsalva. The proximal ascending aorta is normal in size, measuring 2.3 cm across.     Left Atrium: The left atrial volume index is normal, measuring 26.53 cc/m2.     Left Ventricle: The left ventricle is normal in size, with an end-diastolic diameter of 3.3 cm, and an end-systolic diameter of 2.2 cm. LV wall thickness is normal, with the septum measuring 1.6 cm and the posterior wall measuring 1.4 cm across. Relative   wall thickness was increased at 0.85, and the LV mass index was increased at 119.5 g/m2 consistent with concentric  left ventricular hypertrophy. There are no regional wall motion abnormalities. Left ventricular systolic function appears normal. Visually   estimated ejection fraction is 55-60%. The LV Doppler derived stroke volume equals 49.0 ccs.     Diastolic indices: E wave velocity 0.8 m/s, E/A ratio 0.7,  msec., E/e' ratio(avg) 7. Diastolic function is normal.     Right Atrium: The right atrium is normal in size, measuring 4.2 cm in length and 3.8 cm in width in the apical view.     Right Ventricle: The right ventricle is normal in size. Global right ventricular systolic function appears normal. Tricuspid annular plane systolic excursion (TAPSE) is 2.0 cm. The estimated PA systolic pressure is 31 mmHg.     Aortic Valve:  The aortic valve is normal in structure. The mean gradient obtained across the aortic valve is 9 mmHg.     Mitral Valve:  The mitral valve is normal in structure. The pressure half time is 65 msec. The calculated mitral valve area is 3.38 cm2. There is mitral annular calcification.     Tricuspid Valve:  The tricuspid valve is normal in structure. There is mild tricuspid regurgitation.     Pulmonary Valve:  The pulmonic valve is not well seen.     IVC: IVC is normal in size and collapses > 50% with a sniff, suggesting normal right atrial pressure of 3 mmHg.     Intracavitary: There is no evidence of pericardial effusion, intracavity mass, thrombi, or vegetation.         CONCLUSIONS     1 - Concentric hypertrophy.     2 - No wall motion abnormalities.     3 - Normal left ventricular systolic function (EF 55-60%).     4 - Normal left ventricular diastolic function.     5 - Normal right ventricular systolic function .     6 - The estimated PA systolic pressure is 31 mmHg.     7 - Mild tricuspid regurgitation.             This document has been electronically    SIGNED BY: Jose Calvert MD On: 01/02/2020 15:37

## 2020-01-03 NOTE — CONSULTS
01/03/20 0915   Handoff Report   Given To LANEY Charles   Pain/Comfort/Sleep   Preferred Pain Scale number (Numeric Rating Pain Scale)   Pain Rating (0-10): Rest 0   Pain Rating (0-10): Activity 2   Pain Assessment Additional Detail   Factors That Aggravate Pain other (see comments)  (wound care)   Gastrointestinal   GI WDL ex;GI symptoms   GI Signs/Symptoms fecal incontinence   Genitourinary   Genitourinary WDL ex;voiding ability/characteristics   Voiding Characteristics incontinence   Skin   Skin WDL ex   Skin Color/Characteristics redness nonblanchable   Skin Temperature warm   Skin Moisture dry   Skin Elasticity slow return to original state   Skin Integrity wound;pressure injury   Michael Risk Assessment   Sensory Perception 4-->no impairment   Moisture 2-->very moist   Activity 3-->walks occasionally   Mobility 3-->slightly limited   Nutrition 2-->probably inadequate   Friction and Shear 2-->potential problem   Michael Score 16        Pressure Injury 01/02/20 Right anterior Ear Unstageable   Date First Assessed: 01/02/20   Pressure Injury Present on Admission: yes  Side: Right  Orientation: anterior  Location: Ear  Is this injury device related?: Yes  Staging: Unstageable   Wound Image    Staging Unstageable   Dressing Appearance Open to air   Drainage Amount Scant   Drainage Characteristics/Odor Serosanguineous   Appearance Black;Eschar;Yellow;Moist   Tissue loss description Full thickness   Black (%), Wound Tissue Color 95 %   Red (%), Wound Tissue Color 0 %   Yellow (%), Wound Tissue Color 5 %   Periwound Area Intact   Wound Edges Open;Defined   Wound Length (cm) 1 cm   Wound Width (cm) 1 cm   Wound Depth (cm) 0.1 cm   Wound Volume (cm^3) 0.1 cm^3   Wound Surface Area (cm^2) 1 cm^2   Care Cleansed with:;Sterile normal saline;Applied:;Skin Barrier   Dressing Applied;Hydrocolloid   Periwound Care Cleansed with pH balanced cleanser;Dry periwound area maintained;Skin barrier film applied   Dressing Change Due  01/06/20        Pressure Injury 01/02/20 Right posterior Ear Stage 4   Date First Assessed: 01/02/20   Pressure Injury Present on Admission: yes  Side: Right  Orientation: posterior  Location: Ear  Is this injury device related?: Yes  Staging: Stage 4   Wound Image    Staging Stage 4   Dressing Appearance Open to air   Drainage Amount Small   Drainage Characteristics/Odor Serosanguineous   Appearance Red;Yellow;Other (see comments);Moist;Purple  (cartilage)   Tissue loss description Full thickness   Black (%), Wound Tissue Color 0 %   Red (%), Wound Tissue Color 50 %   Yellow (%), Wound Tissue Color 50 %   Periwound Area Dry;Intact   Wound Edges Open   Wound Length (cm) 1 cm   Wound Width (cm) 1 cm   Wound Depth (cm) 0.4 cm   Wound Volume (cm^3) 0.4 cm^3   Wound Surface Area (cm^2) 1 cm^2   Care Cleansed with:;Sterile normal saline;Applied:;Skin Barrier   Dressing Hydrofiber;Silver;Hydrocolloid;Applied   Periwound Care Cleansed with pH balanced cleanser;Dry periwound area maintained;Absorptive dressing applied;Skin barrier film applied   Dressing Change Due 01/06/20        Pressure Injury 01/02/20 Left lateral Ear Stage 4   Date First Assessed: 01/02/20   Pressure Injury Present on Admission: yes  Side: Left  Orientation: (c) lateral  Location: Ear  Is this injury device related?: Yes  Staging: Stage 4   Wound Image     Staging Unstageable   Dressing Appearance Open to air   Drainage Amount Scant   Drainage Characteristics/Odor Serosanguineous   Appearance Black;Eschar;Red   Tissue loss description Full thickness   Black (%), Wound Tissue Color 95 %   Red (%), Wound Tissue Color 5 %   Yellow (%), Wound Tissue Color 0 %   Periwound Area Dry;Intact   Wound Edges Open   Wound Length (cm) 1 cm   Wound Width (cm) 2 cm   Wound Depth (cm) 0.1 cm   Wound Volume (cm^3) 0.2 cm^3   Wound Surface Area (cm^2) 2 cm^2   Care Cleansed with:;Sterile normal saline;Applied:;Skin Barrier   Dressing Hydrocolloid;Applied   Periwound Care  "Cleansed with pH balanced cleanser;Dry periwound area maintained;Skin barrier film applied   Dressing Change Due 01/06/20        Pressure Injury 01/03/20 Coccyx Stage 1   Date First Assessed: 01/03/20   Pressure Injury Present on Admission: yes  Location: Coccyx  Is this injury device related?: No  Staging: Stage 1   Staging Stage 1   Dressing Appearance Open to air   Drainage Amount None   Appearance Red   Tissue loss description Not applicable   Periwound Area Dry;Intact   Wound Length (cm) 2 cm   Wound Width (cm) 2 cm   Wound Surface Area (cm^2) 4 cm^2   Care Cleansed with:;Soap and water;Applied:;Skin Barrier   Off Loading Other (see comments)  (turned with wedge)     Consulted on this 78 y/o F patient due to present on admission wounds to bilateral ears. Patient is awake and alert, denies pain. She reports wearing "clip-on" earrings for several weeks without removing them. She states the left earring "fell off" yesterday revealing a "scab", and the right earring was "stuck". Per nurse report, on admission yesterday 1/3, her daughter "yanked" the stuck earring off revealing a full thickness wound to ear with exposed cartilage. Wounds were left LOKI overnight. Assessment completed this am.  Left ear Unstageable pressure injury from clip-on earring noted that measures 1x2x0.1cm, intact black dry eschar noted covering wound, edges  posteriorly with moist red tissue, scant serosangunious drainage noted. Cleansed with saline and patted dry. Keri wound skin painted with cavilon. duoderm applied to cover and promote autolytic debridement. Right anterior ear Unstageable pressure injury from clip-on earring noted that measures 1x1x0.1cm with 95% black eschar, edges  with moist yellow tissue noted, scant serosanguinous drainage. Right posterior ear stage 4 pressure injury from clip-on earring noted that measures 1x1x0.4cm with moist red and yellow tissue, exposed cartilage, edges moist purple tissue. " Small amount serosanguinous drainage noted. Cleansed right ear with saline and patted dry. aquacel ag extra cut to size and applied to fill posterior wound, then one piece hydrocolloid (duoderm) cut to size and applied to cover both wounds and promote autolytic debridement. Recommend HH at discharge for continued wound care. Discussed with patient that she can no longer wear earrings.   Bilateral heels intact with no redness or breakdown noted. Patient turned to right side with min assistance. Stage 1 pressure injury noted to coccyx that measures 2x2cm with surroudning blanchable redness. Cleansed with bath wipe and thin layer moisture barrier applied. Patient is currently incontinent of urine and stool and wearing a brief. inconitience care provided. Patient turned with foam wedge by primary nurse Selam Charles at this time.     Please see below for wound care recommendations:    Stage  1 pressure injury coccyx:  1. Cleanse with bath wipes  2. Pat dry  3. Apply thin layer critic aid paste moisture barrier  4. Please ensure that patient is repositioned at least every 2 hours with the actual position of the patient documented in EPIC flow sheet.    Stage 4 pressure injury right posterior ear; Unstageable pressure injury right anterior ear and left ear:  1. Cleanse with saline  2. Pat dry  3. Paint dima wound skin with cavilon  4. Apply small piece silver hydrofiber to fill open wound to right posterior ear  5. Cut hydrocolloid dressing (duoderm extra thin) to size and cover wounds on bilateral ears  6. Change every 3 days and prn edges roll

## 2020-01-03 NOTE — PLAN OF CARE
Care plan reviewed with patient and family. Been asking about her sleeping pill-MD aware of. Able to go carina the bathroom with assistance. Free from falls. No other complaints made.

## 2020-01-21 ENCOUNTER — OFFICE VISIT (OUTPATIENT)
Dept: CARDIOLOGY | Facility: CLINIC | Age: 80
End: 2020-01-21
Payer: MEDICARE

## 2020-01-21 VITALS
WEIGHT: 158.5 LBS | HEIGHT: 64 IN | SYSTOLIC BLOOD PRESSURE: 140 MMHG | BODY MASS INDEX: 27.06 KG/M2 | OXYGEN SATURATION: 93 % | DIASTOLIC BLOOD PRESSURE: 78 MMHG | HEART RATE: 83 BPM

## 2020-01-21 DIAGNOSIS — I10 ESSENTIAL HYPERTENSION: ICD-10-CM

## 2020-01-21 DIAGNOSIS — I48.0 PAF (PAROXYSMAL ATRIAL FIBRILLATION): Primary | ICD-10-CM

## 2020-01-21 DIAGNOSIS — C34.12 MALIGNANT NEOPLASM OF UPPER LOBE OF LEFT LUNG: ICD-10-CM

## 2020-01-21 DIAGNOSIS — I26.99 PE (PULMONARY THROMBOEMBOLISM): ICD-10-CM

## 2020-01-21 PROCEDURE — 99214 OFFICE O/P EST MOD 30 MIN: CPT | Mod: S$PBB,,, | Performed by: INTERNAL MEDICINE

## 2020-01-21 PROCEDURE — 99999 PR PBB SHADOW E&M-EST. PATIENT-LVL III: ICD-10-PCS | Mod: PBBFAC,,, | Performed by: INTERNAL MEDICINE

## 2020-01-21 PROCEDURE — 99214 PR OFFICE/OUTPT VISIT, EST, LEVL IV, 30-39 MIN: ICD-10-PCS | Mod: S$PBB,,, | Performed by: INTERNAL MEDICINE

## 2020-01-21 PROCEDURE — 99999 PR PBB SHADOW E&M-EST. PATIENT-LVL III: CPT | Mod: PBBFAC,,, | Performed by: INTERNAL MEDICINE

## 2020-01-21 PROCEDURE — 1159F PR MEDICATION LIST DOCUMENTED IN MEDICAL RECORD: ICD-10-PCS | Mod: ,,, | Performed by: INTERNAL MEDICINE

## 2020-01-21 PROCEDURE — 1126F PR PAIN SEVERITY QUANTIFIED, NO PAIN PRESENT: ICD-10-PCS | Mod: ,,, | Performed by: INTERNAL MEDICINE

## 2020-01-21 PROCEDURE — 99213 OFFICE O/P EST LOW 20 MIN: CPT | Mod: PBBFAC | Performed by: INTERNAL MEDICINE

## 2020-01-21 PROCEDURE — 1126F AMNT PAIN NOTED NONE PRSNT: CPT | Mod: ,,, | Performed by: INTERNAL MEDICINE

## 2020-01-21 PROCEDURE — 1159F MED LIST DOCD IN RCRD: CPT | Mod: ,,, | Performed by: INTERNAL MEDICINE

## 2020-01-21 NOTE — PROGRESS NOTES
Subjective:   Patient ID:  Rachel Long is a 79 y.o. female who presents for cardiac consult of Atrial Fibrillation and Consult      HPI  The patient came in today for cardiac consult of Atrial Fibrillation and Consult      Rachel Long is a 79 y.o. female pt with PAF, h/o TIA remote, twice, dementia, anxiety, h/o left NSCLC h/o chemoXR, h/o PE and former long time smoker presents for CV follow up.     Hosp Follow up   78 yo female, consult for afib with RVR  [facility] home [facility]  PMH h/o TIA remote, twice, dementia, anxiety, h/o left NSCLC h/o chemoXR, h/o PE and former long time somoker  Pt states that she had fast heart beat when she was stressful  Pt came in colonoscopy due to recent GI bleeding. Eliquis was on hold  The procedure was cancelled due to afib with RVR  Pt denied chest pain, dizziness and faint  Has chronic SOARES and intermittent palpitation  On cardizme gtt and had metoprolol 2.5 mg ivp x1 in ER. HR at 95 bpm.   1/3/2020-admitted yesterday with A-fib with RVR. Converted to NSR overnight after Cardizem. Eliquis on hold due to recent GIB. Has no CNS complaints to suggest TIA or CVA. Is on Heparin gtt with no abnormal bleeding. H/H stable     1/21/20  Per DC summary - recommended Cardizem 60mg PO TID with no metoprolol upon discharge. Eliquis on hold until GI clears post endoscopic procedure given her recent GIB. Lives at Nursing home - [facility]. No CP/SOB. Will see GI for anticoag recs.     Patient feels no chest pain, no sob, no leg swelling, no PND, no palpitation, no dizziness, no syncope, no CNS symptoms.    Patient has fairly good exercise tolerance.    Patient is compliant with medications.    CONCLUSIONS     1 - Concentric hypertrophy.     2 - No wall motion abnormalities.     3 - Normal left ventricular systolic function (EF 55-60%).     4 - Normal left ventricular diastolic function.     5 - Normal right ventricular systolic function .     6 - The estimated PA systolic pressure is 31 mmHg.      7 - Mild tricuspid regurgitation.         This document has been electronically    SIGNED BY: Jose Calvert MD On: 01/02/2020 15:37    Past Medical History:   Diagnosis Date    Acute upper respiratory infection     Anemia     Arthritis     osteo    Bipolar mood disorder     Coordination abnormal     COPD exacerbation 5/9/2016    Coronary artery disease     Difficulty walking     Dysphasia     GERD (gastroesophageal reflux disease)     Hyperlipidemia     Hypertension     on meds    Hypothyroidism, adult     Insomnia     Malignant neoplasm of colon     Muscle weakness     Neuralgia and neuritis     Parkinson disease     Pulmonary embolism     Schizoaffective disorder     SOB (shortness of breath)     Spinal stenosis     Thyroid disease     Urinary tract infection        Past Surgical History:   Procedure Laterality Date    COLON SURGERY      CORONARY STENT PLACEMENT      FRACTURE SURGERY      HYSTERECTOMY  1970's       Social History     Tobacco Use    Smoking status: Former Smoker     Years: 50.00     Types: Cigarettes    Smokeless tobacco: Former User   Substance Use Topics    Alcohol use: No    Drug use: No       Family History   Problem Relation Age of Onset    Asthma Father     Eczema Father     No Known Problems Mother     Other Brother         shoulder surgery        Patient's Medications   New Prescriptions    No medications on file   Previous Medications    ACETAMINOPHEN (TYLENOL) 325 MG TABLET    Take 650 mg by mouth 4 (four) times daily as needed for Pain.    ALBUTEROL (PROVENTIL/VENTOLIN HFA) 90 MCG/ACTUATION INHALER    Inhale 2 puffs into the lungs every 6 (six) hours as needed for Wheezing. Rescue    ATORVASTATIN (LIPITOR) 80 MG TABLET    Take 80 mg by mouth every evening.    BENZTROPINE (COGENTIN) 1 MG TABLET    Take 1 mg by mouth 2 (two) times daily.    DEXTRAN 70-HYPROMELLOSE (NATURAL BALANCE TEARS) 0.1-0.3 % DROP    Apply 1 drop to eye 2 (two) times daily.     DILTIAZEM (CARDIZEM) 60 MG TABLET    Take 1 tablet (60 mg total) by mouth every 8 (eight) hours.    DULOXETINE (CYMBALTA) 60 MG CAPSULE    once daily.    FERROUS SULFATE (IRON) 325 MG (65 MG IRON) TAB TABLET    Take 325 mg by mouth 2 (two) times daily.    LOPERAMIDE (IMODIUM A-D) 2 MG TAB    Take 2 mg by mouth daily as needed (give one po after each episode of loose stool with max of 8 tabs/daily).    MELATONIN 3 MG CAP    Take 1 tablet by mouth nightly as needed.    MUPIROCIN (BACTROBAN) 2 % OINTMENT    Apply topically 2 (two) times daily. Apply to right ear x 7 days    PANTOPRAZOLE (PROTONIX) 40 MG TABLET    Take 40 mg by mouth once daily.    POTASSIUM CHLORIDE SA (K-DUR,KLOR-CON) 20 MEQ TABLET    Take 40 mEq by mouth once daily. Give 20 ml to eaqual to 40 meq    QUETIAPINE (SEROQUEL) 100 MG TAB        SENNA-DOCUSATE 8.6-50 MG (PERICOLACE) 8.6-50 MG PER TABLET    Take 1 tablet by mouth 2 (two) times daily.    THIAMINE 100 MG TABLET    Take 100 mg by mouth once daily.    TRAMADOL (ULTRAM) 50 MG TABLET    Take 50 mg by mouth every 6 (six) hours as needed for Pain.    TRAZODONE (DESYREL) 50 MG TABLET    Take 50 mg by mouth nightly as needed.    Modified Medications    No medications on file   Discontinued Medications    No medications on file       Review of Systems   Constitutional: Negative.    HENT: Negative.    Eyes: Negative.    Respiratory: Negative.    Cardiovascular: Negative.    Gastrointestinal: Negative.    Genitourinary: Negative.    Musculoskeletal: Negative.    Skin: Negative.    Neurological: Negative.    Endo/Heme/Allergies: Negative.    Psychiatric/Behavioral: Negative.    All 12 systems otherwise negative.      Wt Readings from Last 3 Encounters:   01/21/20 71.9 kg (158 lb 8.2 oz)   01/03/20 73.7 kg (162 lb 7.7 oz)   11/19/19 70 kg (154 lb 6.4 oz)     Temp Readings from Last 3 Encounters:   01/03/20 98.1 °F (36.7 °C) (Oral)   01/02/20 97.9 °F (36.6 °C) (Temporal)   11/19/19 97.9 °F (36.6 °C)     BP  "Readings from Last 3 Encounters:   01/21/20 (!) 140/78   01/03/20 135/73   01/02/20 (!) 150/91     Pulse Readings from Last 3 Encounters:   01/21/20 83   01/03/20 66   01/02/20 (!) 156       BP (!) 140/78 (BP Location: Right arm, Patient Position: Sitting, BP Method: Large (Manual))   Pulse 83   Ht 5' 4" (1.626 m)   Wt 71.9 kg (158 lb 8.2 oz)   LMP  (LMP Unknown)   SpO2 (!) 93%   BMI 27.21 kg/m²     Objective:   Physical Exam   Constitutional: She is oriented to person, place, and time. She appears well-developed and well-nourished. No distress.   HENT:   Head: Normocephalic and atraumatic.   Nose: Nose normal.   Mouth/Throat: Oropharynx is clear and moist.   Eyes: Conjunctivae and EOM are normal. No scleral icterus.   Neck: Normal range of motion. Neck supple. No JVD present. No thyromegaly present.   Cardiovascular: Normal rate, regular rhythm, S1 normal and S2 normal. Exam reveals no gallop, no S3, no S4 and no friction rub.   No murmur heard.  Pulmonary/Chest: Effort normal and breath sounds normal. No stridor. No respiratory distress. She has no wheezes. She has no rales. She exhibits no tenderness.   Abdominal: Soft. Bowel sounds are normal. She exhibits no distension and no mass. There is no tenderness. There is no rebound.   Genitourinary:   Genitourinary Comments: Deferred   Musculoskeletal: Normal range of motion. She exhibits no edema, tenderness or deformity.   Lymphadenopathy:     She has no cervical adenopathy.   Neurological: She is alert and oriented to person, place, and time. She exhibits normal muscle tone. Coordination normal.   Skin: Skin is warm and dry. No rash noted. She is not diaphoretic. No erythema. No pallor.   Psychiatric: She has a normal mood and affect. Her behavior is normal. Judgment and thought content normal.   Nursing note and vitals reviewed.      Lab Results   Component Value Date     01/03/2020    K 3.4 (L) 01/03/2020     01/03/2020    CO2 31 (H) 01/03/2020 "    BUN 17 01/03/2020    CREATININE 0.7 01/03/2020    GLU 99 01/03/2020    MG 1.7 04/13/2011    AST 14 01/03/2020    ALT 9 (L) 01/03/2020    ALBUMIN 3.3 (L) 01/03/2020    PROT 6.8 01/03/2020    BILITOT 0.8 01/03/2020    WBC 6.89 01/03/2020    HGB 12.1 01/03/2020    HCT 39.2 01/03/2020    MCV 93 01/03/2020     (L) 01/03/2020    INR 1.0 01/02/2020    TSH 4.482 (H) 08/20/2019    BNP 26 01/02/2020     Assessment:      1. PAF (paroxysmal atrial fibrillation)    2. Essential hypertension    3. PE (pulmonary thromboembolism)    4. Malignant neoplasm of upper lobe of left lung        Plan:   1. PAF - in NSR  - cont meds  - check Holter  - restart Eliquis when ok per GI     2. HTN  - cont meds    3. PE h/o  - restart A/C when ok per GI    4. Lung CA  - s/p tx per onc    Thank you for allowing me to participate in this patient's care. Please do not hesitate to contact me with any questions or concerns. Consult note has been forwarded to the referral physician.

## 2020-01-21 NOTE — LETTER
January 21, 2020      Piedad Haddad, NICOLASA  [address] [address]   [place] [facility] [address]           [facility]  [address] [address] [address]  Phone: [phone]  Fax: [phone]          Patient: Rachel Long   MR Number: [id number]   YOB: 1940   Date of Visit: 1/21/2020       Dear Piedad Haddad:    Thank you for referring Rachel Long to me for evaluation. Attached you will find relevant portions of my assessment and plan of care.    If you have questions, please do not hesitate to call me. I look forward to following Rachel Long along with you.    Sincerely,    Duy Lui MD    Enclosure  CC:  No Recipients    If you would like to receive this communication electronically, please contact [email or web address] or [phone] to request more information on hi5 Link access.    For providers and/or their staff who would like to refer a patient to [facility], please contact us through our one-stop-shop provider referral line, Alfonzo Munson, at [phone].    If you feel you have received this communication in error or would no longer like to receive these types of communications, please e-mail [email or web address]

## 2020-02-11 ENCOUNTER — HOSPITAL ENCOUNTER (OUTPATIENT)
Dept: CARDIOLOGY | Facility: HOSPITAL | Age: 80
Discharge: HOME | End: 2020-02-11
Attending: INTERNAL MEDICINE
Payer: MEDICARE

## 2020-02-11 DIAGNOSIS — I48.0 PAF (PAROXYSMAL ATRIAL FIBRILLATION): ICD-10-CM

## 2020-02-11 PROCEDURE — 93227 XTRNL ECG REC<48 HR R&I: CPT | Mod: ,,, | Performed by: INTERNAL MEDICINE

## 2020-02-11 PROCEDURE — 93227 HOLTER MONITOR - 48 HOUR (CUPID ONLY): ICD-10-PCS | Mod: ,,, | Performed by: INTERNAL MEDICINE

## 2020-02-11 PROCEDURE — 93225 XTRNL ECG REC<48 HRS REC: CPT

## 2020-02-15 LAB
OHS CV EVENT MONITOR DAY: 0
OHS CV HOLTER LENGTH DECIMAL HOURS: 48
OHS CV HOLTER LENGTH HOURS: 48
OHS CV HOLTER LENGTH MINUTES: 0

## 2020-04-21 ENCOUNTER — OFFICE VISIT (OUTPATIENT)
Dept: CARDIOLOGY | Facility: CLINIC | Age: 80
End: 2020-04-21
Payer: MEDICARE

## 2020-04-21 DIAGNOSIS — G45.9 TIA (TRANSIENT ISCHEMIC ATTACK): ICD-10-CM

## 2020-04-21 DIAGNOSIS — I48.0 PAF (PAROXYSMAL ATRIAL FIBRILLATION): Primary | ICD-10-CM

## 2020-04-21 DIAGNOSIS — I10 ESSENTIAL HYPERTENSION: ICD-10-CM

## 2020-04-21 DIAGNOSIS — C34.12 MALIGNANT NEOPLASM OF UPPER LOBE OF LEFT LUNG: Chronic | ICD-10-CM

## 2020-04-21 DIAGNOSIS — K92.2 GASTROINTESTINAL HEMORRHAGE, UNSPECIFIED GASTROINTESTINAL HEMORRHAGE TYPE: ICD-10-CM

## 2020-04-21 DIAGNOSIS — I26.99 PE (PULMONARY THROMBOEMBOLISM): ICD-10-CM

## 2020-04-21 DIAGNOSIS — J96.11 CHRONIC RESPIRATORY FAILURE WITH HYPOXIA: ICD-10-CM

## 2020-04-21 PROCEDURE — 99499 UNLISTED E&M SERVICE: CPT | Mod: ,,, | Performed by: INTERNAL MEDICINE

## 2020-04-21 PROCEDURE — 99442 PR PHYSICIAN TELEPHONE EVALUATION 11-20 MIN: CPT | Mod: 95,,, | Performed by: INTERNAL MEDICINE

## 2020-04-21 PROCEDURE — 99442 PR PHYSICIAN TELEPHONE EVALUATION 11-20 MIN: ICD-10-PCS | Mod: 95,,, | Performed by: INTERNAL MEDICINE

## 2020-04-21 PROCEDURE — 99499 NO LOS: ICD-10-PCS | Mod: ,,, | Performed by: INTERNAL MEDICINE

## 2020-04-21 NOTE — PROGRESS NOTES
Subjective:   Patient ID:  Rachel Long is a 79 y.o. female who presents for cardiac consult of Follow-up    The patient location is: home  The chief complaint leading to consultation is: CV follow up  Visit type: audio only  Total time spent with patient: 20 min  Each patient to whom he or she provides medical services by telemedicine is:  (1) informed of the relationship between the physician and patient and the respective role of any other health care provider with respect to management of the patient; and (2) notified that he or she may decline to receive medical services by telemedicine and may withdraw from such care at any time.    Notes: see below    Follow-up       The patient came in today for cardiac consult of Follow-up      Rachel Long is a 79 y.o. female pt with PAF, h/o TIA remote, twice, dementia, anxiety, h/o left NSCLC h/o chemoXR, h/o PE and former long time smoker presents for CV follow up.     Hosp Follow up   78 yo female, consult for afib with RVR  [facility] home resident  [facility] h/o TIA remote, twice, dementia, anxiety, h/o left NSCLC h/o chemoXR, h/o PE and former long time somoker  Pt states that she had fast heart beat when she was stressful  Pt came in colonoscopy due to recent GI bleeding. Eliquis was on hold  The procedure was cancelled due to afib with RVR  Pt denied chest pain, dizziness and faint  Has chronic SOARES and intermittent palpitation  On cardizme gtt and had metoprolol 2.5 mg ivp x1 in ER. HR at 95 bpm.   1/3/2020-admitted yesterday with A-fib with RVR. Converted to NSR overnight after Cardizem. Eliquis on hold due to recent GIB. Has no CNS complaints to suggest TIA or CVA. Is on Heparin gtt with no abnormal bleeding. H/H stable     1/21/20  Per DC summary - recommended Cardizem 60mg PO TID with no metoprolol upon discharge. Eliquis on hold until GI clears post endoscopic procedure given her recent GIB. Lives at Nursing home - [place]. No CP/SOB. Will see GI for anticoag recs.      4/21/20  Holter in Feb with NSR, rare episodes of Aflutter/PAF. Told pt - Episodes of Afib and Aflutter noted, need to restart Eliquis when ok per GI. - Per last DC - She will also follow up with Dr. Bhakta (GI) within 1 week after discharge to evaluate rescheduling endoscopic procedure. Discussed with pt needs scope, she feels ok now but occ has atypical CP, hard to describe.     Patient feels no sob, no leg swelling, no PND, no palpitation, no dizziness, no syncope, no CNS symptoms.    Patient has fairly good exercise tolerance.    Patient is compliant with medications.    HOLTER 2/11/20  Sinus rhythm with heart rates varying between 52 and 194 bpm with an average of 83 bpm.     Paroxysmal atrial fibrillation     also episodes of atrial flutter with variable rate    CONCLUSIONS     1 - Concentric hypertrophy.     2 - No wall motion abnormalities.     3 - Normal left ventricular systolic function (EF 55-60%).     4 - Normal left ventricular diastolic function.     5 - Normal right ventricular systolic function .     6 - The estimated PA systolic pressure is 31 mmHg.     7 - Mild tricuspid regurgitation.         This document has been electronically    SIGNED BY: Jose Calvert MD On: 01/02/2020 15:37    Past Medical History:   Diagnosis Date    Acute upper respiratory infection     Anemia     Arthritis     osteo    Bipolar mood disorder     Coordination abnormal     COPD exacerbation 5/9/2016    Coronary artery disease     Difficulty walking     Dysphasia     GERD (gastroesophageal reflux disease)     Hyperlipidemia     Hypertension     on meds    Hypothyroidism, adult     Insomnia     Malignant neoplasm of colon     Muscle weakness     Neuralgia and neuritis     Parkinson disease     Pulmonary embolism     Schizoaffective disorder     SOB (shortness of breath)     Spinal stenosis     Thyroid disease     Urinary tract infection        Past Surgical History:   Procedure Laterality Date     COLON SURGERY      CORONARY STENT PLACEMENT      FRACTURE SURGERY      HYSTERECTOMY  1970's       Social History     Tobacco Use    Smoking status: Former Smoker     Years: 50.00     Types: Cigarettes    Smokeless tobacco: Former User   Substance Use Topics    Alcohol use: No    Drug use: No       Family History   Problem Relation Age of Onset    Asthma Father     Eczema Father     No Known Problems Mother     Other Brother         shoulder surgery        Patient's Medications   New Prescriptions    No medications on file   Previous Medications    ACETAMINOPHEN (TYLENOL) 325 MG TABLET    Take 650 mg by mouth 4 (four) times daily as needed for Pain.    ALBUTEROL (PROVENTIL/VENTOLIN HFA) 90 MCG/ACTUATION INHALER    Inhale 2 puffs into the lungs every 6 (six) hours as needed for Wheezing. Rescue    ATORVASTATIN (LIPITOR) 80 MG TABLET    Take 80 mg by mouth every evening.    BENZTROPINE (COGENTIN) 1 MG TABLET    Take 1 mg by mouth 2 (two) times daily.    DEXTRAN 70-HYPROMELLOSE (NATURAL BALANCE TEARS) 0.1-0.3 % DROP    Apply 1 drop to eye 2 (two) times daily.    DILTIAZEM (CARDIZEM) 60 MG TABLET    Take 1 tablet (60 mg total) by mouth every 8 (eight) hours.    DULOXETINE (CYMBALTA) 60 MG CAPSULE    once daily.    FERROUS SULFATE (IRON) 325 MG (65 MG IRON) TAB TABLET    Take 325 mg by mouth 2 (two) times daily.    LOPERAMIDE (IMODIUM A-D) 2 MG TAB    Take 2 mg by mouth daily as needed (give one po after each episode of loose stool with max of 8 tabs/daily).    MELATONIN 3 MG CAP    Take 1 tablet by mouth nightly as needed.    MUPIROCIN (BACTROBAN) 2 % OINTMENT    Apply topically 2 (two) times daily. Apply to right ear x 7 days    PANTOPRAZOLE (PROTONIX) 40 MG TABLET    Take 40 mg by mouth once daily.    POTASSIUM CHLORIDE SA (K-DUR,KLOR-CON) 20 MEQ TABLET    Take 40 mEq by mouth once daily. Give 20 ml to eaqual to 40 meq    QUETIAPINE (SEROQUEL) 100 MG TAB        SENNA-DOCUSATE 8.6-50 MG (PERICOLACE) 8.6-50 MG  PER TABLET    Take 1 tablet by mouth 2 (two) times daily.    THIAMINE 100 MG TABLET    Take 100 mg by mouth once daily.    TRAMADOL (ULTRAM) 50 MG TABLET    Take 50 mg by mouth every 6 (six) hours as needed for Pain.    TRAZODONE (DESYREL) 50 MG TABLET    Take 50 mg by mouth nightly as needed.    Modified Medications    No medications on file   Discontinued Medications    No medications on file       Review of Systems   Constitutional: Negative.    HENT: Negative.    Eyes: Negative.    Respiratory: Negative.    Cardiovascular: Negative.    Gastrointestinal: Negative.    Genitourinary: Negative.    Musculoskeletal: Negative.    Skin: Negative.    Neurological: Negative.    Endo/Heme/Allergies: Negative.    Psychiatric/Behavioral: Negative.    All 12 systems otherwise negative.      Wt Readings from Last 3 Encounters:   01/21/20 71.9 kg (158 lb 8.2 oz)   01/03/20 73.7 kg (162 lb 7.7 oz)   11/19/19 70 kg (154 lb 6.4 oz)     Temp Readings from Last 3 Encounters:   01/03/20 98.1 °F (36.7 °C) (Oral)   01/02/20 97.9 °F (36.6 °C) (Temporal)   11/19/19 97.9 °F (36.6 °C)     BP Readings from Last 3 Encounters:   01/21/20 (!) 140/78   01/03/20 135/73   01/02/20 (!) 150/91     Pulse Readings from Last 3 Encounters:   01/21/20 83   01/03/20 66   01/02/20 (!) 156       LMP  (LMP Unknown)     Objective:   Physical Exam       Lab Results   Component Value Date     01/03/2020    K 3.4 (L) 01/03/2020     01/03/2020    CO2 31 (H) 01/03/2020    BUN 17 01/03/2020    CREATININE 0.7 01/03/2020    GLU 99 01/03/2020    MG 1.7 04/13/2011    AST 14 01/03/2020    ALT 9 (L) 01/03/2020    ALBUMIN 3.3 (L) 01/03/2020    PROT 6.8 01/03/2020    BILITOT 0.8 01/03/2020    WBC 6.89 01/03/2020    HGB 12.1 01/03/2020    HCT 39.2 01/03/2020    MCV 93 01/03/2020     (L) 01/03/2020    INR 1.0 01/02/2020    TSH 4.482 (H) 08/20/2019    BNP 26 01/02/2020     Assessment:      1. PAF (paroxysmal atrial fibrillation)    2. Essential  hypertension    3. TIA (transient ischemic attack)    4. PE (pulmonary thromboembolism)    5. Chronic respiratory failure with hypoxia    6. Malignant neoplasm of upper lobe of left lung        Plan:   1. PAF   - cont meds  - Holter - with brief PAF and flutter noted  - restart Eliquis when ok per GI     2. HTN  - cont meds    3. PE h/o  - restart A/C when ok per GI    4. Lung CA  - s/p tx per onc    5.GIB  - needs f/u with GI  - moderate CV risk for Cscope/EGD     Thank you for allowing me to participate in this patient's care. Please do not hesitate to contact me with any questions or concerns. Consult note has been forwarded to the referral physician.

## 2020-04-23 ENCOUNTER — PATIENT MESSAGE (OUTPATIENT)
Dept: GASTROENTEROLOGY | Facility: CLINIC | Age: 80
End: 2020-04-23

## 2020-06-17 ENCOUNTER — TELEPHONE (OUTPATIENT)
Dept: GASTROENTEROLOGY | Facility: CLINIC | Age: 80
End: 2020-06-17

## 2020-06-17 ENCOUNTER — OFFICE VISIT (OUTPATIENT)
Dept: GASTROENTEROLOGY | Facility: CLINIC | Age: 80
End: 2020-06-17
Payer: MEDICARE

## 2020-06-17 VITALS — BODY MASS INDEX: 29.5 KG/M2 | WEIGHT: 172.81 LBS | HEART RATE: 78 BPM | HEIGHT: 64 IN

## 2020-06-17 DIAGNOSIS — Z98.890 HISTORY OF ESOPHAGOGASTRODUODENOSCOPY (EGD): ICD-10-CM

## 2020-06-17 DIAGNOSIS — Z12.11 COLON CANCER SCREENING: ICD-10-CM

## 2020-06-17 DIAGNOSIS — Z85.038 HX OF COLON CANCER, STAGE II: ICD-10-CM

## 2020-06-17 DIAGNOSIS — K92.2 GASTROINTESTINAL HEMORRHAGE, UNSPECIFIED GASTROINTESTINAL HEMORRHAGE TYPE: ICD-10-CM

## 2020-06-17 DIAGNOSIS — D50.0 IRON DEFICIENCY ANEMIA DUE TO CHRONIC BLOOD LOSS: Primary | ICD-10-CM

## 2020-06-17 PROCEDURE — 99214 OFFICE O/P EST MOD 30 MIN: CPT | Mod: PBBFAC | Performed by: INTERNAL MEDICINE

## 2020-06-17 PROCEDURE — 99999 PR PBB SHADOW E&M-EST. PATIENT-LVL IV: ICD-10-PCS | Mod: PBBFAC,,, | Performed by: INTERNAL MEDICINE

## 2020-06-17 PROCEDURE — 99213 OFFICE O/P EST LOW 20 MIN: CPT | Mod: S$PBB,,, | Performed by: INTERNAL MEDICINE

## 2020-06-17 PROCEDURE — 99213 PR OFFICE/OUTPT VISIT, EST, LEVL III, 20-29 MIN: ICD-10-PCS | Mod: S$PBB,,, | Performed by: INTERNAL MEDICINE

## 2020-06-17 PROCEDURE — 99999 PR PBB SHADOW E&M-EST. PATIENT-LVL IV: CPT | Mod: PBBFAC,,, | Performed by: INTERNAL MEDICINE

## 2020-06-17 RX ORDER — SPIRONOLACTONE 25 MG/1
TABLET ORAL
COMMUNITY
Start: 2020-06-12 | End: 2020-07-23 | Stop reason: SDUPTHER

## 2020-06-17 RX ORDER — SODIUM, POTASSIUM,MAG SULFATES 17.5-3.13G
SOLUTION, RECONSTITUTED, ORAL ORAL
Qty: 254 ML | Refills: 0 | Status: ON HOLD | OUTPATIENT
Start: 2020-06-17 | End: 2020-07-09 | Stop reason: ALTCHOICE

## 2020-06-17 RX ORDER — BUSPIRONE HYDROCHLORIDE 5 MG/1
5 TABLET ORAL 2 TIMES DAILY
COMMUNITY
Start: 2020-06-12

## 2020-06-17 NOTE — H&P (VIEW-ONLY)
"Subjective:       Patient ID: Rachel Long is a 79 y.o. female.    Chief Complaint: Rectal Bleeding and Abdominal Pain    Patient with a history of colon cancer, had her last colonoscopy in 2012. She was admitted to the hospital from endoscopy in January of this year when she developed an atrial arrhythmia. She has been followed by cardiology and her procedure has never been rescheduled. We will need to do that now. At the time she was having dysphagia and she is now on what sounds like a mechanical soft with "chopped up meat". She no longer has the UGI complaint, however, the EGD and colon will still be required due hx of iron deficiency anemia. It responded to Iron infusion according to patient. Labs show she was significantly iron deficient last year, but Hgb last done in [facility] was up to normal.     Review of Systems   Constitutional: Positive for chills. Negative for activity change, appetite change, diaphoresis, fatigue, fever and unexpected weight change.   HENT: Positive for ear pain and voice change. Negative for congestion, ear discharge, hearing loss, nosebleeds, postnasal drip and tinnitus.    Eyes: Negative for photophobia and visual disturbance.   Respiratory: Positive for cough and wheezing. Negative for apnea, choking, chest tightness and shortness of breath.    Cardiovascular: Positive for chest pain. Negative for palpitations and leg swelling.   Gastrointestinal: Negative for abdominal distention, abdominal pain, anal bleeding, blood in stool, constipation, diarrhea, nausea, rectal pain and vomiting.   Genitourinary: Negative for difficulty urinating, dyspareunia, dysuria, flank pain, frequency, hematuria, menstrual problem, pelvic pain, urgency, vaginal bleeding and vaginal discharge.   Musculoskeletal: Positive for back pain. Negative for arthralgias, gait problem, joint swelling, myalgias and neck stiffness.   Skin: Negative for pallor and rash.        Pruritus   Neurological: Positive for " headaches. Negative for dizziness, tremors, seizures, syncope, speech difficulty, weakness and numbness.   Hematological: Negative for adenopathy.   Psychiatric/Behavioral: Negative for agitation, confusion, hallucinations, sleep disturbance and suicidal ideas.       Objective:      Physical Exam  Vitals signs reviewed.   Constitutional:       Appearance: She is well-developed.   HENT:      Head: Normocephalic and atraumatic.   Eyes:      General: No scleral icterus.        Right eye: No discharge.         Left eye: No discharge.      Conjunctiva/sclera: Conjunctivae normal.      Pupils: Pupils are equal, round, and reactive to light.   Neck:      Musculoskeletal: Normal range of motion and neck supple.      Thyroid: No thyromegaly.      Vascular: No JVD.   Cardiovascular:      Rate and Rhythm: Normal rate and regular rhythm.      Heart sounds: Normal heart sounds. No murmur. No friction rub. No gallop.    Pulmonary:      Effort: Pulmonary effort is normal. No respiratory distress.      Breath sounds: No wheezing or rales.      Comments: Decreased breath sounds left anterior chest  Chest:      Chest wall: No tenderness.   Abdominal:      General: Bowel sounds are normal. There is no distension.      Palpations: Abdomen is soft. There is no mass.      Tenderness: There is abdominal tenderness. There is no guarding or rebound.      Comments: Fullness diffusely; also generalized tenderness.    Musculoskeletal: Normal range of motion.   Lymphadenopathy:      Cervical: No cervical adenopathy.   Skin:     General: Skin is warm and dry.      Coloration: Skin is not pale.      Findings: No erythema or rash.   Neurological:      Mental Status: She is alert and oriented to person, place, and time.      Motor: No abnormal muscle tone.      Coordination: Coordination normal.      Deep Tendon Reflexes: Reflexes are normal and symmetric.   Psychiatric:         Behavior: Behavior normal.         Thought Content: Thought content  normal.         Judgment: Judgment normal.         Assessment:   Iron Deficiency Anemia  Hx of Colon Cancer       Plan:   EGD and Colonoscopy

## 2020-06-17 NOTE — LETTER
June 17, 2020      Duy Lui MD  [address]  [place] [address]           [facility]  [address] [place] [id number]  Phone: [phone]  Fax: [phone]          Patient: Rachel Long   MR Number: [id number]   YOB: 1940   Date of Visit: 6/17/2020       Dear Dr. Duy Lui:    Thank you for referring Rachel Long to me for evaluation. Attached you will find relevant portions of my assessment and plan of care.    If you have questions, please do not hesitate to call me. I look forward to following Rachel Long along with you.    Sincerely,    Cesar Montague III, MD    Enclosure  CC:  No Recipients    If you would like to receive this communication electronically, please contact [email or web address] or [phone] to request more information on [employer] access.    For providers and/or their staff who would like to refer a patient to [facility], please contact us through our one-stop-shop provider referral line, [facility], at [phone].    If you feel you have received this communication in error or would no longer like to receive these types of communications, please e-mail [email or web address]

## 2020-06-17 NOTE — PROGRESS NOTES
"Subjective:       Patient ID: Rachel Long is a 79 y.o. female.    Chief Complaint: Rectal Bleeding and Abdominal Pain    Patient with a history of colon cancer, had her last colonoscopy in 2012. She was admitted to the hospital from endoscopy in January of this year when she developed an atrial arrhythmia. She has been followed by cardiology and her procedure has never been rescheduled. We will need to do that now. At the time she was having dysphagia and she is now on what sounds like a mechanical soft with "chopped up meat". She no longer has the UGI complaint, however, the EGD and colon will still be required due hx of iron deficiency anemia. It responded to Iron infusion according to patient. Labs show she was significantly iron deficient last year, but Hgb last done in [facility] was up to normal.     Review of Systems   Constitutional: Positive for chills. Negative for activity change, appetite change, diaphoresis, fatigue, fever and unexpected weight change.   HENT: Positive for ear pain and voice change. Negative for congestion, ear discharge, hearing loss, nosebleeds, postnasal drip and tinnitus.    Eyes: Negative for photophobia and visual disturbance.   Respiratory: Positive for cough and wheezing. Negative for apnea, choking, chest tightness and shortness of breath.    Cardiovascular: Positive for chest pain. Negative for palpitations and leg swelling.   Gastrointestinal: Negative for abdominal distention, abdominal pain, anal bleeding, blood in stool, constipation, diarrhea, nausea, rectal pain and vomiting.   Genitourinary: Negative for difficulty urinating, dyspareunia, dysuria, flank pain, frequency, hematuria, menstrual problem, pelvic pain, urgency, vaginal bleeding and vaginal discharge.   Musculoskeletal: Positive for back pain. Negative for arthralgias, gait problem, joint swelling, myalgias and neck stiffness.   Skin: Negative for pallor and rash.        Pruritus   Neurological: Positive for " headaches. Negative for dizziness, tremors, seizures, syncope, speech difficulty, weakness and numbness.   Hematological: Negative for adenopathy.   Psychiatric/Behavioral: Negative for agitation, confusion, hallucinations, sleep disturbance and suicidal ideas.       Objective:      Physical Exam  Vitals signs reviewed.   Constitutional:       Appearance: She is well-developed.   HENT:      Head: Normocephalic and atraumatic.   Eyes:      General: No scleral icterus.        Right eye: No discharge.         Left eye: No discharge.      Conjunctiva/sclera: Conjunctivae normal.      Pupils: Pupils are equal, round, and reactive to light.   Neck:      Musculoskeletal: Normal range of motion and neck supple.      Thyroid: No thyromegaly.      Vascular: No JVD.   Cardiovascular:      Rate and Rhythm: Normal rate and regular rhythm.      Heart sounds: Normal heart sounds. No murmur. No friction rub. No gallop.    Pulmonary:      Effort: Pulmonary effort is normal. No respiratory distress.      Breath sounds: No wheezing or rales.      Comments: Decreased breath sounds left anterior chest  Chest:      Chest wall: No tenderness.   Abdominal:      General: Bowel sounds are normal. There is no distension.      Palpations: Abdomen is soft. There is no mass.      Tenderness: There is abdominal tenderness. There is no guarding or rebound.      Comments: Fullness diffusely; also generalized tenderness.    Musculoskeletal: Normal range of motion.   Lymphadenopathy:      Cervical: No cervical adenopathy.   Skin:     General: Skin is warm and dry.      Coloration: Skin is not pale.      Findings: No erythema or rash.   Neurological:      Mental Status: She is alert and oriented to person, place, and time.      Motor: No abnormal muscle tone.      Coordination: Coordination normal.      Deep Tendon Reflexes: Reflexes are normal and symmetric.   Psychiatric:         Behavior: Behavior normal.         Thought Content: Thought content  normal.         Judgment: Judgment normal.         Assessment:   Iron Deficiency Anemia  Hx of Colon Cancer       Plan:   EGD and Colonoscopy

## 2020-06-17 NOTE — PROGRESS NOTES
COVID Screening     1. Have you had a fever in the last 7 days or have you used fever reducing medicines for a fever in the last 7 days?  no    2. Are you experiencing shortness of breath, cough, muscle aches, loss of taste or loss of smell?  no    3. Are you residing with anyone who has tested positive for Covid?  no    If answered yes to any of the above questions, the pt must be scheduled for an appointment with their PCP.    A message also needs to be sent to the endoscopist to ensure the patient gets rescheduled at a later date.     ENDO screening    1. Have you been admitted overnight to the hospital in the past 3 months? no   If yes, schedule an appointment with PCP before scheduling endoscopic procedure.     2. Have you had a stent placed in the last 12 months? no   If yes, for a screening visit, cancel and message the ordering provider.  The patient will need a new order when the time is appropriate.     3. Have you had a stroke or heart attack in the past 6 months? no   If yes, cancel and refer patient to ordering provider for clearance, also message ordering provider to inform.     4. Have you had any chest pain in the past 3 months? no   If yes, Have you been evaluated by your PCP and/or cardiologist and it was determined to not be heart related? no   If No, Pt needs to be seen by PCP or Cardiologist .  Pt can be scheduled once clearance obtained by either of those providers.     5. Do you take prescription weight loss medications?  no   If yes, must stop for 2 weeks prior to procedure.     6. Have you been diagnosed with diverticulitis within the past 3 months? no   If yes, must have been seen by GI within the last 3 months, if not schedule with GI ANANT.    If pt has been seen by GI, schedule procedure 8-12 weeks post antibiotic treatment.     7. Are you on Dialysis? no  If yes, schedule procedure for the day AFTER dialysis.  Appt time should be 9am or later, patient arrival time is 2 hours prior.   "Nulytely or    miralax prep for all patients with kidney disease.     8. Are you diabetic?  no   If yes, schedule morning appt. Advise pt to hold all diabetic meds day of procedure.     9. If pt is older than 80 years of age and HAS NOT been seen by GI or PCP within the last 6 months, needs appt with GI ANANT.   If pt has been seen by the GI provider or PCP within the past 6  months AND meets criteria, schedule procedure AND send message to the endoscopist.     10. Is patient on a "high risk" medication (blood thinner/antiplatelet agent)?  no   If yes, has cardiac clearance been obtained within the last 60 days? No   If no, a new clearance needs to be obtained.       I have reviewed the last colonoscopy for recommendations regarding next procedure bowel prep.  yes  I have reviewed medications and allergies.  yes  I have verified the pharmacy information and appropriate prep sent if needed. yes  Prep instructions have been mailed or sent to portal per patient request. no    If answers yes to any of the following, schedule at [facility] ONLY. If No, OK for either location.     Is BMI over 45?   Any complaints of chest pain, new onset or at rest?  Does pt have an AICD?  Is there a diagnosis of heart failure?  Does patient have an insulin pump?  If procedure for esophageal banding?          "

## 2020-07-07 ENCOUNTER — LAB VISIT (OUTPATIENT)
Dept: OTOLARYNGOLOGY | Facility: CLINIC | Age: 80
End: 2020-07-07
Payer: MEDICARE

## 2020-07-07 DIAGNOSIS — Z12.11 COLON CANCER SCREENING: ICD-10-CM

## 2020-07-07 DIAGNOSIS — Z98.890 HISTORY OF ESOPHAGOGASTRODUODENOSCOPY (EGD): ICD-10-CM

## 2020-07-07 PROCEDURE — U0003 INFECTIOUS AGENT DETECTION BY NUCLEIC ACID (DNA OR RNA); SEVERE ACUTE RESPIRATORY SYNDROME CORONAVIRUS 2 (SARS-COV-2) (CORONAVIRUS DISEASE [COVID-19]), AMPLIFIED PROBE TECHNIQUE, MAKING USE OF HIGH THROUGHPUT TECHNOLOGIES AS DESCRIBED BY CMS-2020-01-R: HCPCS

## 2020-07-08 LAB — SARS-COV-2 RNA RESP QL NAA+PROBE: NOT DETECTED

## 2020-07-09 ENCOUNTER — HOSPITAL ENCOUNTER (OUTPATIENT)
Facility: HOSPITAL | Age: 80
Discharge: HOME | End: 2020-07-09
Attending: INTERNAL MEDICINE | Admitting: INTERNAL MEDICINE
Payer: MEDICARE

## 2020-07-09 ENCOUNTER — ANESTHESIA EVENT (OUTPATIENT)
Dept: ENDOSCOPY | Facility: HOSPITAL | Age: 80
End: 2020-07-09
Payer: MEDICARE

## 2020-07-09 ENCOUNTER — ANESTHESIA (OUTPATIENT)
Dept: ENDOSCOPY | Facility: HOSPITAL | Age: 80
End: 2020-07-09
Payer: MEDICARE

## 2020-07-09 VITALS
HEIGHT: 64 IN | DIASTOLIC BLOOD PRESSURE: 98 MMHG | SYSTOLIC BLOOD PRESSURE: 157 MMHG | WEIGHT: 171.31 LBS | OXYGEN SATURATION: 90 % | TEMPERATURE: 98 F | HEART RATE: 83 BPM | BODY MASS INDEX: 29.24 KG/M2 | RESPIRATION RATE: 18 BRPM

## 2020-07-09 DIAGNOSIS — D50.0 IRON DEFICIENCY ANEMIA DUE TO CHRONIC BLOOD LOSS: ICD-10-CM

## 2020-07-09 PROCEDURE — 25000003 PHARM REV CODE 250: Performed by: NURSE ANESTHETIST, CERTIFIED REGISTERED

## 2020-07-09 PROCEDURE — 63600175 PHARM REV CODE 636 W HCPCS: Performed by: INTERNAL MEDICINE

## 2020-07-09 PROCEDURE — 45378 DIAGNOSTIC COLONOSCOPY: CPT | Mod: 53,,, | Performed by: INTERNAL MEDICINE

## 2020-07-09 PROCEDURE — 45378 DIAGNOSTIC COLONOSCOPY: CPT | Performed by: INTERNAL MEDICINE

## 2020-07-09 PROCEDURE — 43235 EGD DIAGNOSTIC BRUSH WASH: CPT | Performed by: INTERNAL MEDICINE

## 2020-07-09 PROCEDURE — 43235 EGD DIAGNOSTIC BRUSH WASH: CPT | Mod: 51,,, | Performed by: INTERNAL MEDICINE

## 2020-07-09 PROCEDURE — 37000008 HC ANESTHESIA 1ST 15 MINUTES: Performed by: INTERNAL MEDICINE

## 2020-07-09 PROCEDURE — 37000009 HC ANESTHESIA EA ADD 15 MINS: Performed by: INTERNAL MEDICINE

## 2020-07-09 PROCEDURE — 45378 PR COLONOSCOPY,DIAGNOSTIC: ICD-10-PCS | Mod: 53,,, | Performed by: INTERNAL MEDICINE

## 2020-07-09 PROCEDURE — 25000242 PHARM REV CODE 250 ALT 637 W/ HCPCS: Performed by: ANESTHESIOLOGY

## 2020-07-09 PROCEDURE — 63600175 PHARM REV CODE 636 W HCPCS: Performed by: NURSE ANESTHETIST, CERTIFIED REGISTERED

## 2020-07-09 PROCEDURE — 43235 PR EGD, FLEX, DIAGNOSTIC: ICD-10-PCS | Mod: 51,,, | Performed by: INTERNAL MEDICINE

## 2020-07-09 RX ORDER — LIDOCAINE HYDROCHLORIDE 10 MG/ML
INJECTION, SOLUTION EPIDURAL; INFILTRATION; INTRACAUDAL; PERINEURAL
Status: DISCONTINUED | OUTPATIENT
Start: 2020-07-09 | End: 2020-07-09

## 2020-07-09 RX ORDER — SODIUM CHLORIDE 0.9 % (FLUSH) 0.9 %
10 SYRINGE (ML) INJECTION
Status: DISCONTINUED | OUTPATIENT
Start: 2020-07-09 | End: 2020-07-09 | Stop reason: HOSPADM

## 2020-07-09 RX ORDER — SODIUM CHLORIDE, SODIUM LACTATE, POTASSIUM CHLORIDE, CALCIUM CHLORIDE 600; 310; 30; 20 MG/100ML; MG/100ML; MG/100ML; MG/100ML
INJECTION, SOLUTION INTRAVENOUS CONTINUOUS
Status: DISCONTINUED | OUTPATIENT
Start: 2020-07-09 | End: 2020-07-09 | Stop reason: HOSPADM

## 2020-07-09 RX ORDER — ALBUTEROL SULFATE 0.83 MG/ML
2.5 SOLUTION RESPIRATORY (INHALATION) ONCE
Status: COMPLETED | OUTPATIENT
Start: 2020-07-09 | End: 2020-07-09

## 2020-07-09 RX ORDER — ALBUTEROL SULFATE 0.83 MG/ML
2.5 SOLUTION RESPIRATORY (INHALATION) EVERY 4 HOURS PRN
Status: DISCONTINUED | OUTPATIENT
Start: 2020-07-09 | End: 2020-07-09

## 2020-07-09 RX ORDER — PROPOFOL 10 MG/ML
VIAL (ML) INTRAVENOUS
Status: DISCONTINUED | OUTPATIENT
Start: 2020-07-09 | End: 2020-07-09

## 2020-07-09 RX ADMIN — PROPOFOL 30 MG: 10 INJECTION, EMULSION INTRAVENOUS at 11:07

## 2020-07-09 RX ADMIN — PROPOFOL 70 MG: 10 INJECTION, EMULSION INTRAVENOUS at 11:07

## 2020-07-09 RX ADMIN — ALBUTEROL SULFATE 2.5 MG: 2.5 SOLUTION RESPIRATORY (INHALATION) at 09:07

## 2020-07-09 RX ADMIN — LIDOCAINE HYDROCHLORIDE 50 MG: 10 INJECTION, SOLUTION EPIDURAL; INFILTRATION; INTRACAUDAL; PERINEURAL at 11:07

## 2020-07-09 RX ADMIN — SODIUM CHLORIDE, SODIUM LACTATE, POTASSIUM CHLORIDE, AND CALCIUM CHLORIDE: 600; 310; 30; 20 INJECTION, SOLUTION INTRAVENOUS at 09:07

## 2020-07-09 NOTE — TRANSFER OF CARE
"Anesthesia Transfer of Care Note    Patient: Rachel Long    Procedure(s) Performed: Procedure(s) (LRB):  EGD (ESOPHAGOGASTRODUODENOSCOPY) (N/A)  COLONOSCOPY (N/A)    Patient location: PACU    Anesthesia Type: MAC    Transport from OR: Transported from OR on room air with adequate spontaneous ventilation    Post pain: adequate analgesia    Post assessment: no apparent anesthetic complications    Post vital signs: stable    Level of consciousness: awake    Nausea/Vomiting: no nausea/vomiting    Complications: none    Transfer of care protocol was followed      Last vitals:   Visit Vitals  BP (!) 144/99 (BP Location: Left arm, Patient Position: Lying)   Pulse 80   Temp 36.6 °C (97.9 °F)   Resp (!) 93   Ht 5' 4" (1.626 m)   Wt 77.7 kg (171 lb 4.8 oz)   LMP  (LMP Unknown)   SpO2 98%   Breastfeeding No   BMI 29.40 kg/m²     "

## 2020-07-09 NOTE — INTERVAL H&P NOTE
The patient has been examined and the H&P has been reviewed:  Family History   Problem Relation Age of Onset    Asthma Father     Eczema Father     No Known Problems Mother     Other Brother         shoulder surgery      Past Medical History:   Diagnosis Date    Acute upper respiratory infection     Anemia     Arthritis     osteo    Bipolar mood disorder     Coordination abnormal     COPD exacerbation 5/9/2016    Coronary artery disease     Difficulty walking     Dysphasia     GERD (gastroesophageal reflux disease)     Hyperlipidemia     Hypertension     on meds    Hypothyroidism, adult     Insomnia     Malignant neoplasm of colon     Muscle weakness     Neuralgia and neuritis     Parkinson disease     Pulmonary embolism     Schizoaffective disorder     SOB (shortness of breath)     Spinal stenosis     Stroke     pt states mini strokes    Thyroid disease     Urinary tract infection      Past Surgical History:   Procedure Laterality Date    COLON SURGERY      CORONARY STENT PLACEMENT      FRACTURE SURGERY      HYSTERECTOMY  1970's     Social History     Socioeconomic History    Marital status: Single     Spouse name: Not on file    Number of children: Not on file    Years of education: Not on file    Highest education level: Not on file   Occupational History    Not on file   Social Needs    Financial resource strain: Not on file    Food insecurity     Worry: Not on file     Inability: Not on file    Transportation needs     Medical: Not on file     Non-medical: Not on file   Tobacco Use    Smoking status: Former Smoker     Years: 50.00     Types: Cigarettes    Smokeless tobacco: Former User   Substance and Sexual Activity    Alcohol use: No    Drug use: No    Sexual activity: Never   Lifestyle    Physical activity     Days per week: Not on file     Minutes per session: Not on file    Stress: Not on file   Relationships    Social connections     Talks on phone: Not on  file     Gets together: Not on file     Attends [employer] service: Not on file     Active member of club or organization: Not on file     Attends meetings of clubs or organizations: Not on file     Relationship status: Not on file   Other Topics Concern    Not on file   Social History Narrative    Not on file     Review of patient's allergies indicates:   Allergen Reactions    Benadryl [diphenhydramine hcl] Anxiety    Sulfa (sulfonamide antibiotics) Rash     No current facility-administered medications on file prior to encounter.      Current Outpatient Medications on File Prior to Encounter   Medication Sig Dispense Refill    acetaminophen (TYLENOL) 325 MG tablet Take 650 mg by mouth 4 (four) times daily as needed for Pain.      atorvastatin (LIPITOR) 80 MG tablet Take 80 mg by mouth every evening.      benztropine (COGENTIN) 1 MG tablet Take 1 mg by mouth 2 (two) times daily.      busPIRone (BUSPAR) 5 MG Tab       dextran 70-hypromellose (NATURAL BALANCE TEARS) 0.1-0.3 % Drop Apply 1 drop to eye 2 (two) times daily.      diltiaZEM (CARDIZEM) 60 MG tablet Take 1 tablet (60 mg total) by mouth every 8 (eight) hours. 90 tablet 1    DULoxetine (CYMBALTA) 60 MG capsule once daily.      ferrous sulfate (IRON) 325 mg (65 mg iron) Tab tablet Take 325 mg by mouth 2 (two) times daily.      pantoprazole (PROTONIX) 40 MG tablet Take 40 mg by mouth once daily.      QUEtiapine (SEROQUEL) 100 MG Tab       spironolactone (ALDACTONE) 25 MG tablet       thiamine 100 MG tablet Take 100 mg by mouth once daily.      traZODone (DESYREL) 50 MG tablet Take 50 mg by mouth nightly as needed.       albuterol (PROVENTIL/VENTOLIN HFA) 90 mcg/actuation inhaler Inhale 2 puffs into the lungs every 6 (six) hours as needed for Wheezing. Rescue      loperamide (IMODIUM A-D) 2 mg Tab Take 2 mg by mouth daily as needed (give one po after each episode of loose stool with max of 8 tabs/daily).      melatonin 3 mg Cap Take 1 tablet by  mouth nightly as needed.      [DISCONTINUED] mupirocin (BACTROBAN) 2 % ointment Apply topically 2 (two) times daily. Apply to right ear x 7 days 1 Tube 0    [DISCONTINUED] sodium,potassium,mag sulfates (SUPREP BOWEL PREP KIT) 17.5-3.13-1.6 gram SolR As directed for colonoscopy 254 mL 0    [DISCONTINUED] traMADol (ULTRAM) 50 mg tablet Take 50 mg by mouth every 6 (six) hours as needed for Pain.             Anesthesia/Surgery risks, benefits and alternative options discussed and understood by patient/family.          Active Hospital Problems    Diagnosis  POA    Iron deficiency anemia [D50.9]  Yes      Resolved Hospital Problems   No resolved problems to display.

## 2020-07-09 NOTE — PLAN OF CARE
MD spoke with patient and transportation from nursing home . Procedure report and discharge instructions were explained and hard copy given.

## 2020-07-09 NOTE — DISCHARGE SUMMARY
[facility] -   Brief Operative Note     SUMMARY     Surgery Date: 7/9/2020     Surgeon(s) and Role:     * Cesar Montague III, MD - Primary    Assisting Surgeon: None    Pre-op Diagnosis:  Iron deficiency anemia due to chronic blood loss [D50.0]  Hx of colon cancer, stage II [Z85.038]    Post-op Diagnosis:  Post-Op Diagnosis Codes:     * Iron deficiency anemia due to chronic blood loss [D50.0]     * Hx of colon cancer, stage II [Z85.038]      - Hiatal hernia  Procedure(s) (LRB):  EGD (ESOPHAGOGASTRODUODENOSCOPY) (N/A)  COLONOSCOPY (N/A)    Anesthesia: Monitor Anesthesia Care    Description of the findings of the procedure: Procedures completed. See Procedure note for full details.    Findings/Key Components: Procedures completed. See Procedure note for full details.    Prosthetics/Devices: None    Estimated Blood Loss: * No values recorded between 7/9/2020 12:00 AM and 7/9/2020 12:31 PM *         Specimens:   Specimen (12h ago, onward)    None          Discharge Note    SUMMARY     Admit Date: 7/9/2020    Discharge Date and Time: 7/9/2020    Hospital Course (synopsis of major diagnoses, care, treatment, and services provided during the course of the hospital stay):  Procedures completed. See Procedure note for full details. Discharge patient when discharge criteria met.    Final Diagnosis: Post-Op Diagnosis Codes:     * Iron deficiency anemia due to chronic blood loss [D50.0]     * Hx of colon cancer, stage II [Z85.038]      - Hiatal Hernia  Disposition: Discharge patient when discharge criteria met.    Follow Up/Patient Instructions:       Medications:  Reconciled Home Medications:   Current Discharge Medication List      CONTINUE these medications which have NOT CHANGED    Details   acetaminophen (TYLENOL) 325 MG tablet Take 650 mg by mouth 4 (four) times daily as needed for Pain.      atorvastatin (LIPITOR) 80 MG tablet Take 80 mg by mouth every evening.      benztropine (COGENTIN) 1 MG tablet Take  1 mg by mouth 2 (two) times daily.      busPIRone (BUSPAR) 5 MG Tab       dextran 70-hypromellose (NATURAL BALANCE TEARS) 0.1-0.3 % Drop Apply 1 drop to eye 2 (two) times daily.      diltiaZEM (CARDIZEM) 60 MG tablet Take 1 tablet (60 mg total) by mouth every 8 (eight) hours.  Qty: 90 tablet, Refills: 1      DULoxetine (CYMBALTA) 60 MG capsule once daily.      ferrous sulfate (IRON) 325 mg (65 mg iron) Tab tablet Take 325 mg by mouth 2 (two) times daily.      pantoprazole (PROTONIX) 40 MG tablet Take 40 mg by mouth once daily.      QUEtiapine (SEROQUEL) 100 MG Tab       spironolactone (ALDACTONE) 25 MG tablet     Comments: .      thiamine 100 MG tablet Take 100 mg by mouth once daily.      traZODone (DESYREL) 50 MG tablet Take 50 mg by mouth nightly as needed.       albuterol (PROVENTIL/VENTOLIN HFA) 90 mcg/actuation inhaler Inhale 2 puffs into the lungs every 6 (six) hours as needed for Wheezing. Rescue      loperamide (IMODIUM A-D) 2 mg Tab Take 2 mg by mouth daily as needed (give one po after each episode of loose stool with max of 8 tabs/daily).      melatonin 3 mg Cap Take 1 tablet by mouth nightly as needed.            Discharge Procedure Orders   Diet general     Activity as tolerated

## 2020-07-09 NOTE — ANESTHESIA POSTPROCEDURE EVALUATION
Anesthesia Post Evaluation    Patient: Rachel Long    Procedure(s) Performed: Procedure(s) (LRB):  EGD (ESOPHAGOGASTRODUODENOSCOPY) (N/A)  COLONOSCOPY (N/A)    Final Anesthesia Type: MAC    Patient location during evaluation: PACU  Level of consciousness: awake and alert  Post-procedure vital signs: reviewed and stable  Pain management: adequate    PONV status at discharge: No PONV  Anesthetic complications: no      Cardiovascular status: blood pressure returned to baseline  Respiratory status: unassisted  Hydration status: euvolemic  Follow-up not needed.          Vitals Value Taken Time   /72 07/09/20 1156   Temp 98 07/09/20 1156   Pulse 66 07/09/20 1156   Resp 12 07/09/20 1156   SpO2 98 07/09/20 1156         No case tracking events are documented in the log.      Pain/Royer Score: No data recorded

## 2020-07-09 NOTE — INTERVAL H&P NOTE
The patient has been examined and the H&P has been reviewed:I have reviewed this note and I agree with this assessment. The patient was seen in the GI office and remains stable for endoscopy at the time of this present evaluation. Patient required Respiratory treatments and has cleared wheezing noted at the time of her arrival.         Anesthesia/Surgery risks, benefits and alternative options discussed and understood by patient/family.          Active Hospital Problems    Diagnosis  POA    Iron deficiency anemia [D50.9]  Yes      Resolved Hospital Problems   No resolved problems to display.

## 2020-07-09 NOTE — ANESTHESIA RELEASE NOTE
"Anesthesia Release from PACU Note    Patient: Rachel Long    Procedure(s) Performed: Procedure(s) (LRB):  EGD (ESOPHAGOGASTRODUODENOSCOPY) (N/A)  COLONOSCOPY (N/A)    Anesthesia type: MAC    Post pain: Adequate analgesia    Post assessment: no apparent anesthetic complications    Last Vitals:   Visit Vitals  BP (!) 144/99 (BP Location: Left arm, Patient Position: Lying)   Pulse 80   Temp 36.6 °C (97.9 °F)   Resp (!) 93   Ht 5' 4" (1.626 m)   Wt 77.7 kg (171 lb 4.8 oz)   LMP  (LMP Unknown)   SpO2 98%   Breastfeeding No   BMI 29.40 kg/m²       Post vital signs: stable    Level of consciousness: awake    Nausea/Vomiting: no nausea/no vomiting    Complications: none    Airway Patency: patent    Respiratory: unassisted    Cardiovascular: stable and blood pressure at baseline    Hydration: euvolemic     "

## 2020-07-09 NOTE — [employer] PATIENT INSTRUCTIONS
Discharge Summary/Instructions after an Endoscopic Procedure  Patient Name: Rachel Long  Patient MRN: [id number]  Patient YOB: 1940 Thursday, July 9, 2020 Cesar Montague III, MD  RESTRICTIONS:  During your procedure today, you received medications for sedation.  These   medications may affect your judgment, balance and coordination.  Therefore,   for 24 hours, you have the following restrictions:   - DO NOT drive a car, operate machinery, make legal/financial decisions,   sign important papers or drink alcohol.    ACTIVITY:  Today: no heavy lifting, straining or running due to procedural   sedation/anesthesia.  The following day: return to full activity including work.  DIET:  Eat and drink normally unless instructed otherwise.     TREATMENT FOR COMMON SIDE EFFECTS:  - Mild abdominal pain, nausea, belching, bloating or excessive gas:  rest,   eat lightly and use a heating pad.  - Sore Throat: treat with throat lozenges and/or gargle with warm salt   water.  - Because air was used during the procedure, expelling large amounts of air   from your rectum or belching is normal.  - If a bowel prep was taken, you may not have a bowel movement for 1-3 days.    This is normal.  SYMPTOMS TO WATCH FOR AND REPORT TO YOUR PHYSICIAN:  1. Abdominal pain or bloating, other than gas cramps.  2. Chest pain.  3. Back pain.  4. Signs of infection such as: chills or fever occurring within 24 hours   after the procedure.  5. Rectal bleeding, which would show as bright red, maroon, or black stools.   (A tablespoon of blood from the rectum is not serious, especially if   hemorrhoids are present.)  6. Vomiting.  7. Weakness or dizziness.  GO DIRECTLY TO THE NEAREST EMERGENCY ROOM IF YOU HAVE ANY OF THE FOLLOWING:      Difficulty breathing              Chills and/or fever over 101 F   Persistent vomiting and/or vomiting blood   Severe abdominal pain   Severe chest pain   Black, tarry stools   Bleeding- more than one  tablespoon   Any other symptom or condition that you feel may need urgent attention  Your doctor recommends these additional instructions:  If any biopsies were taken, your doctors clinic will contact you in 1 to 2   weeks with any results.  - Discharge patient to home (via wheelchair).   - Resume previous diet.   - Continue present medications.   - Return to GI clinic at appointment to be scheduled.  For questions, problems or results please call your physician Cesar Montague III, MD at Work:  [phone]  If you have any questions about the above instructions, call the GI   department at [phone] or call the endoscopy unit at [phone]   from 7am until 3 pm.  [facility] [place], EMERGENCY ROOM PHONE NUMBER:   [phone]  IF A COMPLICATION OR EMERGENCY SITUATION ARISES AND YOU ARE UNABLE TO REACH   YOUR PHYSICIAN - GO DIRECTLY TO THE EMERGENCY ROOM.  I have read or have had read to me these discharge instructions for my   procedure and have received a written copy.  I understand these   instructions and will follow-up with my physician if I have any questions.     __________________________________       _____________________________________  Nurse Signature                                          Patient/Designated   Responsible Party Signature  Cesar Montague III, MD  7/9/2020 12:29:36 PM  This report has been verified and signed electronically.  [employer]

## 2020-07-09 NOTE — [employer] PATIENT INSTRUCTIONS
Discharge Summary/Instructions after an Endoscopic Procedure  Patient Name: Rachel Long  Patient MRN: [id number]  Patient YOB: 1940 Thursday, July 9, 2020 Cesar Montague III, MD  RESTRICTIONS:  During your procedure today, you received medications for sedation.  These   medications may affect your judgment, balance and coordination.  Therefore,   for 24 hours, you have the following restrictions:   - DO NOT drive a car, operate machinery, make legal/financial decisions,   sign important papers or drink alcohol.    ACTIVITY:  Today: no heavy lifting, straining or running due to procedural   sedation/anesthesia.  The following day: return to full activity including work.  DIET:  Eat and drink normally unless instructed otherwise.     TREATMENT FOR COMMON SIDE EFFECTS:  - Mild abdominal pain, nausea, belching, bloating or excessive gas:  rest,   eat lightly and use a heating pad.  - Sore Throat: treat with throat lozenges and/or gargle with warm salt   water.  - Because air was used during the procedure, expelling large amounts of air   from your rectum or belching is normal.  - If a bowel prep was taken, you may not have a bowel movement for 1-3 days.    This is normal.  SYMPTOMS TO WATCH FOR AND REPORT TO YOUR PHYSICIAN:  1. Abdominal pain or bloating, other than gas cramps.  2. Chest pain.  3. Back pain.  4. Signs of infection such as: chills or fever occurring within 24 hours   after the procedure.  5. Rectal bleeding, which would show as bright red, maroon, or black stools.   (A tablespoon of blood from the rectum is not serious, especially if   hemorrhoids are present.)  6. Vomiting.  7. Weakness or dizziness.  GO DIRECTLY TO THE NEAREST EMERGENCY ROOM IF YOU HAVE ANY OF THE FOLLOWING:      Difficulty breathing              Chills and/or fever over 101 F   Persistent vomiting and/or vomiting blood   Severe abdominal pain   Severe chest pain   Black, tarry stools   Bleeding- more than one  tablespoon   Any other symptom or condition that you feel may need urgent attention  Your doctor recommends these additional instructions:  If any biopsies were taken, your doctors clinic will contact you in 1 to 2   weeks with any results.  - Discharge patient to home (via wheelchair).   - High fiber diet.   - Continue present medications.   - Repeat colonoscopy (date not yet determined) because the bowel preparation   was suboptimal.   - Return to GI clinic at appointment to be scheduled.   - Discharge patient to home (via wheelchair).   - High fiber diet.   - Continue present medications.   - Repeat colonoscopy (date not yet determined) because the bowel preparation   was suboptimal.  For questions, problems or results please call your physician Cesar Montague III, MD at Work:  [phone]  If you have any questions about the above instructions, call the GI   department at [phone] or call the endoscopy unit at [phone]   from 7am until 3 pm.  [facility] [place], EMERGENCY ROOM PHONE NUMBER:   [phone]  IF A COMPLICATION OR EMERGENCY SITUATION ARISES AND YOU ARE UNABLE TO REACH   YOUR PHYSICIAN - GO DIRECTLY TO THE EMERGENCY ROOM.  I have read or have had read to me these discharge instructions for my   procedure and have received a written copy.  I understand these   instructions and will follow-up with my physician if I have any questions.     __________________________________       _____________________________________  Nurse Signature                                          Patient/Designated   Responsible Party Signature  Cesar Montague III, MD  7/9/2020 12:24:59 PM  This report has been verified and signed electronically.  [employer]

## 2020-07-09 NOTE — DISCHARGE INSTRUCTIONS
What Is a Hiatal Hernia?    Hiatal hernia is when the area where the stomach and esophagus meet bulges up through the diaphragm into the chest cavity. In some cases, part of the stomach may bulge above the diaphragm. Stomach acid may move up into the esophagus and cause symptoms. The symptoms are often blamed on gastroesophageal reflux disease (GERD). You may only know about the hernia when it shows up on an X-ray taken for other reasons.   What you may feel  The hiatus is a normal hole in the diaphragm. The esophagus passes through this hole and leads to the stomach. In some cases, part of the stomach may bulge above the diaphragm. This bulge is called a hernia. Stomach acid may move up into the esophagus and cause symptoms.  When you eat, the muscle at the hiatus relaxes to allow food to pass into the stomach. It tightens again to keep food and digestive acids in the stomach.  Many people with hiatal hernias have mild symptoms. You may notice the following GERD symptoms:  · Heartburn or other chest discomfort  · A feeling of chest fullness after a meal  · Frequent burping  · Acid taste in the mouth  · Trouble swallowing  Treating symptoms  If you have been diagnosed with hiatal hernia, these suggestions may help improve symptoms:  · Lose excess weight. Extra weight puts pressure on the stomach and esophagus.  · Dont lie down after eating. Sit up for at least an hour after eating. Lying down after eating can increase symptoms.  · Avoid certain foods and drinks. These include fatty foods, chocolate, coffee, mint, and other foods that cause symptoms for you.  · Dont smoke or drink alcohol. These can worsen symptoms.  · Look at your medicines. Discuss your medicines with your healthcare provider. Many medicines can cause symptoms.  · Consider an antacid medicine. Ask your healthcare provider about over-the-counter and prescription medicines that may help.  · Ask about surgery, if needed. Surgery is a treatment  choice for some people. Your healthcare provider can determine if surgery is an option for you.    Date Last Reviewed: 10/1/2016  © 5690-7748 [employer], "[employer]. [address] [place] [address], [place] [address]. All rights reserved. This information is not intended as a substitute for professional medical care. Always follow your healthcare professional's instructions.

## 2020-07-09 NOTE — ANESTHESIA PREPROCEDURE EVALUATION
07/09/2020  Rachel Long is a 79 y.o., female.    Anesthesia Evaluation    I have reviewed the Patient Summary Reports.    I have reviewed the Nursing Notes. I have reviewed the NPO Status.   I have reviewed the Medications.     Review of Systems  Anesthesia Hx:  No problems with previous Anesthesia    Social:  Former Smoker    Hematology/Oncology:  Hematology Normal   Oncology Normal    --  Thrombocytopenia: chronic    EENT/Dental:EENT/Dental Normal   Cardiovascular:   Hypertension, well controlled CAD asymptomatic CABG/stent  hyperlipidemia    Pulmonary:   COPD, moderate Asthma moderate Shortness of breath  Chest Tumor/Mass:  Pulmonary Embolism, > 6 months ago    Renal/:  Renal/ Normal     Hepatic/GI:   Bowel Prep. GERD, poorly controlled    Musculoskeletal:   Arthritis     Neurological:   TIA,  Movement Disorder Dx, Parkinson's Disease   Endocrine:   Hypothyroidism    Dermatological:  Skin Normal    Psych:   Psychiatric History  Psychotic Disorder, Schizophrenia and Bipolar disorder.          Physical Exam  General:  Obesity    Airway/Jaw/Neck:  Airway Findings: Mouth Opening: Normal Tongue: Normal  General Airway Assessment: Adult       Chest/Lungs:  Chest/Lungs Findings: Clear to auscultation     Heart/Vascular:  Heart Findings: Rate: Normal             Anesthesia Plan  Type of Anesthesia, risks & benefits discussed:  Anesthesia Type:  MAC  Patient's Preference:   Intra-op Monitoring Plan: standard ASA monitors  Intra-op Monitoring Plan Comments:   Post Op Pain Control Plan:   Post Op Pain Control Plan Comments:   Induction:   IV  Beta Blocker:  Patient is not currently on a Beta-Blocker (No further documentation required).       Informed Consent: Patient understands risks and agrees with Anesthesia plan.  Questions answered. Anesthesia consent signed with patient.  ASA Score: 3     Day of Surgery  Review of History & Physical: I have interviewed and examined the patient. I have reviewed the patient's H&P dated:  There are no significant changes.          Ready For Surgery From Anesthesia Perspective.

## 2020-07-22 DIAGNOSIS — I48.0 PAF (PAROXYSMAL ATRIAL FIBRILLATION): Primary | ICD-10-CM

## 2020-07-23 ENCOUNTER — HOSPITAL ENCOUNTER (OUTPATIENT)
Dept: CARDIOLOGY | Facility: HOSPITAL | Age: 80
Discharge: HOME | End: 2020-07-23
Attending: INTERNAL MEDICINE
Payer: MEDICARE

## 2020-07-23 ENCOUNTER — OFFICE VISIT (OUTPATIENT)
Dept: CARDIOLOGY | Facility: CLINIC | Age: 80
End: 2020-07-23
Payer: MEDICARE

## 2020-07-23 VITALS
WEIGHT: 175.25 LBS | OXYGEN SATURATION: 95 % | BODY MASS INDEX: 29.92 KG/M2 | SYSTOLIC BLOOD PRESSURE: 138 MMHG | HEIGHT: 64 IN | HEART RATE: 72 BPM | DIASTOLIC BLOOD PRESSURE: 76 MMHG

## 2020-07-23 DIAGNOSIS — J44.9 COPD, VERY SEVERE: Chronic | ICD-10-CM

## 2020-07-23 DIAGNOSIS — I48.0 PAF (PAROXYSMAL ATRIAL FIBRILLATION): Primary | ICD-10-CM

## 2020-07-23 DIAGNOSIS — I10 ESSENTIAL HYPERTENSION: ICD-10-CM

## 2020-07-23 DIAGNOSIS — I48.0 PAF (PAROXYSMAL ATRIAL FIBRILLATION): ICD-10-CM

## 2020-07-23 DIAGNOSIS — J96.11 CHRONIC RESPIRATORY FAILURE WITH HYPOXIA: ICD-10-CM

## 2020-07-23 DIAGNOSIS — C34.12 MALIGNANT NEOPLASM OF UPPER LOBE OF LEFT LUNG: Chronic | ICD-10-CM

## 2020-07-23 DIAGNOSIS — I26.99 PE (PULMONARY THROMBOEMBOLISM): ICD-10-CM

## 2020-07-23 PROCEDURE — 93005 ELECTROCARDIOGRAM TRACING: CPT

## 2020-07-23 PROCEDURE — 99214 PR OFFICE/OUTPT VISIT, EST, LEVL IV, 30-39 MIN: ICD-10-PCS | Mod: S$PBB,25,, | Performed by: INTERNAL MEDICINE

## 2020-07-23 PROCEDURE — 99999 PR PBB SHADOW E&M-EST. PATIENT-LVL IV: ICD-10-PCS | Mod: PBBFAC,,, | Performed by: INTERNAL MEDICINE

## 2020-07-23 PROCEDURE — 99214 OFFICE O/P EST MOD 30 MIN: CPT | Mod: PBBFAC | Performed by: INTERNAL MEDICINE

## 2020-07-23 PROCEDURE — 99999 PR PBB SHADOW E&M-EST. PATIENT-LVL IV: CPT | Mod: PBBFAC,,, | Performed by: INTERNAL MEDICINE

## 2020-07-23 PROCEDURE — 93010 EKG 12-LEAD: ICD-10-PCS | Mod: ,,, | Performed by: INTERNAL MEDICINE

## 2020-07-23 PROCEDURE — 99214 OFFICE O/P EST MOD 30 MIN: CPT | Mod: S$PBB,25,, | Performed by: INTERNAL MEDICINE

## 2020-07-23 PROCEDURE — 93010 ELECTROCARDIOGRAM REPORT: CPT | Mod: ,,, | Performed by: INTERNAL MEDICINE

## 2020-07-23 RX ORDER — SPIRONOLACTONE 25 MG/1
25 TABLET ORAL DAILY
Qty: 90 TABLET | Refills: 1 | Status: SHIPPED | OUTPATIENT
Start: 2020-07-23 | End: 2020-12-29 | Stop reason: SDUPTHER

## 2020-07-23 RX ORDER — DILTIAZEM HYDROCHLORIDE 60 MG/1
60 TABLET, FILM COATED ORAL EVERY 8 HOURS
Qty: 90 TABLET | Refills: 1 | Status: SHIPPED | OUTPATIENT
Start: 2020-07-23 | End: 2020-11-10 | Stop reason: SDUPTHER

## 2020-07-23 RX ORDER — ATORVASTATIN CALCIUM 80 MG/1
80 TABLET, FILM COATED ORAL NIGHTLY
Qty: 90 TABLET | Refills: 4 | Status: SHIPPED | OUTPATIENT
Start: 2020-07-23 | End: 2020-12-29 | Stop reason: SDUPTHER

## 2020-07-23 NOTE — PROGRESS NOTES
Subjective:   Patient ID:  Rachel Long is a 79 y.o. female who presents for cardiac consult of PAF (paroxysmal atrial fibrillation)    Follow-up      The patient came in today for cardiac consult of PAF (paroxysmal atrial fibrillation)    Rachel Long is a 79 y.o. female pt with PAF, h/o TIA remote, twice, dementia, anxiety, h/o left NSCLC h/o chemoXR, h/o PE and former long time smoker presents for CV follow up.     Hosp Follow up   78 yo female, consult for afib with RVR  [facility]  [facility] h/o TIA remote, twice, dementia, anxiety, h/o left NSCLC h/o chemoXR, h/o PE and former long time somoker  Pt states that she had fast heart beat when she was stressful  Pt came in colonoscopy due to recent GI bleeding. Eliquis was on hold  The procedure was cancelled due to afib with RVR  Pt denied chest pain, dizziness and faint  Has chronic SOARES and intermittent palpitation  On cardizme gtt and had metoprolol 2.5 mg ivp x1 in ER. HR at 95 bpm.   1/3/2020-admitted yesterday with A-fib with RVR. Converted to NSR overnight after Cardizem. Eliquis on hold due to recent GIB. Has no CNS complaints to suggest TIA or CVA. Is on Heparin gtt with no abnormal bleeding. H/H stable     1/21/20  Per DC summary - recommended Cardizem 60mg PO TID with no metoprolol upon discharge. Eliquis on hold until GI clears post endoscopic procedure given her recent GIB. Lives at Nursing home - [facility]. No CP/SOB. Will see GI for anticoag recs.     4/21/20  Holter in Feb with NSR, rare episodes of Aflutter/PAF. Told pt - Episodes of Afib and Aflutter noted, need to restart Eliquis when ok per GI. - Per last DC - She will also follow up with Dr. Bhakta (GI) within 1 week after discharge to evaluate rescheduling endoscopic procedure. Discussed with pt needs scope, she feels ok now but occ has atypical CP, hard to describe.     7/23/20  EGD with hiatal hernia but no ulcers noted. Cscope needs to be redone due to stool/improper prep. She feels  occ palpitations when she is excited. ECG today NSR. She has not restarted Eliquis yet. NO bleeding or falls lately.     Patient feels no sob, no leg swelling, no PND, no palpitation, no dizziness, no syncope, no CNS symptoms.    Patient has fairly good exercise tolerance.    Patient is compliant with medications.    EGD  - Medium-sized hiatal hernia.                         - No gross lesions in the stomach.                         - Normal duodenal bulb, second portion of the                         duodenum and third portion of the duodenum.                         - No specimens collected.     HOLTER 2/11/20  Sinus rhythm with heart rates varying between 52 and 194 bpm with an average of 83 bpm.     Paroxysmal atrial fibrillation     also episodes of atrial flutter with variable rate    CONCLUSIONS     1 - Concentric hypertrophy.     2 - No wall motion abnormalities.     3 - Normal left ventricular systolic function (EF 55-60%).     4 - Normal left ventricular diastolic function.     5 - Normal right ventricular systolic function .     6 - The estimated PA systolic pressure is 31 mmHg.     7 - Mild tricuspid regurgitation.         This document has been electronically    SIGNED BY: Jose Calvert MD On: 01/02/2020 15:37    Past Medical History:   Diagnosis Date    Acute upper respiratory infection     Anemia     Arthritis     osteo    Bipolar mood disorder     Coordination abnormal     COPD exacerbation 5/9/2016    Coronary artery disease     Difficulty walking     Dysphasia     GERD (gastroesophageal reflux disease)     Hyperlipidemia     Hypertension     on meds    Hypothyroidism, adult     Insomnia     Malignant neoplasm of colon     Muscle weakness     Neuralgia and neuritis     Parkinson disease     Pulmonary embolism     Schizoaffective disorder     SOB (shortness of breath)     Spinal stenosis     Stroke     pt states mini strokes    Thyroid disease     Urinary tract infection         Past Surgical History:   Procedure Laterality Date    COLON SURGERY      COLONOSCOPY N/A 7/9/2020    Procedure: COLONOSCOPY;  Surgeon: Cesar Montague III, MD;  Location: [facility] ENDO;  Service: Endoscopy;  Laterality: N/A;    CORONARY STENT PLACEMENT      ESOPHAGOGASTRODUODENOSCOPY N/A 7/9/2020    Procedure: EGD (ESOPHAGOGASTRODUODENOSCOPY);  Surgeon: Cesar Montague III, MD;  Location: [facility] ENDO;  Service: Endoscopy;  Laterality: N/A;    FRACTURE SURGERY      HYSTERECTOMY  1970's       Social History     Tobacco Use    Smoking status: Former Smoker     Years: 50.00     Types: Cigarettes    Smokeless tobacco: Former User   Substance Use Topics    Alcohol use: No    Drug use: No       Family History   Problem Relation Age of Onset    Asthma Father     Eczema Father     No Known Problems Mother     Other Brother         shoulder surgery        Patient's Medications   New Prescriptions    No medications on file   Previous Medications    ACETAMINOPHEN (TYLENOL) 325 MG TABLET    Take 650 mg by mouth 4 (four) times daily as needed for Pain.    ALBUTEROL (PROVENTIL/VENTOLIN HFA) 90 MCG/ACTUATION INHALER    Inhale 2 puffs into the lungs every 6 (six) hours as needed for Wheezing. Rescue    ATORVASTATIN (LIPITOR) 80 MG TABLET    Take 80 mg by mouth every evening.    BENZTROPINE (COGENTIN) 1 MG TABLET    Take 1 mg by mouth 2 (two) times daily.    BUSPIRONE (BUSPAR) 5 MG TAB        DEXTRAN 70-HYPROMELLOSE (NATURAL BALANCE TEARS) 0.1-0.3 % DROP    Apply 1 drop to eye 2 (two) times daily.    DILTIAZEM (CARDIZEM) 60 MG TABLET    Take 1 tablet (60 mg total) by mouth every 8 (eight) hours.    DULOXETINE (CYMBALTA) 60 MG CAPSULE    once daily.    FERROUS SULFATE (IRON) 325 MG (65 MG IRON) TAB TABLET    Take 325 mg by mouth 2 (two) times daily.    LOPERAMIDE (IMODIUM A-D) 2 MG TAB    Take 2 mg by mouth daily as needed (give one po after each episode of loose stool with max of 8 tabs/daily).    MELATONIN 3 MG CAP    " Take 1 tablet by mouth nightly as needed.    PANTOPRAZOLE (PROTONIX) 40 MG TABLET    Take 40 mg by mouth once daily.    QUETIAPINE (SEROQUEL) 100 MG TAB        SPIRONOLACTONE (ALDACTONE) 25 MG TABLET        THIAMINE 100 MG TABLET    Take 100 mg by mouth once daily.    TRAZODONE (DESYREL) 50 MG TABLET    Take 50 mg by mouth nightly as needed.    Modified Medications    No medications on file   Discontinued Medications    No medications on file       Review of Systems   Constitutional: Negative.    HENT: Negative.    Eyes: Negative.    Respiratory: Negative.    Cardiovascular: Negative.    Gastrointestinal: Negative.    Genitourinary: Negative.    Musculoskeletal: Negative.    Skin: Negative.    Neurological: Negative.    Endo/Heme/Allergies: Negative.    Psychiatric/Behavioral: Negative.    All 12 systems otherwise negative.      Wt Readings from Last 3 Encounters:   07/23/20 79.5 kg (175 lb 4.3 oz)   07/09/20 77.7 kg (171 lb 4.8 oz)   06/17/20 78.4 kg (172 lb 13.5 oz)     Temp Readings from Last 3 Encounters:   07/09/20 98.2 °F (36.8 °C) (Temporal)   01/03/20 98.1 °F (36.7 °C) (Oral)   01/02/20 97.9 °F (36.6 °C) (Temporal)     BP Readings from Last 3 Encounters:   07/23/20 138/76   07/09/20 (!) 157/98   01/21/20 (!) 140/78     Pulse Readings from Last 3 Encounters:   07/23/20 72   07/09/20 83   06/17/20 78       /76 (BP Location: Left arm, Patient Position: Sitting, BP Method: Medium (Manual))   Pulse 72   Ht 5' 4" (1.626 m)   Wt 79.5 kg (175 lb 4.3 oz)   LMP  (LMP Unknown)   SpO2 95%   BMI 30.08 kg/m²     Objective:   Physical Exam   Constitutional: She is oriented to person, place, and time. She appears well-developed and well-nourished. No distress.   HENT:   Head: Normocephalic and atraumatic.   Nose: Nose normal.   Mouth/Throat: Oropharynx is clear and moist.   Eyes: Conjunctivae and EOM are normal. No scleral icterus.   Neck: Normal range of motion. Neck supple. No JVD present. No thyromegaly " present.   Cardiovascular: Normal rate, regular rhythm, S1 normal and S2 normal. Exam reveals no gallop, no S3, no S4 and no friction rub.   Murmur heard.  Pulmonary/Chest: Effort normal and breath sounds normal. No stridor. No respiratory distress. She has no wheezes. She has no rales. She exhibits no tenderness.   Abdominal: Soft. Bowel sounds are normal. She exhibits no distension and no mass. There is no abdominal tenderness. There is no rebound.   Genitourinary:    Genitourinary Comments: Deferred     Musculoskeletal: Normal range of motion.         General: No tenderness, deformity or edema.   Lymphadenopathy:     She has no cervical adenopathy.   Neurological: She is alert and oriented to person, place, and time. She exhibits normal muscle tone. Coordination normal.   Skin: Skin is warm and dry. No rash noted. She is not diaphoretic. No erythema. No pallor.   Psychiatric: She has a normal mood and affect. Her behavior is normal. Judgment and thought content normal.   Nursing note and vitals reviewed.      Lab Results   Component Value Date     01/03/2020    K 3.4 (L) 01/03/2020     01/03/2020    CO2 31 (H) 01/03/2020    BUN 17 01/03/2020    CREATININE 0.7 01/03/2020    GLU 99 01/03/2020    MG 1.7 04/13/2011    AST 14 01/03/2020    ALT 9 (L) 01/03/2020    ALBUMIN 3.3 (L) 01/03/2020    PROT 6.8 01/03/2020    BILITOT 0.8 01/03/2020    WBC 6.89 01/03/2020    HGB 12.1 01/03/2020    HCT 39.2 01/03/2020    MCV 93 01/03/2020     (L) 01/03/2020    INR 1.0 01/02/2020    TSH 4.482 (H) 08/20/2019    BNP 26 01/02/2020     Assessment:      1. PAF (paroxysmal atrial fibrillation)    2. COPD, very severe    3. Chronic respiratory failure with hypoxia    4. Essential hypertension    5. PE (pulmonary thromboembolism)    6. Malignant neoplasm of upper lobe of left lung        Plan:   1. PAF   - cont meds - cont Dilt  - Holter - with brief PAF and flutter noted  - restart Eliquis when ok per GI     2. HTN  -  cont meds    3. PE h/o  - restart A/C when ok per GI    4. Lung CA  - s/p tx per onc    5.GIB  - needs f/u with GI  - moderate CV risk for Cscope/EGD     Thank you for allowing me to participate in this patient's care. Please do not hesitate to contact me with any questions or concerns. Consult note has been forwarded to the referral physician.

## 2020-11-10 ENCOUNTER — HOSPITAL ENCOUNTER (OUTPATIENT)
Dept: RADIOLOGY | Facility: HOSPITAL | Age: 80
Discharge: HOME | End: 2020-11-10
Attending: INTERNAL MEDICINE
Payer: MEDICARE

## 2020-11-10 ENCOUNTER — PATIENT MESSAGE (OUTPATIENT)
Dept: GASTROENTEROLOGY | Facility: CLINIC | Age: 80
End: 2020-11-10

## 2020-11-10 ENCOUNTER — OFFICE VISIT (OUTPATIENT)
Dept: CARDIOLOGY | Facility: CLINIC | Age: 80
End: 2020-11-10
Payer: MEDICARE

## 2020-11-10 VITALS
SYSTOLIC BLOOD PRESSURE: 134 MMHG | HEART RATE: 84 BPM | WEIGHT: 184.88 LBS | DIASTOLIC BLOOD PRESSURE: 76 MMHG | OXYGEN SATURATION: 95 % | BODY MASS INDEX: 31.73 KG/M2

## 2020-11-10 DIAGNOSIS — I48.0 PAF (PAROXYSMAL ATRIAL FIBRILLATION): Primary | ICD-10-CM

## 2020-11-10 DIAGNOSIS — J44.9 COPD, VERY SEVERE: Chronic | ICD-10-CM

## 2020-11-10 DIAGNOSIS — C34.12 MALIGNANT NEOPLASM OF UPPER LOBE OF LEFT LUNG: Chronic | ICD-10-CM

## 2020-11-10 DIAGNOSIS — G45.9 TIA (TRANSIENT ISCHEMIC ATTACK): ICD-10-CM

## 2020-11-10 DIAGNOSIS — J96.11 CHRONIC RESPIRATORY FAILURE WITH HYPOXIA: ICD-10-CM

## 2020-11-10 DIAGNOSIS — I10 ESSENTIAL HYPERTENSION: ICD-10-CM

## 2020-11-10 DIAGNOSIS — D50.0 IRON DEFICIENCY ANEMIA DUE TO CHRONIC BLOOD LOSS: ICD-10-CM

## 2020-11-10 DIAGNOSIS — I26.99 PE (PULMONARY THROMBOEMBOLISM): ICD-10-CM

## 2020-11-10 DIAGNOSIS — R06.09 DOE (DYSPNEA ON EXERTION): ICD-10-CM

## 2020-11-10 PROCEDURE — 71046 X-RAY EXAM CHEST 2 VIEWS: CPT | Mod: 26,,, | Performed by: RADIOLOGY

## 2020-11-10 PROCEDURE — 99214 PR OFFICE/OUTPT VISIT, EST, LEVL IV, 30-39 MIN: ICD-10-PCS | Mod: S$PBB,,, | Performed by: INTERNAL MEDICINE

## 2020-11-10 PROCEDURE — 99999 PR PBB SHADOW E&M-EST. PATIENT-LVL V: CPT | Mod: PBBFAC,,, | Performed by: INTERNAL MEDICINE

## 2020-11-10 PROCEDURE — 71046 XR CHEST PA AND LATERAL: ICD-10-PCS | Mod: 26,,, | Performed by: RADIOLOGY

## 2020-11-10 PROCEDURE — 71046 X-RAY EXAM CHEST 2 VIEWS: CPT | Mod: TC

## 2020-11-10 PROCEDURE — 99215 OFFICE O/P EST HI 40 MIN: CPT | Mod: PBBFAC | Performed by: INTERNAL MEDICINE

## 2020-11-10 PROCEDURE — 99999 PR PBB SHADOW E&M-EST. PATIENT-LVL V: ICD-10-PCS | Mod: PBBFAC,,, | Performed by: INTERNAL MEDICINE

## 2020-11-10 PROCEDURE — 99214 OFFICE O/P EST MOD 30 MIN: CPT | Mod: S$PBB,,, | Performed by: INTERNAL MEDICINE

## 2020-11-10 RX ORDER — DILTIAZEM HYDROCHLORIDE 60 MG/1
60 TABLET, FILM COATED ORAL EVERY 8 HOURS
Qty: 270 TABLET | Refills: 1 | Status: SHIPPED | OUTPATIENT
Start: 2020-11-10 | End: 2020-12-29 | Stop reason: SDUPTHER

## 2020-11-10 NOTE — PROGRESS NOTES
Subjective:   Patient ID:  Rachel Long is a 80 y.o. female who presents for cardiac consult of Dizziness, Chest Pain, Headache, and Shortness of Breath    Follow-up  Associated symptoms include weakness.     The patient came in today for cardiac consult of Dizziness, Chest Pain, Headache, and Shortness of Breath    Rachel Long is a 80 y.o. female pt with PAF, h/o TIA remote- twice, dementia, anxiety, h/o left NSCLC h/o chemoXR, h/o PE and former long time smoker presents for CV follow up.     Hosp Follow up   78 yo female, consult for afib with RVR  [facility]  [facility] h/o TIA remote, twice, dementia, anxiety, h/o left NSCLC h/o chemoXR, h/o PE and former long time somoker  Pt states that she had fast heart beat when she was stressful  Pt came in colonoscopy due to recent GI bleeding. Eliquis was on hold  The procedure was cancelled due to afib with RVR  Pt denied chest pain, dizziness and faint  Has chronic SOARES and intermittent palpitation  On cardizme gtt and had metoprolol 2.5 mg ivp x1 in ER. HR at 95 bpm.   1/3/2020-admitted yesterday with A-fib with RVR. Converted to NSR overnight after Cardizem. Eliquis on hold due to recent GIB. Has no CNS complaints to suggest TIA or CVA. Is on Heparin gtt with no abnormal bleeding. H/H stable     1/21/20  Per DC summary - recommended Cardizem 60mg PO TID with no metoprolol upon discharge. Eliquis on hold until GI clears post endoscopic procedure given her recent GIB. Lives at Nursing home - [facility]. No CP/SOB. Will see GI for anticoag recs.     4/21/20  Holter in Feb with NSR, rare episodes of Aflutter/PAF. Told pt - Episodes of Afib and Aflutter noted, need to restart Eliquis when ok per GI. - Per last DC - She will also follow up with Dr. Bhakta (GI) within 1 week after discharge to evaluate rescheduling endoscopic procedure. Discussed with pt needs scope, she feels ok now but occ has atypical CP, hard to describe.     7/23/20  EGD with hiatal hernia but no  ulcers noted. Cscope needs to be redone due to stool/improper prep. She feels occ palpitations when she is excited. ECG today NSR. She has not restarted Eliquis yet. NO bleeding or falls lately.     11/10/20  Has not had GI eval since last visit. She has been having more palpitations. Has been getting coughing spells, has been dry. Water does not improve it. She has not gotten C scope yet. She saw Dr. English, was told she looks.     Patient feels  no leg swelling, no PND, no syncope, no CNS symptoms.    Patient has fairly good exercise tolerance.    Patient is compliant with medications.    EGD  - Medium-sized hiatal hernia.                         - No gross lesions in the stomach.                         - Normal duodenal bulb, second portion of the                         duodenum and third portion of the duodenum.                         - No specimens collected.     HOLTER 2/11/20  Sinus rhythm with heart rates varying between 52 and 194 bpm with an average of 83 bpm.   Paroxysmal atrial fibrillation   also episodes of atrial flutter with variable rate    CONCLUSIONS     1 - Concentric hypertrophy.     2 - No wall motion abnormalities.     3 - Normal left ventricular systolic function (EF 55-60%).     4 - Normal left ventricular diastolic function.     5 - Normal right ventricular systolic function .     6 - The estimated PA systolic pressure is 31 mmHg.     7 - Mild tricuspid regurgitation.         This document has been electronically    SIGNED BY: Jose Calvert MD On: 01/02/2020 15:37    Past Medical History:   Diagnosis Date    Acute upper respiratory infection     Anemia     Arthritis     osteo    Bipolar mood disorder     Coordination abnormal     COPD exacerbation 5/9/2016    Coronary artery disease     Difficulty walking     Dysphasia     GERD (gastroesophageal reflux disease)     Hyperlipidemia     Hypertension     on meds    Hypothyroidism, adult     Insomnia     Malignant neoplasm of colon      Muscle weakness     Neuralgia and neuritis     Parkinson disease     Pulmonary embolism     Schizoaffective disorder     SOB (shortness of breath)     Spinal stenosis     Stroke     pt states mini strokes    Thyroid disease     Urinary tract infection        Past Surgical History:   Procedure Laterality Date    COLON SURGERY      COLONOSCOPY N/A 7/9/2020    Procedure: COLONOSCOPY;  Surgeon: Cesar Montague III, MD;  Location: [facility] ENDO;  Service: Endoscopy;  Laterality: N/A;    CORONARY STENT PLACEMENT      ESOPHAGOGASTRODUODENOSCOPY N/A 7/9/2020    Procedure: EGD (ESOPHAGOGASTRODUODENOSCOPY);  Surgeon: Cesar Montague III, MD;  Location: [facility] ENDO;  Service: Endoscopy;  Laterality: N/A;    FRACTURE SURGERY      HYSTERECTOMY  1970's       Social History     Tobacco Use    Smoking status: Former Smoker     Years: 50.00     Types: Cigarettes    Smokeless tobacco: Former User   Substance Use Topics    Alcohol use: No    Drug use: No       Family History   Problem Relation Age of Onset    Asthma Father     Eczema Father     No Known Problems Mother     Other Brother         shoulder surgery        Patient's Medications   New Prescriptions    No medications on file   Previous Medications    ACETAMINOPHEN (TYLENOL) 325 MG TABLET    Take 650 mg by mouth 4 (four) times daily as needed for Pain.    ALBUTEROL (PROVENTIL/VENTOLIN HFA) 90 MCG/ACTUATION INHALER    Inhale 2 puffs into the lungs every 6 (six) hours as needed for Wheezing. Rescue    ATORVASTATIN (LIPITOR) 80 MG TABLET    Take 1 tablet (80 mg total) by mouth every evening.    BENZTROPINE (COGENTIN) 1 MG TABLET    Take 1 mg by mouth 2 (two) times daily.    BUSPIRONE (BUSPAR) 5 MG TAB        DEXTRAN 70-HYPROMELLOSE (NATURAL BALANCE TEARS) 0.1-0.3 % DROP    Apply 1 drop to eye 2 (two) times daily.    DULOXETINE (CYMBALTA) 60 MG CAPSULE    once daily.    FERROUS SULFATE (IRON) 325 MG (65 MG IRON) TAB TABLET    Take 325 mg by mouth 2 (two)  times daily.    LOPERAMIDE (IMODIUM A-D) 2 MG TAB    Take 2 mg by mouth daily as needed (give one po after each episode of loose stool with max of 8 tabs/daily).    MELATONIN 3 MG CAP    Take 1 tablet by mouth nightly as needed.    PANTOPRAZOLE (PROTONIX) 40 MG TABLET    Take 40 mg by mouth once daily.    QUETIAPINE (SEROQUEL) 100 MG TAB        SPIRONOLACTONE (ALDACTONE) 25 MG TABLET    Take 1 tablet (25 mg total) by mouth once daily.    THIAMINE 100 MG TABLET    Take 100 mg by mouth once daily.    TRAZODONE (DESYREL) 50 MG TABLET    Take 50 mg by mouth nightly as needed.    Modified Medications    Modified Medication Previous Medication    DILTIAZEM (CARDIZEM) 60 MG TABLET diltiaZEM (CARDIZEM) 60 MG tablet       Take 1 tablet (60 mg total) by mouth every 8 (eight) hours.    Take 1 tablet (60 mg total) by mouth every 8 (eight) hours.   Discontinued Medications    No medications on file       Review of Systems   Constitutional: Positive for malaise/fatigue.   HENT: Negative.    Eyes: Negative.    Respiratory: Positive for shortness of breath.    Cardiovascular: Positive for palpitations.   Gastrointestinal: Negative.    Genitourinary: Negative.    Musculoskeletal: Negative.    Skin: Negative.    Neurological: Positive for dizziness and weakness.   Endo/Heme/Allergies: Negative.    Psychiatric/Behavioral: Negative.    All 12 systems otherwise negative.      Wt Readings from Last 3 Encounters:   11/10/20 83.8 kg (184 lb 13.7 oz)   07/23/20 79.5 kg (175 lb 4.3 oz)   07/09/20 77.7 kg (171 lb 4.8 oz)     Temp Readings from Last 3 Encounters:   07/09/20 98.2 °F (36.8 °C) (Temporal)   01/03/20 98.1 °F (36.7 °C) (Oral)   01/02/20 97.9 °F (36.6 °C) (Temporal)     BP Readings from Last 3 Encounters:   11/10/20 134/76   07/23/20 138/76   07/09/20 (!) 157/98     Pulse Readings from Last 3 Encounters:   11/10/20 84   07/23/20 72   07/09/20 83       /76 (BP Location: Right arm, Patient Position: Sitting, BP Method: Small  (Manual))   Pulse 84   Wt 83.8 kg (184 lb 13.7 oz)   LMP  (LMP Unknown)   SpO2 95%   BMI 31.73 kg/m²     Objective:   Physical Exam   Constitutional: She is oriented to person, place, and time. She appears well-developed and well-nourished. She appears distressed.   HENT:   Head: Normocephalic and atraumatic.   Nose: Nose normal.   Mouth/Throat: Oropharynx is clear and moist.   Eyes: Conjunctivae and EOM are normal. No scleral icterus.   Neck: Normal range of motion. Neck supple. No JVD present. No thyromegaly present.   Cardiovascular: Normal rate, regular rhythm, S1 normal and S2 normal. Exam reveals no gallop, no S3, no S4 and no friction rub.   Murmur heard.  Pulmonary/Chest: Effort normal and breath sounds normal. No stridor. No respiratory distress. She has no wheezes. She has no rales. She exhibits no tenderness.   Abdominal: Soft. Bowel sounds are normal. She exhibits no distension and no mass. There is no abdominal tenderness. There is no rebound.   Genitourinary:    Genitourinary Comments: Deferred     Musculoskeletal: Normal range of motion.         General: No tenderness, deformity or edema.   Lymphadenopathy:     She has no cervical adenopathy.   Neurological: She is alert and oriented to person, place, and time. She exhibits normal muscle tone. Coordination normal.   Skin: Skin is warm and dry. No rash noted. She is not diaphoretic. No erythema. No pallor.   Psychiatric: She has a normal mood and affect. Her behavior is normal. Judgment and thought content normal.   Nursing note and vitals reviewed.      Lab Results   Component Value Date     01/03/2020    K 3.4 (L) 01/03/2020     01/03/2020    CO2 31 (H) 01/03/2020    BUN 17 01/03/2020    CREATININE 0.7 01/03/2020    GLU 99 01/03/2020    MG 1.7 04/13/2011    AST 14 01/03/2020    ALT 9 (L) 01/03/2020    ALBUMIN 3.3 (L) 01/03/2020    PROT 6.8 01/03/2020    BILITOT 0.8 01/03/2020    WBC 6.89 01/03/2020    HGB 12.1 01/03/2020    HCT 39.2  01/03/2020    MCV 93 01/03/2020     (L) 01/03/2020    INR 1.0 01/02/2020    TSH 4.482 (H) 08/20/2019    BNP 26 01/02/2020     Assessment:      1. PAF (paroxysmal atrial fibrillation)    2. Essential hypertension    3. Chronic respiratory failure with hypoxia    4. COPD, very severe    5. TIA (transient ischemic attack)    6. PE (pulmonary thromboembolism)    7. Malignant neoplasm of upper lobe of left lung    8. Iron deficiency anemia due to chronic blood loss    9. SOARES (dyspnea on exertion)        Plan:   1. PAF   - cont meds - cont Dilt  - Holter - with brief PAF and flutter noted  - restart Eliquis when ok per GI     2. HTN  - cont meds    3. PE h/o  - restart A/C when ok per GI    4. Lung CA with COPD  - s/p tx per onc  - f/u pulm    5.GIB with FE def anemia  - needs f/u with GI  - moderate CV risk for Cscope/EGD     6. Dyspnea, fatigue, cough  - order labs - CBC, CMP, Mg, BNP, CXR  - f/u with GI and Pulm asap  - if severe symptoms need ER eval     Thank you for allowing me to participate in this patient's care. Please do not hesitate to contact me with any questions or concerns. Consult note has been forwarded to the referral physician.

## 2020-12-29 ENCOUNTER — OFFICE VISIT (OUTPATIENT)
Dept: CARDIOLOGY | Facility: CLINIC | Age: 80
End: 2020-12-29
Payer: MEDICARE

## 2020-12-29 VITALS
SYSTOLIC BLOOD PRESSURE: 140 MMHG | RESPIRATION RATE: 16 BRPM | HEART RATE: 79 BPM | OXYGEN SATURATION: 96 % | BODY MASS INDEX: 31.12 KG/M2 | HEIGHT: 64 IN | WEIGHT: 182.31 LBS | DIASTOLIC BLOOD PRESSURE: 72 MMHG

## 2020-12-29 DIAGNOSIS — C34.12 MALIGNANT NEOPLASM OF UPPER LOBE OF LEFT LUNG: Chronic | ICD-10-CM

## 2020-12-29 DIAGNOSIS — I10 ESSENTIAL HYPERTENSION: ICD-10-CM

## 2020-12-29 DIAGNOSIS — J44.9 COPD, VERY SEVERE: Chronic | ICD-10-CM

## 2020-12-29 DIAGNOSIS — J96.11 CHRONIC RESPIRATORY FAILURE WITH HYPOXIA: ICD-10-CM

## 2020-12-29 DIAGNOSIS — G45.9 TIA (TRANSIENT ISCHEMIC ATTACK): ICD-10-CM

## 2020-12-29 DIAGNOSIS — I48.0 PAF (PAROXYSMAL ATRIAL FIBRILLATION): Primary | ICD-10-CM

## 2020-12-29 DIAGNOSIS — K92.2 GASTROINTESTINAL HEMORRHAGE, UNSPECIFIED GASTROINTESTINAL HEMORRHAGE TYPE: ICD-10-CM

## 2020-12-29 DIAGNOSIS — I26.99 PE (PULMONARY THROMBOEMBOLISM): ICD-10-CM

## 2020-12-29 PROCEDURE — 99214 PR OFFICE/OUTPT VISIT, EST, LEVL IV, 30-39 MIN: ICD-10-PCS | Mod: S$PBB,,, | Performed by: INTERNAL MEDICINE

## 2020-12-29 PROCEDURE — 99999 PR PBB SHADOW E&M-EST. PATIENT-LVL V: ICD-10-PCS | Mod: PBBFAC,,, | Performed by: INTERNAL MEDICINE

## 2020-12-29 PROCEDURE — 99214 OFFICE O/P EST MOD 30 MIN: CPT | Mod: S$PBB,,, | Performed by: INTERNAL MEDICINE

## 2020-12-29 PROCEDURE — 99999 PR PBB SHADOW E&M-EST. PATIENT-LVL V: CPT | Mod: PBBFAC,,, | Performed by: INTERNAL MEDICINE

## 2020-12-29 PROCEDURE — 99215 OFFICE O/P EST HI 40 MIN: CPT | Mod: PBBFAC | Performed by: INTERNAL MEDICINE

## 2020-12-29 RX ORDER — ATORVASTATIN CALCIUM 80 MG/1
80 TABLET, FILM COATED ORAL NIGHTLY
Qty: 90 TABLET | Refills: 4 | Status: SHIPPED | OUTPATIENT
Start: 2020-12-29 | End: 2021-10-07 | Stop reason: SDUPTHER

## 2020-12-29 RX ORDER — SPIRONOLACTONE 25 MG/1
25 TABLET ORAL DAILY
Qty: 90 TABLET | Refills: 1 | Status: SHIPPED | OUTPATIENT
Start: 2020-12-29 | End: 2021-10-07 | Stop reason: SDUPTHER

## 2020-12-29 RX ORDER — DILTIAZEM HYDROCHLORIDE 60 MG/1
60 TABLET, FILM COATED ORAL EVERY 8 HOURS
Qty: 270 TABLET | Refills: 1 | Status: SHIPPED | OUTPATIENT
Start: 2020-12-29 | End: 2021-04-01 | Stop reason: SDUPTHER

## 2020-12-29 NOTE — PROGRESS NOTES
Subjective:   Patient ID:  Rachel Long is a 80 y.o. female who presents for cardiac consult of Follow-up    Follow-up  Associated symptoms include weakness.     The patient came in today for cardiac consult of Follow-up    Rachel Long is a 80 y.o. female pt with PAF, h/o TIA remote- twice, dementia, anxiety, h/o left NSCLC h/o chemoXR, h/o PE and former long time smoker presents for CV follow up.     Hosp Follow up   78 yo female, consult for afib with RVR  [facility]  [facility] h/o TIA remote, twice, dementia, anxiety, h/o left NSCLC h/o chemoXR, h/o PE and former long time somoker  Pt states that she had fast heart beat when she was stressful  Pt came in colonoscopy due to recent GI bleeding. Eliquis was on hold  The procedure was cancelled due to afib with RVR  Pt denied chest pain, dizziness and faint  Has chronic SOARES and intermittent palpitation  On cardizme gtt and had metoprolol 2.5 mg ivp x1 in ER. HR at 95 bpm.   1/3/2020-admitted yesterday with A-fib with RVR. Converted to NSR overnight after Cardizem. Eliquis on hold due to recent GIB. Has no CNS complaints to suggest TIA or CVA. Is on Heparin gtt with no abnormal bleeding. H/H stable     1/21/20  Per DC summary - recommended Cardizem 60mg PO TID with no metoprolol upon discharge. Eliquis on hold until GI clears post endoscopic procedure given her recent GIB. Lives at Nursing home - [place]. No CP/SOB. Will see GI for anticoag recs.     4/21/20  Holter in Feb with NSR, rare episodes of Aflutter/PAF. Told pt - Episodes of Afib and Aflutter noted, need to restart Eliquis when ok per GI. - Per last DC - She will also follow up with Dr. Bhakta (GI) within 1 week after discharge to evaluate rescheduling endoscopic procedure. Discussed with pt needs scope, she feels ok now but occ has atypical CP, hard to describe.     7/23/20  EGD with hiatal hernia but no ulcers noted. Cscope needs to be redone due to stool/improper prep. She feels occ palpitations  when she is excited. ECG today NSR. She has not restarted Eliquis yet. NO bleeding or falls lately.     11/10/20  Has not had GI eval since last visit. She has been having more palpitations. Has been getting coughing spells, has been dry. Water does not improve it. She has not gotten C scope yet. She saw Dr. English, was told she looks.     12/29/20  BP stable 140s/70s. She has not gotten Cscope yet. She has been having SOB, still coughing a lot. Will refer again to GI and Pulm.     Patient feels  no leg swelling, no PND, no syncope, no CNS symptoms.    Patient has fairly good exercise tolerance.    Patient is compliant with medications.    EGD  - Medium-sized hiatal hernia.                         - No gross lesions in the stomach.                         - Normal duodenal bulb, second portion of the                         duodenum and third portion of the duodenum.                         - No specimens collected.     HOLTER 2/11/20  Sinus rhythm with heart rates varying between 52 and 194 bpm with an average of 83 bpm.   Paroxysmal atrial fibrillation   also episodes of atrial flutter with variable rate    CONCLUSIONS     1 - Concentric hypertrophy.     2 - No wall motion abnormalities.     3 - Normal left ventricular systolic function (EF 55-60%).     4 - Normal left ventricular diastolic function.     5 - Normal right ventricular systolic function .     6 - The estimated PA systolic pressure is 31 mmHg.     7 - Mild tricuspid regurgitation.         This document has been electronically    SIGNED BY: Jose Calvert MD On: 01/02/2020 15:37    Past Medical History:   Diagnosis Date    Acute upper respiratory infection     Anemia     Arthritis     osteo    Bipolar mood disorder     Coordination abnormal     COPD exacerbation 5/9/2016    Coronary artery disease     Difficulty walking     Dysphasia     GERD (gastroesophageal reflux disease)     Hyperlipidemia     Hypertension     on meds    Hypothyroidism,  adult     Insomnia     Malignant neoplasm of colon     Muscle weakness     Neuralgia and neuritis     Parkinson disease     Pulmonary embolism     Schizoaffective disorder     SOB (shortness of breath)     Spinal stenosis     Stroke     pt states mini strokes    Thyroid disease     Urinary tract infection        Past Surgical History:   Procedure Laterality Date    COLON SURGERY      COLONOSCOPY N/A 7/9/2020    Procedure: COLONOSCOPY;  Surgeon: Cesar Montague III, MD;  Location: [facility] ENDO;  Service: Endoscopy;  Laterality: N/A;    CORONARY STENT PLACEMENT      ESOPHAGOGASTRODUODENOSCOPY N/A 7/9/2020    Procedure: EGD (ESOPHAGOGASTRODUODENOSCOPY);  Surgeon: Cesar Montague III, MD;  Location: [facility] ENDO;  Service: Endoscopy;  Laterality: N/A;    FRACTURE SURGERY      HYSTERECTOMY  1970's       Social History     Tobacco Use    Smoking status: Former Smoker     Years: 50.00     Types: Cigarettes    Smokeless tobacco: Former User   Substance Use Topics    Alcohol use: No    Drug use: No       Family History   Problem Relation Age of Onset    Asthma Father     Eczema Father     No Known Problems Mother     Other Brother         shoulder surgery        Patient's Medications   New Prescriptions    No medications on file   Previous Medications    ACETAMINOPHEN (TYLENOL) 325 MG TABLET    Take 650 mg by mouth 4 (four) times daily as needed for Pain.    ALBUTEROL (PROVENTIL/VENTOLIN HFA) 90 MCG/ACTUATION INHALER    Inhale 2 puffs into the lungs every 6 (six) hours as needed for Wheezing. Rescue    ATORVASTATIN (LIPITOR) 80 MG TABLET    Take 1 tablet (80 mg total) by mouth every evening.    BENZTROPINE (COGENTIN) 1 MG TABLET    Take 1 mg by mouth 2 (two) times daily.    BUSPIRONE (BUSPAR) 5 MG TAB        DEXTRAN 70-HYPROMELLOSE (NATURAL BALANCE TEARS) 0.1-0.3 % DROP    Apply 1 drop to eye 2 (two) times daily.    DILTIAZEM (CARDIZEM) 60 MG TABLET    Take 1 tablet (60 mg total) by mouth every 8  "(eight) hours.    DULOXETINE (CYMBALTA) 60 MG CAPSULE    once daily.    FERROUS SULFATE (IRON) 325 MG (65 MG IRON) TAB TABLET    Take 325 mg by mouth 2 (two) times daily.    LOPERAMIDE (IMODIUM A-D) 2 MG TAB    Take 2 mg by mouth daily as needed (give one po after each episode of loose stool with max of 8 tabs/daily).    MELATONIN 3 MG CAP    Take 1 tablet by mouth nightly as needed.    PANTOPRAZOLE (PROTONIX) 40 MG TABLET    Take 40 mg by mouth once daily.    QUETIAPINE (SEROQUEL) 100 MG TAB        SPIRONOLACTONE (ALDACTONE) 25 MG TABLET    Take 1 tablet (25 mg total) by mouth once daily.    THIAMINE 100 MG TABLET    Take 100 mg by mouth once daily.    TRAZODONE (DESYREL) 50 MG TABLET    Take 50 mg by mouth nightly as needed.    Modified Medications    No medications on file   Discontinued Medications    No medications on file       Review of Systems   Constitutional: Positive for malaise/fatigue.   HENT: Negative.    Eyes: Negative.    Respiratory: Positive for shortness of breath.    Cardiovascular: Positive for palpitations.   Gastrointestinal: Negative.    Genitourinary: Negative.    Musculoskeletal: Negative.    Skin: Negative.    Neurological: Positive for dizziness and weakness.   Endo/Heme/Allergies: Negative.    Psychiatric/Behavioral: Negative.    All 12 systems otherwise negative.      Wt Readings from Last 3 Encounters:   12/29/20 82.7 kg (182 lb 5.1 oz)   11/10/20 83.8 kg (184 lb 13.7 oz)   07/23/20 79.5 kg (175 lb 4.3 oz)     Temp Readings from Last 3 Encounters:   07/09/20 98.2 °F (36.8 °C) (Temporal)   01/03/20 98.1 °F (36.7 °C) (Oral)   01/02/20 97.9 °F (36.6 °C) (Temporal)     BP Readings from Last 3 Encounters:   12/29/20 (!) 140/72   11/10/20 134/76   07/23/20 138/76     Pulse Readings from Last 3 Encounters:   12/29/20 79   11/10/20 84   07/23/20 72       BP (!) 140/72 (BP Location: Right arm, Patient Position: Sitting, BP Method: Medium (Manual))   Pulse 79   Resp 16   Ht 5' 4" (1.626 m)   " Wt 82.7 kg (182 lb 5.1 oz)   LMP  (LMP Unknown)   SpO2 96%   BMI 31.30 kg/m²     Objective:   Physical Exam   Constitutional: She is oriented to person, place, and time. She appears well-developed and well-nourished. She appears distressed.   HENT:   Head: Normocephalic and atraumatic.   Nose: Nose normal.   Mouth/Throat: Oropharynx is clear and moist.   Eyes: Conjunctivae and EOM are normal. No scleral icterus.   Neck: Normal range of motion. Neck supple. No JVD present. No thyromegaly present.   Cardiovascular: Normal rate, regular rhythm, S1 normal and S2 normal. Exam reveals no gallop, no S3, no S4 and no friction rub.   Murmur heard.  Pulmonary/Chest: Effort normal and breath sounds normal. No stridor. No respiratory distress. She has no wheezes. She has no rales. She exhibits no tenderness.   Abdominal: Soft. Bowel sounds are normal. She exhibits no distension and no mass. There is no abdominal tenderness. There is no rebound.   Genitourinary:    Genitourinary Comments: Deferred     Musculoskeletal: Normal range of motion.         General: No tenderness, deformity or edema.   Lymphadenopathy:     She has no cervical adenopathy.   Neurological: She is alert and oriented to person, place, and time. She exhibits normal muscle tone. Coordination normal.   Skin: Skin is warm and dry. No rash noted. She is not diaphoretic. No erythema. No pallor.   Psychiatric: She has a normal mood and affect. Her behavior is normal. Judgment and thought content normal.   Nursing note and vitals reviewed.      Lab Results   Component Value Date     11/10/2020    K 4.5 11/10/2020    CL 98 11/10/2020    CO2 30 (H) 11/10/2020    BUN 29 (H) 11/10/2020    CREATININE 0.8 11/10/2020    GLU 71 11/10/2020    MG 1.8 11/10/2020    AST 20 11/10/2020    ALT 12 11/10/2020    ALBUMIN 4.1 11/10/2020    PROT 8.2 11/10/2020    BILITOT 0.4 11/10/2020    WBC 8.06 11/10/2020    HGB 14.0 11/10/2020    HCT 45.2 11/10/2020    MCV 96 11/10/2020      11/10/2020    INR 1.0 01/02/2020    TSH 4.482 (H) 08/20/2019    BNP <10 11/10/2020     Assessment:      1. PAF (paroxysmal atrial fibrillation)    2. TIA (transient ischemic attack)    3. Chronic respiratory failure with hypoxia    4. COPD, very severe    5. Essential hypertension    6. PE (pulmonary thromboembolism)    7. Malignant neoplasm of upper lobe of left lung        Plan:   1. PAF   - cont meds - cont Dilt  - Holter - with brief PAF and flutter noted  - restart Eliquis when ok per GI     2. HTN  - cont meds    3. PE h/o  - restart A/C when ok per GI    4. Lung CA with COPD  - s/p tx per onc  - f/u pulm    5.GIB with FE def anemia  - needs f/u with GI  - moderate CV risk for Cscope/EGD     6. Dyspnea, fatigue, cough  - f/u with GI and Pulm asap  - if severe symptoms need ER eval     Pulm and GI eval consults placed again    Thank you for allowing me to participate in this patient's care. Please do not hesitate to contact me with any questions or concerns. Consult note has been forwarded to the referral physician.

## 2021-01-22 ENCOUNTER — PATIENT MESSAGE (OUTPATIENT)
Dept: GASTROENTEROLOGY | Facility: CLINIC | Age: 81
End: 2021-01-22

## 2021-04-01 ENCOUNTER — OFFICE VISIT (OUTPATIENT)
Dept: CARDIOLOGY | Facility: CLINIC | Age: 81
End: 2021-04-01
Payer: MEDICARE

## 2021-04-01 ENCOUNTER — HOSPITAL ENCOUNTER (OUTPATIENT)
Dept: CARDIOLOGY | Facility: HOSPITAL | Age: 81
Discharge: HOME | End: 2021-04-01
Attending: INTERNAL MEDICINE
Payer: MEDICARE

## 2021-04-01 VITALS
SYSTOLIC BLOOD PRESSURE: 120 MMHG | BODY MASS INDEX: 30.07 KG/M2 | HEIGHT: 64 IN | WEIGHT: 176.13 LBS | HEART RATE: 86 BPM | OXYGEN SATURATION: 98 % | DIASTOLIC BLOOD PRESSURE: 66 MMHG

## 2021-04-01 DIAGNOSIS — I48.0 PAF (PAROXYSMAL ATRIAL FIBRILLATION): Primary | ICD-10-CM

## 2021-04-01 DIAGNOSIS — J44.9 COPD, VERY SEVERE: Chronic | ICD-10-CM

## 2021-04-01 DIAGNOSIS — I10 ESSENTIAL HYPERTENSION: ICD-10-CM

## 2021-04-01 DIAGNOSIS — J96.11 CHRONIC RESPIRATORY FAILURE WITH HYPOXIA: ICD-10-CM

## 2021-04-01 DIAGNOSIS — I48.0 PAF (PAROXYSMAL ATRIAL FIBRILLATION): ICD-10-CM

## 2021-04-01 DIAGNOSIS — C34.12 MALIGNANT NEOPLASM OF UPPER LOBE OF LEFT LUNG: Chronic | ICD-10-CM

## 2021-04-01 PROCEDURE — 93010 ELECTROCARDIOGRAM REPORT: CPT | Mod: ,,, | Performed by: INTERNAL MEDICINE

## 2021-04-01 PROCEDURE — 99213 OFFICE O/P EST LOW 20 MIN: CPT | Mod: PBBFAC,25 | Performed by: INTERNAL MEDICINE

## 2021-04-01 PROCEDURE — 99214 OFFICE O/P EST MOD 30 MIN: CPT | Mod: S$PBB,,, | Performed by: INTERNAL MEDICINE

## 2021-04-01 PROCEDURE — 99999 PR PBB SHADOW E&M-EST. PATIENT-LVL III: ICD-10-PCS | Mod: PBBFAC,,, | Performed by: INTERNAL MEDICINE

## 2021-04-01 PROCEDURE — 93010 EKG 12-LEAD: ICD-10-PCS | Mod: ,,, | Performed by: INTERNAL MEDICINE

## 2021-04-01 PROCEDURE — 93005 ELECTROCARDIOGRAM TRACING: CPT

## 2021-04-01 PROCEDURE — 99214 PR OFFICE/OUTPT VISIT, EST, LEVL IV, 30-39 MIN: ICD-10-PCS | Mod: S$PBB,,, | Performed by: INTERNAL MEDICINE

## 2021-04-01 PROCEDURE — 99999 PR PBB SHADOW E&M-EST. PATIENT-LVL III: CPT | Mod: PBBFAC,,, | Performed by: INTERNAL MEDICINE

## 2021-04-01 RX ORDER — DILTIAZEM HYDROCHLORIDE 60 MG/1
60 TABLET, FILM COATED ORAL EVERY 8 HOURS
Qty: 270 TABLET | Refills: 1 | Status: SHIPPED | OUTPATIENT
Start: 2021-04-01 | End: 2021-10-07 | Stop reason: SDUPTHER

## 2021-08-05 ENCOUNTER — HOSPITAL ENCOUNTER (OUTPATIENT)
Facility: HOSPITAL | Age: 81
Discharge: NURSING, REHAB OR LONG-TERM CARE | End: 2021-08-06
Attending: EMERGENCY MEDICINE | Admitting: INTERNAL MEDICINE
Payer: MEDICARE

## 2021-08-05 DIAGNOSIS — R42 DIZZINESS: ICD-10-CM

## 2021-08-05 DIAGNOSIS — I62.9 INTRACRANIAL HEMORRHAGE: Primary | ICD-10-CM

## 2021-08-05 PROBLEM — E78.5 HLD (HYPERLIPIDEMIA): Status: ACTIVE | Noted: 2021-08-05

## 2021-08-05 PROBLEM — K21.9 GERD (GASTROESOPHAGEAL REFLUX DISEASE): Status: ACTIVE | Noted: 2021-08-05

## 2021-08-05 PROBLEM — I25.10 CAD (CORONARY ARTERY DISEASE): Status: ACTIVE | Noted: 2021-08-05

## 2021-08-05 LAB
ALBUMIN SERPL BCP-MCNC: 3.8 G/DL (ref 3.5–5.2)
ALP SERPL-CCNC: 73 U/L (ref 55–135)
ALT SERPL W/O P-5'-P-CCNC: 10 U/L (ref 10–44)
ANION GAP SERPL CALC-SCNC: 15 MMOL/L (ref 8–16)
AST SERPL-CCNC: 19 U/L (ref 10–40)
BACTERIA #/AREA URNS HPF: ABNORMAL /HPF
BASOPHILS # BLD AUTO: 0.03 K/UL (ref 0–0.2)
BASOPHILS NFR BLD: 0.4 % (ref 0–1.9)
BILIRUB SERPL-MCNC: 0.5 MG/DL (ref 0.1–1)
BILIRUB UR QL STRIP: NEGATIVE
BUN SERPL-MCNC: 28 MG/DL (ref 8–23)
CALCIUM SERPL-MCNC: 10.3 MG/DL (ref 8.7–10.5)
CHLORIDE SERPL-SCNC: 100 MMOL/L (ref 95–110)
CLARITY UR: CLEAR
CO2 SERPL-SCNC: 22 MMOL/L (ref 23–29)
COLOR UR: YELLOW
CREAT SERPL-MCNC: 0.8 MG/DL (ref 0.5–1.4)
DIFFERENTIAL METHOD: ABNORMAL
EOSINOPHIL # BLD AUTO: 0.2 K/UL (ref 0–0.5)
EOSINOPHIL NFR BLD: 3.3 % (ref 0–8)
ERYTHROCYTE [DISTWIDTH] IN BLOOD BY AUTOMATED COUNT: 14.8 % (ref 11.5–14.5)
EST. GFR  (AFRICAN AMERICAN): >60 ML/MIN/1.73 M^2
EST. GFR  (NON AFRICAN AMERICAN): >60 ML/MIN/1.73 M^2
GLUCOSE SERPL-MCNC: 93 MG/DL (ref 70–110)
GLUCOSE UR QL STRIP: NEGATIVE
HCT VFR BLD AUTO: 41.4 % (ref 37–48.5)
HGB BLD-MCNC: 12.9 G/DL (ref 12–16)
HGB UR QL STRIP: NEGATIVE
IMM GRANULOCYTES # BLD AUTO: 0.01 K/UL (ref 0–0.04)
IMM GRANULOCYTES NFR BLD AUTO: 0.1 % (ref 0–0.5)
KETONES UR QL STRIP: NEGATIVE
LEUKOCYTE ESTERASE UR QL STRIP: ABNORMAL
LIPASE SERPL-CCNC: 32 U/L (ref 4–60)
LYMPHOCYTES # BLD AUTO: 1.8 K/UL (ref 1–4.8)
LYMPHOCYTES NFR BLD: 26.5 % (ref 18–48)
MCH RBC QN AUTO: 29.4 PG (ref 27–31)
MCHC RBC AUTO-ENTMCNC: 31.2 G/DL (ref 32–36)
MCV RBC AUTO: 94 FL (ref 82–98)
MICROSCOPIC COMMENT: ABNORMAL
MONOCYTES # BLD AUTO: 0.7 K/UL (ref 0.3–1)
MONOCYTES NFR BLD: 9.9 % (ref 4–15)
NEUTROPHILS # BLD AUTO: 4.2 K/UL (ref 1.8–7.7)
NEUTROPHILS NFR BLD: 59.8 % (ref 38–73)
NITRITE UR QL STRIP: NEGATIVE
NRBC BLD-RTO: 0 /100 WBC
PH UR STRIP: 7 [PH] (ref 5–8)
PLATELET # BLD AUTO: 120 K/UL (ref 150–450)
PMV BLD AUTO: 11.2 FL (ref 9.2–12.9)
POTASSIUM SERPL-SCNC: 5.1 MMOL/L (ref 3.5–5.1)
PROT SERPL-MCNC: 7.4 G/DL (ref 6–8.4)
PROT UR QL STRIP: NEGATIVE
RBC # BLD AUTO: 4.39 M/UL (ref 4–5.4)
SARS-COV-2 RDRP RESP QL NAA+PROBE: NEGATIVE
SODIUM SERPL-SCNC: 137 MMOL/L (ref 136–145)
SP GR UR STRIP: 1.01 (ref 1–1.03)
SQUAMOUS #/AREA URNS HPF: 3 /HPF
TROPONIN I SERPL DL<=0.01 NG/ML-MCNC: 0.01 NG/ML (ref 0–0.03)
URN SPEC COLLECT METH UR: ABNORMAL
UROBILINOGEN UR STRIP-ACNC: NEGATIVE EU/DL
WBC # BLD AUTO: 6.95 K/UL (ref 3.9–12.7)
WBC #/AREA URNS HPF: 7 /HPF (ref 0–5)

## 2021-08-05 PROCEDURE — 93005 ELECTROCARDIOGRAM TRACING: CPT

## 2021-08-05 PROCEDURE — 84484 ASSAY OF TROPONIN QUANT: CPT | Performed by: EMERGENCY MEDICINE

## 2021-08-05 PROCEDURE — 80053 COMPREHEN METABOLIC PANEL: CPT | Performed by: EMERGENCY MEDICINE

## 2021-08-05 PROCEDURE — U0002 COVID-19 LAB TEST NON-CDC: HCPCS | Performed by: EMERGENCY MEDICINE

## 2021-08-05 PROCEDURE — G0378 HOSPITAL OBSERVATION PER HR: HCPCS

## 2021-08-05 PROCEDURE — 96360 HYDRATION IV INFUSION INIT: CPT

## 2021-08-05 PROCEDURE — 99291 CRITICAL CARE FIRST HOUR: CPT | Mod: 25

## 2021-08-05 PROCEDURE — 81000 URINALYSIS NONAUTO W/SCOPE: CPT | Performed by: EMERGENCY MEDICINE

## 2021-08-05 PROCEDURE — 83690 ASSAY OF LIPASE: CPT | Performed by: EMERGENCY MEDICINE

## 2021-08-05 PROCEDURE — 96361 HYDRATE IV INFUSION ADD-ON: CPT

## 2021-08-05 PROCEDURE — 93010 ELECTROCARDIOGRAM REPORT: CPT | Mod: ,,, | Performed by: INTERNAL MEDICINE

## 2021-08-05 PROCEDURE — 93010 EKG 12-LEAD: ICD-10-PCS | Mod: ,,, | Performed by: INTERNAL MEDICINE

## 2021-08-05 PROCEDURE — 25000003 PHARM REV CODE 250: Performed by: NURSE PRACTITIONER

## 2021-08-05 PROCEDURE — 85025 COMPLETE CBC W/AUTO DIFF WBC: CPT | Performed by: EMERGENCY MEDICINE

## 2021-08-05 PROCEDURE — 25000003 PHARM REV CODE 250: Performed by: EMERGENCY MEDICINE

## 2021-08-05 RX ORDER — DILTIAZEM HYDROCHLORIDE 60 MG/1
60 TABLET, FILM COATED ORAL EVERY 8 HOURS
Status: DISCONTINUED | OUTPATIENT
Start: 2021-08-05 | End: 2021-08-06 | Stop reason: HOSPADM

## 2021-08-05 RX ORDER — TRAZODONE HYDROCHLORIDE 50 MG/1
50 TABLET ORAL NIGHTLY PRN
Status: DISCONTINUED | OUTPATIENT
Start: 2021-08-05 | End: 2021-08-06 | Stop reason: HOSPADM

## 2021-08-05 RX ORDER — IPRATROPIUM BROMIDE AND ALBUTEROL SULFATE 2.5; .5 MG/3ML; MG/3ML
3 SOLUTION RESPIRATORY (INHALATION)
Status: DISCONTINUED | OUTPATIENT
Start: 2021-08-05 | End: 2021-08-06 | Stop reason: HOSPADM

## 2021-08-05 RX ORDER — SPIRONOLACTONE 25 MG/1
25 TABLET ORAL DAILY
Status: DISCONTINUED | OUTPATIENT
Start: 2021-08-06 | End: 2021-08-06 | Stop reason: HOSPADM

## 2021-08-05 RX ORDER — ACETAMINOPHEN 325 MG/1
650 TABLET ORAL EVERY 6 HOURS PRN
Status: DISCONTINUED | OUTPATIENT
Start: 2021-08-05 | End: 2021-08-06 | Stop reason: HOSPADM

## 2021-08-05 RX ORDER — HYDRALAZINE HYDROCHLORIDE 20 MG/ML
10 INJECTION INTRAMUSCULAR; INTRAVENOUS EVERY 6 HOURS PRN
Status: DISCONTINUED | OUTPATIENT
Start: 2021-08-05 | End: 2021-08-06 | Stop reason: HOSPADM

## 2021-08-05 RX ORDER — MECLIZINE HYDROCHLORIDE 25 MG/1
25 TABLET ORAL 3 TIMES DAILY PRN
Status: DISCONTINUED | OUTPATIENT
Start: 2021-08-05 | End: 2021-08-06 | Stop reason: HOSPADM

## 2021-08-05 RX ORDER — PANTOPRAZOLE SODIUM 40 MG/1
40 TABLET, DELAYED RELEASE ORAL DAILY
Status: DISCONTINUED | OUTPATIENT
Start: 2021-08-06 | End: 2021-08-06 | Stop reason: HOSPADM

## 2021-08-05 RX ORDER — TALC
3 POWDER (GRAM) TOPICAL NIGHTLY PRN
Status: DISCONTINUED | OUTPATIENT
Start: 2021-08-05 | End: 2021-08-06 | Stop reason: HOSPADM

## 2021-08-05 RX ORDER — BENZTROPINE MESYLATE 1 MG/1
1 TABLET ORAL 2 TIMES DAILY
Status: DISCONTINUED | OUTPATIENT
Start: 2021-08-05 | End: 2021-08-06 | Stop reason: HOSPADM

## 2021-08-05 RX ORDER — IPRATROPIUM BROMIDE AND ALBUTEROL SULFATE 2.5; .5 MG/3ML; MG/3ML
3 SOLUTION RESPIRATORY (INHALATION) EVERY 6 HOURS
Status: DISCONTINUED | OUTPATIENT
Start: 2021-08-06 | End: 2021-08-06 | Stop reason: HOSPADM

## 2021-08-05 RX ORDER — BUDESONIDE 0.5 MG/2ML
0.5 INHALANT ORAL EVERY 12 HOURS
Status: DISCONTINUED | OUTPATIENT
Start: 2021-08-06 | End: 2021-08-06 | Stop reason: HOSPADM

## 2021-08-05 RX ORDER — ATORVASTATIN CALCIUM 40 MG/1
80 TABLET, FILM COATED ORAL NIGHTLY
Status: DISCONTINUED | OUTPATIENT
Start: 2021-08-05 | End: 2021-08-06 | Stop reason: HOSPADM

## 2021-08-05 RX ORDER — ONDANSETRON 2 MG/ML
4 INJECTION INTRAMUSCULAR; INTRAVENOUS EVERY 6 HOURS PRN
Status: DISCONTINUED | OUTPATIENT
Start: 2021-08-05 | End: 2021-08-06 | Stop reason: HOSPADM

## 2021-08-05 RX ORDER — PROMETHAZINE HYDROCHLORIDE AND CODEINE PHOSPHATE 6.25; 1 MG/5ML; MG/5ML
5 SOLUTION ORAL EVERY 4 HOURS PRN
Status: DISCONTINUED | OUTPATIENT
Start: 2021-08-05 | End: 2021-08-06 | Stop reason: HOSPADM

## 2021-08-05 RX ADMIN — SODIUM CHLORIDE 500 ML: 0.9 INJECTION, SOLUTION INTRAVENOUS at 02:08

## 2021-08-05 RX ADMIN — ATORVASTATIN CALCIUM 80 MG: 40 TABLET, FILM COATED ORAL at 10:08

## 2021-08-05 RX ADMIN — TRAZODONE HYDROCHLORIDE 50 MG: 50 TABLET ORAL at 10:08

## 2021-08-05 RX ADMIN — DILTIAZEM HYDROCHLORIDE 60 MG: 60 TABLET, FILM COATED ORAL at 10:08

## 2021-08-05 RX ADMIN — BENZTROPINE MESYLATE 1 MG: 1 TABLET ORAL at 10:08

## 2021-08-06 VITALS
HEIGHT: 64 IN | HEART RATE: 80 BPM | TEMPERATURE: 99 F | RESPIRATION RATE: 18 BRPM | BODY MASS INDEX: 29.7 KG/M2 | SYSTOLIC BLOOD PRESSURE: 120 MMHG | WEIGHT: 173.94 LBS | DIASTOLIC BLOOD PRESSURE: 56 MMHG | OXYGEN SATURATION: 91 %

## 2021-08-06 LAB
ALBUMIN SERPL BCP-MCNC: 3.7 G/DL (ref 3.5–5.2)
ALP SERPL-CCNC: 72 U/L (ref 55–135)
ALT SERPL W/O P-5'-P-CCNC: 9 U/L (ref 10–44)
ANION GAP SERPL CALC-SCNC: 10 MMOL/L (ref 8–16)
AST SERPL-CCNC: 16 U/L (ref 10–40)
BASOPHILS # BLD AUTO: 0.02 K/UL (ref 0–0.2)
BASOPHILS NFR BLD: 0.3 % (ref 0–1.9)
BILIRUB SERPL-MCNC: 0.8 MG/DL (ref 0.1–1)
BUN SERPL-MCNC: 22 MG/DL (ref 8–23)
CALCIUM SERPL-MCNC: 10.3 MG/DL (ref 8.7–10.5)
CHLORIDE SERPL-SCNC: 101 MMOL/L (ref 95–110)
CO2 SERPL-SCNC: 27 MMOL/L (ref 23–29)
CREAT SERPL-MCNC: 0.8 MG/DL (ref 0.5–1.4)
DIFFERENTIAL METHOD: ABNORMAL
EOSINOPHIL # BLD AUTO: 0.2 K/UL (ref 0–0.5)
EOSINOPHIL NFR BLD: 3.1 % (ref 0–8)
ERYTHROCYTE [DISTWIDTH] IN BLOOD BY AUTOMATED COUNT: 14.8 % (ref 11.5–14.5)
EST. GFR  (AFRICAN AMERICAN): >60 ML/MIN/1.73 M^2
EST. GFR  (NON AFRICAN AMERICAN): >60 ML/MIN/1.73 M^2
FOLATE SERPL-MCNC: 7.6 NG/ML (ref 4–24)
GLUCOSE SERPL-MCNC: 101 MG/DL (ref 70–110)
HCT VFR BLD AUTO: 40.7 % (ref 37–48.5)
HGB BLD-MCNC: 12.8 G/DL (ref 12–16)
IMM GRANULOCYTES # BLD AUTO: 0.02 K/UL (ref 0–0.04)
IMM GRANULOCYTES NFR BLD AUTO: 0.3 % (ref 0–0.5)
LYMPHOCYTES # BLD AUTO: 1.3 K/UL (ref 1–4.8)
LYMPHOCYTES NFR BLD: 19 % (ref 18–48)
MAGNESIUM SERPL-MCNC: 1.7 MG/DL (ref 1.6–2.6)
MCH RBC QN AUTO: 28.8 PG (ref 27–31)
MCHC RBC AUTO-ENTMCNC: 31.4 G/DL (ref 32–36)
MCV RBC AUTO: 92 FL (ref 82–98)
MONOCYTES # BLD AUTO: 0.5 K/UL (ref 0.3–1)
MONOCYTES NFR BLD: 8 % (ref 4–15)
NEUTROPHILS # BLD AUTO: 4.7 K/UL (ref 1.8–7.7)
NEUTROPHILS NFR BLD: 69.3 % (ref 38–73)
NRBC BLD-RTO: 0 /100 WBC
PLATELET # BLD AUTO: 158 K/UL (ref 150–450)
PMV BLD AUTO: 11.7 FL (ref 9.2–12.9)
POTASSIUM SERPL-SCNC: 4.4 MMOL/L (ref 3.5–5.1)
PROT SERPL-MCNC: 7.2 G/DL (ref 6–8.4)
RBC # BLD AUTO: 4.45 M/UL (ref 4–5.4)
SODIUM SERPL-SCNC: 138 MMOL/L (ref 136–145)
TSH SERPL DL<=0.005 MIU/L-ACNC: 0.7 UIU/ML (ref 0.4–4)
VIT B12 SERPL-MCNC: 513 PG/ML (ref 210–950)
WBC # BLD AUTO: 6.75 K/UL (ref 3.9–12.7)

## 2021-08-06 PROCEDURE — 99204 OFFICE O/P NEW MOD 45 MIN: CPT | Mod: ,,, | Performed by: NEUROLOGICAL SURGERY

## 2021-08-06 PROCEDURE — 36415 COLL VENOUS BLD VENIPUNCTURE: CPT | Performed by: NURSE PRACTITIONER

## 2021-08-06 PROCEDURE — 84443 ASSAY THYROID STIM HORMONE: CPT | Performed by: NURSE PRACTITIONER

## 2021-08-06 PROCEDURE — 83735 ASSAY OF MAGNESIUM: CPT | Performed by: NURSE PRACTITIONER

## 2021-08-06 PROCEDURE — 94640 AIRWAY INHALATION TREATMENT: CPT

## 2021-08-06 PROCEDURE — 82607 VITAMIN B-12: CPT | Performed by: NURSE PRACTITIONER

## 2021-08-06 PROCEDURE — 94761 N-INVAS EAR/PLS OXIMETRY MLT: CPT

## 2021-08-06 PROCEDURE — 82746 ASSAY OF FOLIC ACID SERUM: CPT | Performed by: NURSE PRACTITIONER

## 2021-08-06 PROCEDURE — G0378 HOSPITAL OBSERVATION PER HR: HCPCS

## 2021-08-06 PROCEDURE — 80053 COMPREHEN METABOLIC PANEL: CPT | Performed by: NURSE PRACTITIONER

## 2021-08-06 PROCEDURE — 25500020 PHARM REV CODE 255: Performed by: INTERNAL MEDICINE

## 2021-08-06 PROCEDURE — 99204 PR OFFICE/OUTPT VISIT, NEW, LEVL IV, 45-59 MIN: ICD-10-PCS | Mod: ,,, | Performed by: NEUROLOGICAL SURGERY

## 2021-08-06 PROCEDURE — 25000242 PHARM REV CODE 250 ALT 637 W/ HCPCS: Performed by: NURSE PRACTITIONER

## 2021-08-06 PROCEDURE — 85025 COMPLETE CBC W/AUTO DIFF WBC: CPT | Performed by: NURSE PRACTITIONER

## 2021-08-06 PROCEDURE — 25000003 PHARM REV CODE 250: Performed by: NURSE PRACTITIONER

## 2021-08-06 RX ORDER — BUDESONIDE AND FORMOTEROL FUMARATE DIHYDRATE 80; 4.5 UG/1; UG/1
2 AEROSOL RESPIRATORY (INHALATION) DAILY
Qty: 1 INHALER | Refills: 0 | Status: ON HOLD | OUTPATIENT
Start: 2021-08-06 | End: 2023-04-06

## 2021-08-06 RX ADMIN — PANTOPRAZOLE SODIUM 40 MG: 40 TABLET, DELAYED RELEASE ORAL at 08:08

## 2021-08-06 RX ADMIN — BENZTROPINE MESYLATE 1 MG: 1 TABLET ORAL at 08:08

## 2021-08-06 RX ADMIN — BUDESONIDE 0.5 MG: 0.5 SUSPENSION RESPIRATORY (INHALATION) at 07:08

## 2021-08-06 RX ADMIN — DILTIAZEM HYDROCHLORIDE 60 MG: 60 TABLET, FILM COATED ORAL at 01:08

## 2021-08-06 RX ADMIN — IOHEXOL 100 ML: 350 INJECTION, SOLUTION INTRAVENOUS at 11:08

## 2021-08-06 RX ADMIN — IPRATROPIUM BROMIDE AND ALBUTEROL SULFATE 3 ML: .5; 3 SOLUTION RESPIRATORY (INHALATION) at 12:08

## 2021-08-06 RX ADMIN — DILTIAZEM HYDROCHLORIDE 60 MG: 60 TABLET, FILM COATED ORAL at 05:08

## 2021-08-06 RX ADMIN — ACETAMINOPHEN 650 MG: 325 TABLET ORAL at 05:08

## 2021-08-06 RX ADMIN — IPRATROPIUM BROMIDE AND ALBUTEROL SULFATE 3 ML: .5; 3 SOLUTION RESPIRATORY (INHALATION) at 01:08

## 2021-08-06 RX ADMIN — IPRATROPIUM BROMIDE AND ALBUTEROL SULFATE 3 ML: .5; 3 SOLUTION RESPIRATORY (INHALATION) at 07:08

## 2021-08-06 RX ADMIN — SPIRONOLACTONE 25 MG: 25 TABLET, FILM COATED ORAL at 08:08

## 2021-10-07 ENCOUNTER — OFFICE VISIT (OUTPATIENT)
Dept: CARDIOLOGY | Facility: CLINIC | Age: 81
End: 2021-10-07
Payer: MEDICARE

## 2021-10-07 VITALS
HEIGHT: 64 IN | HEART RATE: 66 BPM | DIASTOLIC BLOOD PRESSURE: 66 MMHG | BODY MASS INDEX: 28.38 KG/M2 | OXYGEN SATURATION: 96 % | SYSTOLIC BLOOD PRESSURE: 112 MMHG | WEIGHT: 166.25 LBS | RESPIRATION RATE: 16 BRPM

## 2021-10-07 DIAGNOSIS — E78.5 HYPERLIPIDEMIA, UNSPECIFIED HYPERLIPIDEMIA TYPE: ICD-10-CM

## 2021-10-07 DIAGNOSIS — H91.90 HEARING LOSS, UNSPECIFIED HEARING LOSS TYPE, UNSPECIFIED LATERALITY: ICD-10-CM

## 2021-10-07 DIAGNOSIS — D50.0 IRON DEFICIENCY ANEMIA DUE TO CHRONIC BLOOD LOSS: ICD-10-CM

## 2021-10-07 DIAGNOSIS — I48.0 PAF (PAROXYSMAL ATRIAL FIBRILLATION): Primary | ICD-10-CM

## 2021-10-07 DIAGNOSIS — K21.9 GASTROESOPHAGEAL REFLUX DISEASE, UNSPECIFIED WHETHER ESOPHAGITIS PRESENT: ICD-10-CM

## 2021-10-07 DIAGNOSIS — R42 VERTIGO: ICD-10-CM

## 2021-10-07 DIAGNOSIS — I26.99 PE (PULMONARY THROMBOEMBOLISM): ICD-10-CM

## 2021-10-07 DIAGNOSIS — C34.12 MALIGNANT NEOPLASM OF UPPER LOBE OF LEFT LUNG: Chronic | ICD-10-CM

## 2021-10-07 DIAGNOSIS — I10 PRIMARY HYPERTENSION: ICD-10-CM

## 2021-10-07 PROCEDURE — 99215 OFFICE O/P EST HI 40 MIN: CPT | Mod: PBBFAC | Performed by: INTERNAL MEDICINE

## 2021-10-07 PROCEDURE — 99214 OFFICE O/P EST MOD 30 MIN: CPT | Mod: S$PBB,,, | Performed by: INTERNAL MEDICINE

## 2021-10-07 PROCEDURE — 99214 PR OFFICE/OUTPT VISIT, EST, LEVL IV, 30-39 MIN: ICD-10-PCS | Mod: S$PBB,,, | Performed by: INTERNAL MEDICINE

## 2021-10-07 PROCEDURE — 99999 PR PBB SHADOW E&M-EST. PATIENT-LVL V: ICD-10-PCS | Mod: PBBFAC,,, | Performed by: INTERNAL MEDICINE

## 2021-10-07 PROCEDURE — 99999 PR PBB SHADOW E&M-EST. PATIENT-LVL V: CPT | Mod: PBBFAC,,, | Performed by: INTERNAL MEDICINE

## 2021-10-07 RX ORDER — SPIRONOLACTONE 25 MG/1
25 TABLET ORAL DAILY
Qty: 90 TABLET | Refills: 1 | Status: SHIPPED | OUTPATIENT
Start: 2021-10-07 | End: 2022-08-11 | Stop reason: SDUPTHER

## 2021-10-07 RX ORDER — ATORVASTATIN CALCIUM 80 MG/1
80 TABLET, FILM COATED ORAL NIGHTLY
Qty: 90 TABLET | Refills: 4 | Status: SHIPPED | OUTPATIENT
Start: 2021-10-07 | End: 2022-08-11 | Stop reason: SDUPTHER

## 2021-10-07 RX ORDER — DILTIAZEM HYDROCHLORIDE 60 MG/1
60 TABLET, FILM COATED ORAL EVERY 8 HOURS
Qty: 270 TABLET | Refills: 1 | Status: SHIPPED | OUTPATIENT
Start: 2021-10-07 | End: 2022-08-11 | Stop reason: SDUPTHER

## 2021-10-19 ENCOUNTER — OFFICE VISIT (OUTPATIENT)
Dept: OTOLARYNGOLOGY | Facility: CLINIC | Age: 81
End: 2021-10-19
Payer: MEDICARE

## 2021-10-19 ENCOUNTER — CLINICAL SUPPORT (OUTPATIENT)
Dept: AUDIOLOGY | Facility: CLINIC | Age: 81
End: 2021-10-19
Payer: MEDICARE

## 2021-10-19 VITALS
WEIGHT: 166 LBS | DIASTOLIC BLOOD PRESSURE: 73 MMHG | BODY MASS INDEX: 28.49 KG/M2 | TEMPERATURE: 98 F | SYSTOLIC BLOOD PRESSURE: 144 MMHG | HEART RATE: 60 BPM

## 2021-10-19 DIAGNOSIS — H81.11 BPPV (BENIGN PAROXYSMAL POSITIONAL VERTIGO), RIGHT: Primary | ICD-10-CM

## 2021-10-19 DIAGNOSIS — H91.90 HEARING LOSS, UNSPECIFIED HEARING LOSS TYPE, UNSPECIFIED LATERALITY: ICD-10-CM

## 2021-10-19 DIAGNOSIS — R42 VERTIGO: ICD-10-CM

## 2021-10-19 DIAGNOSIS — H90.3 SENSORINEURAL HEARING LOSS, BILATERAL: ICD-10-CM

## 2021-10-19 DIAGNOSIS — R42 DIZZINESS: Primary | ICD-10-CM

## 2021-10-19 DIAGNOSIS — L98.9 SKIN LESION: ICD-10-CM

## 2021-10-19 DIAGNOSIS — R42 DIZZINESS: ICD-10-CM

## 2021-10-19 PROCEDURE — 99203 OFFICE O/P NEW LOW 30 MIN: CPT | Mod: S$PBB,,, | Performed by: STUDENT IN AN ORGANIZED HEALTH CARE EDUCATION/TRAINING PROGRAM

## 2021-10-19 PROCEDURE — 99214 OFFICE O/P EST MOD 30 MIN: CPT | Mod: PBBFAC,27 | Performed by: STUDENT IN AN ORGANIZED HEALTH CARE EDUCATION/TRAINING PROGRAM

## 2021-10-19 PROCEDURE — 92557 COMPREHENSIVE HEARING TEST: CPT | Mod: PBBFAC | Performed by: AUDIOLOGIST-HEARING AID FITTER

## 2021-10-19 PROCEDURE — 99999 PR PBB SHADOW E&M-EST. PATIENT-LVL IV: CPT | Mod: PBBFAC,,, | Performed by: STUDENT IN AN ORGANIZED HEALTH CARE EDUCATION/TRAINING PROGRAM

## 2021-10-19 PROCEDURE — 99999 PR PBB SHADOW E&M-EST. PATIENT-LVL I: ICD-10-PCS | Mod: PBBFAC,,, | Performed by: AUDIOLOGIST-HEARING AID FITTER

## 2021-10-19 PROCEDURE — 99203 PR OFFICE/OUTPT VISIT, NEW, LEVL III, 30-44 MIN: ICD-10-PCS | Mod: S$PBB,,, | Performed by: STUDENT IN AN ORGANIZED HEALTH CARE EDUCATION/TRAINING PROGRAM

## 2021-10-19 PROCEDURE — 99999 PR PBB SHADOW E&M-EST. PATIENT-LVL I: CPT | Mod: PBBFAC,,, | Performed by: AUDIOLOGIST-HEARING AID FITTER

## 2021-10-19 PROCEDURE — 99999 PR PBB SHADOW E&M-EST. PATIENT-LVL IV: ICD-10-PCS | Mod: PBBFAC,,, | Performed by: STUDENT IN AN ORGANIZED HEALTH CARE EDUCATION/TRAINING PROGRAM

## 2021-10-19 PROCEDURE — 92567 TYMPANOMETRY: CPT | Mod: PBBFAC | Performed by: AUDIOLOGIST-HEARING AID FITTER

## 2021-10-19 PROCEDURE — 99211 OFF/OP EST MAY X REQ PHY/QHP: CPT | Mod: PBBFAC | Performed by: AUDIOLOGIST-HEARING AID FITTER

## 2021-10-22 ENCOUNTER — OFFICE VISIT (OUTPATIENT)
Dept: DERMATOLOGY | Facility: CLINIC | Age: 81
End: 2021-10-22
Payer: MEDICARE

## 2021-10-22 DIAGNOSIS — L98.9 SKIN LESION: ICD-10-CM

## 2021-10-22 DIAGNOSIS — D49.2 SKIN NEOPLASM: Primary | ICD-10-CM

## 2021-10-22 PROCEDURE — 99999 PR PBB SHADOW E&M-EST. PATIENT-LVL III: ICD-10-PCS | Mod: PBBFAC,,, | Performed by: DERMATOLOGY

## 2021-10-22 PROCEDURE — 99999 PR PBB SHADOW E&M-EST. PATIENT-LVL III: CPT | Mod: PBBFAC,,, | Performed by: DERMATOLOGY

## 2021-10-22 PROCEDURE — 11102 TANGNTL BX SKIN SINGLE LES: CPT | Mod: PBBFAC,PO | Performed by: DERMATOLOGY

## 2021-10-22 PROCEDURE — 11102 TANGNTL BX SKIN SINGLE LES: CPT | Mod: S$PBB,,, | Performed by: DERMATOLOGY

## 2021-10-22 PROCEDURE — 99499 UNLISTED E&M SERVICE: CPT | Mod: S$PBB,,, | Performed by: DERMATOLOGY

## 2021-10-22 PROCEDURE — 88305 TISSUE EXAM BY PATHOLOGIST: CPT | Mod: 26,,, | Performed by: PATHOLOGY

## 2021-10-22 PROCEDURE — 88305 TISSUE EXAM BY PATHOLOGIST: ICD-10-PCS | Mod: 26,,, | Performed by: PATHOLOGY

## 2021-10-22 PROCEDURE — 11102 PR TANGENTIAL BIOPSY, SKIN, SINGLE LESION: ICD-10-PCS | Mod: S$PBB,,, | Performed by: DERMATOLOGY

## 2021-10-22 PROCEDURE — 99213 OFFICE O/P EST LOW 20 MIN: CPT | Mod: PBBFAC,PO,25 | Performed by: DERMATOLOGY

## 2021-10-22 PROCEDURE — 99499 NO LOS: ICD-10-PCS | Mod: S$PBB,,, | Performed by: DERMATOLOGY

## 2021-10-22 PROCEDURE — 88305 TISSUE EXAM BY PATHOLOGIST: CPT | Performed by: PATHOLOGY

## 2021-10-27 ENCOUNTER — CLINICAL SUPPORT (OUTPATIENT)
Dept: AUDIOLOGY | Facility: CLINIC | Age: 81
End: 2021-10-27
Payer: MEDICARE

## 2021-10-27 DIAGNOSIS — Z71.89 HEARING AID CONSULTATION: Primary | ICD-10-CM

## 2021-10-27 LAB
FINAL PATHOLOGIC DIAGNOSIS: NORMAL
GROSS: NORMAL
Lab: NORMAL
MICROSCOPIC EXAM: NORMAL

## 2021-10-28 ENCOUNTER — TELEPHONE (OUTPATIENT)
Dept: DERMATOLOGY | Facility: CLINIC | Age: 81
End: 2021-10-28
Payer: MEDICARE

## 2021-11-22 ENCOUNTER — TELEPHONE (OUTPATIENT)
Dept: DERMATOLOGY | Facility: CLINIC | Age: 81
End: 2021-11-22
Payer: MEDICARE

## 2021-11-22 ENCOUNTER — TELEPHONE (OUTPATIENT)
Dept: OPHTHALMOLOGY | Facility: CLINIC | Age: 81
End: 2021-11-22
Payer: MEDICARE

## 2021-11-24 ENCOUNTER — OFFICE VISIT (OUTPATIENT)
Dept: DERMATOLOGY | Facility: CLINIC | Age: 81
End: 2021-11-24
Payer: MEDICARE

## 2021-11-24 DIAGNOSIS — D04.121: Primary | ICD-10-CM

## 2021-11-24 PROCEDURE — 99999 PR PBB SHADOW E&M-EST. PATIENT-LVL III: ICD-10-PCS | Mod: PBBFAC,,, | Performed by: DERMATOLOGY

## 2021-11-24 PROCEDURE — 99999 PR PBB SHADOW E&M-EST. PATIENT-LVL III: CPT | Mod: PBBFAC,,, | Performed by: DERMATOLOGY

## 2021-11-24 PROCEDURE — 99213 OFFICE O/P EST LOW 20 MIN: CPT | Mod: S$PBB,,, | Performed by: DERMATOLOGY

## 2021-11-24 PROCEDURE — 99213 OFFICE O/P EST LOW 20 MIN: CPT | Mod: PBBFAC,PO | Performed by: DERMATOLOGY

## 2021-11-24 PROCEDURE — 99213 PR OFFICE/OUTPT VISIT, EST, LEVL III, 20-29 MIN: ICD-10-PCS | Mod: S$PBB,,, | Performed by: DERMATOLOGY

## 2021-11-24 RX ORDER — MUPIROCIN 20 MG/G
OINTMENT TOPICAL 2 TIMES DAILY PRN
Qty: 22 G | Refills: 0 | Status: SHIPPED | OUTPATIENT
Start: 2021-11-24 | End: 2023-04-01

## 2021-12-06 ENCOUNTER — OFFICE VISIT (OUTPATIENT)
Dept: OTOLARYNGOLOGY | Facility: CLINIC | Age: 81
End: 2021-12-06
Payer: MEDICARE

## 2021-12-06 VITALS — HEIGHT: 64 IN | WEIGHT: 161.38 LBS | BODY MASS INDEX: 27.55 KG/M2

## 2021-12-06 DIAGNOSIS — H90.3 SENSORINEURAL HEARING LOSS (SNHL) OF BOTH EARS: Primary | ICD-10-CM

## 2021-12-06 DIAGNOSIS — H61.20 IMPACTED CERUMEN, UNSPECIFIED LATERALITY: ICD-10-CM

## 2021-12-06 PROCEDURE — 99213 OFFICE O/P EST LOW 20 MIN: CPT | Mod: PBBFAC | Performed by: STUDENT IN AN ORGANIZED HEALTH CARE EDUCATION/TRAINING PROGRAM

## 2021-12-06 PROCEDURE — 99999 PR PBB SHADOW E&M-EST. PATIENT-LVL III: CPT | Mod: PBBFAC,,, | Performed by: STUDENT IN AN ORGANIZED HEALTH CARE EDUCATION/TRAINING PROGRAM

## 2021-12-06 PROCEDURE — 69210 PR REMOVAL IMPACTED CERUMEN REQUIRING INSTRUMENTATION, UNILATERAL: ICD-10-PCS | Mod: S$PBB,ICN,, | Performed by: STUDENT IN AN ORGANIZED HEALTH CARE EDUCATION/TRAINING PROGRAM

## 2021-12-06 PROCEDURE — 99213 PR OFFICE/OUTPT VISIT, EST, LEVL III, 20-29 MIN: ICD-10-PCS | Mod: 25,S$PBB,ICN, | Performed by: STUDENT IN AN ORGANIZED HEALTH CARE EDUCATION/TRAINING PROGRAM

## 2021-12-06 PROCEDURE — 69210 REMOVE IMPACTED EAR WAX UNI: CPT | Mod: PBBFAC | Performed by: STUDENT IN AN ORGANIZED HEALTH CARE EDUCATION/TRAINING PROGRAM

## 2021-12-06 PROCEDURE — 99213 OFFICE O/P EST LOW 20 MIN: CPT | Mod: 25,S$PBB,ICN, | Performed by: STUDENT IN AN ORGANIZED HEALTH CARE EDUCATION/TRAINING PROGRAM

## 2021-12-06 PROCEDURE — 99999 PR PBB SHADOW E&M-EST. PATIENT-LVL III: ICD-10-PCS | Mod: PBBFAC,,, | Performed by: STUDENT IN AN ORGANIZED HEALTH CARE EDUCATION/TRAINING PROGRAM

## 2021-12-06 PROCEDURE — 69210 REMOVE IMPACTED EAR WAX UNI: CPT | Mod: S$PBB,ICN,, | Performed by: STUDENT IN AN ORGANIZED HEALTH CARE EDUCATION/TRAINING PROGRAM

## 2021-12-07 ENCOUNTER — TELEPHONE (OUTPATIENT)
Dept: OPHTHALMOLOGY | Facility: CLINIC | Age: 81
End: 2021-12-07
Payer: MEDICARE

## 2021-12-08 ENCOUNTER — TELEPHONE (OUTPATIENT)
Dept: OPHTHALMOLOGY | Facility: CLINIC | Age: 81
End: 2021-12-08
Payer: MEDICARE

## 2021-12-09 ENCOUNTER — TELEPHONE (OUTPATIENT)
Dept: OPHTHALMOLOGY | Facility: CLINIC | Age: 81
End: 2021-12-09
Payer: MEDICARE

## 2021-12-22 ENCOUNTER — OFFICE VISIT (OUTPATIENT)
Dept: OPHTHALMOLOGY | Facility: CLINIC | Age: 81
End: 2021-12-22
Payer: MEDICARE

## 2021-12-22 DIAGNOSIS — H25.13 CATARACT, NUCLEAR SCLEROTIC SENILE, BILATERAL: Primary | ICD-10-CM

## 2021-12-22 DIAGNOSIS — H52.7 REFRACTIVE ERRORS: ICD-10-CM

## 2021-12-22 PROCEDURE — 99999 PR PBB SHADOW E&M-EST. PATIENT-LVL II: CPT | Mod: PBBFAC,,, | Performed by: OPTOMETRIST

## 2021-12-22 PROCEDURE — 92015 DETERMINE REFRACTIVE STATE: CPT | Mod: ,,, | Performed by: OPTOMETRIST

## 2021-12-22 PROCEDURE — 99999 PR PBB SHADOW E&M-EST. PATIENT-LVL II: ICD-10-PCS | Mod: PBBFAC,,, | Performed by: OPTOMETRIST

## 2021-12-22 PROCEDURE — 92004 PR EYE EXAM, NEW PATIENT,COMPREHESV: ICD-10-PCS | Mod: S$PBB,,, | Performed by: OPTOMETRIST

## 2021-12-22 PROCEDURE — 92015 PR REFRACTION: ICD-10-PCS | Mod: ,,, | Performed by: OPTOMETRIST

## 2021-12-22 PROCEDURE — 99212 OFFICE O/P EST SF 10 MIN: CPT | Mod: PBBFAC | Performed by: OPTOMETRIST

## 2021-12-22 PROCEDURE — 92004 COMPRE OPH EXAM NEW PT 1/>: CPT | Mod: S$PBB,,, | Performed by: OPTOMETRIST

## 2022-01-24 ENCOUNTER — TELEPHONE (OUTPATIENT)
Dept: OPHTHALMOLOGY | Facility: CLINIC | Age: 82
End: 2022-01-24
Payer: MEDICARE

## 2022-01-24 NOTE — TELEPHONE ENCOUNTER
The patient wants to be called back tomorrow to reschedule her appointment.    ----- Message from Jocelyne Darby sent at 1/24/2022  4:12 PM CST -----  Contact: [place] /   Called stating the pt needs another appt date and time , please give a call back at [phone]

## 2022-01-25 NOTE — TELEPHONE ENCOUNTER
Called [facility] and spoke to Ms. Long's nurse. Nurse took our number and said she will have the  give us a call back because she wasn't in at this moment.

## 2022-02-07 DIAGNOSIS — I10 PRIMARY HYPERTENSION: ICD-10-CM

## 2022-02-07 DIAGNOSIS — I48.0 PAF (PAROXYSMAL ATRIAL FIBRILLATION): Primary | ICD-10-CM

## 2022-02-08 ENCOUNTER — HOSPITAL ENCOUNTER (OUTPATIENT)
Dept: CARDIOLOGY | Facility: HOSPITAL | Age: 82
Discharge: HOME | End: 2022-02-08
Attending: INTERNAL MEDICINE
Payer: MEDICARE

## 2022-02-08 ENCOUNTER — OFFICE VISIT (OUTPATIENT)
Dept: CARDIOLOGY | Facility: CLINIC | Age: 82
End: 2022-02-08
Payer: MEDICARE

## 2022-02-08 VITALS
RESPIRATION RATE: 16 BRPM | HEART RATE: 89 BPM | DIASTOLIC BLOOD PRESSURE: 70 MMHG | OXYGEN SATURATION: 94 % | BODY MASS INDEX: 26.91 KG/M2 | WEIGHT: 157.63 LBS | HEIGHT: 64 IN | SYSTOLIC BLOOD PRESSURE: 112 MMHG

## 2022-02-08 DIAGNOSIS — I26.99 PE (PULMONARY THROMBOEMBOLISM): ICD-10-CM

## 2022-02-08 DIAGNOSIS — G45.9 TIA (TRANSIENT ISCHEMIC ATTACK): ICD-10-CM

## 2022-02-08 DIAGNOSIS — R42 VERTIGO: ICD-10-CM

## 2022-02-08 DIAGNOSIS — I10 ESSENTIAL HYPERTENSION: ICD-10-CM

## 2022-02-08 DIAGNOSIS — K21.9 GASTROESOPHAGEAL REFLUX DISEASE, UNSPECIFIED WHETHER ESOPHAGITIS PRESENT: ICD-10-CM

## 2022-02-08 DIAGNOSIS — D50.0 IRON DEFICIENCY ANEMIA DUE TO CHRONIC BLOOD LOSS: ICD-10-CM

## 2022-02-08 DIAGNOSIS — J96.11 CHRONIC RESPIRATORY FAILURE WITH HYPOXIA: ICD-10-CM

## 2022-02-08 DIAGNOSIS — I48.0 PAF (PAROXYSMAL ATRIAL FIBRILLATION): Primary | ICD-10-CM

## 2022-02-08 DIAGNOSIS — I10 PRIMARY HYPERTENSION: ICD-10-CM

## 2022-02-08 DIAGNOSIS — J44.9 COPD, VERY SEVERE: ICD-10-CM

## 2022-02-08 DIAGNOSIS — E78.5 HYPERLIPIDEMIA, UNSPECIFIED HYPERLIPIDEMIA TYPE: ICD-10-CM

## 2022-02-08 DIAGNOSIS — I48.0 PAF (PAROXYSMAL ATRIAL FIBRILLATION): ICD-10-CM

## 2022-02-08 DIAGNOSIS — C34.12 MALIGNANT NEOPLASM OF UPPER LOBE OF LEFT LUNG: ICD-10-CM

## 2022-02-08 PROCEDURE — 93010 ELECTROCARDIOGRAM REPORT: CPT | Mod: ,,, | Performed by: INTERNAL MEDICINE

## 2022-02-08 PROCEDURE — 99214 PR OFFICE/OUTPT VISIT, EST, LEVL IV, 30-39 MIN: ICD-10-PCS | Mod: S$PBB,,, | Performed by: INTERNAL MEDICINE

## 2022-02-08 PROCEDURE — 99999 PR PBB SHADOW E&M-EST. PATIENT-LVL IV: ICD-10-PCS | Mod: PBBFAC,,, | Performed by: INTERNAL MEDICINE

## 2022-02-08 PROCEDURE — 93005 ELECTROCARDIOGRAM TRACING: CPT

## 2022-02-08 PROCEDURE — 99999 PR PBB SHADOW E&M-EST. PATIENT-LVL IV: CPT | Mod: PBBFAC,,, | Performed by: INTERNAL MEDICINE

## 2022-02-08 PROCEDURE — 93010 EKG 12-LEAD: ICD-10-PCS | Mod: ,,, | Performed by: INTERNAL MEDICINE

## 2022-02-08 PROCEDURE — 99214 OFFICE O/P EST MOD 30 MIN: CPT | Mod: S$PBB,,, | Performed by: INTERNAL MEDICINE

## 2022-02-08 PROCEDURE — 99214 OFFICE O/P EST MOD 30 MIN: CPT | Mod: PBBFAC | Performed by: INTERNAL MEDICINE

## 2022-02-08 NOTE — PROGRESS NOTES
Subjective:   Patient ID:  Rachel Long is a 81 y.o. female who presents for cardiac consult of No chief complaint on file.    Chest Pain   Associated symptoms include dizziness, malaise/fatigue, palpitations, shortness of breath and weakness.   Follow-up  Associated symptoms include chest pain and weakness.     The patient came in today for cardiac consult of No chief complaint on file.    Rachel Long is a 81 y.o. female pt with PAF, h/o TIA remote- twice, dementia, anxiety, h/o left NSCLC h/o chemoXR, h/o PE and former long time smoker presents for CV follow up.     Hosp Follow up   78 yo female, consult for afib with RVR  [facility] home resident  [facility] [place] h/o TIA remote, twice, dementia, anxiety, h/o left NSCLC h/o chemoXR, h/o PE and former long time somoker  Pt states that she had fast heart beat when she was stressful  Pt came in colonoscopy due to recent GI bleeding. Eliquis was on hold  The procedure was cancelled due to afib with RVR  Pt denied chest pain, dizziness and faint  Has chronic SOARES and intermittent palpitation  On cardizme gtt and had metoprolol 2.5 mg ivp x1 in ER. HR at 95 bpm.   1/3/2020-admitted yesterday with A-fib with RVR. Converted to NSR overnight after Cardizem. Eliquis on hold due to recent GIB. Has no CNS complaints to suggest TIA or CVA. Is on Heparin gtt with no abnormal bleeding. H/H stable     1/21/20  Per DC summary - recommended Cardizem 60mg PO TID with no metoprolol upon discharge. Eliquis on hold until GI clears post endoscopic procedure given her recent GIB. Lives at Nursing home - [facility]. No CP/SOB. Will see GI for anticoag recs.     4/21/20  Holter in Feb with NSR, rare episodes of Aflutter/PAF. Told pt - Episodes of Afib and Aflutter noted, need to restart Eliquis when ok per GI. - Per last DC - She will also follow up with Dr. Bhakta (GI) within 1 week after discharge to evaluate rescheduling endoscopic procedure. Discussed with pt needs scope, she feels ok now but occ has  atypical CP, hard to describe.     7/23/20  EGD with hiatal hernia but no ulcers noted. Cscope needs to be redone due to stool/improper prep. She feels occ palpitations when she is excited. ECG today NSR. She has not restarted Eliquis yet. NO bleeding or falls lately.     11/10/20  Has not had GI eval since last visit. She has been having more palpitations. Has been getting coughing spells, has been dry. Water does not improve it. She has not gotten C scope yet. She saw Dr. English, was told she looks.     12/29/20  BP stable 140s/70s. She has not gotten Cscope yet. She has been having SOB, still coughing a lot. Will refer again to GI and Pulm.     4/1/21  BP and HR well controlled today. Feels as if her BP is more elevated at times. She felt this AM she couldn't breathe felt she was choking. She has gotten both COVID vaccines.   ECG - NSR with sinus arrythmia    10/7/21  Had a fall in Aug - 79 yo F history of Afib not on blood thinners or Aspirin (from med rec) and history of falls who presented for confusion, weakness and dzzinessness. She was admitted to observation as CT head showed 4mm hyperechoic focus in right basal ganglia: calcification Vs Hemorrhage.  Neurosurgery was consulted and a repeat CT head was done which does not report the previous finding.  Neurosurgery has seen repeat CT and clears her for discharge.  BP today well controlled. HR 60s. She fell at home, fell out of bed hit her head on bed post, her neighbor called EMS and she went to ER.     2/8/22  BP and HR stable. She is at nursing home overall has been doing well/stable. NO recent falls/ Is doing PT there. No CP/SOB. Has occ vertigo episodes. Has coughing spells at times. ECG - NSR with sinus arrythmnia, nonspect T wave changes    Patient feels  no leg swelling, no PND, no syncope, no CNS symptoms.    Patient has fairly good exercise tolerance.    Patient is compliant with medications.    EGD  - Medium-sized hiatal hernia.                          - No gross lesions in the stomach.                         - Normal duodenal bulb, second portion of the                         duodenum and third portion of the duodenum.                         - No specimens collected.     HOLTER 2/11/20  Sinus rhythm with heart rates varying between 52 and 194 bpm with an average of 83 bpm.   Paroxysmal atrial fibrillation   also episodes of atrial flutter with variable rate    CONCLUSIONS     1 - Concentric hypertrophy.     2 - No wall motion abnormalities.     3 - Normal left ventricular systolic function (EF 55-60%).     4 - Normal left ventricular diastolic function.     5 - Normal right ventricular systolic function .     6 - The estimated PA systolic pressure is 31 mmHg.     7 - Mild tricuspid regurgitation.     This document has been electronically    SIGNED BY: Jose Calvert MD On: 01/02/2020 15:37    Past Medical History:   Diagnosis Date    Acute upper respiratory infection     Anemia     Arthritis     osteo    Bipolar mood disorder     Coordination abnormal     COPD exacerbation 5/9/2016    Coronary artery disease     Difficulty walking     Dysphasia     GERD (gastroesophageal reflux disease)     Hyperlipidemia     Hypertension     on meds    Hypothyroidism, adult     Insomnia     Malignant neoplasm of colon     Muscle weakness     Neuralgia and neuritis     Parkinson disease     Pulmonary embolism     Schizoaffective disorder     SOB (shortness of breath)     Spinal stenosis     Stroke     pt states mini strokes    Thyroid disease     Urinary tract infection        Past Surgical History:   Procedure Laterality Date    COLON SURGERY      COLONOSCOPY N/A 7/9/2020    Procedure: COLONOSCOPY;  Surgeon: Cesar Montague III, MD;  [facility];  Service: Endoscopy;  Laterality: N/A;    CORONARY STENT PLACEMENT      ESOPHAGOGASTRODUODENOSCOPY N/A 7/9/2020    Procedure: EGD (ESOPHAGOGASTRODUODENOSCOPY);  Surgeon: Cesar Montague III, MD;   Location[facility];  Service: Endoscopy;  Laterality: N/A;    FRACTURE SURGERY      HYSTERECTOMY  1970's       Social History     Tobacco Use    Smoking status: Former Smoker     Years: 50.00     Types: Cigarettes    Smokeless tobacco: Former User   Substance Use Topics    Alcohol use: No    Drug use: No       Family History   Problem Relation Age of Onset    Asthma Father     Eczema Father     No Known Problems Mother     Other Brother         shoulder surgery        Patient's Medications   New Prescriptions    No medications on file   Previous Medications    ACETAMINOPHEN (TYLENOL) 325 MG TABLET    Take 650 mg by mouth 4 (four) times daily as needed for Pain.    ALBUTEROL (PROVENTIL/VENTOLIN HFA) 90 MCG/ACTUATION INHALER    Inhale 2 puffs into the lungs every 6 (six) hours as needed for Wheezing. Rescue    ASPIRIN/CALCIUM CARB/MAGNESIUM (BUFFERIN ORAL)    Take by mouth.    ATORVASTATIN (LIPITOR) 80 MG TABLET    Take 1 tablet (80 mg total) by mouth every evening.    BENZTROPINE (COGENTIN) 1 MG TABLET    Take 1 mg by mouth 2 (two) times daily.    BUDESONIDE-FORMOTEROL 80-4.5 MCG (SYMBICORT) 80-4.5 MCG/ACTUATION HFAA    Inhale 2 puffs into the lungs once daily. Controller    BUSPIRONE (BUSPAR) 5 MG TAB        DEXTRAN 70-HYPROMELLOSE 0.1-0.3 % DROP    Apply 1 drop to eye 2 (two) times daily.    DILTIAZEM (CARDIZEM) 60 MG TABLET    Take 1 tablet (60 mg total) by mouth every 8 (eight) hours.    DULOXETINE (CYMBALTA) 60 MG CAPSULE    once daily.    FERROUS SULFATE (FEOSOL) 325 MG (65 MG IRON) TAB TABLET    Take 325 mg by mouth 2 (two) times daily.    LOPERAMIDE (IMODIUM A-D) 2 MG TAB    Take 2 mg by mouth daily as needed (give one po after each episode of loose stool with max of 8 tabs/daily).    MELATONIN 3 MG CAP    Take 1 tablet by mouth nightly as needed.    MUPIROCIN (BACTROBAN) 2 % OINTMENT    Apply topically 2 (two) times daily as needed (eyelid skin irritation).    PANTOPRAZOLE (PROTONIX) 40 MG TABLET     Take 40 mg by mouth once daily.    QUETIAPINE (SEROQUEL) 100 MG TAB        SPIRONOLACTONE (ALDACTONE) 25 MG TABLET    Take 1 tablet (25 mg total) by mouth once daily.    THIAMINE 100 MG TABLET    Take 100 mg by mouth once daily.    TRAZODONE (DESYREL) 50 MG TABLET    Take 50 mg by mouth nightly as needed.    Modified Medications    No medications on file   Discontinued Medications    No medications on file       Review of Systems   Constitutional: Positive for malaise/fatigue.   HENT: Negative.    Eyes: Negative.    Respiratory: Positive for shortness of breath.    Cardiovascular: Positive for chest pain and palpitations.   Gastrointestinal: Negative.    Genitourinary: Negative.    Musculoskeletal: Negative.    Skin: Negative.    Neurological: Positive for dizziness and weakness.   Endo/Heme/Allergies: Negative.    Psychiatric/Behavioral: Negative.    All 12 systems otherwise negative.      Wt Readings from Last 3 Encounters:   12/06/21 73.2 kg (161 lb 6 oz)   10/19/21 75.3 kg (166 lb)   10/07/21 75.4 kg (166 lb 3.6 oz)     Temp Readings from Last 3 Encounters:   10/19/21 97.5 °F (36.4 °C) (Temporal)   08/06/21 98.5 °F (36.9 °C)   07/09/20 98.2 °F (36.8 °C) (Temporal)     BP Readings from Last 3 Encounters:   10/19/21 (!) 144/73   10/07/21 112/66   08/06/21 (!) 120/56     Pulse Readings from Last 3 Encounters:   10/19/21 60   10/07/21 66   08/06/21 80       LMP  (LMP Unknown)     Objective:   Physical Exam  Vitals and nursing note reviewed.   Constitutional:       General: She is in acute distress.      Appearance: She is well-developed. She is not diaphoretic.   HENT:      Head: Normocephalic and atraumatic.      Nose: Nose normal.   Eyes:      General: No scleral icterus.     Conjunctiva/sclera: Conjunctivae normal.   Neck:      Thyroid: No thyromegaly.      Vascular: No JVD.   Cardiovascular:      Rate and Rhythm: Normal rate and regular rhythm.      Heart sounds: S1 normal and S2 normal. Murmur heard.   No  friction rub. No gallop. No S3 or S4 sounds.    Pulmonary:      Effort: Pulmonary effort is normal. No respiratory distress.      Breath sounds: Normal breath sounds. No stridor. No wheezing or rales.   Chest:      Chest wall: No tenderness.   Abdominal:      General: Bowel sounds are normal. There is no distension.      Palpations: Abdomen is soft. There is no mass.      Tenderness: There is no abdominal tenderness. There is no rebound.   Genitourinary:     Comments: Deferred  Musculoskeletal:         General: No tenderness or deformity. Normal range of motion.      Cervical back: Normal range of motion and neck supple.   Lymphadenopathy:      Cervical: No cervical adenopathy.   Skin:     General: Skin is warm and dry.      Coloration: Skin is not pale.      Findings: No erythema or rash.   Neurological:      Mental Status: She is alert and oriented to person, place, and time.      Motor: No abnormal muscle tone.      Coordination: Coordination normal.   Psychiatric:         Behavior: Behavior normal.         Thought Content: Thought content normal.         Judgment: Judgment normal.         Lab Results   Component Value Date     08/06/2021    K 4.4 08/06/2021     08/06/2021    CO2 27 08/06/2021    BUN 22 08/06/2021    CREATININE 0.8 08/06/2021     08/06/2021    MG 1.7 08/06/2021    AST 16 08/06/2021    ALT 9 (L) 08/06/2021    ALBUMIN 3.7 08/06/2021    PROT 7.2 08/06/2021    BILITOT 0.8 08/06/2021    WBC 6.75 08/06/2021    HGB 12.8 08/06/2021    HCT 40.7 08/06/2021    MCV 92 08/06/2021     08/06/2021    INR 1.0 01/02/2020    TSH 0.701 08/06/2021    BNP <10 11/10/2020     Assessment:      1. PAF (paroxysmal atrial fibrillation)    2. Primary hypertension    3. Vertigo    4. Hyperlipidemia, unspecified hyperlipidemia type    5. PE (pulmonary thromboembolism)    6. Iron deficiency anemia due to chronic blood loss    7. Gastroesophageal reflux disease, unspecified whether esophagitis present     8. Malignant neoplasm of upper lobe of left lung    9. Essential hypertension    10. TIA (transient ischemic attack)    11. COPD, very severe    12. Chronic respiratory failure with hypoxia        Plan:   1. PAF - in NSR  - cont meds - cont Dilt and BB  - Holter - with brief PAF and flutter noted  - off Eliquis, restart when ok per GI /lack of falls     2. HTN - stable   - titrate meds    3. H/O PE  - restart A/C when ok per GI    4. Lung CA with COPD with chronic resp failure  - s/p tx per onc  - f/u pulm    5.GIB with FE def anemia  - needs f/u with GI  - moderate CV risk for Cscope/EGD     6. Dyspnea, fatigue, cough  - f/u with GI and Pulm   - if severe symptoms need ER eval     7. Dizziness, hearing loss with ENT  - f/u ENT     8. HLD  - cont statin    Thank you for allowing me to participate in this patient's care. Please do not hesitate to contact me with any questions or concerns. Consult note has been forwarded to the referral physician.

## 2022-02-17 ENCOUNTER — OFFICE VISIT (OUTPATIENT)
Dept: OPHTHALMOLOGY | Facility: CLINIC | Age: 82
End: 2022-02-17
Payer: MEDICARE

## 2022-02-17 ENCOUNTER — DOCUMENTATION ONLY (OUTPATIENT)
Dept: OPHTHALMOLOGY | Facility: CLINIC | Age: 82
End: 2022-02-17

## 2022-02-17 DIAGNOSIS — H25.12 NUCLEAR SCLEROSIS OF LEFT EYE: ICD-10-CM

## 2022-02-17 DIAGNOSIS — H40.033 ANATOMICAL NARROW ANGLE OF BOTH EYES: ICD-10-CM

## 2022-02-17 DIAGNOSIS — H25.11 NUCLEAR SCLEROSIS OF RIGHT EYE: Primary | ICD-10-CM

## 2022-02-17 DIAGNOSIS — H04.123 DRY EYES, BILATERAL: ICD-10-CM

## 2022-02-17 PROCEDURE — 92025 CORNEAL TOPOGRAPHY - OU - BOTH EYES: ICD-10-PCS | Mod: 26,S$PBB,, | Performed by: STUDENT IN AN ORGANIZED HEALTH CARE EDUCATION/TRAINING PROGRAM

## 2022-02-17 PROCEDURE — 99999 PR PBB SHADOW E&M-EST. PATIENT-LVL III: CPT | Mod: PBBFAC,,, | Performed by: STUDENT IN AN ORGANIZED HEALTH CARE EDUCATION/TRAINING PROGRAM

## 2022-02-17 PROCEDURE — 99213 OFFICE O/P EST LOW 20 MIN: CPT | Mod: PBBFAC,25 | Performed by: STUDENT IN AN ORGANIZED HEALTH CARE EDUCATION/TRAINING PROGRAM

## 2022-02-17 PROCEDURE — 99204 OFFICE O/P NEW MOD 45 MIN: CPT | Mod: S$PBB,,, | Performed by: STUDENT IN AN ORGANIZED HEALTH CARE EDUCATION/TRAINING PROGRAM

## 2022-02-17 PROCEDURE — 99999 PR PBB SHADOW E&M-EST. PATIENT-LVL III: ICD-10-PCS | Mod: PBBFAC,,, | Performed by: STUDENT IN AN ORGANIZED HEALTH CARE EDUCATION/TRAINING PROGRAM

## 2022-02-17 PROCEDURE — 92136 OPHTHALMIC BIOMETRY: CPT | Mod: PBBFAC | Performed by: STUDENT IN AN ORGANIZED HEALTH CARE EDUCATION/TRAINING PROGRAM

## 2022-02-17 PROCEDURE — 92025 CPTRIZED CORNEAL TOPOGRAPHY: CPT | Mod: PBBFAC | Performed by: STUDENT IN AN ORGANIZED HEALTH CARE EDUCATION/TRAINING PROGRAM

## 2022-02-17 PROCEDURE — 99204 PR OFFICE/OUTPT VISIT, NEW, LEVL IV, 45-59 MIN: ICD-10-PCS | Mod: S$PBB,,, | Performed by: STUDENT IN AN ORGANIZED HEALTH CARE EDUCATION/TRAINING PROGRAM

## 2022-02-17 PROCEDURE — 92136 IOL MASTER - OD - RIGHT EYE: ICD-10-PCS | Mod: 26,S$PBB,RT, | Performed by: STUDENT IN AN ORGANIZED HEALTH CARE EDUCATION/TRAINING PROGRAM

## 2022-02-17 RX ORDER — DIFLUPREDNATE OPHTHALMIC 0.5 MG/ML
1 EMULSION OPHTHALMIC 4 TIMES DAILY
Qty: 5 ML | Refills: 1 | Status: SHIPPED | OUTPATIENT
Start: 2022-02-17 | End: 2022-03-19

## 2022-02-17 NOTE — PROGRESS NOTES
HPI     Cataract      Additional comments: Cataract evaluation    Patient states VA has become extremely hazy and dull and very sensitive to   light.  Patient also states OU constantly feels dry and very itchy at the   base of lashes.              Comments     C/o blurred vision  over the past several years    Which eye is most affected? OU    Activities of daily living impacted: reading    Do you have difficulty, even with glasses, with the following activities?    Reading small print such as labels on medicine bottles, a telephone book,   or food labels.   yes  Reading a newspaper or book?  yes  Seeing steps, stairs, or curbs  yes  Reading traffic signs, street signs, or store signs  yes  Doing fine handwork like sewing, knitting, crocheting or carpentry?  yes  Writing checks or filling out forms  yes  Playing games such as bingo, dominos, card games or mahjong?  no  Watching television?  yes  Driving at night?  yes             Last edited by Gini Hutson MD on 2/17/2022  4:34 PM. (History)            Assessment /Plan     For exam results, see Encounter Report.    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 Risks, benefits, and alternatives to cataract surgery were discussed.  Discussion of risks included possibility of infection as well as permanent vision loss.The pt expressed a desire to proceed with surgery with the potential for some reasonable degree of visual improvement. Recommended regular use of artificial tears and good lid hygiene to optimize surgical outcome.     Discussed IOL options and refractive outcomes for this patient.    [place] right eye,   Topical, IRING  monofocal IOL.  Will aim for distance  [facility]  Prescriptions sent for preoperative medications  Durezol or Prednisolone Acetate  Explained that patient may need glasses after surgery.    Discussed that vision may be limited by SUSAN   Dilation: Poor  Alpha Blockers: none        Nuclear sclerosis of left eye- Follow    Dry eyes, bilateral- - ATs QID and lid hygiene w/ baby shampoo  - [place] 3 Fish Oils    Anatomical narrow angles, bilateral- IOP normal. Angles narrow on gonioscopy but open with dynamic gonioscopy. Explained r/b/a to observation vs. LPI vs. CEIOL. Patient elects for CEIOL.  - Plan for CEIOL OD then OS        Return to clinic for PCIOL OD

## 2022-02-17 NOTE — PROGRESS NOTES
Short Stay Record    Diagnosis: Nuclear Sclerotic Cataract right and Anatomically Narrow Angle right    CC: Blurry Vision     HPI:  Rachel Long is a 81 y.o. female who presents for evaluation prior to ophthalmic surgery. No current complaints.     Past Medical History:   Diagnosis Date    Acute upper respiratory infection     Anemia     Arthritis     osteo    Bipolar mood disorder     Coordination abnormal     COPD exacerbation 5/9/2016    Coronary artery disease     Difficulty walking     Dysphasia     GERD (gastroesophageal reflux disease)     Hyperlipidemia     Hypertension     on meds    Hypothyroidism, adult     Insomnia     Malignant neoplasm of colon     Muscle weakness     Neuralgia and neuritis     Parkinson disease     Pulmonary embolism     Schizoaffective disorder     SOB (shortness of breath)     Spinal stenosis     Stroke     pt states mini strokes    Thyroid disease     Urinary tract infection      Past Surgical History:   Procedure Laterality Date    COLON SURGERY      COLONOSCOPY N/A 7/9/2020    Procedure: COLONOSCOPY;  Surgeon: Cesar Montague III, MD;  Location: [facility];  Service: Endoscopy;  Laterality: N/A;    CORONARY STENT PLACEMENT      ESOPHAGOGASTRODUODENOSCOPY N/A 7/9/2020    Procedure: EGD (ESOPHAGOGASTRODUODENOSCOPY);  Surgeon: Cesar Montague III, MD;  Location: [facility];  Service: Endoscopy;  Laterality: N/A;    FRACTURE SURGERY      HYSTERECTOMY  1970's     Social History     Tobacco Use    Smoking status: Former Smoker     Years: 50.00     Types: Cigarettes    Smokeless tobacco: Former User   Substance Use Topics    Alcohol use: No     Family History   Problem Relation Age of Onset    Asthma Father     Eczema Father     No Known Problems Mother     Other Brother         shoulder surgery      Review of patient's allergies indicates:   Allergen Reactions    Benadryl [diphenhydramine hcl] Anxiety    Sulfa (sulfonamide antibiotics) Rash          Current Outpatient Medications:     acetaminophen (TYLENOL) 325 MG tablet, Take 650 mg by mouth 4 (four) times daily as needed for Pain., Disp: , Rfl:     albuterol (PROVENTIL/VENTOLIN HFA) 90 mcg/actuation inhaler, Inhale 2 puffs into the lungs every 6 (six) hours as needed for Wheezing. Rescue, Disp: , Rfl:     aspirin/calcium carb/magnesium (BUFFERIN ORAL), Take by mouth., Disp: , Rfl:     atorvastatin (LIPITOR) 80 MG tablet, Take 1 tablet (80 mg total) by mouth every evening., Disp: 90 tablet, Rfl: 4    benztropine (COGENTIN) 1 MG tablet, Take 1 mg by mouth 2 (two) times daily., Disp: , Rfl:     budesonide-formoterol 80-4.5 mcg (SYMBICORT) 80-4.5 mcg/actuation HFAA, Inhale 2 puffs into the lungs once daily. Controller, Disp: 1 Inhaler, Rfl: 0    busPIRone (BUSPAR) 5 MG Tab, , Disp: , Rfl:     dextran 70-hypromellose 0.1-0.3 % Drop, Apply 1 drop to eye 2 (two) times daily., Disp: , Rfl:     difluprednate (DUREZOL) 0.05 % Drop ophthalmic solution, Place 1 drop into the right eye 4 (four) times daily., Disp: 5 mL, Rfl: 1    diltiaZEM (CARDIZEM) 60 MG tablet, Take 1 tablet (60 mg total) by mouth every 8 (eight) hours., Disp: 270 tablet, Rfl: 1    DULoxetine (CYMBALTA) 60 MG capsule, once daily., Disp: , Rfl:     ferrous sulfate (FEOSOL) 325 mg (65 mg iron) Tab tablet, Take 325 mg by mouth 2 (two) times daily., Disp: , Rfl:     loperamide (IMODIUM A-D) 2 mg Tab, Take 2 mg by mouth daily as needed (give one po after each episode of loose stool with max of 8 tabs/daily)., Disp: , Rfl:     melatonin 3 mg Cap, Take 1 tablet by mouth nightly as needed., Disp: , Rfl:     mupirocin (BACTROBAN) 2 % ointment, Apply topically 2 (two) times daily as needed (eyelid skin irritation)., Disp: 22 g, Rfl: 0    pantoprazole (PROTONIX) 40 MG tablet, Take 40 mg by mouth once daily., Disp: , Rfl:     QUEtiapine (SEROQUEL) 100 MG Tab, , Disp: , Rfl:     spironolactone (ALDACTONE) 25 MG tablet, Take 1 tablet (25  mg total) by mouth once daily., Disp: 90 tablet, Rfl: 1    thiamine 100 MG tablet, Take 100 mg by mouth once daily., Disp: , Rfl:     traZODone (DESYREL) 50 MG tablet, Take 50 mg by mouth nightly as needed. , Disp: , Rfl:     Review of Systems:  10 Pt ROS negative except as stated in HPI    Physical Exam:  General Appearance:    A&Ox3, no distress, appears stated age   Head:    Normocephalic, without obvious abnormality, atraumatic   Eyes:    PERRL, EOM's intact   Back:     Symmetric, no curvature   Lungs:     respirations unlabored   Chest Wall:    No tenderness or deformity    Heart:  Abdomen:  Extremities:  Skin:    S1 and S2 present    Soft, non-tender    Extremities normal, atraumatic    Skin color, texture, turgor normal     Patient is stable for ophthalmic surgery under local and MAC.

## 2022-03-23 ENCOUNTER — OUTSIDE PLACE OF SERVICE (OUTPATIENT)
Dept: OPHTHALMOLOGY | Facility: CLINIC | Age: 82
End: 2022-03-23
Payer: MEDICARE

## 2022-03-23 PROCEDURE — 66984 XCAPSL CTRC RMVL W/O ECP: CPT | Mod: RT,,, | Performed by: STUDENT IN AN ORGANIZED HEALTH CARE EDUCATION/TRAINING PROGRAM

## 2022-03-23 PROCEDURE — 66984 PR REMOVAL, CATARACT, W/INSRT INTRAOC LENS, W/O ENDO CYCLO: ICD-10-PCS | Mod: RT,,, | Performed by: STUDENT IN AN ORGANIZED HEALTH CARE EDUCATION/TRAINING PROGRAM

## 2022-03-24 ENCOUNTER — OFFICE VISIT (OUTPATIENT)
Dept: OPHTHALMOLOGY | Facility: CLINIC | Age: 82
End: 2022-03-24
Payer: MEDICARE

## 2022-03-24 DIAGNOSIS — Z98.890 POST-OPERATIVE STATE: Primary | ICD-10-CM

## 2022-03-24 DIAGNOSIS — Z98.41 CATARACT EXTRACTION STATUS OF EYE, RIGHT: ICD-10-CM

## 2022-03-24 PROCEDURE — 99999 PR PBB SHADOW E&M-EST. PATIENT-LVL III: CPT | Mod: PBBFAC,,, | Performed by: STUDENT IN AN ORGANIZED HEALTH CARE EDUCATION/TRAINING PROGRAM

## 2022-03-24 PROCEDURE — 99024 POSTOP FOLLOW-UP VISIT: CPT | Mod: POP,,, | Performed by: STUDENT IN AN ORGANIZED HEALTH CARE EDUCATION/TRAINING PROGRAM

## 2022-03-24 PROCEDURE — 99213 OFFICE O/P EST LOW 20 MIN: CPT | Mod: PBBFAC | Performed by: STUDENT IN AN ORGANIZED HEALTH CARE EDUCATION/TRAINING PROGRAM

## 2022-03-24 PROCEDURE — 99999 PR PBB SHADOW E&M-EST. PATIENT-LVL III: ICD-10-PCS | Mod: PBBFAC,,, | Performed by: STUDENT IN AN ORGANIZED HEALTH CARE EDUCATION/TRAINING PROGRAM

## 2022-03-24 PROCEDURE — 99024 PR POST-OP FOLLOW-UP VISIT: ICD-10-PCS | Mod: POP,,, | Performed by: STUDENT IN AN ORGANIZED HEALTH CARE EDUCATION/TRAINING PROGRAM

## 2022-03-24 RX ORDER — ERYTHROMYCIN 5 MG/G
OINTMENT OPHTHALMIC 2 TIMES DAILY
Qty: 1 EACH | Refills: 0 | Status: SHIPPED | OUTPATIENT
Start: 2022-03-24 | End: 2023-04-02 | Stop reason: ALTCHOICE

## 2022-03-24 RX ORDER — KETOROLAC TROMETHAMINE 5 MG/ML
1 SOLUTION OPHTHALMIC 4 TIMES DAILY
Qty: 5 ML | Refills: 0 | Status: SHIPPED | OUTPATIENT
Start: 2022-03-24 | End: 2023-03-24

## 2022-03-24 NOTE — PROGRESS NOTES
HPI     Pt in today for a 1 day post-op of the right eye. Pt states her vision is   blurry. Pt denies any ocular pain, but states last night there was some   discomfort. Pt states she's compliant with her drops, but isn't sure what   drop she's using. Pt states her nurses give her the drops.    1. PCIOL OD 3/23/22 - Kenalog Inj.  NSC OS - plan for block     OD- Pred QID      Last edited by Simi Clayton on 3/24/2022  9:32 AM. (History)            Assessment /Plan     For exam results, see Encounter Report.    Post-operative state  Cataract extraction status of eye, right- POD#1 S/P CEIOL OD Doing well. Mild edema    Continue gtts to operative eye:  PF QID  Emycin Oint BID-QID PRN   Keto QID    Reinstructed in importance of absolute compliance with Post-OP instructions including medications, shield at bedtime, protective glasses during the day, and limitation of activities. Follow up appointments in approximately one and six weeks or call immediately for increased pain, redness or vision loss.       *Drop instructions written on nursing home nurse chart and hand written instructions given to patient       RTC 1 week. MOCT if PH worse than 20/25

## 2022-03-31 ENCOUNTER — OFFICE VISIT (OUTPATIENT)
Dept: OPHTHALMOLOGY | Facility: CLINIC | Age: 82
End: 2022-03-31
Payer: MEDICARE

## 2022-03-31 DIAGNOSIS — H04.123 DRY EYES, BILATERAL: ICD-10-CM

## 2022-03-31 DIAGNOSIS — H40.033 ANATOMICAL NARROW ANGLE OF BOTH EYES: ICD-10-CM

## 2022-03-31 DIAGNOSIS — H25.12 NUCLEAR SCLEROSIS OF LEFT EYE: ICD-10-CM

## 2022-03-31 DIAGNOSIS — Z98.41 CATARACT EXTRACTION STATUS OF EYE, RIGHT: ICD-10-CM

## 2022-03-31 DIAGNOSIS — Z98.890 POST-OPERATIVE STATE: Primary | ICD-10-CM

## 2022-03-31 PROCEDURE — 99024 PR POST-OP FOLLOW-UP VISIT: ICD-10-PCS | Mod: POP,,, | Performed by: STUDENT IN AN ORGANIZED HEALTH CARE EDUCATION/TRAINING PROGRAM

## 2022-03-31 PROCEDURE — 99024 POSTOP FOLLOW-UP VISIT: CPT | Mod: POP,,, | Performed by: STUDENT IN AN ORGANIZED HEALTH CARE EDUCATION/TRAINING PROGRAM

## 2022-03-31 PROCEDURE — 99999 PR PBB SHADOW E&M-EST. PATIENT-LVL III: ICD-10-PCS | Mod: PBBFAC,,, | Performed by: STUDENT IN AN ORGANIZED HEALTH CARE EDUCATION/TRAINING PROGRAM

## 2022-03-31 PROCEDURE — 99999 PR PBB SHADOW E&M-EST. PATIENT-LVL III: CPT | Mod: PBBFAC,,, | Performed by: STUDENT IN AN ORGANIZED HEALTH CARE EDUCATION/TRAINING PROGRAM

## 2022-03-31 PROCEDURE — 99213 OFFICE O/P EST LOW 20 MIN: CPT | Mod: PBBFAC | Performed by: STUDENT IN AN ORGANIZED HEALTH CARE EDUCATION/TRAINING PROGRAM

## 2022-03-31 NOTE — PROGRESS NOTES
HPI     1 week post-op of the right eye. Pt states her vision is still cloudy in   her right eye. Pt denies any ocular pain or discomfort. Pt states she's   compliant with her drops and her nurses are giving them to her 4 times a   day.    1. PCIOL OD 3/23/22 - Kenalog Inj.  NSC OS - plan for retrobulbar block and lid block and Kenalog inj.    OD- Pred QID      Last edited by Simi Clayton on 3/31/2022  3:06 PM. (History)            Assessment /Plan     For exam results, see Encounter Report.    Post-operative state  Impression/Plan  POW#1 S/P CEIOL OD : Doing well with no evidence of infection. Persistent inflammation. Will continue steroids.    PF QID until next visit    Pt given and instructed in one week postop instructions. Can resume normal activitites and d/c eye shield. OTC reading glasses can be used until evaluated for final MR. Follow up in one month or PRN pain, redness, vision loss, or other concerns.      Cataract extraction status of eye, right    Nuclear sclerosis of left eye  Monitor for now    Dry eyes, bilateral    Anatomical narrow angle of both eyes

## 2022-04-26 ENCOUNTER — OFFICE VISIT (OUTPATIENT)
Dept: OPHTHALMOLOGY | Facility: CLINIC | Age: 82
End: 2022-04-26
Payer: MEDICARE

## 2022-04-26 ENCOUNTER — DOCUMENTATION ONLY (OUTPATIENT)
Dept: OPHTHALMOLOGY | Facility: CLINIC | Age: 82
End: 2022-04-26

## 2022-04-26 DIAGNOSIS — Z98.41 CATARACT EXTRACTION STATUS OF EYE, RIGHT: ICD-10-CM

## 2022-04-26 DIAGNOSIS — H40.032 ANATOMICAL NARROW ANGLE, LEFT EYE: ICD-10-CM

## 2022-04-26 DIAGNOSIS — H25.12 NUCLEAR SCLEROSIS OF LEFT EYE: ICD-10-CM

## 2022-04-26 DIAGNOSIS — Z98.890 POST-OPERATIVE STATE: Primary | ICD-10-CM

## 2022-04-26 PROCEDURE — 99024 POSTOP FOLLOW-UP VISIT: CPT | Mod: POP,,, | Performed by: STUDENT IN AN ORGANIZED HEALTH CARE EDUCATION/TRAINING PROGRAM

## 2022-04-26 PROCEDURE — 99213 OFFICE O/P EST LOW 20 MIN: CPT | Mod: PBBFAC | Performed by: STUDENT IN AN ORGANIZED HEALTH CARE EDUCATION/TRAINING PROGRAM

## 2022-04-26 PROCEDURE — 92136 IOL MASTER - OS - LEFT EYE: ICD-10-PCS | Mod: 26,S$PBB,LT, | Performed by: STUDENT IN AN ORGANIZED HEALTH CARE EDUCATION/TRAINING PROGRAM

## 2022-04-26 PROCEDURE — 99024 PR POST-OP FOLLOW-UP VISIT: ICD-10-PCS | Mod: POP,,, | Performed by: STUDENT IN AN ORGANIZED HEALTH CARE EDUCATION/TRAINING PROGRAM

## 2022-04-26 PROCEDURE — 99999 PR PBB SHADOW E&M-EST. PATIENT-LVL III: ICD-10-PCS | Mod: PBBFAC,,, | Performed by: STUDENT IN AN ORGANIZED HEALTH CARE EDUCATION/TRAINING PROGRAM

## 2022-04-26 PROCEDURE — 99999 PR PBB SHADOW E&M-EST. PATIENT-LVL III: CPT | Mod: PBBFAC,,, | Performed by: STUDENT IN AN ORGANIZED HEALTH CARE EDUCATION/TRAINING PROGRAM

## 2022-04-26 PROCEDURE — 92136 OPHTHALMIC BIOMETRY: CPT | Mod: PBBFAC | Performed by: STUDENT IN AN ORGANIZED HEALTH CARE EDUCATION/TRAINING PROGRAM

## 2022-04-26 NOTE — PROGRESS NOTES
HPI     Pt in today for a 4 week post-op of the right eye. Pt states her vision   is doing well, but sometimes is cloudy. Denies any ocular pain or   discomfort. Pt states nurses aren't putting drops in 4 times a day.    1. PCIOL OD 3/23/22 - Kenalog Inj.  NSC OS - plan for retrobulbar block and lid block and Kenalog inj.  2. Dry Eyes Ou  3. Narrow Angles OU    OD- Pred QID    C/o blurred vision  and difficulty reading over the past several months    Which eye is most affected? OS    Activities of daily living impacted: reading    Do you have difficulty, even with glasses, with the following activities?    Reading small print such as labels on medicine bottles, a telephone book,   or food labels.   yes  Reading a newspaper or book?  yes  Seeing steps, stairs, or curbs  yes  Reading traffic signs, street signs, or store signs  no  Doing fine handwork like sewing, knitting, crocheting or carpentry?  no  Writing checks or filling out forms  no  Playing games such as bingo, dominos, card games or mahjong?  no  Watching television?  no  Driving at night?  no         Last edited by Simi Clayton on 4/26/2022 11:25 AM. (History)            Assessment /Plan     For exam results, see Encounter Report.    Post-operative state  Cataract extraction status of eye, right- Impression/Plan  S/P CEIOL OD : Doing well with no evidence of infection. Healing well    PF Taper 4-3-2-1 then stop    Pt given and instructed in one week postop instructions. Can resume normal activitites and d/c eye shield. OTC reading glasses can be used until evaluated for final MR. Follow up in one month or PRN pain, redness, vision loss, or other concerns.      Nuclear sclerosis of left eye- Patient reports decreased vision in the fellow eye consistent with the clinical amount of lenticular opacity, which reaches the level of visual significance and affects activities of daily living including reading and glare. Risks, benefits, and alternatives to  cataract surgery were discussed and pt desired to schedule cataract surgery. Pt was consented and the biometry and lens options were reviewed.    Phaco left   Block w/ Lid block  Requests a monofocal IOL.  Will aim for distance  Drop less Sx  Explained that patient may need glasses after surgery.  Discussed that vision may be limited by SUSAN OS      Anatomical narrow angle, left eye- Planning for PCIOL OS      Return to clinic for PCIOL OS

## 2022-04-26 NOTE — PROGRESS NOTES
Short Stay Record    Diagnosis: Nuclear Sclerotic Cataract left    CC: Blurry Vision     HPI:  Rachel Long is a 81 y.o. female who presents for evaluation prior to ophthalmic surgery. No current complaints.     Past Medical History:   Diagnosis Date    Acute upper respiratory infection     Anemia     Arthritis     osteo    Bipolar mood disorder     Cataract     Coordination abnormal     COPD exacerbation 5/9/2016    Coronary artery disease     Difficulty walking     Dysphasia     GERD (gastroesophageal reflux disease)     Hyperlipidemia     Hypertension     on meds    Hypothyroidism, adult     Insomnia     Malignant neoplasm of colon     Muscle weakness     Neuralgia and neuritis     Parkinson disease     Pulmonary embolism     Schizoaffective disorder     SOB (shortness of breath)     Spinal stenosis     Stroke     pt states mini strokes    Thyroid disease     Urinary tract infection      Past Surgical History:   Procedure Laterality Date    CATARACT EXTRACTION Right 03/23/2022    COLON SURGERY      COLONOSCOPY N/A 7/9/2020    Procedure: COLONOSCOPY;  Surgeon: Cesar Montague III, MD;  Location: [facility];  Service: Endoscopy;  Laterality: N/A;    CORONARY STENT PLACEMENT      ESOPHAGOGASTRODUODENOSCOPY N/A 7/9/2020    Procedure: EGD (ESOPHAGOGASTRODUODENOSCOPY);  Surgeon: Cesar Montague III, MD;  Location: [facility];  Service: Endoscopy;  Laterality: N/A;    FRACTURE SURGERY      HYSTERECTOMY  1970's     Social History     Tobacco Use    Smoking status: Former Smoker     Years: 50.00     Types: Cigarettes    Smokeless tobacco: Former User   Substance Use Topics    Alcohol use: No     Family History   Problem Relation Age of Onset    Asthma Father     Eczema Father     No Known Problems Mother     Other Brother         shoulder surgery      Review of patient's allergies indicates:   Allergen Reactions    Benadryl [diphenhydramine hcl] Anxiety    Sulfa (sulfonamide  antibiotics) Rash         Current Outpatient Medications:     acetaminophen (TYLENOL) 325 MG tablet, Take 650 mg by mouth 4 (four) times daily as needed for Pain., Disp: , Rfl:     albuterol (PROVENTIL/VENTOLIN HFA) 90 mcg/actuation inhaler, Inhale 2 puffs into the lungs every 6 (six) hours as needed for Wheezing. Rescue, Disp: , Rfl:     aspirin/calcium carb/magnesium (BUFFERIN ORAL), Take by mouth., Disp: , Rfl:     atorvastatin (LIPITOR) 80 MG tablet, Take 1 tablet (80 mg total) by mouth every evening., Disp: 90 tablet, Rfl: 4    benztropine (COGENTIN) 1 MG tablet, Take 1 mg by mouth 2 (two) times daily., Disp: , Rfl:     budesonide-formoterol 80-4.5 mcg (SYMBICORT) 80-4.5 mcg/actuation HFAA, Inhale 2 puffs into the lungs once daily. Controller, Disp: 1 Inhaler, Rfl: 0    busPIRone (BUSPAR) 5 MG Tab, , Disp: , Rfl:     dextran 70-hypromellose 0.1-0.3 % Drop, Apply 1 drop to eye 2 (two) times daily., Disp: , Rfl:     diltiaZEM (CARDIZEM) 60 MG tablet, Take 1 tablet (60 mg total) by mouth every 8 (eight) hours., Disp: 270 tablet, Rfl: 1    DULoxetine (CYMBALTA) 60 MG capsule, once daily., Disp: , Rfl:     erythromycin (ROMYCIN) ophthalmic ointment, Place into the right eye 2 (two) times a day. Apply to lid margins, Disp: 1 each, Rfl: 0    ferrous sulfate (FEOSOL) 325 mg (65 mg iron) Tab tablet, Take 325 mg by mouth 2 (two) times daily., Disp: , Rfl:     ketorolac 0.5% (ACULAR) 0.5 % Drop, Place 1 drop into the right eye 4 (four) times daily., Disp: 5 mL, Rfl: 0    loperamide (IMODIUM A-D) 2 mg Tab, Take 2 mg by mouth daily as needed (give one po after each episode of loose stool with max of 8 tabs/daily)., Disp: , Rfl:     melatonin 3 mg Cap, Take 1 tablet by mouth nightly as needed., Disp: , Rfl:     mupirocin (BACTROBAN) 2 % ointment, Apply topically 2 (two) times daily as needed (eyelid skin irritation)., Disp: 22 g, Rfl: 0    pantoprazole (PROTONIX) 40 MG tablet, Take 40 mg by mouth once  daily., Disp: , Rfl:     QUEtiapine (SEROQUEL) 100 MG Tab, , Disp: , Rfl:     spironolactone (ALDACTONE) 25 MG tablet, Take 1 tablet (25 mg total) by mouth once daily., Disp: 90 tablet, Rfl: 1    thiamine 100 MG tablet, Take 100 mg by mouth once daily., Disp: , Rfl:     traZODone (DESYREL) 50 MG tablet, Take 50 mg by mouth nightly as needed. , Disp: , Rfl:     Review of Systems:  10 Pt ROS negative except as stated in HPI    Physical Exam:  General Appearance:    A&Ox3, no distress, appears stated age   Head:    Normocephalic, without obvious abnormality, atraumatic   Eyes:    PERRL, EOM's intact   Back:     Symmetric, no curvature   Lungs:     respirations unlabored   Chest Wall:    No tenderness or deformity    Heart:  Abdomen:  Extremities:  Skin:    S1 and S2 present    Soft, non-tender    Extremities normal, atraumatic    Skin color, texture, turgor normal     Patient is stable for ophthalmic surgery under local and MAC.

## 2022-05-04 ENCOUNTER — TELEPHONE (OUTPATIENT)
Dept: OPHTHALMOLOGY | Facility: CLINIC | Age: 82
End: 2022-05-04
Payer: MEDICARE

## 2022-05-04 NOTE — TELEPHONE ENCOUNTER
Called care facility where patient lives, spoke with the nurse who will have  call to reschedule surgery.

## 2022-05-12 ENCOUNTER — HOSPITAL ENCOUNTER (EMERGENCY)
Facility: HOSPITAL | Age: 82
Discharge: HOME | End: 2022-05-12
Attending: EMERGENCY MEDICINE
Payer: MEDICARE

## 2022-05-12 VITALS
OXYGEN SATURATION: 96 % | DIASTOLIC BLOOD PRESSURE: 67 MMHG | HEART RATE: 78 BPM | SYSTOLIC BLOOD PRESSURE: 118 MMHG | HEIGHT: 64 IN | RESPIRATION RATE: 18 BRPM | TEMPERATURE: 99 F | BODY MASS INDEX: 25.61 KG/M2 | WEIGHT: 150 LBS

## 2022-05-12 DIAGNOSIS — S01.81XA LACERATION OF FOREHEAD, INITIAL ENCOUNTER: ICD-10-CM
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
## 2022-05-12 DIAGNOSIS — S09.90XA INJURY OF HEAD, INITIAL ENCOUNTER: ICD-10-CM

## 2022-05-12 PROCEDURE — 99284 EMERGENCY DEPT VISIT MOD MDM: CPT | Mod: 25

## 2022-05-12 PROCEDURE — 90471 IMMUNIZATION ADMIN: CPT | Performed by: NURSE PRACTITIONER

## 2022-05-12 PROCEDURE — 25000003 PHARM REV CODE 250: Performed by: NURSE PRACTITIONER

## 2022-05-12 PROCEDURE — 63600175 PHARM REV CODE 636 W HCPCS: Performed by: NURSE PRACTITIONER

## 2022-05-12 PROCEDURE — 90715 TDAP VACCINE 7 YRS/> IM: CPT | Performed by: NURSE PRACTITIONER

## 2022-05-12 PROCEDURE — 12011 RPR F/E/E/N/L/M 2.5 CM/<: CPT

## 2022-05-12 RX ORDER — HYDROCODONE BITARTRATE AND ACETAMINOPHEN 5; 325 MG/1; MG/1
1 TABLET ORAL
Status: COMPLETED | OUTPATIENT
Start: 2022-05-12 | End: 2022-05-12

## 2022-05-12 RX ORDER — LIDOCAINE HYDROCHLORIDE 10 MG/ML
10 INJECTION, SOLUTION EPIDURAL; INFILTRATION; INTRACAUDAL; PERINEURAL
Status: COMPLETED | OUTPATIENT
Start: 2022-05-12 | End: 2022-05-12

## 2022-05-12 RX ORDER — HYDROCODONE BITARTRATE AND ACETAMINOPHEN 5; 325 MG/1; MG/1
1 TABLET ORAL
Status: DISCONTINUED | OUTPATIENT
Start: 2022-05-12 | End: 2022-05-12

## 2022-05-12 RX ADMIN — HYDROCODONE BITARTRATE AND ACETAMINOPHEN 1 TABLET: 5; 325 TABLET ORAL at 04:05

## 2022-05-12 RX ADMIN — TETANUS TOXOID, REDUCED DIPHTHERIA TOXOID AND ACELLULAR PERTUSSIS VACCINE, ADSORBED 0.5 ML: 5; 2.5; 8; 8; 2.5 SUSPENSION INTRAMUSCULAR at 04:05

## 2022-05-12 RX ADMIN — LIDOCAINE HYDROCHLORIDE 100 MG: 10 INJECTION, SOLUTION EPIDURAL; INFILTRATION; INTRACAUDAL; PERINEURAL at 03:05

## 2022-05-12 NOTE — ED NOTES
Sn called [facility], spoke with [facility] nurse for(Su) states she will set up transportation f for back to  nursing home.

## 2022-05-12 NOTE — ED PROVIDER NOTES
HISTORY     Chief Complaint   Patient presents with    Head Laceration    Fall     Pt presents to ed via [facility]asi. Pt slipped and fell and hit head. Denies LOC denies thinners. Lac above left eye. C/o headache     Review of patient's allergies indicates:   Allergen Reactions    Benadryl [diphenhydramine hcl] Anxiety    Sulfa (sulfonamide antibiotics) Rash        HPI   The history is provided by the patient. No  was used.   Fall  The accident occurred just prior to arrival. The fall occurred while walking (Patient reports she slipped hitting her head from ground level). She landed on a hard floor. The point of impact was the head. The pain is present in the head. The pain is at a severity of 4/10. She was ambulatory at the scene. There was no entrapment after the fall. There was no alcohol use involved in the accident. Associated symptoms include headaches. Pertinent negatives include no neck pain, no back pain, no paresthesias, no paralysis, no visual change, no fever, no numbness, no abdominal pain, no bowel incontinence, no nausea, no vomiting, no hematuria, no hearing loss, no loss of consciousness and no tingling.    Tdap is not up-to-date    PCP: Sharmaine English MD     Past Medical History:  Past Medical History:   Diagnosis Date    Acute upper respiratory infection     Anemia     Arthritis     osteo    Bipolar mood disorder     Cataract     Coordination abnormal     COPD exacerbation 5/9/2016    Coronary artery disease     Difficulty walking     Dysphasia     GERD (gastroesophageal reflux disease)     Hyperlipidemia     Hypertension     on meds    Hypothyroidism, adult     Insomnia     Malignant neoplasm of colon     Muscle weakness     Neuralgia and neuritis     Parkinson disease     Pulmonary embolism     Schizoaffective disorder     SOB (shortness of breath)     Spinal stenosis     Stroke     pt states mini strokes    Thyroid disease     Urinary tract  infection         Past Surgical History:  Past Surgical History:   Procedure Laterality Date    CATARACT EXTRACTION Right 03/23/2022    COLON SURGERY      COLONOSCOPY N/A 7/9/2020    Procedure: COLONOSCOPY;  Surgeon: Cesar Montague III, MD;  Location: [facility];  Service: Endoscopy;  Laterality: N/A;    CORONARY STENT PLACEMENT      ESOPHAGOGASTRODUODENOSCOPY N/A 7/9/2020    Procedure: EGD (ESOPHAGOGASTRODUODENOSCOPY);  Surgeon: Cesar Montague III, MD;  Location: [facility];  Service: Endoscopy;  Laterality: N/A;    FRACTURE SURGERY      HYSTERECTOMY  1970's        Family History:  Family History   Problem Relation Age of Onset    Asthma Father     Eczema Father     No Known Problems Mother     Other Brother         shoulder surgery         Social History:  Social History     Tobacco Use    Smoking status: Former Smoker     Years: 50.00     Types: Cigarettes    Smokeless tobacco: Former User   Substance and Sexual Activity    Alcohol use: No    Drug use: No    Sexual activity: Never         ROS   Review of Systems   Constitutional: Negative for fever.   HENT: Negative for sore throat.    Respiratory: Negative for shortness of breath.    Cardiovascular: Negative for chest pain.   Gastrointestinal: Negative for abdominal pain, bowel incontinence, nausea and vomiting.   Genitourinary: Negative for dysuria and hematuria.   Musculoskeletal: Negative for back pain and neck pain.   Skin: Positive for wound. Negative for rash.   Neurological: Positive for headaches. Negative for tingling, loss of consciousness, weakness, numbness and paresthesias.   Hematological: Does not bruise/bleed easily.       PHYSICAL EXAM     Initial Vitals [05/12/22 1514]   BP Pulse Resp Temp SpO2   118/67 78 18 98.5 °F (36.9 °C) 96 %      MAP       --           Physical Exam    Nursing note and vitals reviewed.  Constitutional: She appears well-developed and well-nourished. She is not diaphoretic. No distress.   HENT:   Head:  Normocephalic and atraumatic. Head is without raccoon's eyes and without Gonzalez's sign.       Right Ear: No hemotympanum.   Left Ear: No hemotympanum.   2 cm laceration noted highlighted area.   Eyes: Right eye exhibits no discharge. Left eye exhibits no discharge.   Neck: Neck supple.   Normal range of motion.  Cardiovascular: Normal rate.   Pulmonary/Chest: No respiratory distress.   Abdominal: Abdomen is soft. She exhibits no distension.   Musculoskeletal:         General: Normal range of motion.      Cervical back: Normal range of motion and neck supple.     Neurological: She is alert and oriented to person, place, and time. She has normal strength.   Skin: Skin is warm and dry.   Psychiatric: She has a normal mood and affect. Her behavior is normal. Thought content normal.          ED COURSE   Lac Repair    Date/Time: 5/12/2022 4:13 PM  Performed by: Gus Dixon NP  Authorized by: Christo Stack MD     Consent:     Consent obtained:  Verbal    Consent given by:  Patient    Risks, benefits, and alternatives were discussed: yes      Alternatives discussed:  No treatment  Universal protocol:     Procedure explained and questions answered to patient or proxy's satisfaction: yes      Relevant documents present and verified: yes      Imaging studies available: yes      Immediately prior to procedure, a time out was called: yes    Anesthesia:     Anesthesia method:  Local infiltration    Local anesthetic:  Lidocaine 1% w/o epi  Laceration details:     Location: Left forehead.    Wound length (cm): 2.  Skin repair:     Repair method:  Sutures    Suture size:  5-0    Suture material:  Prolene    Suture technique:  Simple interrupted    Number of sutures: 6.  Approximation:     Approximation:  Close  Repair type:     Repair type:  Simple  Post-procedure details:     Dressing:  Open (no dressing)    Procedure completion:  Tolerated well, no immediate complications      ED ONGOING VITALS:  Vitals:    05/12/22 1514  "  BP: 118/67   Pulse: 78   Resp: 18   Temp: 98.5 °F (36.9 °C)   TempSrc: Oral   SpO2: 96%   Weight: 68 kg (150 lb)   Height: 5' 4" (1.626 m)         ABNORMAL LAB VALUES:  Labs Reviewed - No data to display      ALL LAB VALUES:  none      RADIOLOGY STUDIES:  Imaging Results          CT Head Without Contrast (Final result)  Result time 05/12/22 16:34:42    Final result by Yisel Rivera MD (05/12/22 16:34:42)                 Impression:      No acute abnormality.    Atrophy and chronic white matter changes    All CT scans   are performed using dose optimization techniques including the following: automated exposure control; adjustment of the mA and/or kV; use of iterative reconstruction technique.  Dose modulation was employed for ALARA by means of: Automated exposure control; adjustment of the mA and/or kV according to patient size (this includes techniques or standardized protocols for targeted exams where dose is matched to indication/reason for exam; i.e. extremities or head); and/or use of iterative reconstructive technique.      Electronically signed by: Miguel Morillo  Date:    05/12/2022  Time:    16:34             Narrative:    EXAMINATION:  CT HEAD WITHOUT CONTRAST    CLINICAL HISTORY:  fall;    TECHNIQUE:  Low dose axial CT images obtained throughout the head without intravenous contrast. Sagittal and coronal reconstructions were performed.    COMPARISON:  None.    FINDINGS:  Atrophy and chronic white matter changes.  No extra-axial blood or fluid collections.    No parenchymal mass, hemorrhage, edema or major vascular distribution infarct.    Skull/extracranial contents (limited evaluation): No fracture. Mastoid air cells and paranasal sinuses are essentially clear.                                          The above vital signs and test results have been reviewed by the emergency provider.     ED Medications:  Medications   Tdap (BOOSTRIX) vaccine injection 0.5 mL (has no administration in time range) "   HYDROcodone-acetaminophen 5-325 mg per tablet 1 tablet (has no administration in time range)   LIDOcaine (PF) 10 mg/ml (1%) injection 100 mg (100 mg Infiltration Given 5/12/22 1530)       Current Discharge Medication List        Discharge Medications:  New Prescriptions    No medications on file       Follow-up Information     Sharmaine English MD In 1 week.    Specialty: Internal Medicine  Why: For suture removal, For wound re-check  Contact information:  [address] [facility] [address] [phone]             [facility] - Emergency Dept..    Specialty: Emergency Medicine  Why: If symptoms worsen  Contact information:  [address] [facility] [address] [phone]                      4:40 PM    I discussed with patient and/or family/caretaker that evaluation in the ED does not suggest any emergent or life threatening medical conditions requiring immediate intervention beyond what was provided in the ED, and I believe patient is safe for discharge. Regardless, an unremarkable evaluation in the ED does not preclude the development or presence of a serious or life threatening condition. As such, patient was instructed to return immediately for any worsening or change in current symptoms.        MEDICAL DECISION MAKING                 CLINICAL IMPRESSION       ICD-10-CM ICD-9-CM   1. Fall, initial encounter  W19.XXXA E888.9   2. Injury of head, initial encounter  S09.90XA 959.01   3. Laceration of forehead, initial encounter  S01.81XA 873.42       Disposition:   Disposition: Discharged  Condition: Stable         Gus Dixon NP  05/16/22 0804

## 2022-05-12 NOTE — ED NOTES
Roseline called back stating that pt will need ambulance transportation back to [facility].

## 2022-05-13 ENCOUNTER — TELEPHONE (OUTPATIENT)
Dept: OPHTHALMOLOGY | Facility: CLINIC | Age: 82
End: 2022-05-13
Payer: MEDICARE

## 2022-06-02 ENCOUNTER — OUTSIDE PLACE OF SERVICE (OUTPATIENT)
Dept: OPHTHALMOLOGY | Facility: CLINIC | Age: 82
End: 2022-06-02

## 2022-06-02 ENCOUNTER — OUTSIDE PLACE OF SERVICE (OUTPATIENT)
Dept: OPHTHALMOLOGY | Facility: CLINIC | Age: 82
End: 2022-06-02
Payer: MEDICARE

## 2022-06-02 PROCEDURE — 66984 XCAPSL CTRC RMVL W/O ECP: CPT | Mod: LT,,, | Performed by: STUDENT IN AN ORGANIZED HEALTH CARE EDUCATION/TRAINING PROGRAM

## 2022-06-02 PROCEDURE — 66984 PR REMOVAL, CATARACT, W/INSRT INTRAOC LENS, W/O ENDO CYCLO: ICD-10-PCS | Mod: LT,,, | Performed by: STUDENT IN AN ORGANIZED HEALTH CARE EDUCATION/TRAINING PROGRAM

## 2022-06-03 ENCOUNTER — OFFICE VISIT (OUTPATIENT)
Dept: OPHTHALMOLOGY | Facility: CLINIC | Age: 82
End: 2022-06-03
Payer: MEDICARE

## 2022-06-03 DIAGNOSIS — Z98.42 CATARACT EXTRACTION STATUS OF EYE, LEFT: ICD-10-CM

## 2022-06-03 DIAGNOSIS — Z98.890 POST-OPERATIVE STATE: Primary | ICD-10-CM

## 2022-06-03 PROCEDURE — 99999 PR PBB SHADOW E&M-EST. PATIENT-LVL III: CPT | Mod: PBBFAC,,, | Performed by: STUDENT IN AN ORGANIZED HEALTH CARE EDUCATION/TRAINING PROGRAM

## 2022-06-03 PROCEDURE — 99999 PR PBB SHADOW E&M-EST. PATIENT-LVL III: ICD-10-PCS | Mod: PBBFAC,,, | Performed by: STUDENT IN AN ORGANIZED HEALTH CARE EDUCATION/TRAINING PROGRAM

## 2022-06-03 PROCEDURE — 99024 POSTOP FOLLOW-UP VISIT: CPT | Mod: POP,,, | Performed by: STUDENT IN AN ORGANIZED HEALTH CARE EDUCATION/TRAINING PROGRAM

## 2022-06-03 PROCEDURE — 99213 OFFICE O/P EST LOW 20 MIN: CPT | Mod: PBBFAC | Performed by: STUDENT IN AN ORGANIZED HEALTH CARE EDUCATION/TRAINING PROGRAM

## 2022-06-03 PROCEDURE — 99024 PR POST-OP FOLLOW-UP VISIT: ICD-10-PCS | Mod: POP,,, | Performed by: STUDENT IN AN ORGANIZED HEALTH CARE EDUCATION/TRAINING PROGRAM

## 2022-06-03 RX ORDER — PREDNISOLONE ACETATE 10 MG/ML
SUSPENSION/ DROPS OPHTHALMIC
COMMUNITY
Start: 2022-04-21 | End: 2023-04-01

## 2022-06-03 NOTE — PROGRESS NOTES
HPI     Post-op Evaluation      Additional comments: Pt here for 1 day PCIOL OS PO. No pain, but pt says   her eye is itching.           Last edited by Kehinde Nichols MA on 6/3/2022  8:27 AM. (History)            Assessment /Plan     For exam results, see Encounter Report.    Post-operative state  Cataract extraction status of eye, left  POD#1 S/P CEIOL OS Doing well. Mild edema, subconj kenalog    Continue gtts to operative eye:  PF QID      Reinstructed in importance of absolute compliance with Post-OP instructions including medications, shield at bedtime, protective glasses during the day, and limitation of activities. Follow up appointments in approximately one and six weeks or call immediately for increased pain, redness or vision loss.     RTC 1 week. MOCT if PH worse than 20/25

## 2022-06-10 ENCOUNTER — OFFICE VISIT (OUTPATIENT)
Dept: OPHTHALMOLOGY | Facility: CLINIC | Age: 82
End: 2022-06-10
Payer: MEDICARE

## 2022-06-10 DIAGNOSIS — Z96.1 PSEUDOPHAKIA OF BOTH EYES: ICD-10-CM

## 2022-06-10 DIAGNOSIS — Z98.890 POST-OPERATIVE STATE: Primary | ICD-10-CM

## 2022-06-10 DIAGNOSIS — Z98.42 CATARACT EXTRACTION STATUS OF EYE, LEFT: ICD-10-CM

## 2022-06-10 PROCEDURE — 99211 OFF/OP EST MAY X REQ PHY/QHP: CPT | Mod: PBBFAC | Performed by: OPTOMETRIST

## 2022-06-10 PROCEDURE — 99999 PR PBB SHADOW E&M-EST. PATIENT-LVL I: CPT | Mod: PBBFAC,,, | Performed by: OPTOMETRIST

## 2022-06-10 PROCEDURE — 99024 PR POST-OP FOLLOW-UP VISIT: ICD-10-PCS | Mod: POP,,, | Performed by: OPTOMETRIST

## 2022-06-10 PROCEDURE — 99024 POSTOP FOLLOW-UP VISIT: CPT | Mod: POP,,, | Performed by: OPTOMETRIST

## 2022-06-10 PROCEDURE — 99999 PR PBB SHADOW E&M-EST. PATIENT-LVL I: ICD-10-PCS | Mod: PBBFAC,,, | Performed by: OPTOMETRIST

## 2022-06-10 NOTE — PROGRESS NOTES
HPI     Post-op Evaluation     Comments: Pt states os is just itchy. Pt states she is only getting her   gtt once daily because she cant convince her caretakers she needs it four   times.   Pt co so va being cloudy at times.          Last edited by Edy Victoria on 6/10/2022 11:13 AM. (History)            Assessment /Plan     For exam results, see Encounter Report.    Post-operative state    Cataract extraction status of eye, left    Pseudophakia of both eyes      Impression: 1 week post-op phaco with PCIOL OS : Doing well    Taper Pred to tid OS x 1 week, then bid OS x 1 week, then daily OS x 1 week, then stop  Ok to resume normal activities and discontinue eye shield   RTC as scheduled for next post-op visit or PRN if any pain, redness, vision changes or any other concerns.  Discussed above and answered questions.

## 2022-07-12 ENCOUNTER — OFFICE VISIT (OUTPATIENT)
Dept: OPHTHALMOLOGY | Facility: CLINIC | Age: 82
End: 2022-07-12
Payer: MEDICARE

## 2022-07-12 DIAGNOSIS — Z98.890 POST-OPERATIVE STATE: Primary | ICD-10-CM

## 2022-07-12 DIAGNOSIS — Z98.42 CATARACT EXTRACTION STATUS OF EYE, LEFT: ICD-10-CM

## 2022-07-12 PROCEDURE — 99024 POSTOP FOLLOW-UP VISIT: CPT | Mod: POP,,, | Performed by: STUDENT IN AN ORGANIZED HEALTH CARE EDUCATION/TRAINING PROGRAM

## 2022-07-12 PROCEDURE — 99024 PR POST-OP FOLLOW-UP VISIT: ICD-10-PCS | Mod: POP,,, | Performed by: STUDENT IN AN ORGANIZED HEALTH CARE EDUCATION/TRAINING PROGRAM

## 2022-07-12 PROCEDURE — 99213 OFFICE O/P EST LOW 20 MIN: CPT | Mod: PBBFAC | Performed by: STUDENT IN AN ORGANIZED HEALTH CARE EDUCATION/TRAINING PROGRAM

## 2022-07-12 PROCEDURE — 99999 PR PBB SHADOW E&M-EST. PATIENT-LVL III: ICD-10-PCS | Mod: PBBFAC,,, | Performed by: STUDENT IN AN ORGANIZED HEALTH CARE EDUCATION/TRAINING PROGRAM

## 2022-07-12 PROCEDURE — 99999 PR PBB SHADOW E&M-EST. PATIENT-LVL III: CPT | Mod: PBBFAC,,, | Performed by: STUDENT IN AN ORGANIZED HEALTH CARE EDUCATION/TRAINING PROGRAM

## 2022-07-12 NOTE — PROGRESS NOTES
HPI     Post-op Evaluation      Additional comments: 1 month rtc PCIOL OS              Comments     Patient states left eye doing well, no visual complaints and no ocular   pain/discomfort.     1. PCIOL OD 3/23/22 - Kenalog Inj.  PCIOL OS 06/02/22 Kenalog Injection  2. Dry Eyes Ou  3. Narrow Angles OU    OS- Pred QID          Last edited by Oralia Birmingham on 7/12/2022  9:53 AM. (History)            Assessment /Plan     For exam results, see Encounter Report.    Post-operative state  Cataract extraction status of eye, left-     Assessment:  S/P CEIOL OU : Doing Well and completing healing process. Final visual correction options discussed and appropriate prescriptions and/or OTC reading glass recommendations offered to patient. Mrx offered. Pt instructed to follow up in 6 months for next eye exam or PRN any pain, redness, vision changes or other concerns.        Return to clinic in 6M W/ TRF for DFE

## 2022-08-09 DIAGNOSIS — I48.0 PAF (PAROXYSMAL ATRIAL FIBRILLATION): Primary | ICD-10-CM

## 2022-08-11 ENCOUNTER — OFFICE VISIT (OUTPATIENT)
Dept: CARDIOLOGY | Facility: CLINIC | Age: 82
End: 2022-08-11
Payer: MEDICARE

## 2022-08-11 ENCOUNTER — HOSPITAL ENCOUNTER (OUTPATIENT)
Dept: CARDIOLOGY | Facility: HOSPITAL | Age: 82
Discharge: HOME | End: 2022-08-11
Attending: INTERNAL MEDICINE
Payer: MEDICARE

## 2022-08-11 VITALS
HEART RATE: 82 BPM | OXYGEN SATURATION: 95 % | WEIGHT: 141.13 LBS | DIASTOLIC BLOOD PRESSURE: 74 MMHG | HEIGHT: 64 IN | BODY MASS INDEX: 24.1 KG/M2 | SYSTOLIC BLOOD PRESSURE: 118 MMHG

## 2022-08-11 DIAGNOSIS — I10 ESSENTIAL HYPERTENSION: ICD-10-CM

## 2022-08-11 DIAGNOSIS — E78.5 HYPERLIPIDEMIA, UNSPECIFIED HYPERLIPIDEMIA TYPE: ICD-10-CM

## 2022-08-11 DIAGNOSIS — I26.99 PE (PULMONARY THROMBOEMBOLISM): ICD-10-CM

## 2022-08-11 DIAGNOSIS — D50.0 IRON DEFICIENCY ANEMIA DUE TO CHRONIC BLOOD LOSS: ICD-10-CM

## 2022-08-11 DIAGNOSIS — R42 VERTIGO: ICD-10-CM

## 2022-08-11 DIAGNOSIS — I10 PRIMARY HYPERTENSION: ICD-10-CM

## 2022-08-11 DIAGNOSIS — G45.9 TIA (TRANSIENT ISCHEMIC ATTACK): ICD-10-CM

## 2022-08-11 DIAGNOSIS — I48.0 PAF (PAROXYSMAL ATRIAL FIBRILLATION): Primary | ICD-10-CM

## 2022-08-11 DIAGNOSIS — K21.9 GASTROESOPHAGEAL REFLUX DISEASE, UNSPECIFIED WHETHER ESOPHAGITIS PRESENT: ICD-10-CM

## 2022-08-11 DIAGNOSIS — J44.9 COPD, VERY SEVERE: ICD-10-CM

## 2022-08-11 DIAGNOSIS — I48.0 PAF (PAROXYSMAL ATRIAL FIBRILLATION): ICD-10-CM

## 2022-08-11 DIAGNOSIS — C34.12 MALIGNANT NEOPLASM OF UPPER LOBE OF LEFT LUNG: ICD-10-CM

## 2022-08-11 PROCEDURE — 99999 PR PBB SHADOW E&M-EST. PATIENT-LVL IV: ICD-10-PCS | Mod: PBBFAC,,, | Performed by: INTERNAL MEDICINE

## 2022-08-11 PROCEDURE — 99999 PR PBB SHADOW E&M-EST. PATIENT-LVL IV: CPT | Mod: PBBFAC,,, | Performed by: INTERNAL MEDICINE

## 2022-08-11 PROCEDURE — 93005 ELECTROCARDIOGRAM TRACING: CPT

## 2022-08-11 PROCEDURE — 99214 PR OFFICE/OUTPT VISIT, EST, LEVL IV, 30-39 MIN: ICD-10-PCS | Mod: S$PBB,,, | Performed by: INTERNAL MEDICINE

## 2022-08-11 PROCEDURE — 99214 OFFICE O/P EST MOD 30 MIN: CPT | Mod: S$PBB,,, | Performed by: INTERNAL MEDICINE

## 2022-08-11 PROCEDURE — 93010 ELECTROCARDIOGRAM REPORT: CPT | Mod: ,,, | Performed by: INTERNAL MEDICINE

## 2022-08-11 PROCEDURE — 93010 EKG 12-LEAD: ICD-10-PCS | Mod: ,,, | Performed by: INTERNAL MEDICINE

## 2022-08-11 PROCEDURE — 99214 OFFICE O/P EST MOD 30 MIN: CPT | Mod: PBBFAC | Performed by: INTERNAL MEDICINE

## 2022-08-11 RX ORDER — ATORVASTATIN CALCIUM 80 MG/1
80 TABLET, FILM COATED ORAL NIGHTLY
Qty: 90 TABLET | Refills: 4 | Status: ON HOLD | OUTPATIENT
Start: 2022-08-11 | End: 2023-04-06

## 2022-08-11 RX ORDER — SPIRONOLACTONE 25 MG/1
25 TABLET ORAL DAILY
Qty: 90 TABLET | Refills: 1 | Status: ON HOLD | OUTPATIENT
Start: 2022-08-11 | End: 2023-04-06

## 2022-08-11 RX ORDER — DILTIAZEM HYDROCHLORIDE 60 MG/1
60 TABLET, FILM COATED ORAL EVERY 8 HOURS
Qty: 270 TABLET | Refills: 1 | Status: ON HOLD | OUTPATIENT
Start: 2022-08-11 | End: 2023-04-06

## 2022-08-11 NOTE — PROGRESS NOTES
Subjective:   Patient ID:  Rachel Long is a 81 y.o. female who presents for cardiac consult of No chief complaint on file.    Chest Pain   Associated symptoms include dizziness, malaise/fatigue, palpitations, shortness of breath and weakness.   Follow-up  Associated symptoms include chest pain and weakness.     The patient came in today for cardiac consult of No chief complaint on file.    Rachel Long is a 81 y.o. female pt with PAF, h/o TIA remote- twice, dementia, anxiety, h/o left NSCLC h/o chemoXR, h/o PE and former long time smoker presents for CV follow up.     Hosp Follow up   78 yo female, consult for afib with RVR  [facility] home resident  [facility] h/o TIA remote, twice, dementia, anxiety, h/o left NSCLC h/o chemoXR, h/o PE and former long time somoker  Pt states that she had fast heart beat when she was stressful  Pt came in colonoscopy due to recent GI bleeding. Eliquis was on hold  The procedure was cancelled due to afib with RVR  Pt denied chest pain, dizziness and faint  Has chronic SOARES and intermittent palpitation  On cardizme gtt and had metoprolol 2.5 mg ivp x1 in ER. HR at 95 bpm.   1/3/2020-admitted yesterday with A-fib with RVR. Converted to NSR overnight after Cardizem. Eliquis on hold due to recent GIB. Has no CNS complaints to suggest TIA or CVA. Is on Heparin gtt with no abnormal bleeding. H/H stable     1/21/20  Per DC summary - recommended Cardizem 60mg PO TID with no metoprolol upon discharge. Eliquis on hold until GI clears post endoscopic procedure given her recent GIB. Lives at Nursing home - [facility]. No CP/SOB. Will see GI for anticoag recs.     4/21/20  Holter in Feb with NSR, rare episodes of Aflutter/PAF. Told pt - Episodes of Afib and Aflutter noted, need to restart Eliquis when ok per GI. - Per last DC - She will also follow up with Dr. Bhakta (GI) within 1 week after discharge to evaluate rescheduling endoscopic procedure. Discussed with pt needs scope, she feels ok now but occ has  atypical CP, hard to describe.     7/23/20  EGD with hiatal hernia but no ulcers noted. Cscope needs to be redone due to stool/improper prep. She feels occ palpitations when she is excited. ECG today NSR. She has not restarted Eliquis yet. NO bleeding or falls lately.     11/10/20  Has not had GI eval since last visit. She has been having more palpitations. Has been getting coughing spells, has been dry. Water does not improve it. She has not gotten C scope yet. She saw Dr. English, was told she looks.     12/29/20  BP stable 140s/70s. She has not gotten Cscope yet. She has been having SOB, still coughing a lot. Will refer again to GI and Pulm.     4/1/21  BP and HR well controlled today. Feels as if her BP is more elevated at times. She felt this AM she couldn't breathe felt she was choking. She has gotten both COVID vaccines.   ECG - NSR with sinus arrythmia    10/7/21  Had a fall in Aug - 81 yo F history of Afib not on blood thinners or Aspirin (from med rec) and history of falls who presented for confusion, weakness and dzzinessness. She was admitted to observation as CT head showed 4mm hyperechoic focus in right basal ganglia: calcification Vs Hemorrhage.  Neurosurgery was consulted and a repeat CT head was done which does not report the previous finding.  Neurosurgery has seen repeat CT and clears her for discharge.  BP today well controlled. HR 60s. She fell at home, fell out of bed hit her head on bed post, her neighbor called EMS and she went to ER.     2/8/22  BP and HR stable. She is at nursing home overall has been doing well/stable. NO recent falls/ Is doing PT there. No CP/SOB. Has occ vertigo episodes. Has coughing spells at times. ECG - NSR with sinus arrythmnia, nonspect T wave changes    8/11/22  BP and HR well controlled today. Pt had recent ortho eval - Right hip pain secondary to fall on 5/22/2022. She is doing better now after hip fall.     Patient feels  no leg swelling, no PND, no syncope, no  CNS symptoms.    Patient has fairly good exercise tolerance.    Patient is compliant with medications.    EGD  - Medium-sized hiatal hernia.                         - No gross lesions in the stomach.                         - Normal duodenal bulb, second portion of the                         duodenum and third portion of the duodenum.                         - No specimens collected.     HOLTER 2/11/20  Sinus rhythm with heart rates varying between 52 and 194 bpm with an average of 83 bpm.   Paroxysmal atrial fibrillation   also episodes of atrial flutter with variable rate    CONCLUSIONS     1 - Concentric hypertrophy.     2 - No wall motion abnormalities.     3 - Normal left ventricular systolic function (EF 55-60%).     4 - Normal left ventricular diastolic function.     5 - Normal right ventricular systolic function .     6 - The estimated PA systolic pressure is 31 mmHg.     7 - Mild tricuspid regurgitation.     This document has been electronically    SIGNED BY: Jose Calvert MD On: 01/02/2020 15:37    Past Medical History:   Diagnosis Date    Acute upper respiratory infection     Anemia     Arthritis     osteo    Bipolar mood disorder     Cataract     Coordination abnormal     COPD exacerbation 5/9/2016    Coronary artery disease     Difficulty walking     Dysphasia     GERD (gastroesophageal reflux disease)     Hyperlipidemia     Hypertension     on meds    Hypothyroidism, adult     Insomnia     Malignant neoplasm of colon     Muscle weakness     Neuralgia and neuritis     Parkinson disease     Pulmonary embolism     Schizoaffective disorder     SOB (shortness of breath)     Spinal stenosis     Stroke     pt states mini strokes    Thyroid disease     Urinary tract infection        Past Surgical History:   Procedure Laterality Date    CATARACT EXTRACTION Right 03/23/2022    CATARACT EXTRACTION W/  INTRAOCULAR LENS IMPLANT Left 06/02/2022    COLON SURGERY      COLONOSCOPY N/A  7/9/2020    Procedure: COLONOSCOPY;  Surgeon: Cesar Montague III, MD;  Location: [facility] ENDO;  Service: Endoscopy;  Laterality: N/A;    CORONARY STENT PLACEMENT      ESOPHAGOGASTRODUODENOSCOPY N/A 7/9/2020    Procedure: EGD (ESOPHAGOGASTRODUODENOSCOPY);  Surgeon: Cesar Montague III, MD;  Location: [facility] ENDO;  Service: Endoscopy;  Laterality: N/A;    FRACTURE SURGERY      HYSTERECTOMY  1970's       Social History     Tobacco Use    Smoking status: Former Smoker     Years: 50.00     Types: Cigarettes    Smokeless tobacco: Former User   Substance Use Topics    Alcohol use: No    Drug use: No       Family History   Problem Relation Age of Onset    Asthma Father     Eczema Father     No Known Problems Mother     Other Brother         shoulder surgery        Patient's Medications   New Prescriptions    No medications on file   Previous Medications    ACETAMINOPHEN (TYLENOL) 325 MG TABLET    Take 650 mg by mouth 4 (four) times daily as needed for Pain.    ALBUTEROL (PROVENTIL/VENTOLIN HFA) 90 MCG/ACTUATION INHALER    Inhale 2 puffs into the lungs every 6 (six) hours as needed for Wheezing. Rescue    ASPIRIN/CALCIUM CARB/MAGNESIUM (BUFFERIN ORAL)    Take by mouth.    ATORVASTATIN (LIPITOR) 80 MG TABLET    Take 1 tablet (80 mg total) by mouth every evening.    BENZTROPINE (COGENTIN) 1 MG TABLET    Take 1 mg by mouth 2 (two) times daily.    BUDESONIDE-FORMOTEROL 80-4.5 MCG (SYMBICORT) 80-4.5 MCG/ACTUATION HFAA    Inhale 2 puffs into the lungs once daily. Controller    BUSPIRONE (BUSPAR) 5 MG TAB        DEXTRAN 70-HYPROMELLOSE 0.1-0.3 % DROP    Apply 1 drop to eye 2 (two) times daily.    DILTIAZEM (CARDIZEM) 60 MG TABLET    Take 1 tablet (60 mg total) by mouth every 8 (eight) hours.    DULOXETINE (CYMBALTA) 60 MG CAPSULE    once daily.    ERYTHROMYCIN (ROMYCIN) OPHTHALMIC OINTMENT    Place into the right eye 2 (two) times a day. Apply to lid margins    FERROUS SULFATE (FEOSOL) 325 MG (65 MG IRON) TAB TABLET     Take 325 mg by mouth 2 (two) times daily.    KETOROLAC 0.5% (ACULAR) 0.5 % DROP    Place 1 drop into the right eye 4 (four) times daily.    LOPERAMIDE (IMODIUM A-D) 2 MG TAB    Take 2 mg by mouth daily as needed (give one po after each episode of loose stool with max of 8 tabs/daily).    MELATONIN 3 MG CAP    Take 1 tablet by mouth nightly as needed.    MUPIROCIN (BACTROBAN) 2 % OINTMENT    Apply topically 2 (two) times daily as needed (eyelid skin irritation).    PANTOPRAZOLE (PROTONIX) 40 MG TABLET    Take 40 mg by mouth once daily.    PREDNISOLONE ACETATE (PRED FORTE) 1 % DRPS    Place into both eyes.    QUETIAPINE (SEROQUEL) 100 MG TAB        SPIRONOLACTONE (ALDACTONE) 25 MG TABLET    Take 1 tablet (25 mg total) by mouth once daily.    THIAMINE 100 MG TABLET    Take 100 mg by mouth once daily.    TRAZODONE (DESYREL) 50 MG TABLET    Take 50 mg by mouth nightly as needed.    Modified Medications    No medications on file   Discontinued Medications    No medications on file       Review of Systems   Constitutional: Positive for malaise/fatigue.   HENT: Negative.    Eyes: Negative.    Respiratory: Positive for shortness of breath.    Cardiovascular: Positive for chest pain and palpitations.   Gastrointestinal: Negative.    Genitourinary: Negative.    Musculoskeletal: Negative.    Skin: Negative.    Neurological: Positive for dizziness and weakness.   Endo/Heme/Allergies: Negative.    Psychiatric/Behavioral: Negative.    All 12 systems otherwise negative.      Wt Readings from Last 3 Encounters:   05/12/22 68 kg (150 lb)   02/08/22 71.5 kg (157 lb 10.1 oz)   12/06/21 73.2 kg (161 lb 6 oz)     Temp Readings from Last 3 Encounters:   05/12/22 98.5 °F (36.9 °C) (Oral)   10/19/21 97.5 °F (36.4 °C) (Temporal)   08/06/21 98.5 °F (36.9 °C)     BP Readings from Last 3 Encounters:   05/12/22 118/67   02/08/22 112/70   10/19/21 (!) 144/73     Pulse Readings from Last 3 Encounters:   05/12/22 78   02/08/22 89   10/19/21 60        LMP  (LMP Unknown)     Objective:   Physical Exam  Vitals and nursing note reviewed.   Constitutional:       General: She is in acute distress.      Appearance: She is well-developed. She is not diaphoretic.   HENT:      Head: Normocephalic and atraumatic.      Nose: Nose normal.   Eyes:      General: No scleral icterus.     Conjunctiva/sclera: Conjunctivae normal.   Neck:      Thyroid: No thyromegaly.      Vascular: No JVD.   Cardiovascular:      Rate and Rhythm: Normal rate and regular rhythm.      Heart sounds: S1 normal and S2 normal. Murmur heard.     No friction rub. No gallop. No S3 or S4 sounds.   Pulmonary:      Effort: Pulmonary effort is normal. No respiratory distress.      Breath sounds: Normal breath sounds. No stridor. No wheezing or rales.   Chest:      Chest wall: No tenderness.   Abdominal:      General: Bowel sounds are normal. There is no distension.      Palpations: Abdomen is soft. There is no mass.      Tenderness: There is no abdominal tenderness. There is no rebound.   Genitourinary:     Comments: Deferred  Musculoskeletal:         General: No tenderness or deformity. Normal range of motion.      Cervical back: Normal range of motion and neck supple.   Lymphadenopathy:      Cervical: No cervical adenopathy.   Skin:     General: Skin is warm and dry.      Coloration: Skin is not pale.      Findings: No erythema or rash.   Neurological:      Mental Status: She is alert and oriented to person, place, and time.      Motor: No abnormal muscle tone.      Coordination: Coordination normal.   Psychiatric:         Behavior: Behavior normal.         Thought Content: Thought content normal.         Judgment: Judgment normal.         Lab Results   Component Value Date     08/06/2021    K 4.4 08/06/2021     08/06/2021    CO2 27 08/06/2021    BUN 22 08/06/2021    CREATININE 0.8 08/06/2021     08/06/2021    MG 1.7 08/06/2021    AST 16 08/06/2021    ALT 9 (L) 08/06/2021    ALBUMIN  3.7 08/06/2021    PROT 7.2 08/06/2021    BILITOT 0.8 08/06/2021    WBC 6.75 08/06/2021    HGB 12.8 08/06/2021    HCT 40.7 08/06/2021    MCV 92 08/06/2021     08/06/2021    INR 1.0 01/02/2020    TSH 0.701 08/06/2021    BNP <10 11/10/2020     Assessment:      1. PAF (paroxysmal atrial fibrillation)    2. Primary hypertension    3. Vertigo    4. Hyperlipidemia, unspecified hyperlipidemia type    5. PE (pulmonary thromboembolism)    6. Gastroesophageal reflux disease, unspecified whether esophagitis present    7. Malignant neoplasm of upper lobe of left lung    8. Iron deficiency anemia due to chronic blood loss    9. Essential hypertension    10. TIA (transient ischemic attack)    11. COPD, very severe        Plan:   1. PAF - in NSR  - cont meds - cont Dilt   - Holter - with brief PAF and flutter noted  - off Eliquis, restart when ok per GI /lack of falls     2. HTN - stable   - titrate meds    3. H/O PE  - restart A/C when ok per GI     4. Lung CA with COPD with chronic resp failure  - s/p tx per onc  - f/u pulm    5.GIB with FE def anemia  - needs f/u with GI  - moderate CV risk for Cscope/EGD     6. Dyspnea, fatigue, cough  - f/u with GI and Pulm   - if severe symptoms need ER eval     7. Dizziness, hearing loss with ENT  - f/u ENT     8. HLD  - cont statin    Thank you for allowing me to participate in this patient's care. Please do not hesitate to contact me with any questions or concerns. Consult note has been forwarded to the referral physician.

## 2023-01-22 ENCOUNTER — HOSPITAL ENCOUNTER (EMERGENCY)
Facility: HOSPITAL | Age: 83
Discharge: HOME | End: 2023-01-22
Attending: EMERGENCY MEDICINE
Payer: MEDICARE

## 2023-01-22 VITALS
RESPIRATION RATE: 16 BRPM | SYSTOLIC BLOOD PRESSURE: 116 MMHG | DIASTOLIC BLOOD PRESSURE: 59 MMHG | HEART RATE: 95 BPM | TEMPERATURE: 98 F | OXYGEN SATURATION: 96 %

## 2023-01-22 DIAGNOSIS — S00.03XA HEMATOMA OF OCCIPITAL REGION OF SCALP: ICD-10-CM

## 2023-01-22 DIAGNOSIS — S09.90XA CLOSED HEAD INJURY, INITIAL ENCOUNTER: Primary | ICD-10-CM

## 2023-01-22 DIAGNOSIS — S06.0XAA CONCUSSION WITH UNKNOWN LOSS OF CONSCIOUSNESS STATUS, INITIAL ENCOUNTER: ICD-10-CM

## 2023-01-22 LAB
ALBUMIN SERPL BCP-MCNC: 3.6 G/DL (ref 3.5–5.2)
ALP SERPL-CCNC: 87 U/L (ref 55–135)
ALT SERPL W/O P-5'-P-CCNC: 9 U/L (ref 10–44)
ANION GAP SERPL CALC-SCNC: 12 MMOL/L (ref 8–16)
APTT BLDCRRT: 29.1 SEC (ref 21–32)
AST SERPL-CCNC: 16 U/L (ref 10–40)
BASOPHILS # BLD AUTO: 0.04 K/UL (ref 0–0.2)
BASOPHILS NFR BLD: 0.4 % (ref 0–1.9)
BILIRUB SERPL-MCNC: 0.4 MG/DL (ref 0.1–1)
BUN SERPL-MCNC: 25 MG/DL (ref 8–23)
CALCIUM SERPL-MCNC: 9.9 MG/DL (ref 8.7–10.5)
CHLORIDE SERPL-SCNC: 98 MMOL/L (ref 95–110)
CO2 SERPL-SCNC: 22 MMOL/L (ref 23–29)
CREAT SERPL-MCNC: 0.9 MG/DL (ref 0.5–1.4)
DIFFERENTIAL METHOD: ABNORMAL
EOSINOPHIL # BLD AUTO: 0.2 K/UL (ref 0–0.5)
EOSINOPHIL NFR BLD: 1.9 % (ref 0–8)
ERYTHROCYTE [DISTWIDTH] IN BLOOD BY AUTOMATED COUNT: 14.9 % (ref 11.5–14.5)
EST. GFR  (NO RACE VARIABLE): >60 ML/MIN/1.73 M^2
GLUCOSE SERPL-MCNC: 143 MG/DL (ref 70–110)
HCT VFR BLD AUTO: 35.8 % (ref 37–48.5)
HGB BLD-MCNC: 11.8 G/DL (ref 12–16)
IMM GRANULOCYTES # BLD AUTO: 0.12 K/UL (ref 0–0.04)
IMM GRANULOCYTES NFR BLD AUTO: 1.1 % (ref 0–0.5)
INR PPP: 1 (ref 0.8–1.2)
LYMPHOCYTES # BLD AUTO: 2.2 K/UL (ref 1–4.8)
LYMPHOCYTES NFR BLD: 21.1 % (ref 18–48)
MCH RBC QN AUTO: 28.7 PG (ref 27–31)
MCHC RBC AUTO-ENTMCNC: 33 G/DL (ref 32–36)
MCV RBC AUTO: 87 FL (ref 82–98)
MONOCYTES # BLD AUTO: 0.8 K/UL (ref 0.3–1)
MONOCYTES NFR BLD: 7.9 % (ref 4–15)
NEUTROPHILS # BLD AUTO: 7.1 K/UL (ref 1.8–7.7)
NEUTROPHILS NFR BLD: 67.6 % (ref 38–73)
NRBC BLD-RTO: 0 /100 WBC
PLATELET # BLD AUTO: 185 K/UL (ref 150–450)
PMV BLD AUTO: 10.4 FL (ref 9.2–12.9)
POTASSIUM SERPL-SCNC: 4.6 MMOL/L (ref 3.5–5.1)
PROT SERPL-MCNC: 7.7 G/DL (ref 6–8.4)
PROTHROMBIN TIME: 10.9 SEC (ref 9–12.5)
RBC # BLD AUTO: 4.11 M/UL (ref 4–5.4)
SODIUM SERPL-SCNC: 132 MMOL/L (ref 136–145)
WBC # BLD AUTO: 10.49 K/UL (ref 3.9–12.7)

## 2023-01-22 PROCEDURE — 96375 TX/PRO/DX INJ NEW DRUG ADDON: CPT

## 2023-01-22 PROCEDURE — 85025 COMPLETE CBC W/AUTO DIFF WBC: CPT | Performed by: EMERGENCY MEDICINE

## 2023-01-22 PROCEDURE — 99285 EMERGENCY DEPT VISIT HI MDM: CPT | Mod: 25

## 2023-01-22 PROCEDURE — 25000242 PHARM REV CODE 250 ALT 637 W/ HCPCS: Performed by: EMERGENCY MEDICINE

## 2023-01-22 PROCEDURE — 96361 HYDRATE IV INFUSION ADD-ON: CPT

## 2023-01-22 PROCEDURE — 96374 THER/PROPH/DIAG INJ IV PUSH: CPT

## 2023-01-22 PROCEDURE — 85610 PROTHROMBIN TIME: CPT | Performed by: EMERGENCY MEDICINE

## 2023-01-22 PROCEDURE — 25000003 PHARM REV CODE 250: Performed by: EMERGENCY MEDICINE

## 2023-01-22 PROCEDURE — 63600175 PHARM REV CODE 636 W HCPCS: Performed by: EMERGENCY MEDICINE

## 2023-01-22 PROCEDURE — 85730 THROMBOPLASTIN TIME PARTIAL: CPT | Mod: ER | Performed by: EMERGENCY MEDICINE

## 2023-01-22 PROCEDURE — 80053 COMPREHEN METABOLIC PANEL: CPT | Performed by: EMERGENCY MEDICINE

## 2023-01-22 PROCEDURE — 94640 AIRWAY INHALATION TREATMENT: CPT

## 2023-01-22 RX ORDER — IPRATROPIUM BROMIDE AND ALBUTEROL SULFATE 2.5; .5 MG/3ML; MG/3ML
3 SOLUTION RESPIRATORY (INHALATION)
Status: COMPLETED | OUTPATIENT
Start: 2023-01-22 | End: 2023-01-22

## 2023-01-22 RX ORDER — ONDANSETRON 2 MG/ML
8 INJECTION INTRAMUSCULAR; INTRAVENOUS
Status: COMPLETED | OUTPATIENT
Start: 2023-01-22 | End: 2023-01-22

## 2023-01-22 RX ORDER — MECLIZINE HYDROCHLORIDE 25 MG/1
50 TABLET ORAL
Status: COMPLETED | OUTPATIENT
Start: 2023-01-22 | End: 2023-01-22

## 2023-01-22 RX ORDER — MECLIZINE HYDROCHLORIDE 25 MG/1
25 TABLET ORAL
Status: DISCONTINUED | OUTPATIENT
Start: 2023-01-22 | End: 2023-01-22

## 2023-01-22 RX ORDER — ONDANSETRON 4 MG/1
4 TABLET, ORALLY DISINTEGRATING ORAL EVERY 8 HOURS PRN
Qty: 18 TABLET | Refills: 0 | Status: ON HOLD | OUTPATIENT
Start: 2023-01-22 | End: 2023-04-06

## 2023-01-22 RX ORDER — MECLIZINE HYDROCHLORIDE 25 MG/1
25 TABLET ORAL 3 TIMES DAILY PRN
Qty: 20 TABLET | Refills: 0 | Status: ON HOLD | OUTPATIENT
Start: 2023-01-22 | End: 2023-04-06

## 2023-01-22 RX ORDER — MORPHINE SULFATE 4 MG/ML
2 INJECTION, SOLUTION INTRAMUSCULAR; INTRAVENOUS
Status: COMPLETED | OUTPATIENT
Start: 2023-01-22 | End: 2023-01-22

## 2023-01-22 RX ADMIN — MORPHINE SULFATE 2 MG: 4 INJECTION INTRAVENOUS at 06:01

## 2023-01-22 RX ADMIN — SODIUM CHLORIDE 500 ML: 9 INJECTION, SOLUTION INTRAVENOUS at 05:01

## 2023-01-22 RX ADMIN — MECLIZINE HYDROCHLORIDE 50 MG: 25 TABLET ORAL at 06:01

## 2023-01-22 RX ADMIN — ONDANSETRON 8 MG: 2 INJECTION INTRAMUSCULAR; INTRAVENOUS at 06:01

## 2023-01-22 RX ADMIN — IPRATROPIUM BROMIDE AND ALBUTEROL SULFATE 3 ML: .5; 3 SOLUTION RESPIRATORY (INHALATION) at 07:01

## 2023-01-22 NOTE — ED PROVIDER NOTES
SCRIBE #1 NOTE: I, Jaclyn Morris, am scribing for, and in the presence of, Alfonso Jenkins MD. I have scribed the entire note.       History     Chief Complaint   Patient presents with    Fall     Fall, laceration to back of head, vomiting, lethargy, no LOC     Review of patient's allergies indicates:   Allergen Reactions    Benadryl [diphenhydramine hcl] Anxiety    Sulfa (sulfonamide antibiotics) Rash         History of Present Illness     HPI    1/22/2023, 5:26 PM  History obtained from the patient      History of Present Illness: Rachel Long is a 82 y.o. female patient with a PMHx of CAD, HTN, PE, stroke, cancer, and Parkinson's disease who presents to the Emergency Department for evaluation after a mechanical trip and fall while getting out of a truck. Pt fell and hit her head reporting N/V and dizziness and denying LOC. Pt denies being on any blood thinners.  Symptoms are constant and moderate in severity. No mitigating or exacerbating factors reported. No further complaints or concerns at this time.       Arrival mode: Ambulance service    PCP: Sharmaine English MD        Past Medical History:  Past Medical History:   Diagnosis Date    Acute upper respiratory infection     Anemia     Arthritis     osteo    Bipolar mood disorder     Cataract     Coordination abnormal     COPD exacerbation 5/9/2016    Coronary artery disease     Difficulty walking     Dysphasia     GERD (gastroesophageal reflux disease)     Hyperlipidemia     Hypertension     on meds    Hypothyroidism, adult     Insomnia     Malignant neoplasm of colon     Muscle weakness     Neuralgia and neuritis     Parkinson disease     Pulmonary embolism     Schizoaffective disorder     SOB (shortness of breath)     Spinal stenosis     Stroke     pt states mini strokes    Thyroid disease     Urinary tract infection        Past Surgical History:  Past Surgical History:   Procedure Laterality Date    CATARACT EXTRACTION Right 03/23/2022    CATARACT EXTRACTION  W/  INTRAOCULAR LENS IMPLANT Left 06/02/2022    COLON SURGERY      COLONOSCOPY N/A 7/9/2020    Procedure: COLONOSCOPY;  Surgeon: Ceasr Montague III, MD;  Location: [facility];  Service: Endoscopy;  Laterality: N/A;    CORONARY STENT PLACEMENT      ESOPHAGOGASTRODUODENOSCOPY N/A 7/9/2020    Procedure: EGD (ESOPHAGOGASTRODUODENOSCOPY);  Surgeon: Cesar Montague III, MD;  Location: [facility];  Service: Endoscopy;  Laterality: N/A;    FRACTURE SURGERY      HYSTERECTOMY  1970's         Family History:  Family History   Problem Relation Age of Onset    Asthma Father     Eczema Father     No Known Problems Mother     Other Brother         shoulder surgery        Social History:  Social History     Tobacco Use    Smoking status: Former     Years: 50.00     Types: Cigarettes    Smokeless tobacco: Former   Substance and Sexual Activity    Alcohol use: No    Drug use: No    Sexual activity: Never        Review of Systems     Review of Systems   Constitutional:  Negative for fever.   HENT:  Negative for sore throat.    Respiratory:  Negative for shortness of breath.    Cardiovascular:  Negative for chest pain.   Gastrointestinal:  Positive for nausea and vomiting.   Genitourinary:  Negative for dysuria.   Musculoskeletal:  Negative for back pain.   Skin:  Negative for rash.   Neurological:  Positive for dizziness. Negative for syncope and weakness.   Hematological:  Does not bruise/bleed easily.   All other systems reviewed and are negative.     Physical Exam     Initial Vitals   BP Pulse Resp Temp SpO2   01/22/23 1716 01/22/23 1716 01/22/23 1716 01/22/23 1738 01/22/23 1716   132/87 75 18 96.3 °F (35.7 °C) 96 %      MAP       --                 Physical Exam  Nursing Notes and Vital Signs reviewed.    Constitutional: Patient is in moderate to severe distress. GCS 14, eyes closed but open to voice.    Head: Small 3-5 mm already coagulated laceration to occipital scalp with surrounding venous oozing abrasion and occipital  hematoma.   Eyes:  Conjunctivae are not pale. No scleral icterus.   ENT: Mucous membranes moist.   Neck: Supple.   Cardiovascular: Regular rate. Regular rhythm.   Pulmonary: No respiratory distress. Wheezing.   Abdominal: Non-distended.   Musculoskeletal: Moves all extremities. No obvious deformities.   Skin: Warm and dry.   Neurological:  Alert, awake; bizarre speech, difficult to redirect. No acute lateralizing neurologic deficits appreciated.        ED Course   Procedures  ED Vital Signs:  Vitals:    01/22/23 1716 01/22/23 1738 01/22/23 1739 01/22/23 1830   BP: 132/87      Pulse: 75   84   Resp: 18   18   Temp:  96.3 °F (35.7 °C) 96.3 °F (35.7 °C)    TempSrc:   Axillary    SpO2: 96%   (!) 89%    01/22/23 1900 01/22/23 1909 01/22/23 1945 01/22/23 2000   BP: 98/62  100/66 (!) 107/57   Pulse: 85 89 91 96   Resp: 18 18 18 18   Temp: 97.5 °F (36.4 °C)      TempSrc: Oral      SpO2: (!) 92% (!) 91% (!) 93% (!) 94%    01/22/23 2100 01/22/23 2215   BP: (!) 94/55 (!) 116/59   Pulse: 98 95   Resp: 18 16   Temp:  97.6 °F (36.4 °C)   TempSrc:     SpO2: (!) 94% 96%       Abnormal Lab Results:  Labs Reviewed   CBC W/ AUTO DIFFERENTIAL - Abnormal; Notable for the following components:       Result Value    Hemoglobin 11.8 (*)     Hematocrit 35.8 (*)     RDW 14.9 (*)     Immature Granulocytes 1.1 (*)     Immature Grans (Abs) 0.12 (*)     All other components within normal limits   COMPREHENSIVE METABOLIC PANEL - Abnormal; Notable for the following components:    Sodium 132 (*)     CO2 22 (*)     Glucose 143 (*)     BUN 25 (*)     ALT 9 (*)     All other components within normal limits   PROTIME-INR   APTT        All Lab Results:  Results for orders placed or performed during the hospital encounter of 01/22/23   CBC auto differential   Result Value Ref Range    WBC 10.49 3.90 - 12.70 K/uL    RBC 4.11 4.00 - 5.40 M/uL    Hemoglobin 11.8 (L) 12.0 - 16.0 g/dL    Hematocrit 35.8 (L) 37.0 - 48.5 %    MCV 87 82 - 98 fL    MCH 28.7 27.0  - 31.0 pg    MCHC 33.0 32.0 - 36.0 g/dL    RDW 14.9 (H) 11.5 - 14.5 %    Platelets 185 150 - 450 K/uL    MPV 10.4 9.2 - 12.9 fL    Immature Granulocytes 1.1 (H) 0.0 - 0.5 %    Gran # (ANC) 7.1 1.8 - 7.7 K/uL    Immature Grans (Abs) 0.12 (H) 0.00 - 0.04 K/uL    Lymph # 2.2 1.0 - 4.8 K/uL    Mono # 0.8 0.3 - 1.0 K/uL    Eos # 0.2 0.0 - 0.5 K/uL    Baso # 0.04 0.00 - 0.20 K/uL    nRBC 0 0 /100 WBC    Gran % 67.6 38.0 - 73.0 %    Lymph % 21.1 18.0 - 48.0 %    Mono % 7.9 4.0 - 15.0 %    Eosinophil % 1.9 0.0 - 8.0 %    Basophil % 0.4 0.0 - 1.9 %    Differential Method Automated    Comprehensive metabolic panel   Result Value Ref Range    Sodium 132 (L) 136 - 145 mmol/L    Potassium 4.6 3.5 - 5.1 mmol/L    Chloride 98 95 - 110 mmol/L    CO2 22 (L) 23 - 29 mmol/L    Glucose 143 (H) 70 - 110 mg/dL    BUN 25 (H) 8 - 23 mg/dL    Creatinine 0.9 0.5 - 1.4 mg/dL    Calcium 9.9 8.7 - 10.5 mg/dL    Total Protein 7.7 6.0 - 8.4 g/dL    Albumin 3.6 3.5 - 5.2 g/dL    Total Bilirubin 0.4 0.1 - 1.0 mg/dL    Alkaline Phosphatase 87 55 - 135 U/L    AST 16 10 - 40 U/L    ALT 9 (L) 10 - 44 U/L    Anion Gap 12 8 - 16 mmol/L    eGFR >60 >60 mL/min/1.73 m^2   Protime-INR   Result Value Ref Range    Prothrombin Time 10.9 9.0 - 12.5 sec    INR 1.0 0.8 - 1.2   APTT   Result Value Ref Range    aPTT 29.1 21.0 - 32.0 sec         Imaging Results:  Imaging Results              CT Cervical Spine Without Contrast (Final result)  Result time 01/22/23 18:33:13      Final result by Dano Anders MD (01/22/23 18:33:13)                   Impression:      No acute abnormality identified.    All CT scans at this facility use dose modulation, iterative reconstruction, and/or weight based dosing when appropriate to reduce radiation dose to as low as reasonably achievable.      Electronically signed by: Dano Anders  Date:    01/22/2023  Time:    18:33               Narrative:    EXAMINATION:  CT CERVICAL SPINE WITHOUT CONTRAST    CLINICAL HISTORY:  Neck trauma  (Age >= 65y);    TECHNIQUE:  Low dose axial images, sagittal and coronal reformations were performed though the cervical spine.  Contrast was not administered.    COMPARISON:  None    FINDINGS:  Lung apices unremarkable.  No evidence of fracture or dislocation.  Odontoid intact.  No prevertebral soft tissue swelling.  Craniocervical junction normal.  Thyroid and perivertebral soft tissues are unremarkable.    Moderate severity multilevel discogenic degenerative change.                                       CT Head Without Contrast (Final result)  Result time 01/22/23 18:30:31      Final result by Dano Anders MD (01/22/23 18:30:31)                   Impression:      No acute abnormality.    All CT scans at this facility use dose modulation, iterative reconstruction, and/or weight based dosing when appropriate to reduce radiation dose to as low as reasonably achievable.      Electronically signed by: Dano Anders  Date:    01/22/2023  Time:    18:30               Narrative:    EXAMINATION:  CT HEAD WITHOUT CONTRAST    CLINICAL HISTORY:  Head trauma, minor (Age >= 65y);    TECHNIQUE:  Low dose axial CT images obtained throughout the head without intravenous contrast. Sagittal and coronal reconstructions were performed.    COMPARISON:  05/12/2022    FINDINGS:  Intracranial compartment:    Sulcal widening and mild enlargement of ventricles suggesting age-related white matter volume loss. No extra-axial blood or fluid collections.    Bilateral mild periventricular white matter hypoattenuation as can be seen with chronic microangiopathic small-vessel disease.  No parenchymal mass, hemorrhage, edema or major vascular distribution infarct.    Skull/extracranial contents (limited evaluation): No fracture. Mastoid air cells and paranasal sinuses are essentially clear.                                           The Emergency Provider reviewed the vital signs and test results, which are outlined above.     ED Discussion      6:56 PM: Reviewed pt's note from a similar fall on 05/12/2022 when her tetanus was updated.    7:05 PM: Reassessed pt at this time.  Discussed with pt all pertinent ED information and results. Discussed pt dx and plan of tx. Gave pt all f/u and return to the ED instructions. All questions and concerns were addressed at this time. Pt expresses understanding of information and instructions, and is comfortable with plan to discharge. Pt is stable for discharge.    I discussed with patient and/or family/caretaker that evaluation in the ED does not suggest any emergent or life threatening medical conditions requiring immediate intervention beyond what was provided in the ED, and I believe patient is safe for discharge.  Regardless, an unremarkable evaluation in the ED does not preclude the development or presence of a serious of life threatening condition. As such, patient was instructed to return immediately for any worsening or change in current symptoms.         Medical Decision Making:   History:   Old Records Summarized: records from previous admission(s).  Clinical Tests:   Lab Tests: Ordered and Reviewed  Radiological Study: Ordered and Reviewed  ED Management:  Reviewed pt's note from a similar fall on 05/12/2022 when her tetanus was updated.         ED Medication(s):  Medications   morphine injection 2 mg (2 mg Intravenous Given 1/22/23 1830)   sodium chloride 0.9% bolus 500 mL 500 mL (0 mLs Intravenous Stopped 1/22/23 1840)   ondansetron injection 8 mg (8 mg Intravenous Given 1/22/23 1831)   meclizine tablet 50 mg (50 mg Oral Given 1/22/23 1848)   albuterol-ipratropium 2.5 mg-0.5 mg/3 mL nebulizer solution 3 mL (3 mLs Nebulization Given 1/22/23 1909)       Discharge Medication List as of 1/22/2023  7:41 PM        START taking these medications    Details   meclizine (ANTIVERT) 25 mg tablet Take 1 tablet (25 mg total) by mouth 3 (three) times daily as needed for Dizziness or Nausea., Starting Sun 1/22/2023,  Until Sun 1/29/2023 at 2359, Print      ondansetron (ZOFRAN-ODT) 4 MG TbDL Take 1 tablet (4 mg total) by mouth every 8 (eight) hours as needed (nausea/vomiting)., Starting Sun 1/22/2023, Print                     Scribe Attestation:   Scribe #1: I performed the above scribed service and the documentation accurately describes the services I performed. I attest to the accuracy of the note.     Attending:   Physician Attestation Statement for Scribe #1: I, Alfonso Jenkins MD, personally performed the services described in this documentation, as scribed by Jaclyn Morris, in my presence, and it is both accurate and complete.         Of note, this is pt's 3rd recent fall with head trauma and she is displaying severe concussion symptoms. May need neuro referral if sx persist past a few days.        Clinical Impression       ICD-10-CM ICD-9-CM   1. Closed head injury, initial encounter  S09.90XA 959.01   2. Concussion with unknown loss of consciousness status, initial encounter  S06.0XAA 850.9   3. Hematoma of occipital region of scalp  S00.03XA 920       Disposition:   Disposition: Discharged  Condition: Stable       Alfonso Jenkins MD  01/24/23 1510

## 2023-01-30 ENCOUNTER — OFFICE VISIT (OUTPATIENT)
Dept: OPHTHALMOLOGY | Facility: CLINIC | Age: 83
End: 2023-01-30
Payer: MEDICARE

## 2023-01-30 DIAGNOSIS — Z96.1 PSEUDOPHAKIA OF BOTH EYES: ICD-10-CM

## 2023-01-30 DIAGNOSIS — L85.8 CUTANEOUS HORN: Primary | ICD-10-CM

## 2023-01-30 DIAGNOSIS — H52.7 REFRACTIVE ERRORS: ICD-10-CM

## 2023-01-30 DIAGNOSIS — H40.033 ANATOMICAL NARROW ANGLE OF BOTH EYES: ICD-10-CM

## 2023-01-30 PROCEDURE — 92015 PR REFRACTION: ICD-10-PCS | Mod: ,,, | Performed by: OPTOMETRIST

## 2023-01-30 PROCEDURE — 99211 OFF/OP EST MAY X REQ PHY/QHP: CPT | Mod: PBBFAC,PO | Performed by: OPTOMETRIST

## 2023-01-30 PROCEDURE — 99999 PR PBB SHADOW E&M-EST. PATIENT-LVL I: CPT | Mod: PBBFAC,,, | Performed by: OPTOMETRIST

## 2023-01-30 PROCEDURE — 99999 PR PBB SHADOW E&M-EST. PATIENT-LVL I: ICD-10-PCS | Mod: PBBFAC,,, | Performed by: OPTOMETRIST

## 2023-01-30 PROCEDURE — 92014 PR EYE EXAM, EST PATIENT,COMPREHESV: ICD-10-PCS | Mod: S$PBB,,, | Performed by: OPTOMETRIST

## 2023-01-30 PROCEDURE — 92014 COMPRE OPH EXAM EST PT 1/>: CPT | Mod: S$PBB,,, | Performed by: OPTOMETRIST

## 2023-01-30 PROCEDURE — 92015 DETERMINE REFRACTIVE STATE: CPT | Mod: ,,, | Performed by: OPTOMETRIST

## 2023-01-30 RX ORDER — HYDROCODONE BITARTRATE AND ACETAMINOPHEN 5; 325 MG/1; MG/1
1 TABLET ORAL EVERY 6 HOURS PRN
COMMUNITY
Start: 2022-11-21 | End: 2023-04-02 | Stop reason: SDUPTHER

## 2023-01-30 RX ORDER — CYCLOSPORINE 0.5 MG/ML
1 EMULSION OPHTHALMIC 2 TIMES DAILY
Status: ON HOLD | COMMUNITY
End: 2023-04-06

## 2023-01-30 RX ORDER — BUPROPION HYDROCHLORIDE 100 MG/1
100 TABLET ORAL 2 TIMES DAILY
COMMUNITY
End: 2023-04-01

## 2023-01-30 NOTE — PATIENT INSTRUCTIONS
PROGRESS NOTES:    HPI     Concerns About Ocular Health     Additional comments: 6 month pseudophakic f/u.      Comments    Pt. C/o lesion EARL.  States vision is good with glasses.     PCIOL OD 03/23/22  PCIOL OS 06/22/22  Restasis bid OU     Last edited by Latoya Brian MA on 1/30/2023  9:47 AM.         Assessment /Plan      For exam results, see Encounter Report.     Cutaneous horn     Pseudophakia of both eyes     Anatomical narrow angle of both eyes     Refractive errors        Desires to have cutaneous horn EARL removed.  Consult Dr Hutson for lid eval     Stable IOL OU.     Low IOP with narrow angles, will continue to monitor annually.     Dispense Final Rx for glasses or may use OTC glasses.  RTC prn Dr Hutson for  lid eval  Discussed above and answered questions.

## 2023-01-30 NOTE — PROGRESS NOTES
HPI     Concerns About Ocular Health     Additional comments: 6 month pseudophakic f/u.           Comments    Pt. C/o lesion EARL.  States vision is good with glasses.    PCIOL OD 03/23/22  PCIOL OS 06/22/22  Restasis bid OU          Last edited by Latoya Brian MA on 1/30/2023  9:47 AM.            Assessment /Plan     For exam results, see Encounter Report.    Cutaneous horn    Pseudophakia of both eyes    Anatomical narrow angle of both eyes    Refractive errors      Desires to have cutaneous horn EARL removed.  Consult Dr Hutson for lid eval    Stable IOL OU.    Low IOP with narrow angles, will continue to monitor annually.    Dispense Final Rx for glasses or may use OTC glasses.  RTC prn Dr Hutson for  lid eval  Discussed above and answered questions.

## 2023-03-15 ENCOUNTER — OFFICE VISIT (OUTPATIENT)
Dept: OPHTHALMOLOGY | Facility: CLINIC | Age: 83
End: 2023-03-15
Payer: MEDICARE

## 2023-03-15 DIAGNOSIS — H02.831 DERMATOCHALASIS OF BOTH UPPER EYELIDS: Primary | ICD-10-CM

## 2023-03-15 DIAGNOSIS — H02.834 DERMATOCHALASIS OF BOTH UPPER EYELIDS: Primary | ICD-10-CM

## 2023-03-15 DIAGNOSIS — L85.8 CUTANEOUS HORN: ICD-10-CM

## 2023-03-15 PROCEDURE — 99999 PR PBB SHADOW E&M-EST. PATIENT-LVL I: ICD-10-PCS | Mod: PBBFAC,,, | Performed by: STUDENT IN AN ORGANIZED HEALTH CARE EDUCATION/TRAINING PROGRAM

## 2023-03-15 PROCEDURE — 99211 OFF/OP EST MAY X REQ PHY/QHP: CPT | Mod: PBBFAC,PO | Performed by: STUDENT IN AN ORGANIZED HEALTH CARE EDUCATION/TRAINING PROGRAM

## 2023-03-15 PROCEDURE — 99999 PR PBB SHADOW E&M-EST. PATIENT-LVL I: CPT | Mod: PBBFAC,,, | Performed by: STUDENT IN AN ORGANIZED HEALTH CARE EDUCATION/TRAINING PROGRAM

## 2023-03-15 PROCEDURE — 99214 OFFICE O/P EST MOD 30 MIN: CPT | Mod: S$PBB,,, | Performed by: STUDENT IN AN ORGANIZED HEALTH CARE EDUCATION/TRAINING PROGRAM

## 2023-03-15 PROCEDURE — 99214 PR OFFICE/OUTPT VISIT, EST, LEVL IV, 30-39 MIN: ICD-10-PCS | Mod: S$PBB,,, | Performed by: STUDENT IN AN ORGANIZED HEALTH CARE EDUCATION/TRAINING PROGRAM

## 2023-03-15 NOTE — PROGRESS NOTES
HPI     Lesion     Additional comments: Patient in today for a lid lesion excision.           Comments    1. PCIOL OD 3/23/22 - Kenalog Inj.  PCIOL OS 06/02/22 Kenalog Injection  2. Dry Eyes Ou  3. Narrow Angles OU    OS- Pred QID            Last edited by Orquidea Durand on 3/15/2023 10:06 AM.            Assessment /Plan     For exam results, see Encounter Report.    Dermatochalasis of both upper eyelids- Referring patient to  for lid eval    Cutaneous horn- Frozen by Derm yesterday 3/14/23, continue care with Derm      Return to clinic 1 year DFE w/ DKT or DNL

## 2023-04-02 ENCOUNTER — HOSPITAL ENCOUNTER (INPATIENT)
Facility: HOSPITAL | Age: 83
LOS: 4 days | Discharge: HOSPICE | DRG: 871 | End: 2023-04-06
Attending: EMERGENCY MEDICINE | Admitting: INTERNAL MEDICINE
Payer: MEDICARE

## 2023-04-02 DIAGNOSIS — N39.0 ACUTE UTI (URINARY TRACT INFECTION): ICD-10-CM

## 2023-04-02 DIAGNOSIS — K72.00 SHOCK LIVER: ICD-10-CM

## 2023-04-02 DIAGNOSIS — C34.12 MALIGNANT NEOPLASM OF UPPER LOBE OF LEFT LUNG: Chronic | ICD-10-CM

## 2023-04-02 DIAGNOSIS — R65.21 SEPTIC SHOCK: ICD-10-CM

## 2023-04-02 DIAGNOSIS — E87.20 LACTIC ACIDOSIS: ICD-10-CM

## 2023-04-02 DIAGNOSIS — R00.1 JUNCTIONAL BRADYCARDIA: ICD-10-CM

## 2023-04-02 DIAGNOSIS — E87.5 HYPERKALEMIA: ICD-10-CM

## 2023-04-02 DIAGNOSIS — J96.01 ACUTE RESPIRATORY FAILURE WITH HYPOXIA: Primary | ICD-10-CM

## 2023-04-02 DIAGNOSIS — E78.5 HYPERLIPIDEMIA, UNSPECIFIED HYPERLIPIDEMIA TYPE: ICD-10-CM

## 2023-04-02 DIAGNOSIS — I10 HYPERTENSION, UNSPECIFIED TYPE: ICD-10-CM

## 2023-04-02 DIAGNOSIS — R79.89 ELEVATED TROPONIN I LEVEL: ICD-10-CM

## 2023-04-02 DIAGNOSIS — J96.01 ACUTE HYPOXEMIC RESPIRATORY FAILURE: ICD-10-CM

## 2023-04-02 DIAGNOSIS — J44.9 COPD, VERY SEVERE: Chronic | ICD-10-CM

## 2023-04-02 DIAGNOSIS — I48.91 ATRIAL FIBRILLATION WITH RVR: ICD-10-CM

## 2023-04-02 DIAGNOSIS — E87.5 ACUTE HYPERKALEMIA: ICD-10-CM

## 2023-04-02 DIAGNOSIS — F25.9 SCHIZOAFFECTIVE DISORDER, UNSPECIFIED TYPE: ICD-10-CM

## 2023-04-02 DIAGNOSIS — R41.82 ALTERED MENTAL STATUS: ICD-10-CM

## 2023-04-02 DIAGNOSIS — A41.9 SEPTIC SHOCK: ICD-10-CM

## 2023-04-02 DIAGNOSIS — N17.9 AKI (ACUTE KIDNEY INJURY): ICD-10-CM

## 2023-04-02 DIAGNOSIS — R91.8 MASS OF LEFT LUNG: ICD-10-CM

## 2023-04-02 DIAGNOSIS — R57.9 SHOCK: ICD-10-CM

## 2023-04-02 DIAGNOSIS — N17.9 ACUTE RENAL FAILURE, UNSPECIFIED ACUTE RENAL FAILURE TYPE: ICD-10-CM

## 2023-04-02 LAB
ALBUMIN SERPL BCP-MCNC: 3.6 G/DL (ref 3.5–5.2)
ALLENS TEST: ABNORMAL
ALLENS TEST: ABNORMAL
ALP SERPL-CCNC: 93 U/L (ref 55–135)
ALT SERPL W/O P-5'-P-CCNC: 270 U/L (ref 10–44)
AMPHET+METHAMPHET UR QL: NEGATIVE
ANION GAP SERPL CALC-SCNC: 17 MMOL/L (ref 8–16)
ANION GAP SERPL CALC-SCNC: 18 MMOL/L (ref 8–16)
APTT PPP: 35.2 SEC (ref 21–32)
AST SERPL-CCNC: 290 U/L (ref 10–40)
BACTERIA #/AREA URNS HPF: ABNORMAL /HPF
BARBITURATES UR QL SCN>200 NG/ML: NEGATIVE
BASOPHILS # BLD AUTO: 0.02 K/UL (ref 0–0.2)
BASOPHILS NFR BLD: 0.2 % (ref 0–1.9)
BENZODIAZ UR QL SCN>200 NG/ML: NEGATIVE
BILIRUB SERPL-MCNC: 1.3 MG/DL (ref 0.1–1)
BILIRUB UR QL STRIP: NEGATIVE
BNP SERPL-MCNC: 689 PG/ML (ref 0–99)
BUN SERPL-MCNC: 32 MG/DL (ref 8–23)
BUN SERPL-MCNC: 33 MG/DL (ref 8–23)
BZE UR QL SCN: NEGATIVE
CALCIUM SERPL-MCNC: 8.6 MG/DL (ref 8.7–10.5)
CALCIUM SERPL-MCNC: 9.2 MG/DL (ref 8.7–10.5)
CANNABINOIDS UR QL SCN: NEGATIVE
CHLORIDE SERPL-SCNC: 96 MMOL/L (ref 95–110)
CHLORIDE SERPL-SCNC: 99 MMOL/L (ref 95–110)
CLARITY UR: ABNORMAL
CO2 SERPL-SCNC: 15 MMOL/L (ref 23–29)
CO2 SERPL-SCNC: 17 MMOL/L (ref 23–29)
COLOR UR: YELLOW
CREAT SERPL-MCNC: 2.2 MG/DL (ref 0.5–1.4)
CREAT SERPL-MCNC: 2.3 MG/DL (ref 0.5–1.4)
CREAT UR-MCNC: 72.2 MG/DL (ref 15–325)
D DIMER PPP IA.FEU-MCNC: 8.34 MG/L FEU
DELSYS: ABNORMAL
DELSYS: ABNORMAL
DIFFERENTIAL METHOD: ABNORMAL
EOSINOPHIL # BLD AUTO: 0 K/UL (ref 0–0.5)
EOSINOPHIL NFR BLD: 0 % (ref 0–8)
ERYTHROCYTE [DISTWIDTH] IN BLOOD BY AUTOMATED COUNT: 14.6 % (ref 11.5–14.5)
ERYTHROCYTE [SEDIMENTATION RATE] IN BLOOD BY WESTERGREN METHOD: 22 MM/H
EST. GFR  (NO RACE VARIABLE): 21 ML/MIN/1.73 M^2
EST. GFR  (NO RACE VARIABLE): 22 ML/MIN/1.73 M^2
FIO2: 70
FLOW: 15
GLUCOSE SERPL-MCNC: 132 MG/DL (ref 70–110)
GLUCOSE SERPL-MCNC: 147 MG/DL (ref 70–110)
GLUCOSE UR QL STRIP: NEGATIVE
HCO3 UR-SCNC: 18.1 MMOL/L (ref 24–28)
HCO3 UR-SCNC: 22 MMOL/L (ref 24–28)
HCT VFR BLD AUTO: 35 % (ref 37–48.5)
HGB BLD-MCNC: 10.9 G/DL (ref 12–16)
HGB UR QL STRIP: ABNORMAL
HYALINE CASTS #/AREA URNS LPF: 0 /LPF
IMM GRANULOCYTES # BLD AUTO: 0.06 K/UL (ref 0–0.04)
IMM GRANULOCYTES NFR BLD AUTO: 0.5 % (ref 0–0.5)
INR PPP: 1.4 (ref 0.8–1.2)
KETONES UR QL STRIP: NEGATIVE
LACTATE SERPL-SCNC: 5.1 MMOL/L (ref 0.5–2.2)
LACTATE SERPL-SCNC: 7.2 MMOL/L (ref 0.5–2.2)
LACTATE SERPL-SCNC: 7.3 MMOL/L (ref 0.5–2.2)
LEUKOCYTE ESTERASE UR QL STRIP: ABNORMAL
LIPASE SERPL-CCNC: 30 U/L (ref 4–60)
LYMPHOCYTES # BLD AUTO: 1.1 K/UL (ref 1–4.8)
LYMPHOCYTES NFR BLD: 8.3 % (ref 18–48)
MCH RBC QN AUTO: 28.3 PG (ref 27–31)
MCHC RBC AUTO-ENTMCNC: 31.1 G/DL (ref 32–36)
MCV RBC AUTO: 91 FL (ref 82–98)
METHADONE UR QL SCN>300 NG/ML: NEGATIVE
MICROSCOPIC COMMENT: ABNORMAL
MODE: ABNORMAL
MODE: ABNORMAL
MONOCYTES # BLD AUTO: 0.9 K/UL (ref 0.3–1)
MONOCYTES NFR BLD: 6.7 % (ref 4–15)
NEUTROPHILS # BLD AUTO: 11 K/UL (ref 1.8–7.7)
NEUTROPHILS NFR BLD: 84.3 % (ref 38–73)
NITRITE UR QL STRIP: NEGATIVE
NRBC BLD-RTO: 0 /100 WBC
OPIATES UR QL SCN: ABNORMAL
PCO2 BLDA: 40.6 MMHG (ref 35–45)
PCO2 BLDA: 55.6 MMHG (ref 35–45)
PCP UR QL SCN>25 NG/ML: NEGATIVE
PEEP: 8
PH SMN: 7.21 [PH] (ref 7.35–7.45)
PH SMN: 7.26 [PH] (ref 7.35–7.45)
PH UR STRIP: 6 [PH] (ref 5–8)
PLATELET # BLD AUTO: 180 K/UL (ref 150–450)
PMV BLD AUTO: 10.7 FL (ref 9.2–12.9)
PO2 BLDA: 376 MMHG (ref 80–100)
PO2 BLDA: 388 MMHG (ref 80–100)
POC BE: -6 MMOL/L
POC BE: -9 MMOL/L
POC SATURATED O2: 100 % (ref 95–100)
POC SATURATED O2: 100 % (ref 95–100)
POCT GLUCOSE: 130 MG/DL (ref 70–110)
POCT GLUCOSE: 132 MG/DL (ref 70–110)
POCT GLUCOSE: 137 MG/DL (ref 70–110)
POCT GLUCOSE: 99 MG/DL (ref 70–110)
POTASSIUM SERPL-SCNC: 5.9 MMOL/L (ref 3.5–5.1)
POTASSIUM SERPL-SCNC: 7.6 MMOL/L (ref 3.5–5.1)
PROT SERPL-MCNC: 7 G/DL (ref 6–8.4)
PROT UR QL STRIP: ABNORMAL
PROTHROMBIN TIME: 15.2 SEC (ref 9–12.5)
RBC # BLD AUTO: 3.85 M/UL (ref 4–5.4)
RBC #/AREA URNS HPF: 24 /HPF (ref 0–4)
SAMPLE: ABNORMAL
SAMPLE: ABNORMAL
SARS-COV-2 RDRP RESP QL NAA+PROBE: NEGATIVE
SITE: ABNORMAL
SITE: ABNORMAL
SODIUM SERPL-SCNC: 129 MMOL/L (ref 136–145)
SODIUM SERPL-SCNC: 133 MMOL/L (ref 136–145)
SP GR UR STRIP: 1.01 (ref 1–1.03)
TOXICOLOGY INFORMATION: ABNORMAL
TROPONIN I SERPL DL<=0.01 NG/ML-MCNC: 0.2 NG/ML (ref 0–0.03)
TROPONIN I SERPL DL<=0.01 NG/ML-MCNC: 0.34 NG/ML (ref 0–0.03)
URN SPEC COLLECT METH UR: ABNORMAL
UROBILINOGEN UR STRIP-ACNC: NEGATIVE EU/DL
VT: 420
WBC # BLD AUTO: 13.01 K/UL (ref 3.9–12.7)
WBC #/AREA URNS HPF: >100 /HPF (ref 0–5)
WBC CLUMPS URNS QL MICRO: ABNORMAL

## 2023-04-02 PROCEDURE — 87088 URINE BACTERIA CULTURE: CPT | Performed by: EMERGENCY MEDICINE

## 2023-04-02 PROCEDURE — 83605 ASSAY OF LACTIC ACID: CPT | Performed by: EMERGENCY MEDICINE

## 2023-04-02 PROCEDURE — 94002 VENT MGMT INPAT INIT DAY: CPT

## 2023-04-02 PROCEDURE — 96375 TX/PRO/DX INJ NEW DRUG ADDON: CPT

## 2023-04-02 PROCEDURE — 83605 ASSAY OF LACTIC ACID: CPT | Mod: 91 | Performed by: INTERNAL MEDICINE

## 2023-04-02 PROCEDURE — 25000003 PHARM REV CODE 250: Performed by: INTERNAL MEDICINE

## 2023-04-02 PROCEDURE — 93005 ELECTROCARDIOGRAM TRACING: CPT

## 2023-04-02 PROCEDURE — U0002 COVID-19 LAB TEST NON-CDC: HCPCS | Performed by: EMERGENCY MEDICINE

## 2023-04-02 PROCEDURE — 99900035 HC TECH TIME PER 15 MIN (STAT)

## 2023-04-02 PROCEDURE — 80048 BASIC METABOLIC PNL TOTAL CA: CPT | Mod: XB | Performed by: EMERGENCY MEDICINE

## 2023-04-02 PROCEDURE — 76937 US GUIDE VASCULAR ACCESS: CPT

## 2023-04-02 PROCEDURE — 80053 COMPREHEN METABOLIC PANEL: CPT | Performed by: EMERGENCY MEDICINE

## 2023-04-02 PROCEDURE — 25000242 PHARM REV CODE 250 ALT 637 W/ HCPCS: Performed by: EMERGENCY MEDICINE

## 2023-04-02 PROCEDURE — 96361 HYDRATE IV INFUSION ADD-ON: CPT

## 2023-04-02 PROCEDURE — 36415 COLL VENOUS BLD VENIPUNCTURE: CPT | Performed by: INTERNAL MEDICINE

## 2023-04-02 PROCEDURE — 25000003 PHARM REV CODE 250: Performed by: NURSE PRACTITIONER

## 2023-04-02 PROCEDURE — 31500 INSERT EMERGENCY AIRWAY: CPT

## 2023-04-02 PROCEDURE — 84484 ASSAY OF TROPONIN QUANT: CPT | Mod: 91 | Performed by: EMERGENCY MEDICINE

## 2023-04-02 PROCEDURE — 83880 ASSAY OF NATRIURETIC PEPTIDE: CPT | Performed by: EMERGENCY MEDICINE

## 2023-04-02 PROCEDURE — 93010 ELECTROCARDIOGRAM REPORT: CPT | Mod: ,,, | Performed by: INTERNAL MEDICINE

## 2023-04-02 PROCEDURE — 87186 SC STD MICRODIL/AGAR DIL: CPT | Performed by: EMERGENCY MEDICINE

## 2023-04-02 PROCEDURE — 63600175 PHARM REV CODE 636 W HCPCS: Performed by: EMERGENCY MEDICINE

## 2023-04-02 PROCEDURE — 36600 WITHDRAWAL OF ARTERIAL BLOOD: CPT

## 2023-04-02 PROCEDURE — 87086 URINE CULTURE/COLONY COUNT: CPT | Performed by: EMERGENCY MEDICINE

## 2023-04-02 PROCEDURE — 25000003 PHARM REV CODE 250: Performed by: EMERGENCY MEDICINE

## 2023-04-02 PROCEDURE — 63600175 PHARM REV CODE 636 W HCPCS

## 2023-04-02 PROCEDURE — 94640 AIRWAY INHALATION TREATMENT: CPT | Mod: XB

## 2023-04-02 PROCEDURE — 36556 INSERT NON-TUNNEL CV CATH: CPT | Mod: LT

## 2023-04-02 PROCEDURE — 51702 INSERT TEMP BLADDER CATH: CPT

## 2023-04-02 PROCEDURE — 87077 CULTURE AEROBIC IDENTIFY: CPT | Performed by: EMERGENCY MEDICINE

## 2023-04-02 PROCEDURE — 94761 N-INVAS EAR/PLS OXIMETRY MLT: CPT

## 2023-04-02 PROCEDURE — 20000000 HC ICU ROOM

## 2023-04-02 PROCEDURE — 81000 URINALYSIS NONAUTO W/SCOPE: CPT | Performed by: EMERGENCY MEDICINE

## 2023-04-02 PROCEDURE — 96365 THER/PROPH/DIAG IV INF INIT: CPT | Mod: 59

## 2023-04-02 PROCEDURE — 80307 DRUG TEST PRSMV CHEM ANLYZR: CPT | Performed by: EMERGENCY MEDICINE

## 2023-04-02 PROCEDURE — 82803 BLOOD GASES ANY COMBINATION: CPT

## 2023-04-02 PROCEDURE — 96374 THER/PROPH/DIAG INJ IV PUSH: CPT | Mod: 59

## 2023-04-02 PROCEDURE — 85025 COMPLETE CBC W/AUTO DIFF WBC: CPT | Performed by: EMERGENCY MEDICINE

## 2023-04-02 PROCEDURE — 85379 FIBRIN DEGRADATION QUANT: CPT | Performed by: EMERGENCY MEDICINE

## 2023-04-02 PROCEDURE — 82962 GLUCOSE BLOOD TEST: CPT

## 2023-04-02 PROCEDURE — 63600175 PHARM REV CODE 636 W HCPCS: Performed by: INTERNAL MEDICINE

## 2023-04-02 PROCEDURE — 96367 TX/PROPH/DG ADDL SEQ IV INF: CPT

## 2023-04-02 PROCEDURE — 25000003 PHARM REV CODE 250

## 2023-04-02 PROCEDURE — 85610 PROTHROMBIN TIME: CPT | Performed by: EMERGENCY MEDICINE

## 2023-04-02 PROCEDURE — 83690 ASSAY OF LIPASE: CPT | Performed by: EMERGENCY MEDICINE

## 2023-04-02 PROCEDURE — 87040 BLOOD CULTURE FOR BACTERIA: CPT | Mod: 59 | Performed by: EMERGENCY MEDICINE

## 2023-04-02 PROCEDURE — 27000221 HC OXYGEN, UP TO 24 HOURS

## 2023-04-02 PROCEDURE — 99291 CRITICAL CARE FIRST HOUR: CPT

## 2023-04-02 PROCEDURE — 85730 THROMBOPLASTIN TIME PARTIAL: CPT | Performed by: EMERGENCY MEDICINE

## 2023-04-02 PROCEDURE — 93010 EKG 12-LEAD: ICD-10-PCS | Mod: ,,, | Performed by: INTERNAL MEDICINE

## 2023-04-02 RX ORDER — IBUPROFEN 400 MG/1
400 TABLET ORAL EVERY 6 HOURS PRN
Status: ON HOLD | COMMUNITY
End: 2023-04-06

## 2023-04-02 RX ORDER — ALBUTEROL SULFATE 0.83 MG/ML
5 SOLUTION RESPIRATORY (INHALATION)
Status: COMPLETED | OUTPATIENT
Start: 2023-04-02 | End: 2023-04-02

## 2023-04-02 RX ORDER — CALCIUM GLUCONATE 20 MG/ML
3 INJECTION, SOLUTION INTRAVENOUS
Status: DISCONTINUED | OUTPATIENT
Start: 2023-04-02 | End: 2023-04-03

## 2023-04-02 RX ORDER — NOREPINEPHRINE BITARTRATE/D5W 4MG/250ML
PLASTIC BAG, INJECTION (ML) INTRAVENOUS
Status: COMPLETED
Start: 2023-04-02 | End: 2023-04-02

## 2023-04-02 RX ORDER — FAMOTIDINE 20 MG/1
20 TABLET, FILM COATED ORAL DAILY
Status: DISCONTINUED | OUTPATIENT
Start: 2023-04-03 | End: 2023-04-03

## 2023-04-02 RX ORDER — INDOMETHACIN 25 MG/1
50 CAPSULE ORAL
Status: COMPLETED | OUTPATIENT
Start: 2023-04-02 | End: 2023-04-02

## 2023-04-02 RX ORDER — SODIUM CHLORIDE 0.9 % (FLUSH) 0.9 %
10 SYRINGE (ML) INJECTION
Status: DISCONTINUED | OUTPATIENT
Start: 2023-04-02 | End: 2023-04-06 | Stop reason: HOSPADM

## 2023-04-02 RX ORDER — ENOXAPARIN SODIUM 100 MG/ML
30 INJECTION SUBCUTANEOUS EVERY 24 HOURS
Status: DISCONTINUED | OUTPATIENT
Start: 2023-04-02 | End: 2023-04-06 | Stop reason: HOSPADM

## 2023-04-02 RX ORDER — POTASSIUM CHLORIDE 14.9 MG/ML
60 INJECTION INTRAVENOUS
Status: DISCONTINUED | OUTPATIENT
Start: 2023-04-02 | End: 2023-04-03

## 2023-04-02 RX ORDER — IPRATROPIUM BROMIDE AND ALBUTEROL SULFATE 2.5; .5 MG/3ML; MG/3ML
3 SOLUTION RESPIRATORY (INHALATION)
Status: COMPLETED | OUTPATIENT
Start: 2023-04-02 | End: 2023-04-02

## 2023-04-02 RX ORDER — ALBUTEROL SULFATE 0.83 MG/ML
2.5 SOLUTION RESPIRATORY (INHALATION) EVERY 4 HOURS PRN
Status: DISCONTINUED | OUTPATIENT
Start: 2023-04-02 | End: 2023-04-06 | Stop reason: HOSPADM

## 2023-04-02 RX ORDER — ROCURONIUM BROMIDE 10 MG/ML
60 INJECTION, SOLUTION INTRAVENOUS ONCE
Status: COMPLETED | OUTPATIENT
Start: 2023-04-02 | End: 2023-04-02

## 2023-04-02 RX ORDER — ATROPINE SULFATE 0.1 MG/ML
INJECTION INTRAVENOUS
Status: COMPLETED
Start: 2023-04-02 | End: 2023-04-02

## 2023-04-02 RX ORDER — POTASSIUM CHLORIDE 29.8 MG/ML
80 INJECTION INTRAVENOUS
Status: DISCONTINUED | OUTPATIENT
Start: 2023-04-02 | End: 2023-04-03

## 2023-04-02 RX ORDER — DEXTROSE MONOHYDRATE 100 MG/ML
25 INJECTION, SOLUTION INTRAVENOUS
Status: COMPLETED | OUTPATIENT
Start: 2023-04-02 | End: 2023-04-02

## 2023-04-02 RX ORDER — CALCIUM GLUCONATE 20 MG/ML
1 INJECTION, SOLUTION INTRAVENOUS
Status: COMPLETED | OUTPATIENT
Start: 2023-04-02 | End: 2023-04-02

## 2023-04-02 RX ORDER — ATROPINE SULFATE 0.1 MG/ML
1 INJECTION INTRAVENOUS
Status: COMPLETED | OUTPATIENT
Start: 2023-04-02 | End: 2023-04-02

## 2023-04-02 RX ORDER — NOREPINEPHRINE BITARTRATE/D5W 4MG/250ML
0-3 PLASTIC BAG, INJECTION (ML) INTRAVENOUS CONTINUOUS
Status: DISCONTINUED | OUTPATIENT
Start: 2023-04-02 | End: 2023-04-03

## 2023-04-02 RX ORDER — ROCURONIUM BROMIDE 10 MG/ML
INJECTION, SOLUTION INTRAVENOUS
Status: COMPLETED
Start: 2023-04-02 | End: 2023-04-02

## 2023-04-02 RX ORDER — PROPOFOL 10 MG/ML
0-50 INJECTION, EMULSION INTRAVENOUS CONTINUOUS
Status: DISCONTINUED | OUTPATIENT
Start: 2023-04-02 | End: 2023-04-03

## 2023-04-02 RX ORDER — FENTANYL CITRATE-0.9 % NACL/PF 10 MCG/ML
0-250 PLASTIC BAG, INJECTION (ML) INTRAVENOUS CONTINUOUS
Status: DISCONTINUED | OUTPATIENT
Start: 2023-04-02 | End: 2023-04-03

## 2023-04-02 RX ORDER — PROPOFOL 10 MG/ML
0-50 INJECTION, EMULSION INTRAVENOUS CONTINUOUS
Status: DISCONTINUED | OUTPATIENT
Start: 2023-04-02 | End: 2023-04-02

## 2023-04-02 RX ORDER — ATORVASTATIN CALCIUM 40 MG/1
80 TABLET, FILM COATED ORAL NIGHTLY
Status: DISCONTINUED | OUTPATIENT
Start: 2023-04-02 | End: 2023-04-03

## 2023-04-02 RX ORDER — FENTANYL CITRATE 50 UG/ML
50 INJECTION, SOLUTION INTRAMUSCULAR; INTRAVENOUS
Status: DISCONTINUED | OUTPATIENT
Start: 2023-04-02 | End: 2023-04-03

## 2023-04-02 RX ORDER — FENTANYL CITRATE 50 UG/ML
50 INJECTION, SOLUTION INTRAMUSCULAR; INTRAVENOUS
Status: ACTIVE | OUTPATIENT
Start: 2023-04-02 | End: 2023-04-02

## 2023-04-02 RX ORDER — CALCIUM GLUCONATE 20 MG/ML
1 INJECTION, SOLUTION INTRAVENOUS
Status: DISCONTINUED | OUTPATIENT
Start: 2023-04-02 | End: 2023-04-03

## 2023-04-02 RX ORDER — MAGNESIUM SULFATE HEPTAHYDRATE 40 MG/ML
4 INJECTION, SOLUTION INTRAVENOUS
Status: DISCONTINUED | OUTPATIENT
Start: 2023-04-02 | End: 2023-04-03

## 2023-04-02 RX ORDER — CHLORHEXIDINE GLUCONATE ORAL RINSE 1.2 MG/ML
15 SOLUTION DENTAL 2 TIMES DAILY
Status: DISCONTINUED | OUTPATIENT
Start: 2023-04-02 | End: 2023-04-04

## 2023-04-02 RX ORDER — CALCIUM GLUCONATE 98 MG/ML
1 INJECTION, SOLUTION INTRAVENOUS
Status: DISCONTINUED | OUTPATIENT
Start: 2023-04-02 | End: 2023-04-02

## 2023-04-02 RX ORDER — ETOMIDATE 2 MG/ML
15 INJECTION INTRAVENOUS
Status: COMPLETED | OUTPATIENT
Start: 2023-04-02 | End: 2023-04-02

## 2023-04-02 RX ORDER — POTASSIUM CHLORIDE 29.8 MG/ML
40 INJECTION INTRAVENOUS
Status: DISCONTINUED | OUTPATIENT
Start: 2023-04-02 | End: 2023-04-03

## 2023-04-02 RX ORDER — PROPOFOL 10 MG/ML
INJECTION, EMULSION INTRAVENOUS
Status: DISPENSED
Start: 2023-04-02 | End: 2023-04-03

## 2023-04-02 RX ORDER — THIAMINE HCL 100 MG
100 TABLET ORAL DAILY
Status: DISCONTINUED | OUTPATIENT
Start: 2023-04-03 | End: 2023-04-03

## 2023-04-02 RX ORDER — MAGNESIUM SULFATE HEPTAHYDRATE 40 MG/ML
2 INJECTION, SOLUTION INTRAVENOUS
Status: DISCONTINUED | OUTPATIENT
Start: 2023-04-02 | End: 2023-04-03

## 2023-04-02 RX ORDER — SODIUM CHLORIDE 9 MG/ML
INJECTION, SOLUTION INTRAVENOUS CONTINUOUS
Status: DISCONTINUED | OUTPATIENT
Start: 2023-04-02 | End: 2023-04-03

## 2023-04-02 RX ORDER — CALCIUM GLUCONATE 20 MG/ML
2 INJECTION, SOLUTION INTRAVENOUS
Status: DISCONTINUED | OUTPATIENT
Start: 2023-04-02 | End: 2023-04-03

## 2023-04-02 RX ORDER — BUPROPION HYDROCHLORIDE 100 MG/1
100 TABLET, EXTENDED RELEASE ORAL EVERY MORNING
Status: DISCONTINUED | OUTPATIENT
Start: 2023-04-03 | End: 2023-04-03

## 2023-04-02 RX ADMIN — PIPERACILLIN SODIUM AND TAZOBACTAM SODIUM 4.5 G: 4; .5 INJECTION, POWDER, FOR SOLUTION INTRAVENOUS at 06:04

## 2023-04-02 RX ADMIN — ENOXAPARIN SODIUM 30 MG: 30 INJECTION SUBCUTANEOUS at 06:04

## 2023-04-02 RX ADMIN — SODIUM BICARBONATE 50 MEQ: 84 INJECTION, SOLUTION INTRAVENOUS at 01:04

## 2023-04-02 RX ADMIN — SODIUM ZIRCONIUM CYCLOSILICATE 10 G: 5 POWDER, FOR SUSPENSION ORAL at 03:04

## 2023-04-02 RX ADMIN — PIPERACILLIN SODIUM AND TAZOBACTAM SODIUM 4.5 G: 4; .5 INJECTION, POWDER, FOR SOLUTION INTRAVENOUS at 01:04

## 2023-04-02 RX ADMIN — Medication 0.25 MCG/KG/MIN: at 04:04

## 2023-04-02 RX ADMIN — PROPOFOL 5 MCG/KG/MIN: 10 INJECTION, EMULSION INTRAVENOUS at 04:04

## 2023-04-02 RX ADMIN — NOREPINEPHRINE BITARTRATE 0.25 MCG/KG/MIN: 4 INJECTION, SOLUTION INTRAVENOUS at 04:04

## 2023-04-02 RX ADMIN — CALCIUM GLUCONATE 1 G: 20 INJECTION, SOLUTION INTRAVENOUS at 12:04

## 2023-04-02 RX ADMIN — ROCURONIUM BROMIDE 60 MG: 10 INJECTION, SOLUTION INTRAVENOUS at 03:04

## 2023-04-02 RX ADMIN — ATROPINE SULFATE 1 MG: 0.1 INJECTION, SOLUTION INTRAVENOUS at 04:04

## 2023-04-02 RX ADMIN — ALBUTEROL SULFATE 5 MG: 2.5 SOLUTION RESPIRATORY (INHALATION) at 01:04

## 2023-04-02 RX ADMIN — CHLORHEXIDINE GLUCONATE 0.12% ORAL RINSE 15 ML: 1.2 LIQUID ORAL at 10:04

## 2023-04-02 RX ADMIN — CALCIUM GLUCONATE 1 G: 20 INJECTION, SOLUTION INTRAVENOUS at 04:04

## 2023-04-02 RX ADMIN — SODIUM CHLORIDE, POTASSIUM CHLORIDE, SODIUM LACTATE AND CALCIUM CHLORIDE 1000 ML: 600; 310; 30; 20 INJECTION, SOLUTION INTRAVENOUS at 06:04

## 2023-04-02 RX ADMIN — INSULIN HUMAN 4 UNITS: 100 INJECTION, SOLUTION PARENTERAL at 01:04

## 2023-04-02 RX ADMIN — SODIUM CHLORIDE, POTASSIUM CHLORIDE, SODIUM LACTATE AND CALCIUM CHLORIDE 1000 ML: 600; 310; 30; 20 INJECTION, SOLUTION INTRAVENOUS at 12:04

## 2023-04-02 RX ADMIN — ROCURONIUM BROMIDE 60 MG: 10 INJECTION INTRAVENOUS at 03:04

## 2023-04-02 RX ADMIN — IPRATROPIUM BROMIDE AND ALBUTEROL SULFATE 3 ML: 2.5; .5 SOLUTION RESPIRATORY (INHALATION) at 12:04

## 2023-04-02 RX ADMIN — Medication 25 MCG/HR: at 10:04

## 2023-04-02 RX ADMIN — ATROPINE SULFATE 1 MG: 0.1 INJECTION INTRAVENOUS at 04:04

## 2023-04-02 RX ADMIN — SODIUM CHLORIDE 1000 ML: 9 INJECTION, SOLUTION INTRAVENOUS at 10:04

## 2023-04-02 RX ADMIN — DEXTROSE MONOHYDRATE 25 G: 100 INJECTION, SOLUTION INTRAVENOUS at 01:04

## 2023-04-02 RX ADMIN — ATORVASTATIN CALCIUM 80 MG: 40 TABLET, FILM COATED ORAL at 10:04

## 2023-04-02 RX ADMIN — EPINEPHRINE 0.1 MCG/KG/MIN: 1 INJECTION INTRAMUSCULAR; INTRAVENOUS; SUBCUTANEOUS at 04:04

## 2023-04-02 RX ADMIN — FENTANYL CITRATE 50 MCG: 50 INJECTION, SOLUTION INTRAMUSCULAR; INTRAVENOUS at 10:04

## 2023-04-02 RX ADMIN — PROPOFOL 35 MCG/KG/MIN: 10 INJECTION, EMULSION INTRAVENOUS at 10:04

## 2023-04-02 RX ADMIN — SODIUM CHLORIDE, POTASSIUM CHLORIDE, SODIUM LACTATE AND CALCIUM CHLORIDE 1611 ML: 600; 310; 30; 20 INJECTION, SOLUTION INTRAVENOUS at 12:04

## 2023-04-02 RX ADMIN — ETOMIDATE 15 MG: 2 INJECTION INTRAVENOUS at 03:04

## 2023-04-02 NOTE — H&P
[facility] - Emergency Dept.  Critical Care Medicine  History & Physical    Patient Name: Rachel Long  MRN: [id number]  Admission Date: 4/2/2023  Hospital Length of Stay: 0 days  Code Status: Full Code  Attending Physician: Ivan Dixon MD   Primary Care Provider: Sharmaine English MD   Principal Problem: Septic shock    Subjective:     HPI:  Ms. Long is an 82-year-old nursing home resident with CAD, HTN, COPD, bipolar disorder, history of PE (not on anticoagulation seemingly due to frequent falls), history of lung cancer and Parkinson's disease who presented to the ER today via EMS for altered mental status.  History is taken from the medical record, as patient is unable to provide history and no family at bedside or reachable by telephone at this time.  Mr. Long was in the ER yesterday after a fall where she broke her left clavicle.  She was otherwise well and discharged home.  EMS was called today due to altered mental status.  Per EMS, she was having erratic movements and had a near syncopal episode in route.  Blood glucose at the time of 65 she was given D10.  Also some reports of hypoxia and bradycardia while EN route.  He had some further bradycardia in the low 30s and hypoxia into the 60s, and was intubated in the ER.  She was given volume resuscitation along with Zosyn from looks like a UTI.  Also given medical correction for hyperkalemia and started on vasopressors.    At the time my exam, she is intubated and sedated on propofol.  Ventilator weaned down to 40% FiO2 and 5 of PEEP.  She is on 0.25 mcg/kg/min of levo and 0.25 mcg/kg/min Epi.  Heart rate initially in the high 30s, but improved to the mid 40s during the course of my exam.    Imaging in the ER notable for 5.7 cm left upper lobe lung mass as well as some other spiculated lesions on the right concerning for metastatic disease.    Addendum:  I was able to speak with her son, Anam, at bedside.  He states that she had wishes to be DNR, though  understands the paperwork we have from 2017 says full code.  He is fine with keeping her on the vent for a few days to see what her trajectory is, but is also in agreement that if she gets worse that CRP and shocks would not be beneficial to her.  I will place a DNR order in the computer at this time, but we will continue all other measures, including vent support, pressors, and Abx at this time.    Hospital/ICU Course:  No notes on file     Past Medical History:   Diagnosis Date    Acute upper respiratory infection     Anemia     Arthritis     osteo    Bipolar mood disorder     Cataract     Coordination abnormal     COPD exacerbation 5/9/2016    Coronary artery disease     Difficulty walking     Dysphasia     GERD (gastroesophageal reflux disease)     Hyperlipidemia     Hypertension     on meds    Hypothyroidism, adult     Insomnia     Malignant neoplasm of colon     Muscle weakness     Neuralgia and neuritis     Parkinson disease     Pulmonary embolism     Schizoaffective disorder     SOB (shortness of breath)     Spinal stenosis     Stroke     pt states mini strokes    Thyroid disease     Urinary tract infection        Past Surgical History:   Procedure Laterality Date    CATARACT EXTRACTION Right 03/23/2022    CATARACT EXTRACTION W/  INTRAOCULAR LENS IMPLANT Left 06/02/2022    COLON SURGERY      COLONOSCOPY N/A 7/9/2020    Procedure: COLONOSCOPY;  Surgeon: Cesar Montague III, MD;  Location: [facility];  Service: Endoscopy;  Laterality: N/A;    CORONARY STENT PLACEMENT      ESOPHAGOGASTRODUODENOSCOPY N/A 7/9/2020    Procedure: EGD (ESOPHAGOGASTRODUODENOSCOPY);  Surgeon: Cesar Montague III, MD;  Location: [facility];  Service: Endoscopy;  Laterality: N/A;    FRACTURE SURGERY      HYSTERECTOMY  1970's       Review of patient's allergies indicates:   Allergen Reactions    Benadryl [diphenhydramine hcl] Anxiety    Sulfa (sulfonamide antibiotics) Rash       Family History       Problem Relation (Age of Onset)     Asthma Father    Eczema Father    No Known Problems Mother    Other Brother          Tobacco Use    Smoking status: Former     Years: 50.00     Types: Cigarettes    Smokeless tobacco: Former   Substance and Sexual Activity    Alcohol use: No    Drug use: No    Sexual activity: Never         Review of Systems   Unable to perform ROS: Intubated   Objective:     Vital Signs (Most Recent):  Temp: 99.3 °F (37.4 °C) (04/02/23 1203)  Pulse: (!) 50 (04/02/23 1711)  Resp: (!) 22 (04/02/23 1656)  BP: (!) 114/57 (04/02/23 1656)  SpO2: 100 % (04/02/23 1656)   Vital Signs (24h Range):  Temp:  [99.3 °F (37.4 °C)] 99.3 °F (37.4 °C)  Pulse:  [34-82] 50  Resp:  [17-33] 22  SpO2:  [79 %-100 %] 100 %  BP: ()/(43-88) 114/57     Weight: 53.7 kg (118 lb 6.4 oz)  Body mass index is 20.32 kg/m².      Intake/Output Summary (Last 24 hours) at 4/2/2023 1723  Last data filed at 4/2/2023 1708  Gross per 24 hour   Intake 50 ml   Output --   Net 50 ml       Physical Exam  Vitals and nursing note reviewed.   Constitutional:       General: She is not in acute distress.     Comments: Intubated and sedated   HENT:      Head: Normocephalic and atraumatic.   Eyes:      General: No scleral icterus.        Right eye: No discharge.         Left eye: No discharge.      Conjunctiva/sclera: Conjunctivae normal.      Pupils: Pupils are equal, round, and reactive to light.   Cardiovascular:      Rate and Rhythm: Regular rhythm. Bradycardia present.      Pulses: Normal pulses.      Heart sounds: Murmur heard.   Pulmonary:      Effort: Pulmonary effort is normal. No respiratory distress.      Breath sounds: No wheezing, rhonchi or rales.   Abdominal:      General: Abdomen is flat. There is no distension.      Palpations: Abdomen is soft.      Tenderness: There is no abdominal tenderness.   Musculoskeletal:      Cervical back: Normal range of motion and neck supple. No rigidity or tenderness.      Right lower leg: Edema (trace) present.      Left lower leg:  Edema (trace) present.   Skin:     General: Skin is warm and dry.      Coloration: Skin is not jaundiced or pale.      Comments: Scattered bruising on the L side, most notably over the hip and left upper chest (known left clavicular fracture)   Neurological:      Comments: Sedated and unresponsive to voice       Vents:  Vent Mode: A/C (04/02/23 1648)  Set Rate: 22 BPM (04/02/23 1648)  Vt Set: 420 mL (04/02/23 1648)  Pressure Support: 0 cmH20 (04/02/23 1648)  PEEP/CPAP: 8 cmH20 (04/02/23 1648)  Oxygen Concentration (%): 60 (04/02/23 1656)  Peak Airway Pressure: 30 cmH20 (04/02/23 1648)  Plateau Pressure: 0 cmH20 (04/02/23 1648)  Total Ve: 9.65 L/m (04/02/23 1648)  F/VT Ratio<105 (RSBI): (!) 50.23 (04/02/23 1648)    Lines/Drains/Airways       Drain  Duration                  Urethral Catheter 04/02/23 1500 Double-lumen 16 Fr. <1 day              Airway  Duration                  Airway - Non-Surgical 04/02/23 1551 Endotracheal Tube <1 day              Peripheral Intravenous Line  Duration                  Peripheral IV - Single Lumen 04/02/23 0120 20 G Left Antecubital <1 day         Peripheral IV - Single Lumen 04/02/23 1200 20 G Right Antecubital <1 day                    Significant Labs:    CBC/Anemia Profile:  Recent Labs   Lab 04/01/23  1311 04/02/23  1201   WBC 9.91 13.01*   HGB 10.7* 10.9*   HCT 32.7* 35.0*    180   MCV 88 91   RDW 14.6* 14.6*        Chemistries:  Recent Labs   Lab 04/01/23  1311 04/02/23  1201 04/02/23  1516   * 129* 133*   K 5.5* 7.6* 5.9*   CL 98 96 99   CO2 22* 15* 17*   BUN 19 32* 33*   CREATININE 0.9 2.3* 2.2*   CALCIUM 9.6 9.2 8.6*   ALBUMIN 3.5 3.6  --    PROT 7.1 7.0  --    BILITOT 0.5 1.3*  --    ALKPHOS 85 93  --    ALT 11 270*  --    AST 13 290*  --        All pertinent labs within the past 24 hours have been reviewed.    Significant Imaging:   I have reviewed all pertinent imaging results/findings within the past 24 hours.    Assessment/Plan:      Psychiatric  Schizoaffective disorder  - holding most of her home meds due to renal dysfxn  - will restart, as able    Pulmonary  Acute hypoxemic respiratory failure  Patient with Hypoxic Respiratory failure which is Acute.. Supplemental oxygen was provided and noted- Vent Mode: A/C  Oxygen Concentration (%):  [60-75] 60  Resp Rate Total:  [22 br/min] 22 br/min  Vt Set:  [420 mL] 420 mL  PEEP/CPAP:  [8 cmH20] 8 cmH20  Pressure Support:  [0 cmH20] 0 cmH20  Mean Airway Pressure:  [14 cmH20] 14 cmH20    .   Signs/symptoms of respiratory failure include- tachypnea and respiratory distress. Contributing diagnoses includes - sepsis Labs and images were reviewed. Patient Has recent ABG, which has been reviewed. Will treat underlying causes and adjust management of respiratory failure as follows- vent support.    - intubated 4/2 in the ER  - vent weaned to 40% and 5 of PEEP at the time of my exam  - CXR fairly unremarkable except for evidence of malignancy  - vent bundle in place  - SAT/SBT as condition allows    COPD, very severe  - not in exacerbation  - vent support  - continue bronchodilators    Cardiac/Vascular  HLD (hyperlipidemia)  - continue statin    Hypertension  - holding meds due to shock  - can restart, as necessary    Renal/  Hyperkalemia  - see renal failure    Lactic acidosis  - see septic shock    Acute renal failure  - Cr 2.2 on admission; 0.9 yesterday  - Hyper-K treated medically  - will trend lytes and monitor with resuscitation  - renally dose meds and avoid nephrotoxins      ID  * Septic shock  - source most likely urine  - continue Zosyn  - blood and urine Cx pending  - received 1L IVF in the ER; will continue with volume resuscitation  - currently on Epi and Levo for vasopressor support  - goal MAP > 65  - CVL placed 4/2 in the ER  - trend lactic acid       Critical Care Time: 40 minutes  Critical secondary to respiratory failure, infusion of high risk medications     Critical care was time  spent personally by me on the following activities: development of treatment plan with patient or surrogate and bedside caregivers, discussions with consultants, evaluation of patient's response to treatment, examination of patient, ordering and performing treatments and interventions, ordering and review of laboratory studies, ordering and review of radiographic studies, pulse oximetry, re-evaluation of patient's condition. This critical care time did not overlap with that of any other provider or involve time for any procedures.     Ivan Dixon MD  Critical Care Medicine  '[place] - Emergency Dept.

## 2023-04-02 NOTE — NURSING
Admitted to ICU via stretcher.  Pt is intubated.  SB on monitor.  Has epinephrine, levophed and propofol infusing to central line.  Pt is unresponsive at this time.

## 2023-04-02 NOTE — PHARMACY MED REC
"Admission Medication History     The home medication history was taken by Eric Hansen.    You may go to "Admission" then "Reconcile Home Medications" tabs to review and/or act upon these items.     The home medication list has been updated by the Pharmacy department.   Please read ALL comments highlighted in yellow.   Please address this information as you see fit.    Feel free to contact us if you have any questions or require assistance.      Medications listed below were obtained from: Nursing home:   (Not in a hospital admission)      Eric Hansen  [id number]      Current Outpatient Medications on File Prior to Encounter   Medication Sig Dispense Refill Last Dose    albuterol (PROVENTIL/VENTOLIN HFA) 90 mcg/actuation inhaler Inhale 2 puffs into the lungs every 6 (six) hours as needed for Wheezing. Rescue   4/2/2023    atorvastatin (LIPITOR) 80 MG tablet Take 1 tablet (80 mg total) by mouth every evening. 90 tablet 4 4/1/2023    benztropine (COGENTIN) 1 MG tablet Take 1.5 mg by mouth every morning and 1 mg nightly   4/2/2023    budesonide (PULMICORT) 0.25 mg/2 mL nebulizer solution Take 0.25 mg by nebulization 2 (two) times a day.   4/2/2023    buPROPion (WELLBUTRIN SR) 100 MG TBSR 12 hr tablet Take 100 mg by mouth every morning.   4/2/2023    busPIRone (BUSPAR) 5 MG Tab Take 5 mg by mouth 2 (two) times daily.   4/2/2023    cycloSPORINE (RESTASIS) 0.05 % ophthalmic emulsion Place 1 drop into both eyes 2 (two) times daily.   4/2/2023    diltiaZEM (CARDIZEM) 60 MG tablet Take 1 tablet (60 mg total) by mouth every 8 (eight) hours. 270 tablet 1 4/2/2023    DULoxetine (CYMBALTA) 60 MG capsule Take 60 mg by mouth 2 (two) times daily.   4/2/2023    HYDROcodone-acetaminophen (NORCO) 5-325 mg per tablet Take 1 tablet by mouth every 6 (six) hours as needed for Pain. 20 tablet 0 4/2/2023 at 6:03AM    meclizine (ANTIVERT) 25 mg tablet Take 1 tablet (25 mg total) by mouth 3 (three) times daily as needed for " Dizziness or Nausea. 20 tablet 0 Past Week    melatonin 3 mg Cap Take 1 tablet by mouth nightly as needed.   4/1/2023    metoprolol succinate (TOPROL-XL) 25 MG 24 hr tablet Take 25 mg by mouth once daily.   4/2/2023    ondansetron (ZOFRAN-ODT) 4 MG TbDL Take 1 tablet (4 mg total) by mouth every 8 (eight) hours as needed (nausea/vomiting). 18 tablet 0 Past Week    pantoprazole (PROTONIX) 40 MG tablet Take 40 mg by mouth once daily.   4/2/2023    QUEtiapine 150 mg Tab Take 150 mg by mouth every evening.   3/31/2023    saliva substitute combo no.9 (BIOTENE DRY MOUTH ORAL RINSE MM) 15 mLs by Mucous Membrane route 3 (three) times daily.   4/2/2023    spironolactone (ALDACTONE) 25 MG tablet Take 1 tablet (25 mg total) by mouth once daily. (Patient taking differently: Take 12.5 mg by mouth once daily.) 90 tablet 1 4/2/2023    thiamine 100 MG tablet Take 100 mg by mouth once daily.   4/2/2023    acetaminophen (TYLENOL) 325 MG tablet Take 650 mg by mouth 4 (four) times daily as needed for Pain.       albuterol (PROVENTIL) 2.5 mg /3 mL (0.083 %) nebulizer solution Take 2.5 mg by nebulization 4 (four) times daily as needed.       budesonide-formoterol 80-4.5 mcg (SYMBICORT) 80-4.5 mcg/actuation HFAA Inhale 2 puffs into the lungs once daily. Controller 1 Inhaler 0     dextran 70-hypromellose 0.1-0.3 % Drop Apply 1 drop to eye 2 (two) times daily.       ibuprofen (ADVIL,MOTRIN) 400 MG tablet Take 400 mg by mouth every 6 (six) hours as needed for Other.   2/23/2023    loperamide (IMODIUM A-D) 2 mg Tab Take 2 mg by mouth daily as needed (give one po after each episode of loose stool with max of 8 tabs/daily).                              .

## 2023-04-02 NOTE — ASSESSMENT & PLAN NOTE
Patient with Hypoxic Respiratory failure which is Acute.. Supplemental oxygen was provided and noted- Vent Mode: A/C  Oxygen Concentration (%):  [60-75] 60  Resp Rate Total:  [22 br/min] 22 br/min  Vt Set:  [420 mL] 420 mL  PEEP/CPAP:  [8 cmH20] 8 cmH20  Pressure Support:  [0 cmH20] 0 cmH20  Mean Airway Pressure:  [14 cmH20] 14 cmH20    .   Signs/symptoms of respiratory failure include- tachypnea and respiratory distress. Contributing diagnoses includes - sepsis Labs and images were reviewed. Patient Has recent ABG, which has been reviewed. Will treat underlying causes and adjust management of respiratory failure as follows- vent support.    - intubated 4/2 in the ER  - vent weaned to 40% and 5 of PEEP at the time of my exam  - CXR fairly unremarkable except for evidence of malignancy  - vent bundle in place  - SAT/SBT as condition allows

## 2023-04-02 NOTE — PHARMACY MED REC
"Admission Medication History     The home medication history was taken by Eric Hansen.    You may go to "Admission" then "Reconcile Home Medications" tabs to review and/or act upon these items.     The home medication list has been updated by the Pharmacy department.   Please read ALL comments highlighted in yellow.   Please address this information as you see fit.    Feel free to contact us if you have any questions or require assistance.      Medications listed below were obtained from: Nursing home: [facility]  (Not in a hospital admission)      Eric Hansen  [id number]    Current Outpatient Medications on File Prior to Encounter   Medication Sig Dispense Refill Last Dose    albuterol (PROVENTIL/VENTOLIN HFA) 90 mcg/actuation inhaler Inhale 2 puffs into the lungs every 6 (six) hours as needed for Wheezing. Rescue   4/2/2023    atorvastatin (LIPITOR) 80 MG tablet Take 1 tablet (80 mg total) by mouth every evening. 90 tablet 4 4/1/2023    benztropine (COGENTIN) 1 MG tablet Take 1.5 mg by mouth every morning and 1 mg nightly   4/2/2023    budesonide (PULMICORT) 0.25 mg/2 mL nebulizer solution Take 0.25 mg by nebulization 2 (two) times a day.   4/2/2023    buPROPion (WELLBUTRIN SR) 100 MG TBSR 12 hr tablet Take 100 mg by mouth every morning.   4/2/2023    busPIRone (BUSPAR) 5 MG Tab Take 5 mg by mouth 2 (two) times daily.   4/2/2023    cycloSPORINE (RESTASIS) 0.05 % ophthalmic emulsion Place 1 drop into both eyes 2 (two) times daily.   4/2/2023    diltiaZEM (CARDIZEM) 60 MG tablet Take 1 tablet (60 mg total) by mouth every 8 (eight) hours. 270 tablet 1 4/2/2023    DULoxetine (CYMBALTA) 60 MG capsule Take 60 mg by mouth 2 (two) times daily.   4/2/2023    HYDROcodone-acetaminophen (NORCO) 5-325 mg per tablet Take 1 tablet by mouth every 6 (six) hours as needed for Pain. 20 tablet 0 4/2/2023 at 6:03AM    meclizine (ANTIVERT) 25 mg tablet Take 1 tablet (25 mg total) by mouth 3 (three) times daily as needed " for Dizziness or Nausea. 20 tablet 0 Past Week    melatonin 3 mg Cap Take 1 tablet by mouth nightly as needed.   4/1/2023    metoprolol succinate (TOPROL-XL) 25 MG 24 hr tablet Take 25 mg by mouth once daily.   4/2/2023    ondansetron (ZOFRAN-ODT) 4 MG TbDL Take 1 tablet (4 mg total) by mouth every 8 (eight) hours as needed (nausea/vomiting). 18 tablet 0 Past Week    pantoprazole (PROTONIX) 40 MG tablet Take 40 mg by mouth once daily.   4/2/2023    QUEtiapine 150 mg Tab Take 150 mg by mouth every evening.   3/31/2023    saliva substitute combo no.9 (BIOTENE DRY MOUTH ORAL RINSE MM) 15 mLs by Mucous Membrane route 3 (three) times daily.   4/2/2023    spironolactone (ALDACTONE) 25 MG tablet Take 1 tablet (25 mg total) by mouth once daily. (Patient taking differently: Take 12.5 mg by mouth once daily.) 90 tablet 1 4/2/2023    thiamine 100 MG tablet Take 100 mg by mouth once daily.   4/2/2023    acetaminophen (TYLENOL) 325 MG tablet Take 650 mg by mouth 4 (four) times daily as needed for Pain.       albuterol (PROVENTIL) 2.5 mg /3 mL (0.083 %) nebulizer solution Take 2.5 mg by nebulization 4 (four) times daily as needed.       budesonide-formoterol 80-4.5 mcg (SYMBICORT) 80-4.5 mcg/actuation HFAA Inhale 2 puffs into the lungs once daily. Controller 1 Inhaler 0     dextran 70-hypromellose 0.1-0.3 % Drop Apply 1 drop to eye 2 (two) times daily.       ibuprofen (ADVIL,MOTRIN) 400 MG tablet Take 400 mg by mouth every 6 (six) hours as needed for Other.   2/23/2023    loperamide (IMODIUM A-D) 2 mg Tab Take 2 mg by mouth daily as needed (give one po after each episode of loose stool with max of 8 tabs/daily).                              .

## 2023-04-02 NOTE — HPI
Ms. Long is an 82-year-old nursing home resident with CAD, HTN, COPD, bipolar disorder, history of PE (not on anticoagulation seemingly due to frequent falls), history of lung cancer and Parkinson's disease who presented to the ER today via EMS for altered mental status.  History is taken from the medical record, as patient is unable to provide history and no family at bedside or reachable by telephone at this time.  Mr. Long was in the ER yesterday after a fall where she broke her left clavicle.  She was otherwise well and discharged home.  EMS was called today due to altered mental status.  Per EMS, she was having erratic movements and had a near syncopal episode in route.  Blood glucose at the time of 65 she was given D10.  Also some reports of hypoxia and bradycardia while EN route.  He had some further bradycardia in the low 30s and hypoxia into the 60s, and was intubated in the ER.  She was given volume resuscitation along with Zosyn from looks like a UTI.  Also given medical correction for hyperkalemia and started on vasopressors.    At the time my exam, she is intubated and sedated on propofol.  Ventilator weaned down to 40% FiO2 and 5 of PEEP.  She is on 0.25 mcg/kg/min of levo and 0.25 mcg/kg/min Epi.  Heart rate initially in the high 30s, but improved to the mid 40s during the course of my exam.    Imaging in the ER notable for 5.7 cm left upper lobe lung mass as well as some other spiculated lesions on the right concerning for metastatic disease.

## 2023-04-02 NOTE — ASSESSMENT & PLAN NOTE
- Cr 2.2 on admission; 0.9 yesterday  - Hyper-K treated medically  - will trend lytes and monitor with resuscitation  - renally dose meds and avoid nephrotoxins

## 2023-04-02 NOTE — SUBJECTIVE & OBJECTIVE
Past Medical History:   Diagnosis Date    Acute upper respiratory infection     Anemia     Arthritis     osteo    Bipolar mood disorder     Cataract     Coordination abnormal     COPD exacerbation 5/9/2016    Coronary artery disease     Difficulty walking     Dysphasia     GERD (gastroesophageal reflux disease)     Hyperlipidemia     Hypertension     on meds    Hypothyroidism, adult     Insomnia     Malignant neoplasm of colon     Muscle weakness     Neuralgia and neuritis     Parkinson disease     Pulmonary embolism     Schizoaffective disorder     SOB (shortness of breath)     Spinal stenosis     Stroke     pt states mini strokes    Thyroid disease     Urinary tract infection        Past Surgical History:   Procedure Laterality Date    CATARACT EXTRACTION Right 03/23/2022    CATARACT EXTRACTION W/  INTRAOCULAR LENS IMPLANT Left 06/02/2022    COLON SURGERY      COLONOSCOPY N/A 7/9/2020    Procedure: COLONOSCOPY;  Surgeon: Cesar Montague III, MD;  Location: [facility];  Service: Endoscopy;  Laterality: N/A;    CORONARY STENT PLACEMENT      ESOPHAGOGASTRODUODENOSCOPY N/A 7/9/2020    Procedure: EGD (ESOPHAGOGASTRODUODENOSCOPY);  Surgeon: Cesar Montague III, MD;  Location: [facility];  Service: Endoscopy;  Laterality: N/A;    FRACTURE SURGERY      HYSTERECTOMY  1970's       Review of patient's allergies indicates:   Allergen Reactions    Benadryl [diphenhydramine hcl] Anxiety    Sulfa (sulfonamide antibiotics) Rash       Family History       Problem Relation (Age of Onset)    Asthma Father    Eczema Father    No Known Problems Mother    Other Brother          Tobacco Use    Smoking status: Former     Years: 50.00     Types: Cigarettes    Smokeless tobacco: Former   Substance and Sexual Activity    Alcohol use: No    Drug use: No    Sexual activity: Never         Review of Systems   Unable to perform ROS: Intubated   Objective:     Vital Signs (Most Recent):  Temp: 99.3 °F (37.4 °C) (04/02/23 1203)  Pulse: (!) 50  (04/02/23 1711)  Resp: (!) 22 (04/02/23 1656)  BP: (!) 114/57 (04/02/23 1656)  SpO2: 100 % (04/02/23 1656)   Vital Signs (24h Range):  Temp:  [99.3 °F (37.4 °C)] 99.3 °F (37.4 °C)  Pulse:  [34-82] 50  Resp:  [17-33] 22  SpO2:  [79 %-100 %] 100 %  BP: ()/(43-88) 114/57     Weight: 53.7 kg (118 lb 6.4 oz)  Body mass index is 20.32 kg/m².      Intake/Output Summary (Last 24 hours) at 4/2/2023 1723  Last data filed at 4/2/2023 1708  Gross per 24 hour   Intake 50 ml   Output --   Net 50 ml       Physical Exam  Vitals and nursing note reviewed.   Constitutional:       General: She is not in acute distress.     Comments: Intubated and sedated   HENT:      Head: Normocephalic and atraumatic.   Eyes:      General: No scleral icterus.        Right eye: No discharge.         Left eye: No discharge.      Conjunctiva/sclera: Conjunctivae normal.      Pupils: Pupils are equal, round, and reactive to light.   Cardiovascular:      Rate and Rhythm: Regular rhythm. Bradycardia present.      Pulses: Normal pulses.      Heart sounds: Murmur heard.   Pulmonary:      Effort: Pulmonary effort is normal. No respiratory distress.      Breath sounds: No wheezing, rhonchi or rales.   Abdominal:      General: Abdomen is flat. There is no distension.      Palpations: Abdomen is soft.      Tenderness: There is no abdominal tenderness.   Musculoskeletal:      Cervical back: Normal range of motion and neck supple. No rigidity or tenderness.      Right lower leg: Edema (trace) present.      Left lower leg: Edema (trace) present.   Skin:     General: Skin is warm and dry.      Coloration: Skin is not jaundiced or pale.      Comments: Scattered bruising on the L side, most notably over the hip and left upper chest (known left clavicular fracture)   Neurological:      Comments: Sedated and unresponsive to voice       Vents:  Vent Mode: A/C (04/02/23 1648)  Set Rate: 22 BPM (04/02/23 1648)  Vt Set: 420 mL (04/02/23 1648)  Pressure Support: 0  cmH20 (04/02/23 1648)  PEEP/CPAP: 8 cmH20 (04/02/23 1648)  Oxygen Concentration (%): 60 (04/02/23 1656)  Peak Airway Pressure: 30 cmH20 (04/02/23 1648)  Plateau Pressure: 0 cmH20 (04/02/23 1648)  Total Ve: 9.65 L/m (04/02/23 1648)  F/VT Ratio<105 (RSBI): (!) 50.23 (04/02/23 1648)    Lines/Drains/Airways       Drain  Duration                  Urethral Catheter 04/02/23 1500 Double-lumen 16 Fr. <1 day              Airway  Duration                  Airway - Non-Surgical 04/02/23 1551 Endotracheal Tube <1 day              Peripheral Intravenous Line  Duration                  Peripheral IV - Single Lumen 04/02/23 0120 20 G Left Antecubital <1 day         Peripheral IV - Single Lumen 04/02/23 1200 20 G Right Antecubital <1 day                    Significant Labs:    CBC/Anemia Profile:  Recent Labs   Lab 04/01/23  1311 04/02/23  1201   WBC 9.91 13.01*   HGB 10.7* 10.9*   HCT 32.7* 35.0*    180   MCV 88 91   RDW 14.6* 14.6*        Chemistries:  Recent Labs   Lab 04/01/23  1311 04/02/23  1201 04/02/23  1516   * 129* 133*   K 5.5* 7.6* 5.9*   CL 98 96 99   CO2 22* 15* 17*   BUN 19 32* 33*   CREATININE 0.9 2.3* 2.2*   CALCIUM 9.6 9.2 8.6*   ALBUMIN 3.5 3.6  --    PROT 7.1 7.0  --    BILITOT 0.5 1.3*  --    ALKPHOS 85 93  --    ALT 11 270*  --    AST 13 290*  --        All pertinent labs within the past 24 hours have been reviewed.    Significant Imaging:   I have reviewed all pertinent imaging results/findings within the past 24 hours.

## 2023-04-02 NOTE — ASSESSMENT & PLAN NOTE
- source most likely urine  - continue Zosyn  - blood and urine Cx pending  - received 1L IVF in the ER; will continue with volume resuscitation  - currently on Epi and Levo for vasopressor support  - goal MAP > 65  - CVL placed 4/2 in the ER  - trend lactic acid

## 2023-04-02 NOTE — ED PROVIDER NOTES
SCRIBE #1 NOTE: I, Jaclyn Morris, am scribing for, and in the presence of, Alfonso Jenkins MD. I have scribed the entire note.       History     Chief Complaint   Patient presents with    Altered Mental Status     Altered per [facility]. CBG 35, given D10 in route. Recently discharged yesterday.      HPI  4/2/2023, 11:56 AM  History obtained from patient and paramedic    HPI:  Rachel Long is a 82 y.o. female with a PMH of CAD, HTN, hypothyroidism, COPD, PE, stroke, and Parkinson's disease who presents to the [facility] emergency department for evaluation of AMS. Pt was in ED yesterday for a fall where she broke her clavicle. Per paramedic, pt was having erratic movements and had a near syncopal epiose in route with a blood sugar of 65. Paramedic states he gave pt D10 and blood sugar increased to 230. Pt is not a diabetic. Paramedic also reports pt had O2 saturation of 56% in route. No other complaints or concerns.     Arrival mode: [facility]        PCP: Sharmaine English MD    Review of patient's allergies indicates:   Allergen Reactions    Benadryl [diphenhydramine hcl] Anxiety    Sulfa (sulfonamide antibiotics) Rash      Past Medical History:   Diagnosis Date    Acute upper respiratory infection     Anemia     Arthritis     osteo    Bipolar mood disorder     Cataract     Coordination abnormal     COPD exacerbation 5/9/2016    Coronary artery disease     Difficulty walking     Dysphasia     GERD (gastroesophageal reflux disease)     Hyperlipidemia     Hypertension     on meds    Hypothyroidism, adult     Insomnia     Malignant neoplasm of colon     Muscle weakness     Neuralgia and neuritis     Parkinson disease     Pulmonary embolism     Schizoaffective disorder     SOB (shortness of breath)     Spinal stenosis     Stroke     pt states mini strokes    Thyroid disease     Urinary tract infection      Past Surgical History:   Procedure Laterality Date    CATARACT EXTRACTION Right 03/23/2022    CATARACT EXTRACTION W/   INTRAOCULAR LENS IMPLANT Left 06/02/2022    COLON SURGERY      COLONOSCOPY N/A 7/9/2020    Procedure: COLONOSCOPY;  Surgeon: Cesar Montague III, MD;  Location: [facility];  Service: Endoscopy;  Laterality: N/A;    CORONARY STENT PLACEMENT      ESOPHAGOGASTRODUODENOSCOPY N/A 7/9/2020    Procedure: EGD (ESOPHAGOGASTRODUODENOSCOPY);  Surgeon: Cesar Montague III, MD;  Location: [facility] ENDO;  Service: Endoscopy;  Laterality: N/A;    FRACTURE SURGERY      HYSTERECTOMY  1970's       Family History   Problem Relation Age of Onset    Asthma Father     Eczema Father     No Known Problems Mother     Other Brother         shoulder surgery      Social History     Tobacco Use    Smoking status: Former     Years: 50.00     Types: Cigarettes    Smokeless tobacco: Former   Substance and Sexual Activity    Alcohol use: No    Drug use: No    Sexual activity: Never      Review of Systems     Review of Systems   Unable to perform ROS: Mental status change        Physical Exam     Initial Vitals   BP Pulse Resp Temp SpO2   04/02/23 1152 04/02/23 1152 04/02/23 1152 04/02/23 1203 04/02/23 1152   124/62 (!) 52 18 99.3 °F (37.4 °C) 100 %      MAP       --                 Physical Exam  Nursing notes and vital signs reviewed.  Constitutional: Patient is in severe distress.   Head: Normocephalic. R temple abrasion.  Eyes: Conjunctivae are not pale. No scleral icterus.   ENT: Mucous membranes are dry.   Neck: Supple.   Cardiovascular: Bradycardic rate. Regular rhythm.   Pulmonary: No respiratory distress. No wheezes.   Abdominal: Non-distended.   Musculoskeletal: Left clavicle deformity with bruising, known fracture from prior day.   Skin: Warm and dry. Pale. Skin tears bandaged LUE.   Neurological:  Delirious. Speaks back with eyes closed, sometimes nonsensically. No acute lateralizing neurologic appreciated. Oriented x1.      ED Course   Critical Care    Date/Time: 4/2/2023 12:04 PM  Performed by: Alfonso Jenkins MD  Authorized by:  Alfonso Jenkins MD   Direct patient critical care time: 40 minutes  Additional history critical care time: 12 minutes  Ordering / reviewing critical care time: 12 minutes  Documentation critical care time: 12 minutes  Consulting other physicians critical care time: 5 minutes  Total critical care time (exclusive of procedural time) : 81 minutes  Critical care time was exclusive of separately billable procedures and treating other patients and teaching time.  Critical care was necessary to treat or prevent imminent or life-threatening deterioration of the following conditions: respiratory failure, renal failure, sepsis, cardiac failure and hepatic failure (AMS and hypoglycemia).  Critical care was time spent personally by me on the following activities: blood draw for specimens, discussions with consultants, development of treatment plan with patient or surrogate, interpretation of cardiac output measurements, evaluation of patient's response to treatment, examination of patient, obtaining history from patient or surrogate, ordering and performing treatments and interventions, ordering and review of laboratory studies, ordering and review of radiographic studies, pulse oximetry, re-evaluation of patient's condition and review of old charts.      Intubation    Date/Time: 4/2/2023 4:23 PM  Location procedure was performed: [facility] EMERGENCY DEPARTMENT  Performed by: Alfonso Jenkins MD  Authorized by: Alfonso Jenkins MD   Consent Done: Emergent Situation  Indications: respiratory distress and respiratory failure  Intubation method: direct  Patient status: paralyzed (RSI)  Preoxygenation: nasal cannula  Sedatives: etomidate  Paralytic: rocuronium  Laryngoscope size: Mac 4  Tube size: 7.5 mm  Tube type: cuffed  Number of attempts: 1  Cricoid pressure: no  Cords visualized: yes  Post-procedure assessment: ETCO2 monitor, chest rise and CO2 detector  Breath sounds: clear and equal and absent over the  epigastrium  Cuff inflated: yes  ETT to lip: 22 cm  Tube secured with: ETT donato  Chest x-ray interpreted by me.  Chest x-ray findings: endotracheal tube in appropriate position  Patient tolerance: Patient tolerated the procedure well with no immediate complications  Complications: No  Specimens: No  Implants: No      Central Line    Date/Time: 4/2/2023 4:24 PM  Performed by: Alfonso Jenkins MD  Authorized by: Alfonso Jenkins MD     Location procedure was performed:  [facility] EMERGENCY DEPARTMENT  Consent Done ?:  Emergent Situation  Indications:  Med administration  Anesthesia:  See MAR for details  Preparation:  Skin prepped with ChloraPrep  Skin prep agent dried: Skin prep agent completely dried prior to procedure    Sterile barriers: All five maximal sterile barriers used - gloves, gown, cap, mask and large sterile sheet    Hand hygiene: Hand hygiene performed immediately prior to central venous catheter insertion    Location:  Left internal jugular  Catheter size:  7 Fr  Inserted Catheter Length (cm):  16  Ultrasound guidance: Yes    Vessel Caliber:  Medium  Needle advanced into vessel with real time ultrasound guidance.    Guidewire confirmed in vessel.    Steril sheath on probe.    Sterile gel used.  Manometry: No    Number of attempts:  2  Securement:  Chlorhexidine patch, line sutured, blood return through all ports and sterile dressing applied  Complications: No    Specimens: No    Implants: No    XRay:  Placement verified by x-ray, no pneumothorax on x-ray and successful placement  Adverse Events:  NoneTermination Site: superior vena cava  Vitals:    04/03/23 0800 04/03/23 0815 04/03/23 0830 04/03/23 0845   BP: (!) 100/59 106/60 (!) 95/52 (!) 97/53   Pulse: (!) 58 60 (!) 59 60   Resp: (!) 22 (!) 22 (!) 22 (!) 22   Temp:       TempSrc:       SpO2: 100% 100% 100% 100%   Weight:       Height:        04/03/23 0900 04/03/23 0915 04/03/23 0930 04/03/23 0940   BP: (!) 98/55 (!) 102/58 (!) 102/59    Pulse:  "61 60 61    Resp: (!) 22 (!) 22 (!) 22    Temp:       TempSrc:       SpO2: 100% 100% 100%    Weight:    65.8 kg (145 lb 1 oz)   Height:    5' 4" (1.626 m)    04/03/23 0945 04/03/23 1000 04/03/23 1007 04/03/23 1015   BP: (!) 107/58 (!) 99/55 (!) 99/55 98/60   Pulse: 62 61 61 62   Resp: (!) 22 (!) 22 (!) 22 (!) 22   Temp:       TempSrc:       SpO2: 100% 100% 100% 100%   Weight:   65.8 kg (145 lb)    Height:   5' 4" (1.626 m)     04/03/23 1030 04/03/23 1045 04/03/23 1100   BP: 110/65 (!) 100/54 (!) 92/55   Pulse: 62 64 62   Resp: (!) 22 (!) 22 (!) 22   Temp:      TempSrc:      SpO2: 100% 100% 100%   Weight:      Height:        Lab Results Interpreted as Abnormal:  Labs Reviewed   CBC W/ AUTO DIFFERENTIAL - Abnormal; Notable for the following components:       Result Value    WBC 13.01 (*)     RBC 3.85 (*)     Hemoglobin 10.9 (*)     Hematocrit 35.0 (*)     MCHC 31.1 (*)     RDW 14.6 (*)     Gran # (ANC) 11.0 (*)     Immature Grans (Abs) 0.06 (*)     Gran % 84.3 (*)     Lymph % 8.3 (*)     All other components within normal limits   COMPREHENSIVE METABOLIC PANEL - Abnormal; Notable for the following components:    Sodium 129 (*)     Potassium 7.6 (*)     CO2 15 (*)     Glucose 147 (*)     BUN 32 (*)     Creatinine 2.3 (*)     Total Bilirubin 1.3 (*)      (*)      (*)     Anion Gap 18 (*)     eGFR 21 (*)     All other components within normal limits    Narrative:     K   critical result(s) called and verbal readback obtained from   GERALDINE GRIFFIN RN  by EDJ1 04/02/2023 12:47   URINALYSIS, REFLEX TO URINE CULTURE - Abnormal; Notable for the following components:    Appearance, UA Cloudy (*)     Protein, UA 1+ (*)     Occult Blood UA 2+ (*)     Leukocytes, UA 3+ (*)     All other components within normal limits    Narrative:     Specimen Source->Urine   TROPONIN I - Abnormal; Notable for the following components:    Troponin I 0.195 (*)     All other components within normal limits   B-TYPE NATRIURETIC PEPTIDE " - Abnormal; Notable for the following components:     (*)     All other components within normal limits   D DIMER, QUANTITATIVE - Abnormal; Notable for the following components:    D-Dimer 8.34 (*)     All other components within normal limits   LACTIC ACID, PLASMA - Abnormal; Notable for the following components:    Lactate (Lactic Acid) 5.1 (*)     All other components within normal limits    Narrative:        LA critical result(s) called and verbal readback obtained from   ASHLI GRIFFIN RN  by EDJ1 04/02/2023 12:49   APTT - Abnormal; Notable for the following components:    aPTT 35.2 (*)     All other components within normal limits   PROTIME-INR - Abnormal; Notable for the following components:    Prothrombin Time 15.2 (*)     INR 1.4 (*)     All other components within normal limits   DRUG SCREEN PANEL, URINE EMERGENCY - Abnormal; Notable for the following components:    Opiate Scrn, Ur Presumptive Positive (*)     All other components within normal limits    Narrative:     Specimen Source->Urine   TROPONIN I - Abnormal; Notable for the following components:    Troponin I 0.340 (*)     All other components within normal limits   BASIC METABOLIC PANEL - Abnormal; Notable for the following components:    Sodium 133 (*)     Potassium 5.9 (*)     CO2 17 (*)     Glucose 132 (*)     BUN 33 (*)     Creatinine 2.2 (*)     Calcium 8.6 (*)     Anion Gap 17 (*)     eGFR 22 (*)     All other components within normal limits   LACTIC ACID, PLASMA - Abnormal; Notable for the following components:    Lactate (Lactic Acid) 7.3 (*)     All other components within normal limits    Narrative:        LA critical result(s) called and verbal readback obtained from   JONO ALMEIDA RN by EDJ1 04/02/2023 16:08   URINALYSIS MICROSCOPIC - Abnormal; Notable for the following components:    RBC, UA 24 (*)     WBC, UA >100 (*)     WBC Clumps, UA Many (*)     Bacteria Many (*)     All other components within normal limits    Narrative:      Specimen Source->Urine   POCT GLUCOSE - Abnormal; Notable for the following components:    POCT Glucose 137 (*)     All other components within normal limits   ISTAT PROCEDURE - Abnormal; Notable for the following components:    POC PH 7.205 (*)     POC PCO2 55.6 (*)     POC PO2 388 (*)     POC HCO3 22.0 (*)     All other components within normal limits   POCT GLUCOSE - Abnormal; Notable for the following components:    POCT Glucose 132 (*)     All other components within normal limits   POCT GLUCOSE - Abnormal; Notable for the following components:    POCT Glucose 130 (*)     All other components within normal limits   ISTAT PROCEDURE - Abnormal; Notable for the following components:    POC PH 7.257 (*)     POC PO2 376 (*)     POC HCO3 18.1 (*)     All other components within normal limits   CULTURE, URINE   SARS-COV-2 RNA AMPLIFICATION, QUAL   LIPASE   LIPASE   POCT GLUCOSE   POCT GLUCOSE MONITORING CONTINUOUS      All Lab Results:  Results for orders placed or performed during the hospital encounter of 04/02/23   Blood culture #1 **CANNOT BE ORDERED STAT**    Specimen: Peripheral, Antecubital, Right; Blood   Result Value Ref Range    Blood Culture, Routine No Growth to date    Blood culture #2 **CANNOT BE ORDERED STAT**    Specimen: Peripheral, Forearm, Right; Blood   Result Value Ref Range    Blood Culture, Routine No Growth to date    CBC auto differential   Result Value Ref Range    WBC 13.01 (H) 3.90 - 12.70 K/uL    RBC 3.85 (L) 4.00 - 5.40 M/uL    Hemoglobin 10.9 (L) 12.0 - 16.0 g/dL    Hematocrit 35.0 (L) 37.0 - 48.5 %    MCV 91 82 - 98 fL    MCH 28.3 27.0 - 31.0 pg    MCHC 31.1 (L) 32.0 - 36.0 g/dL    RDW 14.6 (H) 11.5 - 14.5 %    Platelets 180 150 - 450 K/uL    MPV 10.7 9.2 - 12.9 fL    Immature Granulocytes 0.5 0.0 - 0.5 %    Gran # (ANC) 11.0 (H) 1.8 - 7.7 K/uL    Immature Grans (Abs) 0.06 (H) 0.00 - 0.04 K/uL    Lymph # 1.1 1.0 - 4.8 K/uL    Mono # 0.9 0.3 - 1.0 K/uL    Eos # 0.0 0.0 - 0.5 K/uL     Baso # 0.02 0.00 - 0.20 K/uL    nRBC 0 0 /100 WBC    Gran % 84.3 (H) 38.0 - 73.0 %    Lymph % 8.3 (L) 18.0 - 48.0 %    Mono % 6.7 4.0 - 15.0 %    Eosinophil % 0.0 0.0 - 8.0 %    Basophil % 0.2 0.0 - 1.9 %    Differential Method Automated    Comprehensive metabolic panel   Result Value Ref Range    Sodium 129 (L) 136 - 145 mmol/L    Potassium 7.6 (HH) 3.5 - 5.1 mmol/L    Chloride 96 95 - 110 mmol/L    CO2 15 (L) 23 - 29 mmol/L    Glucose 147 (H) 70 - 110 mg/dL    BUN 32 (H) 8 - 23 mg/dL    Creatinine 2.3 (H) 0.5 - 1.4 mg/dL    Calcium 9.2 8.7 - 10.5 mg/dL    Total Protein 7.0 6.0 - 8.4 g/dL    Albumin 3.6 3.5 - 5.2 g/dL    Total Bilirubin 1.3 (H) 0.1 - 1.0 mg/dL    Alkaline Phosphatase 93 55 - 135 U/L     (H) 10 - 40 U/L     (H) 10 - 44 U/L    Anion Gap 18 (H) 8 - 16 mmol/L    eGFR 21 (A) >60 mL/min/1.73 m^2   Urinalysis, Reflex to Urine Culture Urine, Catheterized    Specimen: Urine   Result Value Ref Range    Specimen UA Urine, Catheterized     Color, UA Yellow Yellow, Straw, [place]    Appearance, UA Cloudy (A) Clear    pH, UA 6.0 5.0 - 8.0    Specific Gravity, UA 1.015 1.005 - 1.030    Protein, UA 1+ (A) Negative    Glucose, UA Negative Negative    Ketones, UA Negative Negative    Bilirubin (UA) Negative Negative    Occult Blood UA 2+ (A) Negative    Nitrite, UA Negative Negative    Urobilinogen, UA Negative <2.0 EU/dL    Leukocytes, UA 3+ (A) Negative   Troponin I   Result Value Ref Range    Troponin I 0.195 (H) 0.000 - 0.026 ng/mL   Brain natriuretic peptide   Result Value Ref Range     (H) 0 - 99 pg/mL   D dimer, quantitative   Result Value Ref Range    D-Dimer 8.34 (H) <0.50 mg/L FEU   Lactic acid, plasma   Result Value Ref Range    Lactate (Lactic Acid) 5.1 (HH) 0.5 - 2.2 mmol/L   APTT   Result Value Ref Range    aPTT 35.2 (H) 21.0 - 32.0 sec   Protime-INR   Result Value Ref Range    Prothrombin Time 15.2 (H) 9.0 - 12.5 sec    INR 1.4 (H) 0.8 - 1.2   COVID-19 Rapid Screening   Result Value  Ref Range    SARS-CoV-2 RNA, Amplification, Qual Negative Negative   Drug screen panel, emergency   Result Value Ref Range    Benzodiazepines Negative Negative    Methadone metabolites Negative Negative    Cocaine (Metab.) Negative Negative    Opiate Scrn, Ur Presumptive Positive (A) Negative    Barbiturate Screen, Ur Negative Negative    Amphetamine Screen, Ur Negative Negative    THC Negative Negative    Phencyclidine Negative Negative    Creatinine, Urine 72.2 15.0 - 325.0 mg/dL    Toxicology Information SEE COMMENT    Lipase   Result Value Ref Range    Lipase 30 4 - 60 U/L   Troponin I   Result Value Ref Range    Troponin I 0.340 (H) 0.000 - 0.026 ng/mL   Basic metabolic panel   Result Value Ref Range    Sodium 133 (L) 136 - 145 mmol/L    Potassium 5.9 (H) 3.5 - 5.1 mmol/L    Chloride 99 95 - 110 mmol/L    CO2 17 (L) 23 - 29 mmol/L    Glucose 132 (H) 70 - 110 mg/dL    BUN 33 (H) 8 - 23 mg/dL    Creatinine 2.2 (H) 0.5 - 1.4 mg/dL    Calcium 8.6 (L) 8.7 - 10.5 mg/dL    Anion Gap 17 (H) 8 - 16 mmol/L    eGFR 22 (A) >60 mL/min/1.73 m^2   Lactic acid, plasma   Result Value Ref Range    Lactate (Lactic Acid) 7.3 (HH) 0.5 - 2.2 mmol/L   Urinalysis Microscopic   Result Value Ref Range    RBC, UA 24 (H) 0 - 4 /hpf    WBC, UA >100 (H) 0 - 5 /hpf    WBC Clumps, UA Many (A) None-Rare    Bacteria Many (A) None-Occ /hpf    Hyaline Casts, UA 0 0-1/lpf /lpf    Microscopic Comment SEE COMMENT    Lactic acid, plasma   Result Value Ref Range    Lactate (Lactic Acid) 7.2 (HH) 0.5 - 2.2 mmol/L   Basic metabolic panel   Result Value Ref Range    Sodium 128 (L) 136 - 145 mmol/L    Potassium 5.5 (H) 3.5 - 5.1 mmol/L    Chloride 98 95 - 110 mmol/L    CO2 15 (L) 23 - 29 mmol/L    Glucose 183 (H) 70 - 110 mg/dL    BUN 39 (H) 8 - 23 mg/dL    Creatinine 2.4 (H) 0.5 - 1.4 mg/dL    Calcium 7.8 (L) 8.7 - 10.5 mg/dL    Anion Gap 15 8 - 16 mmol/L    eGFR 20 (A) >60 mL/min/1.73 m^2   Lactic acid, plasma   Result Value Ref Range    Lactate (Lactic  Acid) 3.6 (HH) 0.5 - 2.2 mmol/L   Comprehensive metabolic panel   Result Value Ref Range    Sodium 124 (L) 136 - 145 mmol/L    Potassium 4.8 3.5 - 5.1 mmol/L    Chloride 96 95 - 110 mmol/L    CO2 18 (L) 23 - 29 mmol/L    Glucose 120 (H) 70 - 110 mg/dL    BUN 43 (H) 8 - 23 mg/dL    Creatinine 2.7 (H) 0.5 - 1.4 mg/dL    Calcium 8.0 (L) 8.7 - 10.5 mg/dL    Total Protein 5.5 (L) 6.0 - 8.4 g/dL    Albumin 2.8 (L) 3.5 - 5.2 g/dL    Total Bilirubin 1.5 (H) 0.1 - 1.0 mg/dL    Alkaline Phosphatase 81 55 - 135 U/L    AST 5,190 (H) 10 - 40 U/L    ALT 3,954 (H) 10 - 44 U/L    Anion Gap 10 8 - 16 mmol/L    eGFR 17 (A) >60 mL/min/1.73 m^2   CBC auto differential   Result Value Ref Range    WBC 19.42 (H) 3.90 - 12.70 K/uL    RBC 3.27 (L) 4.00 - 5.40 M/uL    Hemoglobin 9.4 (L) 12.0 - 16.0 g/dL    Hematocrit 28.7 (L) 37.0 - 48.5 %    MCV 88 82 - 98 fL    MCH 28.7 27.0 - 31.0 pg    MCHC 32.8 32.0 - 36.0 g/dL    RDW 14.4 11.5 - 14.5 %    Platelets 154 150 - 450 K/uL    MPV 11.2 9.2 - 12.9 fL    Immature Granulocytes 1.0 (H) 0.0 - 0.5 %    Gran # (ANC) 17.6 (H) 1.8 - 7.7 K/uL    Immature Grans (Abs) 0.19 (H) 0.00 - 0.04 K/uL    Lymph # 1.1 1.0 - 4.8 K/uL    Mono # 0.5 0.3 - 1.0 K/uL    Eos # 0.0 0.0 - 0.5 K/uL    Baso # 0.02 0.00 - 0.20 K/uL    nRBC 0 0 /100 WBC    Gran % 90.7 (H) 38.0 - 73.0 %    Lymph % 5.7 (L) 18.0 - 48.0 %    Mono % 2.5 (L) 4.0 - 15.0 %    Eosinophil % 0.0 0.0 - 8.0 %    Basophil % 0.1 0.0 - 1.9 %    Differential Method Automated    Lactic acid, plasma   Result Value Ref Range    Lactate (Lactic Acid) 1.8 0.5 - 2.2 mmol/L   Echo   Result Value Ref Range    BSA 1.72 m2    TDI SEPTAL 0.08 m/s    LV LATERAL E/E' RATIO 11.57 m/s    LV SEPTAL E/E' RATIO 10.13 m/s    LA WIDTH 3.30 cm    IVC diameter 2.01 cm    Left Ventricular Outflow Tract Mean Velocity 0.91 cm/s    Left Ventricular Outflow Tract Mean Gradient 3.35 mmHg    TV mean gradient 28 mmHg    TDI LATERAL 0.07 m/s    LVIDd 4.32 3.5 - 6.0 cm    IVS 1.05 0.6 - 1.1  cm    Posterior Wall 0.95 0.6 - 1.1 cm    Ao root annulus 3.09 cm    LVIDs 3.16 2.1 - 4.0 cm    FS 27 28 - 44 %    LA volume 59.97 cm3    STJ 3.01 cm    Ascending aorta 2.87 cm    LV mass 143.55 g    LA size 4.16 cm    TAPSE 1.80 cm    Left Ventricle Relative Wall Thickness 0.44 cm    AV mean gradient 8 mmHg    AV valve area 1.68 cm2    AV Velocity Ratio 0.61     AV index (prosthetic) 0.62     MV valve area p 1/2 method 2.39 cm2    PV peak gradient 2.19 mmHg    E/A ratio 0.83     Mean e' 0.08 m/s    E wave deceleration time 317.16 msec    IVRT 72.31 msec    LVOT diameter 1.86 cm    LVOT area 2.7 cm2    LVOT peak carlito 1.05 m/s    LVOT peak VTI 21.60 cm    Ao peak carlito 1.71 m/s    Ao VTI 34.9 cm    RVOT peak carlito 0.74 m/s    RVOT peak VTI 13.9 cm    Mr max carlito 3.28 m/s    LVOT stroke volume 58.66 cm3    AV peak gradient 12 mmHg    PV mean gradient 1.53 mmHg    E/E' ratio 10.80 m/s    MV Peak E Carlito 0.81 m/s    TR Max Carlito 2.90 m/s    MV stenosis pressure 1/2 time 91.98 ms    MV Peak A Carlito 0.98 m/s    LV Systolic Volume 39.68 mL    LV Systolic Volume Index 23.2 mL/m2    LV Diastolic Volume 83.95 mL    LV Diastolic Volume Index 49.09 mL/m2    LA Volume Index 35.1 mL/m2    LV Mass Index 84 g/m2    RA Major Axis 4.24 cm    Left Atrium Minor Axis 5.21 cm    Left Atrium Major Axis 5.07 cm    Triscuspid Valve Regurgitation Peak Gradient 34 mmHg    RA Width 2.40 cm   POCT glucose   Result Value Ref Range    POCT Glucose 137 (H) 70 - 110 mg/dL   ISTAT PROCEDURE   Result Value Ref Range    POC PH 7.205 (LL) 7.35 - 7.45    POC PCO2 55.6 (H) 35 - 45 mmHg    POC PO2 388 (H) 80 - 100 mmHg    POC HCO3 22.0 (L) 24 - 28 mmol/L    POC BE -6 -2 to 2 mmol/L    POC SATURATED O2 100 95 - 100 %    Sample ARTERIAL     Site RR     Allens Test Pass     DelSys NRB     Mode SPONT     Flow 15    POCT glucose   Result Value Ref Range    POCT Glucose 99 70 - 110 mg/dL   POCT glucose   Result Value Ref Range    POCT Glucose 132 (H) 70 - 110 mg/dL   POCT  glucose   Result Value Ref Range    POCT Glucose 130 (H) 70 - 110 mg/dL   ISTAT PROCEDURE   Result Value Ref Range    POC PH 7.257 (LL) 7.35 - 7.45    POC PCO2 40.6 35 - 45 mmHg    POC PO2 376 (H) 80 - 100 mmHg    POC HCO3 18.1 (L) 24 - 28 mmol/L    POC BE -9 -2 to 2 mmol/L    POC SATURATED O2 100 95 - 100 %    Rate 22     Sample ARTERIAL     Site RR     Allens Test Pass     DelSys Adult Vent     Mode AC/PRVC     Vt 420     PEEP 8     FiO2 70    ISTAT PROCEDURE   Result Value Ref Range    POC PH 7.397 7.35 - 7.45    POC PCO2 34.2 (L) 35 - 45 mmHg    POC PO2 99 80 - 100 mmHg    POC HCO3 21.0 (L) 24 - 28 mmol/L    POC BE -4 -2 to 2 mmol/L    POC SATURATED O2 98 95 - 100 %    Rate 22     Sample ARTERIAL     Site RR     Allens Test N/A     DelSys Adult Vent     Mode AC/PRVC     Vt 420     PEEP 5     FiO2 40      Imaging Results              X-Ray Chest 1 View for Line/Tube Placement (Final result)  Result time 04/02/23 16:36:46      Final result by Miguel Morillo MD (04/02/23 16:36:46)                   Impression:      Satisfactory support lines      Electronically signed by: Miguel Morillo  Date:    04/02/2023  Time:    16:36               Narrative:    EXAMINATION:  XR CHEST 1 VIEW FOR LINE/TUBE PLACEMENT    CLINICAL HISTORY:  Intubation;    TECHNIQUE:  Single frontal portable view of the chest was performed.    COMPARISON:  Prior    FINDINGS:  Endotracheal tube enteric tube left IJ central venous catheter satisfactory position as far seen.  Left hilar opacity again noted.                                       CT Chest Abdomen Pelvis Without Contrast (XPD) (Final result)  Result time 04/02/23 14:48:18      Final result by Malick Esparza MD (04/02/23 14:48:18)                   Impression:      Exam is severely degraded due to motion.    5.7 cm left upper lobe lung mass, most likely represents recurrent or metastatic lung carcinoma.    Spiculated right lung pulmonary nodules most likely represents metastatic  disease.    No evidence of metastatic disease of the abdomen or pelvis.      Electronically signed by: Malick Esparza  Date:    04/02/2023  Time:    14:48               Narrative:    EXAMINATION:  CT CHEST ABDOMEN PELVIS WITHOUT CONTRAST(XPD)    CLINICAL HISTORY:  sepsis; possible lung cancer left opacity;    TECHNIQUE:  Low dose axial images, sagittal and coronal reformations were obtained from the thoracic inlet to the pubic symphysis    COMPARISON:  None    FINDINGS:  CT chest:    Exam degraded due to artifact.    5.7 x 3.3 x 5 1 cm left perihilar and left upper lobe lung mass, most consistent with recurrent or possibly metastatic lung carcinoma.  Remaining left lung is clear.  Spiculated 9 mm pulmonary nodule right upper lobe (series 6, image 204).  Spiculated density likely represents additional pulmonary nodule superior segmental left lower lobe measuring 1.6 by 1.9 cm (series 6, image 217).    Heart normal in size.  Severe coronary artery calcification and severe atherosclerotic calcification of the aorta.  No adenopathy identified.    Chest wall soft tissues are normal.  No suspicious bony abnormality.    CT abdomen and pelvis:    Normal liver.  Normal gallbladder.  Normal spleen and pancreas.  Normal adrenal glands.  Normal caliber aorta and iliac vasculature with severe atherosclerotic calcification.  IVC is normal.    Kidneys are normal bilaterally.  No hydronephrosis.  Bladder only mildly distended but otherwise normal.    Normal stomach.  Small intestine is nondilated.  Appendix thought to be partially visualized and normal.: Unremarkable.    Normal bladder.  Uterus surgically absent.  Negative for adnexal mass.    Scoliosis and advanced degenerative changes of the spine.  No suspicious osseous lesions.                                       US Abdomen Limited (Final result)  Result time 04/02/23 14:20:49      Final result by Malick Esparza MD (04/02/23 14:20:49)                   Impression:       Mild hepatomegaly.  Mild hepatic nodularity can be associated with chronic liver disease/cirrhosis.  Trace ascites adjacent to the liver.    Normal gallbladder.    Common bile duct cannot be visualized.  No intrahepatic biliary ductal dilatation.      Electronically signed by: Malick Esparza  Date:    04/02/2023  Time:    14:20               Narrative:    EXAMINATION:  US ABDOMEN LIMITED    CLINICAL HISTORY:  hyperbili and increased LFTs, septic, ?choledocholith/cholangitis; .    COMPARISON:  None.    FINDINGS:  Liver is homogeneous appearance with mildly nodular liver contour which can be a associated with chronic liver disease.  Negative for liver mass.  Liver mildly enlarged measuring 17.3 cm.  Trace perihepatic ascites.  Normal color Doppler of the portal vein.  Spectral analysis could not be performed due to patient's mental status.  Altered    The gallbladder is normal.  The common bile duct is cannot be visualized.  No intrahepatic biliary ductal dilatation.    Pancreatic head is normal.  Body and tail obscured by bowel gas.  Visualized segments of the abdominal aorta and IVC are normal.    Right kidney partially obscured by bowel gas but is grossly normal, measuring proximally 9.4 cm in length.                                       X-Ray Chest AP Portable (Final result)  Result time 04/02/23 13:59:13      Final result by Malick Esparza MD (04/02/23 13:59:13)                   Impression:      5.9 cm indeterminate opacity left lung as detailed above.      Electronically signed by: Malick Esparza  Date:    04/02/2023  Time:    13:59               Narrative:    EXAMINATION:  XR CHEST AP PORTABLE    CLINICAL HISTORY:  Altered mental status, unspecified .  History of lung carcinoma.    TECHNIQUE:  Chest x-ray, 2 views    COMPARISON:  08/05/2021.    FINDINGS:  5.9 x 5.0 cm left perihilar opacity is new since 08/05/2021, and is suspicious for recurrent or metastatic lung carcinoma.  CT chest  recommended.  Right lung clear.  Heart size within normal limits.No significant bony findings.                                       CT Head Without Contrast (Final result)  Result time 04/02/23 12:48:05      Final result by Malick Esparza MD (04/02/23 12:48:05)                   Impression:      1. No acute intracranial findings.  2. Atrophy and chronic ischemic change.  3. No change since 01/22/2023.      Electronically signed by: Malick Esparza  Date:    04/02/2023  Time:    12:48               Narrative:    EXAMINATION:  CT HEAD WITHOUT CONTRAST    CLINICAL HISTORY:  Head trauma, minor (Age >= 65y);.    TECHNIQUE:  Low dose axial images were obtained through the head.  Coronal and sagittal reformations were also performed. Contrast was not administered.    COMPARISON:  None.    FINDINGS:  Exam degraded due to motion artifact.    Midline structures intact.  Atrophy with diffuse prominence of ventricular system and cortical sulci, appropriate for patient's age.  Low-density change in the deep white matter, compatible chronic ischemic microvascular disease.    No intracranial hemorrhage,acute infarct, or suspicious intracranial lesion is identified.    Skull base and calvarium are normal. Mild chronic mucosal thickening of the right maxillary antrum.  Remaining sinuses and mastoid air cells are clear.                                     ED Physician's independent review of the above imaging: agree with radiologist     The EKG was ordered, reviewed, and independently interpreted by the ED Physician:  Interpretation time: 11:51  Rate: 53 bpm  Rhythm:  Junctional rhythm  Interpretation: Peaked T waves (consistent with hyperkalemia). No STEMI.    The EKG was ordered, reviewed, and independently interpreted by the ED provider.  Interpretation time: 16:28  Rate: 37 BPM  Rhythm:  Junctional bradycardia  Interpretation: No acute ST or T wave abnormalities. No STEMI. Peaked T waves resolved after hyperkalemia  treatment.         The emergency physician reviewed the vital signs and test results, which are outlined above.     ED Discussion     4:00 PM:  Pt had sudden episode of desaturation and AMS with purple/blue skin discoloration. Pt was emergently intubated and and a central line was placed.    4:55 PM: Discussed case with Dr. Dixon (Critical care) who agrees with current care and management of pt and accepts admission.   Admitting Service: Critical care  Admitting Physician: Dr. Dixon  Admit to: ICU         Medical Decision Making:   History:   I obtained history from: EMS provider.       <> Summary of History:  Per paramedic, pt was having erratic movements and had a near syncopal epiose in route with a blood sugar of 65. Paramedic states he gave pt D10 and blood sugar increased to 230. Pt is not a diabetic. Paramedic also reports pt had O2 saturation of 56% in route.  Clinical Tests:   Lab Tests: Ordered and Reviewed  Radiological Study: Ordered and Reviewed  Medical Tests: Ordered and Reviewed       ED Medication(s):  Medications   EPINEPHrine (ADRENALIN) 5 mg in dextrose 5 % (D5W) 250 mL infusion (0.05 mcg/kg/min × 53.7 kg Intravenous Verify Only 4/3/23 1200)   sodium chloride 0.9% flush 10 mL (has no administration in time range)   enoxaparin injection 30 mg (30 mg Subcutaneous Given 4/2/23 1827)   potassium chloride 40 mEq in 100 mL IVPB (FOR CENTRAL LINE ADMINISTRATION ONLY) (has no administration in time range)     And   potassium chloride 20 mEq in 100 mL IVPB (FOR CENTRAL LINE ADMINISTRATION ONLY) (has no administration in time range)     And   potassium chloride 40 mEq in 100 mL IVPB (FOR CENTRAL LINE ADMINISTRATION ONLY) (has no administration in time range)   magnesium sulfate 2g in water 50mL IVPB (premix) (has no administration in time range)   magnesium sulfate 2g in water 50mL IVPB (premix) (has no administration in time range)   sodium phosphate 15 mmol in dextrose 5 % (D5W) 250 mL IVPB (has no  administration in time range)   sodium phosphate 20.01 mmol in dextrose 5 % (D5W) 250 mL IVPB (has no administration in time range)   sodium phosphate 30 mmol in dextrose 5 % (D5W) 250 mL IVPB (has no administration in time range)   calcium gluconate 1 g in NS IVPB (premixed) (has no administration in time range)   calcium gluconate 1 g in NS IVPB (premixed) (has no administration in time range)   calcium gluconate 1 g in NS IVPB (premixed) (has no administration in time range)   piperacillin-tazobactam (ZOSYN) 4.5 g in dextrose 5 % in water (D5W) 5 % 100 mL IVPB (MB+) (0 g Intravenous Stopped 4/3/23 0932)   buPROPion TBSR 12 hr tablet 100 mg (100 mg Oral Given 4/3/23 0805)   albuterol nebulizer solution 2.5 mg (has no administration in time range)   chlorhexidine 0.12 % solution 15 mL (15 mLs Mouth/Throat Given 4/3/23 0805)   fentaNYL 50 mcg/mL injection 50 mcg (has no administration in time range)     Followed by   fentaNYL 50 mcg/mL injection 50 mcg (50 mcg Intravenous Given 4/2/23 2234)   0.9%  NaCl infusion ( Intravenous Verify Only 4/3/23 1200)   propofol (DIPRIVAN) 10 mg/mL infusion (40 mcg/kg/min × 64.2 kg Intravenous Verify Only 4/3/23 1200)   fentaNYL 2500 mcg in 0.9% sodium chloride 250 mL infusion premix (titrating) (50 mcg/hr Intravenous Verify Only 4/3/23 1200)   mupirocin 2 % ointment ( Nasal Given 4/3/23 0942)   senna-docusate 8.6-50 mg per tablet 2 tablet (2 tablets Per OG tube Given 4/3/23 1016)   thiamine tablet 100 mg (has no administration in time range)   famotidine 40 mg/5 mL (8 mg/mL) suspension 20 mg (has no administration in time range)   lactated ringers bolus 1,611 mL (0 mLs Intravenous Stopped 4/2/23 1320)   albuterol-ipratropium 2.5 mg-0.5 mg/3 mL nebulizer solution 3 mL (3 mLs Nebulization Given 4/2/23 1251)   calcium gluconate 1 g in NS IVPB (premixed) (0 g Intravenous Stopped 4/2/23 1352)   sodium bicarbonate solution 50 mEq (50 mEq Intravenous Given 4/2/23 1328)   sodium zirconium  cyclosilicate packet 10 g (10 g Oral Given 4/2/23 1518)   lactated ringers bolus 1,000 mL (0 mLs Intravenous Stopped 4/2/23 1352)   albuterol nebulizer solution 5 mg (5 mg Nebulization Given 4/2/23 1315)   insulin regular injection 4 Units 0.04 mL (4 Units Intravenous Given 4/2/23 1355)   dextrose 10 % infusion (25 g Intravenous New Bag 4/2/23 1352)   piperacillin-tazobactam (ZOSYN) 4.5 g in dextrose 5 % in water (D5W) 5 % 100 mL IVPB (MB+) (0 g Intravenous Stopped 4/2/23 1402)   etomidate injection 15 mg (15 mg Intravenous Given 4/2/23 1549)   rocuronium injection 60 mg (60 mg Intravenous Given 4/2/23 1549)   calcium gluconate 1 g in NS IVPB (premixed) (0 g Intravenous Stopped 4/2/23 1708)   atropine injection 1 mg (1 mg Intravenous Given 4/2/23 1651)   lactated ringers bolus 1,000 mL (0 mLs Intravenous Stopped 4/2/23 1919)   sodium chloride 0.9% bolus 1,000 mL 1,000 mL ( Intravenous Stopped 4/3/23 0038)     Current Discharge Medication List        Prescription Management: I performed a review of the patient's current Rx medication list as input by nursing staff.        Scribe Attestation:   Scribe #1: I performed the above scribed service and the documentation accurately describes the services I performed. I attest to the accuracy of the note.     Attending:   Physician Attestation Statement for Scribe #1: I, Alfonso Jenkins MD, personally performed the services described in this documentation, as scribed by Jaclyn Morris, in my presence, and it is both accurate and complete. As with other dictation methods such as dictation software, small errors or inconsistencies may be overlooked due to the goal of spending more face-to-face time with patients.      Clinical Impression       ICD-10-CM ICD-9-CM   1. Acute respiratory failure with hypoxia  J96.01 518.81   2. Altered mental status  R41.82 780.97   3. Acute hyperkalemia  E87.5 276.7   4. Mass of left lung  R91.8 786.6   5. Junctional bradycardia  R00.1 427.89    6. Elevated troponin I level  R77.8 790.6   7. DANN (acute kidney injury)  N17.9 584.9   8. Septic shock  A41.9 038.9    R65.21 785.52     995.92   9. Acute UTI (urinary tract infection)  N39.0 599.0   10. Shock  R57.9 785.50   11. Acute hypoxemic respiratory failure  J96.01 518.81   12. Acute renal failure, unspecified acute renal failure type  N17.9 584.9   13. COPD, very severe  J44.9 496   14. Lactic acidosis  E87.20 276.2   15. Malignant neoplasm of upper lobe of left lung  C34.12 162.3   16. Hyperkalemia  E87.5 276.7   17. Schizoaffective disorder, unspecified type  F25.9 295.70   18. Hypertension, unspecified type  I10 401.9   19. Hyperlipidemia, unspecified hyperlipidemia type  E78.5 272.4   20. Shock liver  K72.00 570      ED Disposition Condition    Admit Critical               Alfonso Jenkins MD  04/03/23 1212

## 2023-04-02 NOTE — PROGRESS NOTES
Pharmacist Renal Dose Adjustment Note    Rachel Long is a 82 y.o. female being treated with the medication famotidine    Patient Data:    Vital Signs (Most Recent):  Temp: 99.3 °F (37.4 °C) (04/02/23 1203)  Pulse: (!) 50 (04/02/23 1711)  Resp: (!) 22 (04/02/23 1656)  BP: (!) 114/57 (04/02/23 1656)  SpO2: 100 % (04/02/23 1656)   Vital Signs (72h Range):  Temp:  [98.3 °F (36.8 °C)-99.3 °F (37.4 °C)]   Pulse:  [34-82]   Resp:  [17-33]   BP: ()/(43-88)   SpO2:  [79 %-100 %]      Recent Labs   Lab 04/01/23  1311 04/02/23  1201 04/02/23  1516   CREATININE 0.9 2.3* 2.2*     Serum creatinine: 2.2 mg/dL (H) 04/02/23 1516  Estimated creatinine clearance: 16.7 mL/min (A)    Medication:famotidine 20 mg po bid will be changed to medication:famotidine 20 mg po dailyper renal dosing protocol.   Pharmacist's Name: Adry Iglesias [facility]  Pharmacist's Extension: [phone]

## 2023-04-02 NOTE — CARE UPDATE
Called to bedside d/t pt distress. ABG attempted but unsuccessful. Dr Jenkins at bedside and pt required intubation. Pt intubated with 7.5 ETT @ 22 lips. Pt placed on 840 ventilator on documented settings. Will continue to monitor.

## 2023-04-03 PROBLEM — K72.00 SHOCK LIVER: Status: ACTIVE | Noted: 2023-04-03

## 2023-04-03 LAB
ALBUMIN SERPL BCP-MCNC: 2.8 G/DL (ref 3.5–5.2)
ALLENS TEST: ABNORMAL
ALP SERPL-CCNC: 81 U/L (ref 55–135)
ALT SERPL W/O P-5'-P-CCNC: 3954 U/L (ref 10–44)
ANION GAP SERPL CALC-SCNC: 10 MMOL/L (ref 8–16)
ANION GAP SERPL CALC-SCNC: 12 MMOL/L (ref 8–16)
ANION GAP SERPL CALC-SCNC: 15 MMOL/L (ref 8–16)
AORTIC ROOT ANNULUS: 3.09 CM
ASCENDING AORTA: 2.87 CM
AST SERPL-CCNC: 5190 U/L (ref 10–40)
AV INDEX (PROSTH): 0.62
AV MEAN GRADIENT: 8 MMHG
AV PEAK GRADIENT: 12 MMHG
AV VALVE AREA: 1.68 CM2
AV VELOCITY RATIO: 0.61
BASOPHILS # BLD AUTO: 0.02 K/UL (ref 0–0.2)
BASOPHILS NFR BLD: 0.1 % (ref 0–1.9)
BILIRUB SERPL-MCNC: 1.5 MG/DL (ref 0.1–1)
BSA FOR ECHO PROCEDURE: 1.72 M2
BUN SERPL-MCNC: 39 MG/DL (ref 8–23)
BUN SERPL-MCNC: 43 MG/DL (ref 8–23)
BUN SERPL-MCNC: 52 MG/DL (ref 8–23)
CALCIUM SERPL-MCNC: 7.6 MG/DL (ref 8.7–10.5)
CALCIUM SERPL-MCNC: 7.8 MG/DL (ref 8.7–10.5)
CALCIUM SERPL-MCNC: 8 MG/DL (ref 8.7–10.5)
CHLORIDE SERPL-SCNC: 101 MMOL/L (ref 95–110)
CHLORIDE SERPL-SCNC: 96 MMOL/L (ref 95–110)
CHLORIDE SERPL-SCNC: 98 MMOL/L (ref 95–110)
CK SERPL-CCNC: 3261 U/L (ref 20–180)
CO2 SERPL-SCNC: 15 MMOL/L (ref 23–29)
CO2 SERPL-SCNC: 17 MMOL/L (ref 23–29)
CO2 SERPL-SCNC: 18 MMOL/L (ref 23–29)
CREAT SERPL-MCNC: 2.4 MG/DL (ref 0.5–1.4)
CREAT SERPL-MCNC: 2.7 MG/DL (ref 0.5–1.4)
CREAT SERPL-MCNC: 3.5 MG/DL (ref 0.5–1.4)
CV ECHO LV RWT: 0.44 CM
DELSYS: ABNORMAL
DIFFERENTIAL METHOD: ABNORMAL
DOP CALC AO PEAK VEL: 1.71 M/S
DOP CALC AO VTI: 34.9 CM
DOP CALC LVOT AREA: 2.7 CM2
DOP CALC LVOT DIAMETER: 1.86 CM
DOP CALC LVOT PEAK VEL: 1.05 M/S
DOP CALC LVOT STROKE VOLUME: 58.66 CM3
DOP CALC RVOT PEAK VEL: 0.74 M/S
DOP CALC RVOT VTI: 13.9 CM
DOP CALCLVOT PEAK VEL VTI: 21.6 CM
E WAVE DECELERATION TIME: 317.16 MSEC
E/A RATIO: 0.83
E/E' RATIO: 10.8 M/S
ECHO LV POSTERIOR WALL: 0.95 CM (ref 0.6–1.1)
EJECTION FRACTION: 60 %
EOSINOPHIL # BLD AUTO: 0 K/UL (ref 0–0.5)
EOSINOPHIL NFR BLD: 0 % (ref 0–8)
EP: 6
ERYTHROCYTE [DISTWIDTH] IN BLOOD BY AUTOMATED COUNT: 14.4 % (ref 11.5–14.5)
ERYTHROCYTE [SEDIMENTATION RATE] IN BLOOD BY WESTERGREN METHOD: 16 MM/H
ERYTHROCYTE [SEDIMENTATION RATE] IN BLOOD BY WESTERGREN METHOD: 22 MM/H
EST. GFR  (NO RACE VARIABLE): 13 ML/MIN/1.73 M^2
EST. GFR  (NO RACE VARIABLE): 17 ML/MIN/1.73 M^2
EST. GFR  (NO RACE VARIABLE): 20 ML/MIN/1.73 M^2
FIO2: 40
FIO2: 50
FLOW: 3
FRACTIONAL SHORTENING: 27 % (ref 28–44)
GLUCOSE SERPL-MCNC: 120 MG/DL (ref 70–110)
GLUCOSE SERPL-MCNC: 183 MG/DL (ref 70–110)
GLUCOSE SERPL-MCNC: 57 MG/DL (ref 70–110)
HCO3 UR-SCNC: 19.9 MMOL/L (ref 24–28)
HCO3 UR-SCNC: 20.9 MMOL/L (ref 24–28)
HCO3 UR-SCNC: 21 MMOL/L (ref 24–28)
HCT VFR BLD AUTO: 28.7 % (ref 37–48.5)
HGB BLD-MCNC: 9.4 G/DL (ref 12–16)
IMM GRANULOCYTES # BLD AUTO: 0.19 K/UL (ref 0–0.04)
IMM GRANULOCYTES NFR BLD AUTO: 1 % (ref 0–0.5)
INTERVENTRICULAR SEPTUM: 1.05 CM (ref 0.6–1.1)
IP: 12
IVC DIAMETER: 2.01 CM
IVRT: 72.31 MSEC
LA MAJOR: 5.07 CM
LA MINOR: 5.21 CM
LA WIDTH: 3.3 CM
LACTATE SERPL-SCNC: 1.8 MMOL/L (ref 0.5–2.2)
LACTATE SERPL-SCNC: 3.6 MMOL/L (ref 0.5–2.2)
LEFT ATRIUM SIZE: 4.16 CM
LEFT ATRIUM VOLUME INDEX: 35.1 ML/M2
LEFT ATRIUM VOLUME: 59.97 CM3
LEFT INTERNAL DIMENSION IN SYSTOLE: 3.16 CM (ref 2.1–4)
LEFT VENTRICLE DIASTOLIC VOLUME INDEX: 49.09 ML/M2
LEFT VENTRICLE DIASTOLIC VOLUME: 83.95 ML
LEFT VENTRICLE MASS INDEX: 84 G/M2
LEFT VENTRICLE SYSTOLIC VOLUME INDEX: 23.2 ML/M2
LEFT VENTRICLE SYSTOLIC VOLUME: 39.68 ML
LEFT VENTRICULAR INTERNAL DIMENSION IN DIASTOLE: 4.32 CM (ref 3.5–6)
LEFT VENTRICULAR MASS: 143.55 G
LV LATERAL E/E' RATIO: 11.57 M/S
LV SEPTAL E/E' RATIO: 10.13 M/S
LVOT MG: 3.35 MMHG
LVOT MV: 0.91 CM/S
LYMPHOCYTES # BLD AUTO: 1.1 K/UL (ref 1–4.8)
LYMPHOCYTES NFR BLD: 5.7 % (ref 18–48)
MCH RBC QN AUTO: 28.7 PG (ref 27–31)
MCHC RBC AUTO-ENTMCNC: 32.8 G/DL (ref 32–36)
MCV RBC AUTO: 88 FL (ref 82–98)
MODE: ABNORMAL
MONOCYTES # BLD AUTO: 0.5 K/UL (ref 0.3–1)
MONOCYTES NFR BLD: 2.5 % (ref 4–15)
MV PEAK A VEL: 0.98 M/S
MV PEAK E VEL: 0.81 M/S
MV STENOSIS PRESSURE HALF TIME: 91.98 MS
MV VALVE AREA P 1/2 METHOD: 2.39 CM2
NEUTROPHILS # BLD AUTO: 17.6 K/UL (ref 1.8–7.7)
NEUTROPHILS NFR BLD: 90.7 % (ref 38–73)
NRBC BLD-RTO: 0 /100 WBC
PCO2 BLDA: 34.2 MMHG (ref 35–45)
PCO2 BLDA: 44.6 MMHG (ref 35–45)
PCO2 BLDA: 52.9 MMHG (ref 35–45)
PEEP: 5
PH SMN: 7.21 [PH] (ref 7.35–7.45)
PH SMN: 7.26 [PH] (ref 7.35–7.45)
PH SMN: 7.4 [PH] (ref 7.35–7.45)
PISA MRMAX VEL: 3.28 M/S
PISA TR MAX VEL: 2.9 M/S
PLATELET # BLD AUTO: 154 K/UL (ref 150–450)
PMV BLD AUTO: 11.2 FL (ref 9.2–12.9)
PO2 BLDA: 178 MMHG (ref 80–100)
PO2 BLDA: 76 MMHG (ref 80–100)
PO2 BLDA: 99 MMHG (ref 80–100)
POC BE: -4 MMOL/L
POC BE: -7 MMOL/L
POC BE: -7 MMOL/L
POC SATURATED O2: 91 % (ref 95–100)
POC SATURATED O2: 98 % (ref 95–100)
POC SATURATED O2: 99 % (ref 95–100)
POCT GLUCOSE: 55 MG/DL (ref 70–110)
POCT GLUCOSE: 77 MG/DL (ref 70–110)
POTASSIUM SERPL-SCNC: 4.8 MMOL/L (ref 3.5–5.1)
POTASSIUM SERPL-SCNC: 5.1 MMOL/L (ref 3.5–5.1)
POTASSIUM SERPL-SCNC: 5.5 MMOL/L (ref 3.5–5.1)
PROT SERPL-MCNC: 5.5 G/DL (ref 6–8.4)
PV MEAN GRADIENT: 1.53 MMHG
RA MAJOR: 4.24 CM
RA WIDTH: 2.4 CM
RBC # BLD AUTO: 3.27 M/UL (ref 4–5.4)
SAMPLE: ABNORMAL
SITE: ABNORMAL
SODIUM SERPL-SCNC: 124 MMOL/L (ref 136–145)
SODIUM SERPL-SCNC: 128 MMOL/L (ref 136–145)
SODIUM SERPL-SCNC: 130 MMOL/L (ref 136–145)
STJ: 3.01 CM
TDI LATERAL: 0.07 M/S
TDI SEPTAL: 0.08 M/S
TDI: 0.08 M/S
TR MAX PG: 34 MMHG
TR MEAN GRADIENT: 28 MMHG
TRICUSPID ANNULAR PLANE SYSTOLIC EXCURSION: 1.8 CM
VT: 420
WBC # BLD AUTO: 19.42 K/UL (ref 3.9–12.7)

## 2023-04-03 PROCEDURE — 94640 AIRWAY INHALATION TREATMENT: CPT

## 2023-04-03 PROCEDURE — 25000003 PHARM REV CODE 250

## 2023-04-03 PROCEDURE — 27000190 HC CPAP FULL FACE MASK W/VALVE

## 2023-04-03 PROCEDURE — 99499 NO LOS: ICD-10-PCS | Mod: ,,, | Performed by: INTERNAL MEDICINE

## 2023-04-03 PROCEDURE — 82803 BLOOD GASES ANY COMBINATION: CPT

## 2023-04-03 PROCEDURE — 25000003 PHARM REV CODE 250: Performed by: INTERNAL MEDICINE

## 2023-04-03 PROCEDURE — 99292 PR CRITICAL CARE, ADDL 30 MIN: ICD-10-PCS | Mod: ,,, | Performed by: NURSE PRACTITIONER

## 2023-04-03 PROCEDURE — 93010 ELECTROCARDIOGRAM REPORT: CPT | Mod: ,,, | Performed by: INTERNAL MEDICINE

## 2023-04-03 PROCEDURE — 99900026 HC AIRWAY MAINTENANCE (STAT)

## 2023-04-03 PROCEDURE — 25000003 PHARM REV CODE 250: Performed by: NURSE PRACTITIONER

## 2023-04-03 PROCEDURE — 99291 CRITICAL CARE FIRST HOUR: CPT | Mod: ,,, | Performed by: NURSE PRACTITIONER

## 2023-04-03 PROCEDURE — 94003 VENT MGMT INPAT SUBQ DAY: CPT

## 2023-04-03 PROCEDURE — 27000221 HC OXYGEN, UP TO 24 HOURS

## 2023-04-03 PROCEDURE — 99291 PR CRITICAL CARE, E/M 30-74 MINUTES: ICD-10-PCS | Mod: ,,, | Performed by: NURSE PRACTITIONER

## 2023-04-03 PROCEDURE — 80048 BASIC METABOLIC PNL TOTAL CA: CPT | Mod: XB | Performed by: NURSE PRACTITIONER

## 2023-04-03 PROCEDURE — 99292 CRITICAL CARE ADDL 30 MIN: CPT | Mod: ,,, | Performed by: NURSE PRACTITIONER

## 2023-04-03 PROCEDURE — 80048 BASIC METABOLIC PNL TOTAL CA: CPT | Mod: 91,XB | Performed by: NURSE PRACTITIONER

## 2023-04-03 PROCEDURE — 82550 ASSAY OF CK (CPK): CPT | Performed by: NURSE PRACTITIONER

## 2023-04-03 PROCEDURE — 63600175 PHARM REV CODE 636 W HCPCS: Performed by: EMERGENCY MEDICINE

## 2023-04-03 PROCEDURE — 25000242 PHARM REV CODE 250 ALT 637 W/ HCPCS: Performed by: NURSE PRACTITIONER

## 2023-04-03 PROCEDURE — 93005 ELECTROCARDIOGRAM TRACING: CPT

## 2023-04-03 PROCEDURE — 99900035 HC TECH TIME PER 15 MIN (STAT)

## 2023-04-03 PROCEDURE — 99499 UNLISTED E&M SERVICE: CPT | Mod: ,,, | Performed by: INTERNAL MEDICINE

## 2023-04-03 PROCEDURE — 83605 ASSAY OF LACTIC ACID: CPT | Performed by: NURSE PRACTITIONER

## 2023-04-03 PROCEDURE — 94660 CPAP INITIATION&MGMT: CPT | Mod: XB

## 2023-04-03 PROCEDURE — 20000000 HC ICU ROOM

## 2023-04-03 PROCEDURE — 94761 N-INVAS EAR/PLS OXIMETRY MLT: CPT

## 2023-04-03 PROCEDURE — 93010 EKG 12-LEAD: ICD-10-PCS | Mod: ,,, | Performed by: INTERNAL MEDICINE

## 2023-04-03 PROCEDURE — 36600 WITHDRAWAL OF ARTERIAL BLOOD: CPT

## 2023-04-03 PROCEDURE — 25000242 PHARM REV CODE 250 ALT 637 W/ HCPCS: Performed by: INTERNAL MEDICINE

## 2023-04-03 PROCEDURE — 63600175 PHARM REV CODE 636 W HCPCS: Performed by: INTERNAL MEDICINE

## 2023-04-03 PROCEDURE — 80053 COMPREHEN METABOLIC PANEL: CPT | Performed by: INTERNAL MEDICINE

## 2023-04-03 PROCEDURE — 25000003 PHARM REV CODE 250: Performed by: EMERGENCY MEDICINE

## 2023-04-03 PROCEDURE — 85025 COMPLETE CBC W/AUTO DIFF WBC: CPT | Performed by: INTERNAL MEDICINE

## 2023-04-03 PROCEDURE — 63600175 PHARM REV CODE 636 W HCPCS: Performed by: NURSE PRACTITIONER

## 2023-04-03 PROCEDURE — 83605 ASSAY OF LACTIC ACID: CPT | Mod: 91 | Performed by: INTERNAL MEDICINE

## 2023-04-03 RX ORDER — DEXTROSE 40 %
30 GEL (GRAM) ORAL
Status: DISCONTINUED | OUTPATIENT
Start: 2023-04-04 | End: 2023-04-06 | Stop reason: HOSPADM

## 2023-04-03 RX ORDER — DILTIAZEM HYDROCHLORIDE 5 MG/ML
10 INJECTION INTRAVENOUS ONCE AS NEEDED
Status: COMPLETED | OUTPATIENT
Start: 2023-04-03 | End: 2023-04-03

## 2023-04-03 RX ORDER — METOPROLOL TARTRATE 1 MG/ML
2.5 INJECTION, SOLUTION INTRAVENOUS ONCE
Status: COMPLETED | OUTPATIENT
Start: 2023-04-03 | End: 2023-04-03

## 2023-04-03 RX ORDER — MAGNESIUM SULFATE HEPTAHYDRATE 40 MG/ML
2 INJECTION, SOLUTION INTRAVENOUS ONCE
Status: COMPLETED | OUTPATIENT
Start: 2023-04-03 | End: 2023-04-03

## 2023-04-03 RX ORDER — METOPROLOL TARTRATE 1 MG/ML
INJECTION, SOLUTION INTRAVENOUS
Status: DISPENSED
Start: 2023-04-03 | End: 2023-04-04

## 2023-04-03 RX ORDER — AMOXICILLIN 250 MG
2 CAPSULE ORAL 2 TIMES DAILY
Status: DISCONTINUED | OUTPATIENT
Start: 2023-04-03 | End: 2023-04-04

## 2023-04-03 RX ORDER — FAMOTIDINE 40 MG/5ML
20 POWDER, FOR SUSPENSION ORAL DAILY
Status: DISCONTINUED | OUTPATIENT
Start: 2023-04-04 | End: 2023-04-04

## 2023-04-03 RX ORDER — THIAMINE HCL 100 MG
100 TABLET ORAL DAILY
Status: DISCONTINUED | OUTPATIENT
Start: 2023-04-04 | End: 2023-04-04

## 2023-04-03 RX ORDER — MORPHINE SULFATE 2 MG/ML
1 INJECTION, SOLUTION INTRAMUSCULAR; INTRAVENOUS
Status: DISCONTINUED | OUTPATIENT
Start: 2023-04-03 | End: 2023-04-06 | Stop reason: HOSPADM

## 2023-04-03 RX ORDER — MUPIROCIN 20 MG/G
OINTMENT TOPICAL 2 TIMES DAILY
Status: DISCONTINUED | OUTPATIENT
Start: 2023-04-03 | End: 2023-04-06 | Stop reason: HOSPADM

## 2023-04-03 RX ORDER — PHENYLEPHRINE HCL IN 0.9% NACL 20MG/250ML
0-5 PLASTIC BAG, INJECTION (ML) INTRAVENOUS CONTINUOUS
Status: DISCONTINUED | OUTPATIENT
Start: 2023-04-03 | End: 2023-04-05

## 2023-04-03 RX ORDER — METOPROLOL TARTRATE 1 MG/ML
INJECTION, SOLUTION INTRAVENOUS
Status: COMPLETED
Start: 2023-04-03 | End: 2023-04-03

## 2023-04-03 RX ORDER — DEXTROSE 40 %
15 GEL (GRAM) ORAL
Status: DISCONTINUED | OUTPATIENT
Start: 2023-04-04 | End: 2023-04-06 | Stop reason: HOSPADM

## 2023-04-03 RX ORDER — MAGNESIUM SULFATE HEPTAHYDRATE 40 MG/ML
INJECTION, SOLUTION INTRAVENOUS
Status: DISPENSED
Start: 2023-04-03 | End: 2023-04-04

## 2023-04-03 RX ORDER — BUPROPION HYDROCHLORIDE 100 MG/1
100 TABLET ORAL DAILY
Status: DISCONTINUED | OUTPATIENT
Start: 2023-04-04 | End: 2023-04-04

## 2023-04-03 RX ORDER — GLUCAGON 1 MG
1 KIT INJECTION
Status: DISCONTINUED | OUTPATIENT
Start: 2023-04-04 | End: 2023-04-06 | Stop reason: HOSPADM

## 2023-04-03 RX ADMIN — SODIUM CHLORIDE: 9 INJECTION, SOLUTION INTRAVENOUS at 12:04

## 2023-04-03 RX ADMIN — RACEPINEPHRINE HYDROCHLORIDE 0.5 ML: 11.25 SOLUTION RESPIRATORY (INHALATION) at 08:04

## 2023-04-03 RX ADMIN — MAGNESIUM SULFATE HEPTAHYDRATE 2 G: 40 INJECTION, SOLUTION INTRAVENOUS at 09:04

## 2023-04-03 RX ADMIN — FAMOTIDINE 20 MG: 20 TABLET, FILM COATED ORAL at 08:04

## 2023-04-03 RX ADMIN — METOROPROLOL TARTRATE 2.5 MG: 5 INJECTION, SOLUTION INTRAVENOUS at 09:04

## 2023-04-03 RX ADMIN — Medication 0.5 MCG/KG/MIN: at 10:04

## 2023-04-03 RX ADMIN — MORPHINE SULFATE 1 MG: 2 INJECTION, SOLUTION INTRAMUSCULAR; INTRAVENOUS at 08:04

## 2023-04-03 RX ADMIN — THIAMINE HCL TAB 100 MG 100 MG: 100 TAB at 08:04

## 2023-04-03 RX ADMIN — MUPIROCIN: 20 OINTMENT TOPICAL at 09:04

## 2023-04-03 RX ADMIN — BUPROPION HYDROCHLORIDE 100 MG: 100 TABLET, FILM COATED, EXTENDED RELEASE ORAL at 08:04

## 2023-04-03 RX ADMIN — EPINEPHRINE 0.05 MCG/KG/MIN: 1 INJECTION INTRAMUSCULAR; INTRAVENOUS; SUBCUTANEOUS at 12:04

## 2023-04-03 RX ADMIN — ALBUTEROL SULFATE 2.5 MG: 2.5 SOLUTION RESPIRATORY (INHALATION) at 04:04

## 2023-04-03 RX ADMIN — PROPOFOL 40 MCG/KG/MIN: 10 INJECTION, EMULSION INTRAVENOUS at 07:04

## 2023-04-03 RX ADMIN — PIPERACILLIN SODIUM AND TAZOBACTAM SODIUM 4.5 G: 4; .5 INJECTION, POWDER, FOR SOLUTION INTRAVENOUS at 05:04

## 2023-04-03 RX ADMIN — SODIUM CHLORIDE: 9 INJECTION, SOLUTION INTRAVENOUS at 11:04

## 2023-04-03 RX ADMIN — PROPOFOL 40 MCG/KG/MIN: 10 INJECTION, EMULSION INTRAVENOUS at 12:04

## 2023-04-03 RX ADMIN — ENOXAPARIN SODIUM 30 MG: 30 INJECTION SUBCUTANEOUS at 05:04

## 2023-04-03 RX ADMIN — DILTIAZEM HYDROCHLORIDE 10 MG: 5 INJECTION, SOLUTION INTRAVENOUS at 10:04

## 2023-04-03 RX ADMIN — SENNOSIDES AND DOCUSATE SODIUM 2 TABLET: 50; 8.6 TABLET ORAL at 10:04

## 2023-04-03 RX ADMIN — DEXTROSE MONOHYDRATE 125 ML: 100 INJECTION, SOLUTION INTRAVENOUS at 11:04

## 2023-04-03 RX ADMIN — METOPROLOL TARTRATE 2.5 MG: 1 INJECTION, SOLUTION INTRAVENOUS at 09:04

## 2023-04-03 RX ADMIN — SODIUM CHLORIDE 100 ML/HR: 9 INJECTION, SOLUTION INTRAVENOUS at 10:04

## 2023-04-03 RX ADMIN — CHLORHEXIDINE GLUCONATE 0.12% ORAL RINSE 15 ML: 1.2 LIQUID ORAL at 08:04

## 2023-04-03 NOTE — PROGRESS NOTES
[facility] - Intensive Care ([facility])  Critical Care Medicine  Progress Note    Patient Name: Rachel Long  MRN: [id number]  Admission Date: 4/2/2023  Hospital Length of Stay: 1 days  Code Status: DNR  Attending Provider: Ivan Dixon MD  Primary Care Provider: Sharmaine English MD   Principal Problem: Septic shock    Subjective:     HPI:  Ms. Long is an 82-year-old nursing home resident with CAD, HTN, COPD, bipolar disorder, history of PE (not on anticoagulation seemingly due to frequent falls), history of lung cancer and Parkinson's disease who presented to the ER today via EMS for altered mental status.  History is taken from the medical record, as patient is unable to provide history and no family at bedside or reachable by telephone at this time.  Mr. Long was in the ER yesterday after a fall where she broke her left clavicle.  She was otherwise well and discharged home.  EMS was called today due to altered mental status.  Per EMS, she was having erratic movements and had a near syncopal episode in route.  Blood glucose at the time of 65 she was given D10.  Also some reports of hypoxia and bradycardia while EN route.  He had some further bradycardia in the low 30s and hypoxia into the 60s, and was intubated in the ER.  She was given volume resuscitation along with Zosyn from looks like a UTI.  Also given medical correction for hyperkalemia and started on vasopressors.    At the time my exam, she is intubated and sedated on propofol.  Ventilator weaned down to 40% FiO2 and 5 of PEEP.  She is on 0.25 mcg/kg/min of levo and 0.25 mcg/kg/min Epi.  Heart rate initially in the high 30s, but improved to the mid 40s during the course of my exam.    Imaging in the ER notable for 5.7 cm left upper lobe lung mass as well as some other spiculated lesions on the right concerning for metastatic disease.      Hospital/ICU Course:  4/2:  Intubated.  Pressors. Zosyn.  DANN.  4/3:  Remains on vent.  Epi down to 0.05.  Continue  Zosyn.  Cr up to 2.7.      Interval History:   Unable to obtain interval history or ROS from patient due to intubation and sedation.        Objective:     Vital Signs (Most Recent):  Temp: 97.8 °F (36.6 °C) (04/03/23 0705)  Pulse: 61 (04/03/23 1007)  Resp: (!) 22 (04/03/23 1007)  BP: (!) 99/55 (04/03/23 1007)  SpO2: 100 % (04/03/23 1007)   Vital Signs (24h Range):  Temp:  [97.8 °F (36.6 °C)-99.3 °F (37.4 °C)] 97.8 °F (36.6 °C)  Pulse:  [34-70] 61  Resp:  [17-33] 22  SpO2:  [72 %-100 %] 100 %  BP: ()/(43-72) 99/55     Weight: 65.8 kg (145 lb 1 oz)  Body mass index is 24.9 kg/m².      Intake/Output Summary (Last 24 hours) at 4/3/2023 1018  Last data filed at 4/3/2023 1000  Gross per 24 hour   Intake 5412.9 ml   Output 207 ml   Net 5205.9 ml       Physical Exam  Vitals reviewed.   Constitutional:       Appearance: She is ill-appearing and toxic-appearing.      Interventions: She is sedated and intubated.   HENT:      Head: Normocephalic and atraumatic.      Nose: Nose normal.   Eyes:      Pupils: Pupils are equal, round, and reactive to light.   Cardiovascular:      Rate and Rhythm: Normal rate and regular rhythm.   Pulmonary:      Effort: Pulmonary effort is normal. No respiratory distress. She is intubated.      Comments: Symmetric chest rise.  No distress or dyssynchrony on the ventilator.  Abdominal:      General: Abdomen is flat. There is no distension.      Palpations: Abdomen is soft.   Musculoskeletal:         General: No deformity or signs of injury.      Right lower leg: No edema.      Left lower leg: No edema.   Skin:     General: Skin is warm and dry.      Comments: Scattered bruising on the L side, most notably over the hip and left upper chest (known left clavicular fracture)      Neurological:      Comments: Sedated.    Does not open eyes to stimulation or voice.    Does not follow commands.         Vents:  Vent Mode: A/C (04/03/23 0900)  Set Rate: 22 BPM (04/03/23 0900)  Vt Set: 420 mL (04/03/23  0900)  Pressure Support: 0 cmH20 (04/03/23 0900)  PEEP/CPAP: 5 cmH20 (04/03/23 0900)  Oxygen Concentration (%): 40 (04/03/23 1007)  Peak Airway Pressure: 23 cmH20 (04/03/23 0900)  Plateau Pressure: 16 cmH20 (04/03/23 0900)  Total Ve: 9.56 L/m (04/03/23 0900)  F/VT Ratio<105 (RSBI): (!) 50.57 (04/03/23 0900)    Lines/Drains/Airways       Central Venous Catheter Line  Duration             Percutaneous Central Line Insertion/Assessment - Triple Lumen  04/02/23 Internal Jugular Left 1 day              Drain  Duration                  NG/OG Tube 04/02/23  Center mouth 1 day         Urethral Catheter 04/02/23 1500 Double-lumen 16 Fr. <1 day              Airway  Duration                  Airway - Non-Surgical 04/02/23 1551 Endotracheal Tube <1 day              Peripheral Intravenous Line  Duration                  Peripheral IV - Single Lumen 04/02/23 0120 20 G Left Antecubital 1 day         Peripheral IV - Single Lumen 04/02/23 1200 20 G Right Antecubital <1 day                    Significant Labs:    CBC/Anemia Profile:  Recent Labs   Lab 04/01/23  1311 04/02/23  1201 04/03/23  0442   WBC 9.91 13.01* 19.42*   HGB 10.7* 10.9* 9.4*   HCT 32.7* 35.0* 28.7*    180 154   MCV 88 91 88   RDW 14.6* 14.6* 14.4        Chemistries:  Recent Labs   Lab 04/01/23  1311 04/02/23  1201 04/02/23  1516 04/03/23  0033 04/03/23  0442   * 129* 133* 128* 124*   K 5.5* 7.6* 5.9* 5.5* 4.8   CL 98 96 99 98 96   CO2 22* 15* 17* 15* 18*   BUN 19 32* 33* 39* 43*   CREATININE 0.9 2.3* 2.2* 2.4* 2.7*   CALCIUM 9.6 9.2 8.6* 7.8* 8.0*   ALBUMIN 3.5 3.6  --   --  2.8*   PROT 7.1 7.0  --   --  5.5*   BILITOT 0.5 1.3*  --   --  1.5*   ALKPHOS 85 93  --   --  81   ALT 11 270*  --   --  3,954*   AST 13 290*  --   --  5,190*       A1C: No results for input(s): HGBA1C in the last 48 hours.  Blood Culture:   Recent Labs   Lab 04/02/23  1201 04/02/23  1308   LABBLOO No Growth to date No Growth to date     Coagulation:   Recent Labs   Lab  04/02/23  1201   INR 1.4*   APTT 35.2*     Lactic Acid:   Recent Labs   Lab 04/02/23  2049 04/03/23  0033 04/03/23  0442   LACTATE 7.2* 3.6* 1.8     Pathology Results  (Last 10 years)                 10/22/21 1008  Specimen to Pathology, Dermatology Final result    Narrative:  Number of Specimens:->1   ------------------------->-------------------------   Spec 1 Procedure:->Biopsy   Spec 1 Clinical Impression:->skin tag vs NMSC   Spec 1 Source:->left upper eyelid (margin)   Release to patient->Immediate       10/11/18 1400  Tissue Specimen to Pathology Final result    10/11/18 1400  Tissue Specimen to Pathology Final result          POCT Glucose:   Recent Labs   Lab 04/02/23  1353 04/02/23  1504 04/02/23  1632   POCTGLUCOSE 99 132* 130*     Respiratory Culture: No results for input(s): GSRESP, RESPIRATORYC in the last 48 hours.  Troponin:   Recent Labs   Lab 04/02/23  1201 04/02/23  1516   TROPONINI 0.195* 0.340*     Urine Culture: No results for input(s): LABURIN in the last 48 hours.  Urine Studies:   Recent Labs   Lab 04/02/23  1512   COLORU Yellow   APPEARANCEUA Cloudy*   PHUR 6.0   SPECGRAV 1.015   PROTEINUA 1+*   GLUCUA Negative   KETONESU Negative   BILIRUBINUA Negative   OCCULTUA 2+*   NITRITE Negative   UROBILINOGEN Negative   LEUKOCYTESUR 3+*   RBCUA 24*   WBCUA >100*   BACTERIA Many*   HYALINECASTS 0       Significant Imaging:  I have reviewed all pertinent imaging results/findings within the past 24 hours.      ABG  Recent Labs   Lab 04/03/23  0435   PH 7.397   PO2 99   PCO2 34.2*   HCO3 21.0*   BE -4     Assessment/Plan:     Psychiatric  Schizoaffective disorder  - holding most of her home meds due to renal dysfxn  - will restart, as able    Pulmonary  COPD, very severe  - not in exacerbation  - vent support  - continue bronchodilators    Acute hypoxemic respiratory failure  Patient with Hypoxic Respiratory failure which is Acute.. Supplemental oxygen was provided and noted- Vent Mode: A/C  Oxygen  Concentration (%):  [40-75] 40  Resp Rate Total:  [22 br/min] 22 br/min  Vt Set:  [420 mL] 420 mL  PEEP/CPAP:  [5 cmH20-8 cmH20] 5 cmH20  Pressure Support:  [0 cmH20] 0 cmH20  Mean Airway Pressure:  [11 kcH92-59 cmH20] 11 cmH20    .   Signs/symptoms of respiratory failure include- tachypnea and respiratory distress. Contributing diagnoses includes - sepsis Labs and images were reviewed. Patient Has recent ABG, which has been reviewed. Will treat underlying causes and adjust management of respiratory failure as follows- vent support.    - intubated 4/2 in the ER  - FiO2 40% and 5 of PEEP  - CXR fairly unremarkable except for evidence of malignancy  - vent bundle in place  - SAT/SBT as condition allows    Cardiac/Vascular  HLD (hyperlipidemia)  - Hold statin 2/2 shock liver    Hypertension  - holding meds due to shock  - can restart, as necessary    Renal/  Hyperkalemia  - Improved    Lactic acidosis  -Improved    Acute renal failure  Due to septic shock    - Cr 2.2 on admission 4/2/23; Had been 0.9 on  4/1/23  - Cr now up to 2.7  - will trend lytes and monitor with resuscitation  - renally dose meds and avoid nephrotoxins      ID  * Septic shock  - source most likely urine  - continue Zosyn  - blood and urine Cx pending  - currently on Epi for vasopressor support  - goal MAP > 65  - CVL placed 4/2 in the ER  - Lactic acid now normalized    Oncology  Malignant neoplasm of upper lobe of left lung  Noted on CT imaging.  Further discussion and follow up when acute issues resolved.    GI  Shock liver  Due to septic shock.  Trend LFTs      Prophylaxis Measures:  GI ppx: Famotidine  VTE ppx: Enoxaparin  Glucose control: Monitor blood glucose    Code Status: DNR      Critical Care Time: 45 minutes  Critical secondary to take shock complicated by acute respiratory failure, acute kidney injury, acute liver injury/shock liver.  Patient is on vasopressors and mechanical ventilation.  She is also receiving continuous  sedation/analgesia with propofol and fentanyl drips.       Critical care was time spent personally by me on the following activities: development of treatment plan with patient or surrogate and bedside caregivers, discussions with consultants, evaluation of patient's response to treatment, examination of patient, ordering and performing treatments and interventions, ordering and review of laboratory studies, ordering and review of radiographic studies, pulse oximetry, re-evaluation of patient's condition. This critical care time did not overlap with that of any other provider or involve time for any procedures.     Reg Patton MD  Critical Care Medicine  [facility] - Intensive Care [facility])

## 2023-04-03 NOTE — ASSESSMENT & PLAN NOTE
- source most likely urine  - continue Zosyn  - blood and urine Cx pending  - currently on Epi for vasopressor support  - goal MAP > 65  - CVL placed 4/2 in the ER  - Lactic acid now normalized

## 2023-04-03 NOTE — PROGRESS NOTES
Ortho saw pt for visual inspection of left clavicle.    Chart reviewed.  Severity of patient's condition noted.    No skin at risk at left clavicle.    Please call if patient is extubated and we will do a full consult.

## 2023-04-03 NOTE — ASSESSMENT & PLAN NOTE
Due to septic shock    - Cr 2.2 on admission 4/2/23; Had been 0.9 on  4/1/23  - Cr now up to 2.7  - will trend lytes and monitor with resuscitation  - renally dose meds and avoid nephrotoxins

## 2023-04-03 NOTE — PROGRESS NOTES
I clarified code status with patient's son Jb Long by telephone.    Patient to be partial code.  Specifically ,she is okay to intubate.  Life support meds such as pressors okay.    However, no chest compressions, defibrillation, or external pacing.    Partial code order entered.

## 2023-04-03 NOTE — NURSING
Sedation off for 30min for SAT. Echo tech at bedside. Pt immediately started thrashing in bed throwing head side to side, pt asynchronous on vent and biting ET tube. MD to bedside. Sedation restarted at previous rate. Will attempt SAT again at later time.

## 2023-04-03 NOTE — CONSULTS
[facility] ([facility])  Adult Nutrition  Consult Note    SUMMARY     Recommendations  1. Recommend pt be initiated onto EN when medically appropriate.   - Peptamen Intense VHP. Initiate at 10ml/hr, advance as pt tolerates. Aim for goal rate of 45ml/hr.   - Formula provides w/ propofol @ 15.4ml/hr: 1486.56kcal (107% EEN), 99.36g Protein, 907.2ml H2O.   - 80ml fwf q4hrs (480ml) or per MD/NP.   - Check Mg, Na, K+, Phos, and Glu before and during initiation, correct as indicated.   2. Recommend pt receives Rick BID via tube feed to assist with wound healing when medically appropriate.   3. Weigh daily.    Goals:   1.Pt will be initiated onto EN within 24-48hrs.   2. Pt will tolerate >50% of energy needs by RD follow up.  Nutrition Goal Status: new  Communication of RD Recs: other (comment) (POC: Sticky Note)    Assessment and Plan    Nutrition Problem  Inadequate PO intake    Related to (etiology):   Decreased ability to consume sufficient nutrients.     Signs and Symptoms (as evidenced by):   NPO vent    Interventions(treatment strategy):  1. Recommend pt be initiated onto EN when medically appropriate.   - Peptamen Intense VHP. Initiate at 10ml/hr, advance as pt tolerates. Aim for goal rate of 45ml/hr.   - Formula provides w/ propofol @ 15.4ml/hr: 1486.56kcal (107% EEN), 99.36g Protein, 907.2ml H2O.   - 80ml fwf q4hrs (480ml) or per MD/NP.   - Check Mg, Na, K+, Phos, and Glu before and during initiation, correct as indicated.   2. Recommend pt receives Rick BID via tube feed to assist with wound healing when medically appropriate.   3. Weigh daily.  4. Collaboration of care with medical providers.     Nutrition Diagnosis Status:   New       Malnutrition Assessment     Skin (Micronutrient): dry, wounds unhealed, bruised                   Edema (Fluid Accumulation): 1-->trace             Reason for Assessment    Reason For Assessment: consult, new tube feeding  Diagnosis: infection/sepsis  Relevant  "Medical History: Septic shock, HTN, COPD, HLD, Acute Hypoxemic respiratory, Lactic acidosis, TIA, CAD, Schizoaffective disorder  Interdisciplinary Rounds: did not attend  General Information Comments:  4/3: 82 y.o female admitted w/ current principal problem of septic shock. Pt admitted to ICU x15hrs ago intubated; Total Ve: 9.56 (9:00am, 4/3). and sedated; propofol: 15.4ml/hr (9:00am, 4/3). NFPE not performed per pt being intubated. Last charted wt for pt is 145lbs, BMI 24.90 (underweight for age). LBM is JULIUS. Pt is charted to have multiple skin tears/abrasions to LUE and RUE, abrasion to R side of head. bruising to L chest/shoulder and L upper leg/hip and L big toe. Michael score 11 (High risk). Dietitian will continue to monitor the pt vent status, TF tolerance, weight status, fluid shift/ edema, wounds, and labs.  Nutrition Discharge Planning: Cardiac    Wt Readings from Last 10 Encounters:   04/03/23 65.8 kg (145 lb 1 oz)   04/01/23 58.4 kg (128 lb 12 oz)   08/11/22 64 kg (141 lb 1.5 oz)   05/12/22 68 kg (150 lb)   02/08/22 71.5 kg (157 lb 10.1 oz)   12/06/21 73.2 kg (161 lb 6 oz)   10/19/21 75.3 kg (166 lb)   10/07/21 75.4 kg (166 lb 3.6 oz)   08/05/21 78.9 kg (173 lb 15.1 oz)   04/01/21 79.9 kg (176 lb 2.4 oz)   ]  Nutrition Risk Screen    Nutrition Risk Screen: large or nonhealing wound, burn or pressure injury, tube feeding or parenteral nutrition    Nutrition/Diet History    Spiritual, Cultural Beliefs, Yarsanism Practices, Values that Affect Care: no  Food Allergies: NKFA  Factors Affecting Nutritional Intake: impaired cognitive status/motor control, NPO, on mechanical ventilation    Anthropometrics    Temp: 97.8 °F (36.6 °C)  Height Method: Estimated  Height: 5' 4" (162.6 cm)  Height (inches): 64 in  Weight Method: Bed Scale  Weight: 65.8 kg (145 lb 1 oz)  Weight (lb): 145.06 lb  Ideal Body Weight (IBW), Female: 120 lb  % Ideal Body Weight, Female (lb): 120.88 %  BMI (Calculated): 24.9  BMI Grade: " 18.5-24.9 - normal       Lab/Procedures/Meds  BMP  Lab Results   Component Value Date     (L) 2023    K 4.8 2023    CL 96 2023    CO2 18 (L) 2023    BUN 43 (H) 2023    CREATININE 2.7 (H) 2023    CALCIUM 8.0 (L) 2023    ANIONGAP 10 2023    EGFRNORACEVR 17 (A) 2023       Pertinent Labs Reviewed: reviewed  Pertinent Medications Reviewed: reviewed  Scheduled Meds:   buPROPion  100 mg Oral QAM    chlorhexidine  15 mL Mouth/Throat BID    enoxaparin  30 mg Subcutaneous Daily    [START ON 2023] famotidine  20 mg Per NG tube Daily    mupirocin   Nasal BID    piperacillin-tazobactam (ZOSYN) IVPB  4.5 g Intravenous Q12H    senna-docusate 8.6-50 mg  2 tablet Per OG tube BID    [START ON 2023] thiamine  100 mg Per OG tube Daily     Continuous Infusions:   sodium chloride 0.9% 100 mL/hr at 23 1000    EPINEPHrine 0.05 mcg/kg/min (23 1000)    fentanyl 50 mcg/hr (23 1000)    propofoL 40 mcg/kg/min (23 1000)     PRN Meds:.albuterol sulfate, calcium gluconate IVPB, calcium gluconate IVPB, calcium gluconate IVPB, [] fentaNYL **FOLLOWED BY** fentaNYL, magnesium sulfate IVPB, magnesium sulfate IVPB, potassium chloride in water **AND** potassium chloride in water **AND** potassium chloride in water, sodium chloride 0.9%, sodium phosphate IVPB, sodium phosphate IVPB, sodium phosphate IVPB    Physical Findings/Assessment         Estimated/Assessed Needs    Weight Used For Calorie Calculations: 65.8 kg (145 lb 1 oz)  Energy Calorie Requirements (kcal): 1387  Energy Need Method: [facility]  Protein Requirements:  (1.5-1.7g/kg per acute renal failure, critical care)  Weight Used For Protein Calculations: 65.8 kg (145 lb 1 oz)  Fluid Requirements (mL): 500 + total output     RDA Method (mL): 1387  CHO Requirement: 173      Nutrition Prescription Ordered    Current Diet Order: NPO    Evaluation of Received Nutrient/Fluid Intake    % Kcal  Needs: 0  % Protein Needs: 0  Energy Calories Required: not meeting needs  Protein Required: not meeting needs  Fluid Required: exceeds needs  Tolerance: not tolerating  % Intake of Estimated Energy Needs: 0 - 25 %  % Meal Intake: NPO    Nutrition Risk    Level of Risk/Frequency of Follow-up: high (x2 weekly)       Monitor and Evaluation    Food and Nutrient Intake: energy intake, enteral nutrition intake  Food and Nutrient Adminstration: enteral and parenteral nutrition administration  Knowledge/Beliefs/Attitudes: food and nutrition knowledge/skill, beliefs and attitudes  Physical Activity and Function: nutrition-related ADLs and IADLs  Anthropometric Measurements: weight, weight change, body mass index  Biochemical Data, Medical Tests and Procedures: electrolyte and renal panel, gastrointestinal profile, glucose/endocrine profile, inflammatory profile, lipid profile  Nutrition-Focused Physical Findings: overall appearance, skin       Nutrition Follow-Up    RD Follow-up?: Yes  Joseph Sexton, Registration Eligible, Provisional LDN

## 2023-04-03 NOTE — PLAN OF CARE
----- Message from Wes Mandujano, [address] [place] sent at 6/14/2021  9:02 AM EDT -----  Regarding: care gap request  06/14/21 9:02 AM    Hello, our patient attached above has had Pap Smear (HPV) aka Cervical Cancer Screening completed/performed  Please assist in updating the patient chart by pulling the document from the Media Tab  The date of service is 6/11/20       Thank you,  Wes Mandujano MA  [facility] - [facility] (Hospital)  Initial Discharge Assessment       Primary Care Provider: Sharmaine English MD    Admission Diagnosis: Acute hyperkalemia [E87.5]  Altered mental status [R41.82]  Junctional bradycardia [R00.1]  Acute respiratory failure with hypoxia [J96.01]  Elevated troponin I level [R77.8]  DANN (acute kidney injury) [N17.9]  Acute UTI (urinary tract infection) [N39.0]  Septic shock [A41.9, R65.21]  Mass of left lung [R91.8]    Admission Date: 4/2/2023  Expected Discharge Date:     Discharge Barriers Identified: None    Payor: MEDICARE / Plan: MEDICARE PART A & B / Product Type: Government /     Extended Emergency Contact Information  Primary Emergency Contact: Anam Long   [facility]  Mobile Phone: [phone]  Relation: Son  Secondary Emergency Contact: Macie Long  Mobile Phone: [phone]  Relation: Relative    Discharge Plan A: Return to nursing home         [facility] [place] - [id number] [address] [facility] [address]  [address] [id number]  [facility] [address]  Phone: [phone] Fax: [phone]      Initial Assessment (most recent)       Adult Discharge Assessment - 04/03/23 1110          Discharge Assessment    Assessment Type Discharge Planning Assessment     Confirmed/corrected address, phone number and insurance Yes     Confirmed Demographics Correct on Facesheet     Source of Information family     Communicated KAYKAY with patient/caregiver Date not available/Unable to determine     Reason For Admission Septic shock     People in Home facility resident     [facility]     Do you expect to return to your current living situation? Yes     Do you have help at home or someone to help you manage your care at home? Yes     Who are your caregiver(s) and their phone number(s)? facility staff     Prior to hospitilization cognitive status: Alert/Oriented     Current cognitive status: Coma/Sedated/Intubated     Walking or Climbing Stairs ambulation  difficulty, requires equipment     Mobility Management rolling walker     Dressing/Bathing bathing difficulty, assistance 1 person     Dressing/Bathing Management assist x 1     Home Accessibility wheelchair accessible     Home Layout Able to live on 1st floor     Equipment Currently Used at Home wheelchair;walker, rolling     Readmission within 30 days? No     Patient currently being followed by outpatient case management? No     Do you currently have service(s) that help you manage your care at home? No     Do you take prescription medications? Yes     Do you have prescription coverage? Yes     Coverage MCR     Do you have any problems affording any of your prescribed medications? No     Is the patient taking medications as prescribed? yes     Who is going to help you get home at discharge? family/facility staff     How do you get to doctors appointments? family or friend will provide;health plan transportation     Are you on dialysis? No     Do you take coumadin? No     Discharge Plan A Return to nursing home     DME Needed Upon Discharge  none     Discharge Plan discussed with: Adult children     Discharge Barriers Identified None        Physical Activity    On average, how many days per week do you engage in moderate to strenuous exercise (like a brisk walk)? 0 days     On average, how many minutes do you engage in exercise at this level? 0 min        Financial Resource Strain    How hard is it for you to pay for the very basics like food, housing, medical care, and heating? Not hard at all        Housing Stability    In the last 12 months, was there a time when you were not able to pay the mortgage or rent on time? No     In the last 12 months, how many places have you lived? 1     In the last 12 months, was there a time when you did not have a steady place to sleep or slept in a shelter (including now)? No        Transportation Needs    In the past 12 months, has lack of transportation kept you from medical  appointments or from getting medications? No     In the past 12 months, has lack of transportation kept you from meetings, work, or from getting things needed for daily living? No        Food Insecurity    Within the past 12 months, you worried that your food would run out before you got the money to buy more. Never true     Within the past 12 months, the food you bought just didn't last and you didn't have money to get more. Never true        Stress    Do you feel stress - tense, restless, nervous, or anxious, or unable to sleep at night because your mind is troubled all the time - these days? Not at all                   Anticipated DC dispo: return to [facility]   Prior Level of Function: dependent with ADLs   PCP: Sharmaine English MD    Comments:  CM met with patient's family at bedside to introduce role and discuss discharge planning. Patient is a resident at [facility]. Facility staff and family will be help at home and can provide transport at time of discharge. CM discharge needs depends on hospital progress. CM will continue following to assist with other needs.

## 2023-04-03 NOTE — CONSULTS
[facility] ([facility])  Wound Care    Patient Name:  Rachel Long   MRN:  [id number]  Date: 4/3/2023  Diagnosis: Septic shock    History:     Past Medical History:   Diagnosis Date    Acute upper respiratory infection     Anemia     Arthritis     osteo    Bipolar mood disorder     Cataract     Coordination abnormal     COPD exacerbation 5/9/2016    Coronary artery disease     Difficulty walking     Dysphasia     GERD (gastroesophageal reflux disease)     Hyperlipidemia     Hypertension     on meds    Hypothyroidism, adult     Insomnia     Malignant neoplasm of colon     Muscle weakness     Neuralgia and neuritis     Parkinson disease     Pulmonary embolism     Schizoaffective disorder     SOB (shortness of breath)     Spinal stenosis     Stroke     pt states mini strokes    Thyroid disease     Urinary tract infection        Social History     Socioeconomic History    Marital status: Single   Tobacco Use    Smoking status: Former     Years: 50.00     Types: Cigarettes    Smokeless tobacco: Former   Substance and Sexual Activity    Alcohol use: No    Drug use: No    Sexual activity: Never     Social Determinants of Health     Financial Resource Strain: Low Risk     Difficulty of Paying Living Expenses: Not hard at all   Food Insecurity: No Food Insecurity    Worried About Running Out of Food in the Last Year: Never true    Ran Out of Food in the Last Year: Never true   Transportation Needs: No Transportation Needs    Lack of Transportation (Medical): No    Lack of Transportation (Non-Medical): No   Physical Activity: Inactive    Days of Exercise per Week: 0 days    Minutes of Exercise per Session: 0 min   Stress: No Stress Concern Present    Feeling of Stress : Not at all   Housing Stability: Low Risk     Unable to Pay for Housing in the Last Year: No    Number of Places Lived in the Last Year: 1    Unstable Housing in the Last Year: No       Precautions:     Allergies as of 04/02/2023 - Reviewed 04/02/2023    Allergen Reaction Noted    Benadryl [diphenhydramine hcl] Anxiety 10/09/2013    Sulfa (sulfonamide antibiotics) Rash 10/09/2013       WO Assessment Details/Treatment     Consulted on Ms. Long due to present [place] [facility] with septic shock from [facility]. And has had several recent falls at NH, most recently fracturing Left clavicle.   Patient is in ICU, intubated, sedated, on IV epi drip.   Ecchymosis to Left side of body is noted. Large jagged full thickness skin tear is noted to left lateral forearm with partially intact skin flap, surrounded by multiple smaller full thickness and partial thickness skin tears. Cleansed all with saline and patted dry. Re-approximated skin flaps as able. Painted dima wound skin with cavilon, then foam dressings applied to cover. Recommend change foam dressings weekly and prn excess drainage.  Turned for sacral assessment. Sacrum intact with no redness, preventative foam dressing in place.  Heels intact, offloaded in boots.   Recommend pressure injury prevention interventions to include - turn q2 hours with foam wedge, float heels, pad medical devices and rotate frequently.      04/03/23 1125   WOCN Assessment   WOCN Total Time (mins) 30   Visit Date 04/03/23   Visit Time 1125   Consult Type New   WOCN Speciality Wound   Wound skin tear   Intervention assessed;applied;chart review;coordination of care;team conference;orders        Altered Skin Integrity 04/03/23 Left Arm Skin Tear   Date First Assessed: 04/03/23   Altered Skin Integrity Present on Admission - Did Patient arrive to the hospital with altered skin?: yes  Side: Left  Location: Arm  Primary Wound Type: Skin Tear   Dressing Appearance Intact;Moist drainage   Drainage Amount Small   Drainage Characteristics/Odor Serosanguineous   Appearance Red;Pink;Yellow;Moist;Adipose   Tissue loss description Full thickness   Periwound Area Ecchymotic   Wound Edges Jagged;Irregular   Wound Length (cm) 12 cm    Wound Width (cm) 5 cm   Wound Depth (cm) 0.2 cm   Wound Volume (cm^3) 12 cm^3   Wound Surface Area (cm^2) 60 cm^2   Care Cleansed with:;Sterile normal saline;Applied:;Skin Barrier   Dressing Foam;Applied   Dressing Change Due 04/10/23        Airway - Non-Surgical 04/02/23 1551 Endotracheal Tube   Placement Date/Time: 04/02/23 1551   Method of Intubation: Glidescope;Rapid Sequence Induction  Inserted by: (morenita) MD  Airway Device: Endotracheal Tube  Airway Device Size: 7.5  Style: Cuffed  Cuff Inflated With: Air  Placement Verified By: Colorimetric...   Secured at 22 cm   Measured At Lips   Secured Location Left   Secured by Commercial tube donato   Bite Block none   Site Condition Dry   Status Intact;Secured;Patent   Site Assessment Clean;Dry;No bleeding;No drainage         04/03/2023

## 2023-04-03 NOTE — PLAN OF CARE
Nutrition Recs: 4/3  1. Recommend pt be initiated onto EN when medically appropriate.   - Peptamen Intense VHP. Initiate at 10ml/hr, advance as pt tolerates. Aim for goal rate of 45ml/hr.   - Formula provides w/ propofol @ 15.4ml/hr: 1486.56kcal (107% EEN), 99.36g Protein, 907.2ml H2O. -   80ml fwf q4hrs (480ml) or per MD/NP.   - Check Mg, Na, K+, Phos, and Glu before and during initiation, correct as indicated.   2. Recommend pt receives Rick BID via tube feed to assist with wound healing when medically appropriate.   3. Weigh daily.  4. Collaboration of care with medical providers.     Goals:   1.Pt will be initiated onto EN within 24-48hrs.   2. Pt will tolerate >50% of energy needs by RD follow up.  Nutrition Goal Status: new  Communication of RD Recs: other (comment) (POC: Sticky Note)  Joseph Sexton, Registration Eligible, Provisional LDN

## 2023-04-03 NOTE — ASSESSMENT & PLAN NOTE
Patient with Hypoxic Respiratory failure which is Acute.. Supplemental oxygen was provided and noted- Vent Mode: A/C  Oxygen Concentration (%):  [40-75] 40  Resp Rate Total:  [22 br/min] 22 br/min  Vt Set:  [420 mL] 420 mL  PEEP/CPAP:  [5 cmH20-8 cmH20] 5 cmH20  Pressure Support:  [0 cmH20] 0 cmH20  Mean Airway Pressure:  [11 fyQ30-35 cmH20] 11 cmH20    .   Signs/symptoms of respiratory failure include- tachypnea and respiratory distress. Contributing diagnoses includes - sepsis Labs and images were reviewed. Patient Has recent ABG, which has been reviewed. Will treat underlying causes and adjust management of respiratory failure as follows- vent support.    - intubated 4/2 in the ER  - FiO2 40% and 5 of PEEP  - CXR fairly unremarkable except for evidence of malignancy  - vent bundle in place  - SAT/SBT as condition allows

## 2023-04-03 NOTE — PLAN OF CARE
Pt responds to pain, does not follow commands. On prop and fent for sedation to maintain a RASS goal of -2. On vent, FiO2 40% and 5 PEEP sating 100%. SB on heart monitor. Remains on epi for BP support. About 55ml total urine output for this shift. Sling placed to LUE. Multiple skin tears/abrasions to LUE and RUE, abrasion to R side of head. bruising to L chest/shoulder and L upper leg/hip and L big toe. BSWR in place. Bed low and locked. Fall precautions maintained. Will continue to monitor.

## 2023-04-03 NOTE — PLAN OF CARE
Pt initally intubated on fent and prop gtt. Sedation off since noon, SBT for appx 4hrs; Pt extubated and placed on 3L NC around 1630, SPO2 maintaining 95-97%. SB/ NSR on monitor. Epi gtt continued to maintain MAP >65. ECHO preformed during shift. Oilguric at beginning of shift, now anuric (MD marcus). No BM during shift. Wound care per WOC RN. LA remains in sling d/t fx clavicle. Turned pt Q2 using wedge and pillows, heels floated in boots. Family remains at bedside and updated on POC throughout shift.   Problem: Infection  Goal: Absence of Infection Signs and Symptoms  4/3/2023 1658 by Shirley Joya RN  Outcome: Ongoing, Progressing  4/3/2023 1656 by Shirley Joya RN  Outcome: Ongoing, Progressing  4/3/2023 1626 by Shirley Joya RN  Outcome: Ongoing, Progressing     Problem: Adult Inpatient Plan of Care  Goal: Plan of Care Review  4/3/2023 1658 by Shirley Joya RN  Outcome: Ongoing, Progressing  4/3/2023 1656 by Shirley Joya RN  Outcome: Ongoing, Progressing  4/3/2023 1626 by Shirley Joya RN  Outcome: Ongoing, Progressing  Goal: Patient-Specific Goal (Individualized)  4/3/2023 1658 by Shirley Joya RN  Outcome: Ongoing, Progressing  4/3/2023 1656 by Shirley Joya RN  Outcome: Ongoing, Progressing  4/3/2023 1626 by Shirley Joya RN  Outcome: Ongoing, Progressing  Goal: Absence of Hospital-Acquired Illness or Injury  4/3/2023 1658 by Shirley Joya RN  Outcome: Ongoing, Progressing  4/3/2023 1656 by Shirley Joya RN  Outcome: Ongoing, Progressing  4/3/2023 1626 by Shirley Joya RN  Outcome: Ongoing, Progressing  Goal: Optimal Comfort and Wellbeing  4/3/2023 1658 by Shirley Joya RN  Outcome: Ongoing, Progressing  4/3/2023 1656 by Shirley J. Borel, RN  Outcome: Ongoing, Progressing  4/3/2023 1626 by Shirley Joya RN  Outcome: Ongoing, Progressing  Goal: Readiness for Transition of Care  4/3/2023 1658 by Shirley Joya RN  Outcome: Ongoing,  Progressing  4/3/2023 1656 by Shirley Joya RN  Outcome: Ongoing, Progressing  4/3/2023 1626 by Shirley Joya RN  Outcome: Ongoing, Progressing     Problem: Adjustment to Illness (Sepsis/Septic Shock)  Goal: Optimal Coping  4/3/2023 1658 by Shirley Joya RN  Outcome: Ongoing, Progressing  4/3/2023 1656 by Shirley Joya RN  Outcome: Ongoing, Progressing  4/3/2023 1626 by Shirley Joya RN  Outcome: Ongoing, Progressing     Problem: Bleeding (Sepsis/Septic Shock)  Goal: Absence of Bleeding  4/3/2023 1658 by Shirley Joya RN  Outcome: Ongoing, Progressing  4/3/2023 1656 by Shirley Joya RN  Outcome: Ongoing, Progressing  4/3/2023 1626 by Shirley Joya RN  Outcome: Ongoing, Progressing     Problem: Glycemic Control Impaired (Sepsis/Septic Shock)  Goal: Blood Glucose Level Within Desired Range  4/3/2023 1658 by Shirley Joya RN  Outcome: Ongoing, Progressing  4/3/2023 1656 by Shirley Joya RN  Outcome: Ongoing, Progressing  4/3/2023 1626 by Shirley Joya RN  Outcome: Ongoing, Progressing     Problem: Infection Progression (Sepsis/Septic Shock)  Goal: Absence of Infection Signs and Symptoms  4/3/2023 1658 by Shirley Joya RN  Outcome: Ongoing, Progressing  4/3/2023 1656 by Shirley Joya RN  Outcome: Ongoing, Progressing  4/3/2023 1626 by Shirley Joya RN  Outcome: Ongoing, Progressing     Problem: Nutrition Impaired (Sepsis/Septic Shock)  Goal: Optimal Nutrition Intake  4/3/2023 1658 by Shirley Joya RN  Outcome: Ongoing, Progressing  4/3/2023 1656 by Shirley Joya RN  Outcome: Ongoing, Progressing  4/3/2023 1626 by Shirley Joya RN  Outcome: Ongoing, Progressing     Problem: Fluid and Electrolyte Imbalance (Acute Kidney Injury/Impairment)  Goal: Fluid and Electrolyte Balance  4/3/2023 1658 by Shirley Joya RN  Outcome: Ongoing, Progressing  4/3/2023 1656 by Shirley Joya RN  Outcome: Ongoing, Progressing  4/3/2023 1626 by Shirley Joya,  RN  Outcome: Ongoing, Progressing     Problem: Oral Intake Inadequate (Acute Kidney Injury/Impairment)  Goal: Optimal Nutrition Intake  4/3/2023 1658 by Shirley Joya RN  Outcome: Ongoing, Progressing  4/3/2023 1656 by Shirley Joya RN  Outcome: Ongoing, Progressing  4/3/2023 1626 by Shirley Joya RN  Outcome: Ongoing, Progressing     Problem: Renal Function Impairment (Acute Kidney Injury/Impairment)  Goal: Effective Renal Function  4/3/2023 1658 by Shirley Joya, RN  Outcome: Ongoing, Progressing  4/3/2023 1656 by Shirley Joya RN  Outcome: Ongoing, Progressing  4/3/2023 1626 by Shirley Joya RN  Outcome: Ongoing, Progressing     Problem: Fall Injury Risk  Goal: Absence of Fall and Fall-Related Injury  4/3/2023 1658 by Shirley Joya, RN  Outcome: Ongoing, Progressing  4/3/2023 1656 by Shirley Joya, RN  Outcome: Ongoing, Progressing  4/3/2023 1626 by Shirley Joya RN  Outcome: Ongoing, Progressing     Problem: Restraint, Nonbehavioral (Nonviolent)  Goal: Absence of Harm or Injury  4/3/2023 1658 by Shirley Joya, RN  Outcome: Ongoing, Progressing  4/3/2023 1656 by Shirley Joya RN  Outcome: Ongoing, Progressing  4/3/2023 1626 by Shirley Joya RN  Outcome: Ongoing, Progressing     Problem: Skin Injury Risk Increased  Goal: Skin Health and Integrity  4/3/2023 1658 by Shirley Joya, RN  Outcome: Ongoing, Progressing  4/3/2023 1656 by Shirley Joya RN  Outcome: Ongoing, Progressing  4/3/2023 1626 by Shirley Joya RN  Outcome: Ongoing, Progressing     Problem: Impaired Wound Healing  Goal: Optimal Wound Healing  4/3/2023 1658 by Shirley Joya, RN  Outcome: Ongoing, Progressing  4/3/2023 1656 by Shirley Joya RN  Outcome: Ongoing, Progressing  4/3/2023 1626 by Shirley Joya RN  Outcome: Ongoing, Progressing

## 2023-04-03 NOTE — SUBJECTIVE & OBJECTIVE
Interval History:   Unable to obtain interval history or ROS from patient due to intubation and sedation.        Objective:     Vital Signs (Most Recent):  Temp: 97.8 °F (36.6 °C) (04/03/23 0705)  Pulse: 61 (04/03/23 1007)  Resp: (!) 22 (04/03/23 1007)  BP: (!) 99/55 (04/03/23 1007)  SpO2: 100 % (04/03/23 1007)   Vital Signs (24h Range):  Temp:  [97.8 °F (36.6 °C)-99.3 °F (37.4 °C)] 97.8 °F (36.6 °C)  Pulse:  [34-70] 61  Resp:  [17-33] 22  SpO2:  [72 %-100 %] 100 %  BP: ()/(43-72) 99/55     Weight: 65.8 kg (145 lb 1 oz)  Body mass index is 24.9 kg/m².      Intake/Output Summary (Last 24 hours) at 4/3/2023 1018  Last data filed at 4/3/2023 1000  Gross per 24 hour   Intake 5412.9 ml   Output 207 ml   Net 5205.9 ml       Physical Exam  Vitals reviewed.   Constitutional:       Appearance: She is ill-appearing and toxic-appearing.      Interventions: She is sedated and intubated.   HENT:      Head: Normocephalic and atraumatic.      Nose: Nose normal.   Eyes:      Pupils: Pupils are equal, round, and reactive to light.   Cardiovascular:      Rate and Rhythm: Normal rate and regular rhythm.   Pulmonary:      Effort: Pulmonary effort is normal. No respiratory distress. She is intubated.      Comments: Symmetric chest rise.  No distress or dyssynchrony on the ventilator.  Abdominal:      General: Abdomen is flat. There is no distension.      Palpations: Abdomen is soft.   Musculoskeletal:         General: No deformity or signs of injury.      Right lower leg: No edema.      Left lower leg: No edema.   Skin:     General: Skin is warm and dry.      Comments: Scattered bruising on the L side, most notably over the hip and left upper chest (known left clavicular fracture)      Neurological:      Comments: Sedated.    Does not open eyes to stimulation or voice.    Does not follow commands.         Vents:  Vent Mode: A/C (04/03/23 0900)  Set Rate: 22 BPM (04/03/23 0900)  Vt Set: 420 mL (04/03/23 0900)  Pressure Support: 0  cmH20 (04/03/23 0900)  PEEP/CPAP: 5 cmH20 (04/03/23 0900)  Oxygen Concentration (%): 40 (04/03/23 1007)  Peak Airway Pressure: 23 cmH20 (04/03/23 0900)  Plateau Pressure: 16 cmH20 (04/03/23 0900)  Total Ve: 9.56 L/m (04/03/23 0900)  F/VT Ratio<105 (RSBI): (!) 50.57 (04/03/23 0900)    Lines/Drains/Airways       Central Venous Catheter Line  Duration             Percutaneous Central Line Insertion/Assessment - Triple Lumen  04/02/23 Internal Jugular Left 1 day              Drain  Duration                  NG/OG Tube 04/02/23  Center mouth 1 day         Urethral Catheter 04/02/23 1500 Double-lumen 16 Fr. <1 day              Airway  Duration                  Airway - Non-Surgical 04/02/23 1551 Endotracheal Tube <1 day              Peripheral Intravenous Line  Duration                  Peripheral IV - Single Lumen 04/02/23 0120 20 G Left Antecubital 1 day         Peripheral IV - Single Lumen 04/02/23 1200 20 G Right Antecubital <1 day                    Significant Labs:    CBC/Anemia Profile:  Recent Labs   Lab 04/01/23  1311 04/02/23  1201 04/03/23  0442   WBC 9.91 13.01* 19.42*   HGB 10.7* 10.9* 9.4*   HCT 32.7* 35.0* 28.7*    180 154   MCV 88 91 88   RDW 14.6* 14.6* 14.4        Chemistries:  Recent Labs   Lab 04/01/23  1311 04/02/23  1201 04/02/23  1516 04/03/23  0033 04/03/23  0442   * 129* 133* 128* 124*   K 5.5* 7.6* 5.9* 5.5* 4.8   CL 98 96 99 98 96   CO2 22* 15* 17* 15* 18*   BUN 19 32* 33* 39* 43*   CREATININE 0.9 2.3* 2.2* 2.4* 2.7*   CALCIUM 9.6 9.2 8.6* 7.8* 8.0*   ALBUMIN 3.5 3.6  --   --  2.8*   PROT 7.1 7.0  --   --  5.5*   BILITOT 0.5 1.3*  --   --  1.5*   ALKPHOS 85 93  --   --  81   ALT 11 270*  --   --  3,954*   AST 13 290*  --   --  5,190*       A1C: No results for input(s): HGBA1C in the last 48 hours.  Blood Culture:   Recent Labs   Lab 04/02/23  1201 04/02/23  1308   LABBLOO No Growth to date No Growth to date     Coagulation:   Recent Labs   Lab 04/02/23  1201   INR 1.4*   APTT 35.2*      Lactic Acid:   Recent Labs   Lab 04/02/23  2049 04/03/23  0033 04/03/23  0442   LACTATE 7.2* 3.6* 1.8     Pathology Results  (Last 10 years)                 10/22/21 1008  Specimen to Pathology, Dermatology Final result    Narrative:  Number of Specimens:->1   ------------------------->-------------------------   Spec 1 Procedure:->Biopsy   Spec 1 Clinical Impression:->skin tag vs NMSC   Spec 1 Source:->left upper eyelid (margin)   Release to patient->Immediate       10/11/18 1400  Tissue Specimen to Pathology Final result    10/11/18 1400  Tissue Specimen to Pathology Final result          POCT Glucose:   Recent Labs   Lab 04/02/23  1353 04/02/23  1504 04/02/23  1632   POCTGLUCOSE 99 132* 130*     Respiratory Culture: No results for input(s): GSRESP, RESPIRATORYC in the last 48 hours.  Troponin:   Recent Labs   Lab 04/02/23  1201 04/02/23  1516   TROPONINI 0.195* 0.340*     Urine Culture: No results for input(s): LABURIN in the last 48 hours.  Urine Studies:   Recent Labs   Lab 04/02/23  1512   COLORU Yellow   APPEARANCEUA Cloudy*   PHUR 6.0   SPECGRAV 1.015   PROTEINUA 1+*   GLUCUA Negative   KETONESU Negative   BILIRUBINUA Negative   OCCULTUA 2+*   NITRITE Negative   UROBILINOGEN Negative   LEUKOCYTESUR 3+*   RBCUA 24*   WBCUA >100*   BACTERIA Many*   HYALINECASTS 0       Significant Imaging:  I have reviewed all pertinent imaging results/findings within the past 24 hours.

## 2023-04-04 PROBLEM — I48.91 ATRIAL FIBRILLATION WITH RVR: Status: ACTIVE | Noted: 2023-04-04

## 2023-04-04 LAB
ALBUMIN SERPL BCP-MCNC: 2.7 G/DL (ref 3.5–5.2)
ALLENS TEST: ABNORMAL
ALLENS TEST: ABNORMAL
ALP SERPL-CCNC: 92 U/L (ref 55–135)
ALT SERPL W/O P-5'-P-CCNC: 3683 U/L (ref 10–44)
ANION GAP SERPL CALC-SCNC: 11 MMOL/L (ref 8–16)
AST SERPL-CCNC: 4113 U/L (ref 10–40)
BACTERIA UR CULT: ABNORMAL
BASOPHILS # BLD AUTO: 0.02 K/UL (ref 0–0.2)
BASOPHILS NFR BLD: 0.1 % (ref 0–1.9)
BILIRUB DIRECT SERPL-MCNC: 1.3 MG/DL (ref 0.1–0.3)
BILIRUB SERPL-MCNC: 1.6 MG/DL (ref 0.1–1)
BNP SERPL-MCNC: 1267 PG/ML (ref 0–99)
BUN SERPL-MCNC: 54 MG/DL (ref 8–23)
CALCIUM SERPL-MCNC: 7.2 MG/DL (ref 8.7–10.5)
CHLORIDE SERPL-SCNC: 99 MMOL/L (ref 95–110)
CK SERPL-CCNC: 1724 U/L (ref 20–180)
CO2 SERPL-SCNC: 20 MMOL/L (ref 23–29)
CREAT SERPL-MCNC: 3.6 MG/DL (ref 0.5–1.4)
DELSYS: ABNORMAL
DELSYS: ABNORMAL
DIFFERENTIAL METHOD: ABNORMAL
EOSINOPHIL # BLD AUTO: 0.1 K/UL (ref 0–0.5)
EOSINOPHIL NFR BLD: 0.4 % (ref 0–8)
EP: 6
EP: 6
ERYTHROCYTE [DISTWIDTH] IN BLOOD BY AUTOMATED COUNT: 14.6 % (ref 11.5–14.5)
ERYTHROCYTE [SEDIMENTATION RATE] IN BLOOD BY WESTERGREN METHOD: 16 MM/H
ERYTHROCYTE [SEDIMENTATION RATE] IN BLOOD BY WESTERGREN METHOD: 16 MM/H
EST. GFR  (NO RACE VARIABLE): 12 ML/MIN/1.73 M^2
FIO2: 28
FIO2: 28
GLUCOSE SERPL-MCNC: 84 MG/DL (ref 70–110)
HCO3 UR-SCNC: 19.3 MMOL/L (ref 24–28)
HCO3 UR-SCNC: 20.9 MMOL/L (ref 24–28)
HCT VFR BLD AUTO: 29.2 % (ref 37–48.5)
HGB BLD-MCNC: 9.4 G/DL (ref 12–16)
IMM GRANULOCYTES # BLD AUTO: 0.1 K/UL (ref 0–0.04)
IMM GRANULOCYTES NFR BLD AUTO: 0.7 % (ref 0–0.5)
INR PPP: 1.7 (ref 0.8–1.2)
IP: 12
IP: 12
LYMPHOCYTES # BLD AUTO: 0.6 K/UL (ref 1–4.8)
LYMPHOCYTES NFR BLD: 4.3 % (ref 18–48)
MAGNESIUM SERPL-MCNC: 2.1 MG/DL (ref 1.6–2.6)
MCH RBC QN AUTO: 28.2 PG (ref 27–31)
MCHC RBC AUTO-ENTMCNC: 32.2 G/DL (ref 32–36)
MCV RBC AUTO: 88 FL (ref 82–98)
MODE: ABNORMAL
MODE: ABNORMAL
MONOCYTES # BLD AUTO: 0.5 K/UL (ref 0.3–1)
MONOCYTES NFR BLD: 3.5 % (ref 4–15)
NEUTROPHILS # BLD AUTO: 12.2 K/UL (ref 1.8–7.7)
NEUTROPHILS NFR BLD: 91 % (ref 38–73)
NRBC BLD-RTO: 0 /100 WBC
PCO2 BLDA: 45.3 MMHG (ref 35–45)
PCO2 BLDA: 47.7 MMHG (ref 35–45)
PH SMN: 7.24 [PH] (ref 7.35–7.45)
PH SMN: 7.25 [PH] (ref 7.35–7.45)
PHOSPHATE SERPL-MCNC: 3.4 MG/DL (ref 2.7–4.5)
PLATELET # BLD AUTO: 129 K/UL (ref 150–450)
PMV BLD AUTO: 10.4 FL (ref 9.2–12.9)
PO2 BLDA: 78 MMHG (ref 80–100)
PO2 BLDA: 81 MMHG (ref 80–100)
POC BE: -6 MMOL/L
POC BE: -8 MMOL/L
POC SATURATED O2: 93 % (ref 95–100)
POC SATURATED O2: 94 % (ref 95–100)
POCT GLUCOSE: 106 MG/DL (ref 70–110)
POCT GLUCOSE: 107 MG/DL (ref 70–110)
POCT GLUCOSE: 107 MG/DL (ref 70–110)
POCT GLUCOSE: 112 MG/DL (ref 70–110)
POCT GLUCOSE: 88 MG/DL (ref 70–110)
POCT GLUCOSE: 91 MG/DL (ref 70–110)
POCT GLUCOSE: 91 MG/DL (ref 70–110)
POTASSIUM SERPL-SCNC: 5 MMOL/L (ref 3.5–5.1)
PROT SERPL-MCNC: 5.2 G/DL (ref 6–8.4)
PROTHROMBIN TIME: 17.9 SEC (ref 9–12.5)
PTH-INTACT SERPL-MCNC: 532.8 PG/ML (ref 9–77)
RBC # BLD AUTO: 3.33 M/UL (ref 4–5.4)
SAMPLE: ABNORMAL
SAMPLE: ABNORMAL
SITE: ABNORMAL
SITE: ABNORMAL
SODIUM SERPL-SCNC: 130 MMOL/L (ref 136–145)
TRIGL SERPL-MCNC: 66 MG/DL (ref 30–150)
WBC # BLD AUTO: 13.37 K/UL (ref 3.9–12.7)

## 2023-04-04 PROCEDURE — 51702 INSERT TEMP BLADDER CATH: CPT

## 2023-04-04 PROCEDURE — 84478 ASSAY OF TRIGLYCERIDES: CPT | Performed by: INTERNAL MEDICINE

## 2023-04-04 PROCEDURE — 99497 PR ADVNCD CARE PLAN 30 MIN: ICD-10-PCS | Mod: 25,,,

## 2023-04-04 PROCEDURE — 99900035 HC TECH TIME PER 15 MIN (STAT)

## 2023-04-04 PROCEDURE — 20000000 HC ICU ROOM

## 2023-04-04 PROCEDURE — 83970 ASSAY OF PARATHORMONE: CPT | Performed by: INTERNAL MEDICINE

## 2023-04-04 PROCEDURE — 27000221 HC OXYGEN, UP TO 24 HOURS

## 2023-04-04 PROCEDURE — 99497 ADVNCD CARE PLAN 30 MIN: CPT | Mod: 25,,,

## 2023-04-04 PROCEDURE — 99223 1ST HOSP IP/OBS HIGH 75: CPT | Mod: ,,,

## 2023-04-04 PROCEDURE — 82803 BLOOD GASES ANY COMBINATION: CPT

## 2023-04-04 PROCEDURE — 84100 ASSAY OF PHOSPHORUS: CPT | Performed by: INTERNAL MEDICINE

## 2023-04-04 PROCEDURE — 99223 1ST HOSP IP/OBS HIGH 75: CPT | Mod: ,,, | Performed by: INTERNAL MEDICINE

## 2023-04-04 PROCEDURE — 25000003 PHARM REV CODE 250: Performed by: NURSE PRACTITIONER

## 2023-04-04 PROCEDURE — 94660 CPAP INITIATION&MGMT: CPT

## 2023-04-04 PROCEDURE — 94761 N-INVAS EAR/PLS OXIMETRY MLT: CPT

## 2023-04-04 PROCEDURE — 63600175 PHARM REV CODE 636 W HCPCS: Performed by: INTERNAL MEDICINE

## 2023-04-04 PROCEDURE — 25000003 PHARM REV CODE 250: Performed by: INTERNAL MEDICINE

## 2023-04-04 PROCEDURE — 83735 ASSAY OF MAGNESIUM: CPT | Performed by: INTERNAL MEDICINE

## 2023-04-04 PROCEDURE — 80076 HEPATIC FUNCTION PANEL: CPT | Performed by: INTERNAL MEDICINE

## 2023-04-04 PROCEDURE — 85025 COMPLETE CBC W/AUTO DIFF WBC: CPT | Performed by: INTERNAL MEDICINE

## 2023-04-04 PROCEDURE — 99223 PR INITIAL HOSPITAL CARE,LEVL III: ICD-10-PCS | Mod: ,,,

## 2023-04-04 PROCEDURE — 99223 PR INITIAL HOSPITAL CARE,LEVL III: ICD-10-PCS | Mod: ,,, | Performed by: INTERNAL MEDICINE

## 2023-04-04 PROCEDURE — 82306 VITAMIN D 25 HYDROXY: CPT | Performed by: INTERNAL MEDICINE

## 2023-04-04 PROCEDURE — 83880 ASSAY OF NATRIURETIC PEPTIDE: CPT | Performed by: INTERNAL MEDICINE

## 2023-04-04 PROCEDURE — 87205 SMEAR GRAM STAIN: CPT | Performed by: INTERNAL MEDICINE

## 2023-04-04 PROCEDURE — 63600175 PHARM REV CODE 636 W HCPCS: Performed by: NURSE PRACTITIONER

## 2023-04-04 PROCEDURE — 99291 PR CRITICAL CARE, E/M 30-74 MINUTES: ICD-10-PCS | Mod: ,,, | Performed by: INTERNAL MEDICINE

## 2023-04-04 PROCEDURE — 36600 WITHDRAWAL OF ARTERIAL BLOOD: CPT

## 2023-04-04 PROCEDURE — 82550 ASSAY OF CK (CPK): CPT | Performed by: INTERNAL MEDICINE

## 2023-04-04 PROCEDURE — 85610 PROTHROMBIN TIME: CPT | Performed by: NURSE PRACTITIONER

## 2023-04-04 PROCEDURE — 80048 BASIC METABOLIC PNL TOTAL CA: CPT | Performed by: INTERNAL MEDICINE

## 2023-04-04 PROCEDURE — 99291 CRITICAL CARE FIRST HOUR: CPT | Mod: ,,, | Performed by: INTERNAL MEDICINE

## 2023-04-04 RX ORDER — DILTIAZEM HYDROCHLORIDE 5 MG/ML
10 INJECTION INTRAVENOUS
Status: DISCONTINUED | OUTPATIENT
Start: 2023-04-04 | End: 2023-04-04

## 2023-04-04 RX ORDER — THIAMINE HCL 100 MG
100 TABLET ORAL DAILY
Status: DISCONTINUED | OUTPATIENT
Start: 2023-04-04 | End: 2023-04-06 | Stop reason: HOSPADM

## 2023-04-04 RX ORDER — AMOXICILLIN 250 MG
2 CAPSULE ORAL 2 TIMES DAILY
Status: DISCONTINUED | OUTPATIENT
Start: 2023-04-04 | End: 2023-04-04

## 2023-04-04 RX ORDER — FAMOTIDINE 10 MG/ML
20 INJECTION INTRAVENOUS DAILY
Status: DISCONTINUED | OUTPATIENT
Start: 2023-04-04 | End: 2023-04-06 | Stop reason: HOSPADM

## 2023-04-04 RX ORDER — HYDROCODONE BITARTRATE AND ACETAMINOPHEN 500; 5 MG/1; MG/1
TABLET ORAL
Status: DISCONTINUED | OUTPATIENT
Start: 2023-04-04 | End: 2023-04-06 | Stop reason: HOSPADM

## 2023-04-04 RX ORDER — DILTIAZEM HYDROCHLORIDE 5 MG/ML
10 INJECTION INTRAVENOUS
Status: DISCONTINUED | OUTPATIENT
Start: 2023-04-04 | End: 2023-04-06 | Stop reason: HOSPADM

## 2023-04-04 RX ORDER — BISACODYL 10 MG
10 SUPPOSITORY, RECTAL RECTAL
Status: DISCONTINUED | OUTPATIENT
Start: 2023-04-04 | End: 2023-04-06 | Stop reason: HOSPADM

## 2023-04-04 RX ORDER — HALOPERIDOL 5 MG/ML
5 INJECTION INTRAMUSCULAR EVERY 6 HOURS PRN
Status: DISCONTINUED | OUTPATIENT
Start: 2023-04-04 | End: 2023-04-06 | Stop reason: HOSPADM

## 2023-04-04 RX ORDER — BUPROPION HYDROCHLORIDE 100 MG/1
100 TABLET, EXTENDED RELEASE ORAL EVERY MORNING
Status: DISCONTINUED | OUTPATIENT
Start: 2023-04-04 | End: 2023-04-06 | Stop reason: HOSPADM

## 2023-04-04 RX ADMIN — DILTIAZEM HYDROCHLORIDE 10 MG: 5 INJECTION, SOLUTION INTRAVENOUS at 08:04

## 2023-04-04 RX ADMIN — CEFTRIAXONE 2 G: 2 INJECTION, POWDER, FOR SOLUTION INTRAMUSCULAR; INTRAVENOUS at 04:04

## 2023-04-04 RX ADMIN — MUPIROCIN: 20 OINTMENT TOPICAL at 08:04

## 2023-04-04 RX ADMIN — MORPHINE SULFATE 1 MG: 2 INJECTION, SOLUTION INTRAMUSCULAR; INTRAVENOUS at 12:04

## 2023-04-04 RX ADMIN — BISACODYL 10 MG: 10 SUPPOSITORY RECTAL at 10:04

## 2023-04-04 RX ADMIN — MORPHINE SULFATE 1 MG: 2 INJECTION, SOLUTION INTRAMUSCULAR; INTRAVENOUS at 10:04

## 2023-04-04 RX ADMIN — MORPHINE SULFATE 1 MG: 2 INJECTION, SOLUTION INTRAMUSCULAR; INTRAVENOUS at 07:04

## 2023-04-04 RX ADMIN — FAMOTIDINE 20 MG: 10 INJECTION, SOLUTION INTRAVENOUS at 08:04

## 2023-04-04 RX ADMIN — SODIUM BICARBONATE: 84 INJECTION, SOLUTION INTRAVENOUS at 12:04

## 2023-04-04 RX ADMIN — SODIUM BICARBONATE: 84 INJECTION, SOLUTION INTRAVENOUS at 09:04

## 2023-04-04 RX ADMIN — HALOPERIDOL LACTATE 5 MG: 5 INJECTION, SOLUTION INTRAMUSCULAR at 11:04

## 2023-04-04 RX ADMIN — Medication 0.25 MCG/KG/MIN: at 05:04

## 2023-04-04 RX ADMIN — MORPHINE SULFATE 1 MG: 2 INJECTION, SOLUTION INTRAMUSCULAR; INTRAVENOUS at 03:04

## 2023-04-04 RX ADMIN — SODIUM BICARBONATE: 84 INJECTION, SOLUTION INTRAVENOUS at 07:04

## 2023-04-04 RX ADMIN — DILTIAZEM HYDROCHLORIDE 10 MG: 5 INJECTION, SOLUTION INTRAVENOUS at 07:04

## 2023-04-04 RX ADMIN — PIPERACILLIN SODIUM AND TAZOBACTAM SODIUM 4.5 G: 4; .5 INJECTION, POWDER, FOR SOLUTION INTRAVENOUS at 05:04

## 2023-04-04 RX ADMIN — ENOXAPARIN SODIUM 30 MG: 30 INJECTION SUBCUTANEOUS at 04:04

## 2023-04-04 NOTE — ASSESSMENT & PLAN NOTE
- source most likely urine  - blood and urine Cx pendingUr cx with E coli.  - Switch Zosyn >> CTX  - currently on Jose for vasopressor support  - goal MAP > 65  - CVL placed 4/2 in the ER  - Lactic acid now normalized

## 2023-04-04 NOTE — PROGRESS NOTES
[facility] - Intensive Care ([facility])  Critical Care Medicine  Progress Note    Patient Name: Rachel Long  MRN: [id number]  Admission Date: 4/2/2023  Hospital Length of Stay: 2 days  Code Status: Partial Code  Attending Provider: Reg Patton MD  Primary Care Provider: Sharmaine English MD   Principal Problem: Septic shock    Subjective:     HPI:  Ms. Long is an 82-year-old nursing home resident with CAD, HTN, COPD, bipolar disorder, history of PE (not on anticoagulation seemingly due to frequent falls), history of lung cancer and Parkinson's disease who presented to the ER today via EMS for altered mental status.  History is taken from the medical record, as patient is unable to provide history and no family at bedside or reachable by telephone at this time.  Mr. Long was in the ER yesterday after a fall where she broke her left clavicle.  She was otherwise well and discharged home.  EMS was called today due to altered mental status.  Per EMS, she was having erratic movements and had a near syncopal episode in route.  Blood glucose at the time of 65 she was given D10.  Also some reports of hypoxia and bradycardia while EN route.  He had some further bradycardia in the low 30s and hypoxia into the 60s, and was intubated in the ER.  She was given volume resuscitation along with Zosyn from looks like a UTI.  Also given medical correction for hyperkalemia and started on vasopressors.    At the time my exam, she is intubated and sedated on propofol.  Ventilator weaned down to 40% FiO2 and 5 of PEEP.  She is on 0.25 mcg/kg/min of levo and 0.25 mcg/kg/min Epi.  Heart rate initially in the high 30s, but improved to the mid 40s during the course of my exam.    Imaging in the ER notable for 5.7 cm left upper lobe lung mass as well as some other spiculated lesions on the right concerning for metastatic disease.      Hospital/ICU Course:  4/2:  Intubated.  Pressors. Zosyn.  DANN.  4/3:  Remains on vent.  Epi down to 0.05.   Continue Zosyn.  Cr up to 2.7.  4/4:  Pt had episode of afib RVR last night.  Switched epi to phenylephrine. Also received metoprolol and diltiazem with improvement in rate.  Had to go on AVAPS.  Ur Cx with Ecoli.  Switch  Zosyn >> CTX.  Cr up to 3.6.  Renal consulted.  LFTs mildly improved.        Interval History:   Unable to obtain interval history or ROS from patient due to confusion.    Objective:     Vital Signs (Most Recent):  Temp: 97.7 °F (36.5 °C) (04/04/23 0700)  Pulse: 90 (04/04/23 0945)  Resp: 16 (04/04/23 0945)  BP: (!) 110/55 (04/04/23 0900)  SpO2: 97 % (04/04/23 0945)   Vital Signs (24h Range):  Temp:  [97.5 °F (36.4 °C)-98.8 °F (37.1 °C)] 97.7 °F (36.5 °C)  Pulse:  [] 90  Resp:  [7-47] 16  SpO2:  [72 %-100 %] 97 %  BP: ()/(50-85) 110/55     Weight: 67.1 kg (147 lb 14.9 oz)  Body mass index is 25.39 kg/m².      Intake/Output Summary (Last 24 hours) at 4/4/2023 1054  Last data filed at 4/4/2023 0800  Gross per 24 hour   Intake 2886.33 ml   Output 64 ml   Net 2822.33 ml     Physical Exam  Vitals reviewed.   Constitutional:       General: She is awake.      Appearance: She is ill-appearing.   HENT:      Head: Normocephalic and atraumatic.      Nose: Nose normal.   Eyes:      Pupils: Pupils are equal, round, and reactive to light.   Cardiovascular:      Rate and Rhythm: Tachycardia present. Rhythm irregular.   Pulmonary:      Effort: Pulmonary effort is normal. No respiratory distress.      Comments: Symmetric chest rise.    Abdominal:      General: Abdomen is flat. There is no distension.      Palpations: Abdomen is soft.   Musculoskeletal:         General: No deformity or signs of injury.      Right lower leg: No edema.      Left lower leg: No edema.   Skin:     General: Skin is warm and dry.      Comments: Scattered bruising on the L side, most notably over the hip and left upper chest (known left clavicular fracture)      Neurological:      Mental Status: She is alert.      Comments:  Confused.  Answers basic questions like what is her name.  Does not consistently follow commands.  Moves all extremities.       Vents:  Vent Mode: Spont (04/03/23 1630)  Set Rate: 0 BPM (04/03/23 1630)  Vt Set: 420 mL (04/03/23 1630)  Pressure Support: 5 cmH20 (04/03/23 1630)  PEEP/CPAP: 5 cmH20 (04/03/23 1630)  Oxygen Concentration (%): 28 (04/04/23 0809)  Peak Airway Pressure: 11 cmH20 (04/03/23 1630)  Plateau Pressure: 16 cmH20 (04/03/23 1630)  Total Ve: 6.65 L/m (04/03/23 1630)  F/VT Ratio<105 (RSBI): (!) 23.26 (04/03/23 1630)    Lines/Drains/Airways       Central Venous Catheter Line  Duration             Percutaneous Central Line Insertion/Assessment - Triple Lumen  04/02/23 Internal Jugular Left 2 days              Drain  Duration                  Urethral Catheter 04/02/23 1500 Double-lumen 16 Fr. 1 day              Peripheral Intravenous Line  Duration                  Peripheral IV - Single Lumen 04/02/23 1200 20 G Right Antecubital 1 day                    Significant Labs:    CBC/Anemia Profile:  Recent Labs   Lab 04/02/23  1201 04/03/23  0442 04/04/23  0323   WBC 13.01* 19.42* 13.37*   HGB 10.9* 9.4* 9.4*   HCT 35.0* 28.7* 29.2*    154 129*   MCV 91 88 88   RDW 14.6* 14.4 14.6*        Chemistries:  Recent Labs   Lab 04/02/23  1201 04/02/23  1516 04/03/23  0442 04/03/23  2202 04/04/23  0323   *   < > 124* 130* 130*   K 7.6*   < > 4.8 5.1 5.0   CL 96   < > 96 101 99   CO2 15*   < > 18* 17* 20*   BUN 32*   < > 43* 52* 54*   CREATININE 2.3*   < > 2.7* 3.5* 3.6*   CALCIUM 9.2   < > 8.0* 7.6* 7.2*   ALBUMIN 3.6  --  2.8*  --  2.7*   PROT 7.0  --  5.5*  --  5.2*   BILITOT 1.3*  --  1.5*  --  1.6*   ALKPHOS 93  --  81  --  92   *  --  3,954*  --  3,683*   *  --  5,190*  --  4,113*   MG  --   --   --   --  2.1    < > = values in this interval not displayed.       A1C: No results for input(s): HGBA1C in the last 48 hours.  Blood Culture:   Recent Labs   Lab 04/02/23  1201 04/02/23  1308    LABBLOO No Growth to date  No Growth to date No Growth to date  No Growth to date     Coagulation:   Recent Labs   Lab 04/02/23  1201 04/04/23  0323   INR 1.4* 1.7*   APTT 35.2*  --      Lactic Acid:   Recent Labs   Lab 04/02/23  2049 04/03/23  0033 04/03/23  0442   LACTATE 7.2* 3.6* 1.8     Pathology Results  (Last 10 years)                 10/22/21 1008  Specimen to Pathology, Dermatology Final result    Narrative:  Number of Specimens:->1   ------------------------->-------------------------   Spec 1 Procedure:->Biopsy   Spec 1 Clinical Impression:->skin tag vs NMSC   Spec 1 Source:->left upper eyelid (margin)   Release to patient->Immediate       10/11/18 1400  Tissue Specimen to Pathology Final result    10/11/18 1400  Tissue Specimen to Pathology Final result          POCT Glucose:   Recent Labs   Lab 04/04/23  0038 04/04/23  0306 04/04/23  0715   POCTGLUCOSE 88 91 107     Respiratory Culture: No results for input(s): GSRESP, RESPIRATORYC in the last 48 hours.  Troponin:   Recent Labs   Lab 04/02/23  1201 04/02/23  1516   TROPONINI 0.195* 0.340*     Urine Culture:   Recent Labs   Lab 04/02/23  1512   LABURIN ESCHERICHIA COLI  >100,000 cfu/ml  *     Urine Studies:   Recent Labs   Lab 04/02/23  1512   COLORU Yellow   APPEARANCEUA Cloudy*   PHUR 6.0   SPECGRAV 1.015   PROTEINUA 1+*   GLUCUA Negative   KETONESU Negative   BILIRUBINUA Negative   OCCULTUA 2+*   NITRITE Negative   UROBILINOGEN Negative   LEUKOCYTESUR 3+*   RBCUA 24*   WBCUA >100*   BACTERIA Many*   HYALINECASTS 0       Significant Imaging:  I have reviewed all pertinent imaging results/findings within the past 24 hours..      ABG  Recent Labs   Lab 04/04/23  0309   PH 7.251*   PO2 81   PCO2 47.7*   HCO3 20.9*   BE -6     Assessment/Plan:     Psychiatric  Schizoaffective disorder  - holding most of her home meds due to renal dysfxn  - will restart, as able    Pulmonary  COPD, very severe  - not in exacerbation  - vent support  - continue  bronchodilators    Acute hypoxemic respiratory failure  Patient with Hypoxic Respiratory failure which is Acute.. Supplemental oxygen was provided and noted- Vent Mode: Spont  Oxygen Concentration (%):  [28-40] 28  Resp Rate Total:  [7.3 br/min-23 br/min] 13 br/min  Vt Set:  [420 mL] 420 mL  PEEP/CPAP:  [5 cmH20] 5 cmH20  Pressure Support:  [0 owS43-04 cmH20] 5 cmH20  Mean Airway Pressure:  [7 ewX41-34 cmH20] 7.1 cmH20    .   Signs/symptoms of respiratory failure include- tachypnea and respiratory distress. Contributing diagnoses includes - sepsis Labs and images were reviewed. Patient Has recent ABG, which has been reviewed. Will treat underlying causes and adjust management of respiratory failure as follows- vent support.    - intubated 4/2 in the ER and extubated 4/3  -However, now on AVAPS  - FiO2 28% and 5 of PEEP        Cardiac/Vascular  Atrial fibrillation with RVR  Diltiazem for rate control.  Defer AC for now  On prophylactic enoxaparin    HLD (hyperlipidemia)  - Hold statin 2/2 shock liver    Hypertension  - holding meds due to shock  - can restart, as necessary    Renal/  Hyperkalemia  - Improved    Lactic acidosis  -Improved    Acute renal failure  Due to septic shock    - Cr 2.2 on admission 4/2/23; Had been 0.9 on  4/1/23  - Cr now up to 3.6  - will trend lytes and monitor with resuscitation  - renally dose meds and avoid nephrotoxins  - Consult renal today      ID  * Septic shock  - source most likely urine  - blood and urine Cx pendingUr cx with E coli.  - Switch Zosyn >> CTX  - currently on Jose for vasopressor support  - goal MAP > 65  - CVL placed 4/2 in the ER  - Lactic acid now normalized    Oncology  Malignant neoplasm of upper lobe of left lung  Noted on CT imaging.  Further discussion and follow up when acute issues resolved.    GI  Shock liver  Due to septic shock.  Trend LFTs    Prophylaxis Measures:  GI ppx: Famotidine  VTE ppx: Enoxaparin  Glucose control: Monitor blood glucose    Code  Status: Partial Code    Critical Care Time: 45 minutes  Critical secondary to take septic shock complicated by acute respiratory failure, acute kidney injury, acute liver injury/shock liver.  Patient is on vasopressors and NIPPV.       Critical care was time spent personally by me on the following activities: development of treatment plan with patient or surrogate and bedside caregivers, discussions with consultants, evaluation of patient's response to treatment, examination of patient, ordering and performing treatments and interventions, ordering and review of laboratory studies, ordering and review of radiographic studies, pulse oximetry, re-evaluation of patient's condition. This critical care time did not overlap with that of any other provider or involve time for any procedures.     Reg Patton MD  Critical Care Medicine  Formerly [facility] - Intensive [facility])

## 2023-04-04 NOTE — ASSESSMENT & PLAN NOTE
Due to septic shock    - Cr 2.2 on admission 4/2/23; Had been 0.9 on  4/1/23  - Cr now up to 3.6  - will trend lytes and monitor with resuscitation  - renally dose meds and avoid nephrotoxins  - Consult renal today

## 2023-04-04 NOTE — CONSULTS
..Rachel Long is a 82 y.o. female for whom nephrology consult has been requested to evaluate and give opinion.     HPI:  82 year old white female currently on Bipap who cannot provide history so EHR was reviewed in detail who was admitted with AMS on 4/2/23: Scr at that time was 0.9 then 2.3, 2.2; up to 2.4, 2.7, 3.5 and 3.6 this am. Had a CT scan but this was without contrast; showed normal kidneys but lung mass noted. UOP poor; remains * liters TBV + since admission; Nephrology consult requested for concerns of DANN associated with sepsis; she is on bicarb gtt and Rocephin dosing. Meds and labs reviewed. E. Coli UTI noted on urine cultures. Nephrology consult requested for further evaluation. Currently, now on Levo and Epi.     PAST MEDICAL HISTORY:  She  has a past medical history of Acute upper respiratory infection, Anemia, Arthritis, Bipolar mood disorder, Cataract, Coordination abnormal, COPD exacerbation (5/9/2016), Coronary artery disease, Difficulty walking, Dysphasia, GERD (gastroesophageal reflux disease), Hyperlipidemia, Hypertension, Hypothyroidism, adult, Insomnia, Malignant neoplasm of colon, Muscle weakness, Neuralgia and neuritis, Parkinson disease, Pulmonary embolism, Schizoaffective disorder, SOB (shortness of breath), Spinal stenosis, Stroke, Thyroid disease, and Urinary tract infection.    PAST SURGICAL HISTORY:  She  has a past surgical history that includes Coronary stent placement; Colon surgery; Hysterectomy (1970's); Fracture surgery; Esophagogastroduodenoscopy (N/A, 7/9/2020); Colonoscopy (N/A, 7/9/2020); Cataract extraction (Right, 03/23/2022); and Cataract extraction w/  intraocular lens implant (Left, 06/02/2022).    SOCIAL HISTORY:  She  reports that she has quit smoking. Her smoking use included cigarettes. She has quit using smokeless tobacco. She reports that she does not drink alcohol and does not use drugs.      FAMILY MEDICAL HISTORY:  Her family history includes Asthma in her  father; Eczema in her father; No Known Problems in her mother; Other in her brother.    Review of patient's allergies indicates:   Allergen Reactions    Benadryl [diphenhydramine hcl] Anxiety    Sulfa (sulfonamide antibiotics) Rash        bisacodyL  10 mg Rectal Q48H    buPROPion  100 mg Oral QAM    cefTRIAXone (ROCEPHIN) IVPB  2 g Intravenous Q24H    Followed by    [START ON 4/5/2023] cefTRIAXone (ROCEPHIN) IVPB  1 g Intravenous Q24H    enoxaparin  30 mg Subcutaneous Daily    famotidine (PF)  20 mg Intravenous Daily    mupirocin   Nasal BID    thiamine  100 mg Oral Daily       Prior to Admission medications    Medication Sig Start Date End Date Taking? Authorizing Provider   albuterol (PROVENTIL/VENTOLIN HFA) 90 mcg/actuation inhaler Inhale 2 puffs into the lungs every 6 (six) hours as needed for Wheezing. Rescue   Yes Historical Provider   atorvastatin (LIPITOR) 80 MG tablet Take 1 tablet (80 mg total) by mouth every evening. 8/11/22 9/23/23 Yes Duy Lui MD   benztropine (COGENTIN) 1 MG tablet Take 1.5 mg by mouth every morning and 1 mg nightly   Yes Historical Provider   budesonide (PULMICORT) 0.25 mg/2 mL nebulizer solution Take 0.25 mg by nebulization 2 (two) times a day. 3/28/23  Yes Historical Provider   buPROPion (WELLBUTRIN SR) 100 MG TBSR 12 hr tablet Take 100 mg by mouth every morning. 4/1/23  Yes Historical Provider   busPIRone (BUSPAR) 5 MG Tab Take 5 mg by mouth 2 (two) times daily. 6/12/20  Yes Historical Provider   cycloSPORINE (RESTASIS) 0.05 % ophthalmic emulsion Place 1 drop into both eyes 2 (two) times daily.   Yes Historical Provider   diltiaZEM (CARDIZEM) 60 MG tablet Take 1 tablet (60 mg total) by mouth every 8 (eight) hours. 8/11/22 8/11/23 Yes Duy Lui MD   DULoxetine (CYMBALTA) 60 MG capsule Take 60 mg by mouth 2 (two) times daily. 7/30/18  Yes Historical Provider   HYDROcodone-acetaminophen (NORCO) 5-325 mg per tablet Take 1 tablet by mouth every 6 (six) hours as needed for Pain.  4/1/23 4/8/23 Yes Alfonso Jenkins MD   meclizine (ANTIVERT) 25 mg tablet Take 1 tablet (25 mg total) by mouth 3 (three) times daily as needed for Dizziness or Nausea. 1/22/23 4/2/23 Yes Alfonso Jenkins MD   melatonin 3 mg Cap Take 1 tablet by mouth nightly as needed.   Yes Historical Provider   metoprolol succinate (TOPROL-XL) 25 MG 24 hr tablet Take 25 mg by mouth once daily. 3/31/23  Yes Historical Provider   ondansetron (ZOFRAN-ODT) 4 MG TbDL Take 1 tablet (4 mg total) by mouth every 8 (eight) hours as needed (nausea/vomiting). 1/22/23  Yes Alfonso Jenkins MD   pantoprazole (PROTONIX) 40 MG tablet Take 40 mg by mouth once daily. 6/12/19 4/2/23 Yes Historical Provider   QUEtiapine 150 mg Tab Take 150 mg by mouth every evening. 12/22/18  Yes Historical Provider   saliva substitute combo no.9 (BIOTENE DRY MOUTH ORAL RINSE MM) 15 mLs by Mucous Membrane route 3 (three) times daily.   Yes Historical Provider   spironolactone (ALDACTONE) 25 MG tablet Take 1 tablet (25 mg total) by mouth once daily.  Patient taking differently: Take 12.5 mg by mouth once daily. 8/11/22  Yes Duy Lui MD   thiamine 100 MG tablet Take 100 mg by mouth once daily.   Yes Historical Provider   acetaminophen (TYLENOL) 325 MG tablet Take 650 mg by mouth 4 (four) times daily as needed for Pain.    Historical Provider   albuterol (PROVENTIL) 2.5 mg /3 mL (0.083 %) nebulizer solution Take 2.5 mg by nebulization 4 (four) times daily as needed. 2/17/23   Historical Provider   budesonide-formoterol 80-4.5 mcg (SYMBICORT) 80-4.5 mcg/actuation HF Inhale 2 puffs into the lungs once daily. Controller 8/6/21 8/6/22  Vickie Vidal MD   dextran 70-hypromellose 0.1-0.3 % Drop Apply 1 drop to eye 2 (two) times daily.    Historical Provider   ibuprofen (ADVIL,MOTRIN) 400 MG tablet Take 400 mg by mouth every 6 (six) hours as needed for Other.    Historical Provider   loperamide (IMODIUM A-D) 2 mg Tab Take 2 mg by mouth daily as needed  "(give one po after each episode of loose stool with max of 8 tabs/daily).    Historical Provider        REVIEW OF SYSTEMS:    Unobtainable to mental status.    Patient has no fever, fatigue, visual changes, chest pain, edema, cough, dyspnea, nausea, vomiting, constipation, diarrhea, arthralgias, pruritis, dizziness, weakness, depression, confusion.        PHYSICAL EXAM:   height is 5' 4" (1.626 m) and weight is 67.1 kg (147 lb 14.9 oz). Her oral temperature is 97.7 °F (36.5 °C). Her blood pressure is 110/55 (abnormal) and her pulse is 90. Her respiration is 16 and oxygen saturation is 97%.   Gen: WDWN female in apparent distress on Bipap.     Psych: Normal mood and affect  Skin: No rashes or ulcers  Eyes: Normal conjunctiva and lids, PERRLA  ENT: Normal hearing with no oropharyngeal lesions  Neck: No JVD  Chest: Clear with no rales, rhonchi, wheezing with normal effort  CV: Regular with no murmurs, gallops or rubs  Abd: Soft, nontender, no distension, positive bowel sounds  Ext: No cyanosis, clubbing or edema          IMPRESSION AND RECOMMENDATIONS:    DANN - oligoanuric  Septic  shock on pressors (UTI)  Hypotension  AMS  EARL lung CA  Hyponatremia  Hyperkalemia    Plan: Patient seen and examined; at this point we are likely dealing with sepsis associated DANN and not contrast induced nephropathy, which was my original concern given rapid decline in eGFR temporally associated with CT scan but this was without contrast; supportive care on board with bicarb gtt and pressors; not a HD Candidate at present time; palliative care will be meeting with family I am told; will check basic urine testing (eos) but suspect we are dealing with sepsis associated DANN and not a GN or obstruction. Shock liver and elevated CPK noted; will re-check as needed.  Please call with interim concerns.    Carlos Starkey MD  [phone]          Carlos Starkey MD   "

## 2023-04-04 NOTE — SUBJECTIVE & OBJECTIVE
Interval History:   Unable to obtain interval history or ROS from patient due to confusion.    Objective:     Vital Signs (Most Recent):  Temp: 97.7 °F (36.5 °C) (04/04/23 0700)  Pulse: 90 (04/04/23 0945)  Resp: 16 (04/04/23 0945)  BP: (!) 110/55 (04/04/23 0900)  SpO2: 97 % (04/04/23 0945)   Vital Signs (24h Range):  Temp:  [97.5 °F (36.4 °C)-98.8 °F (37.1 °C)] 97.7 °F (36.5 °C)  Pulse:  [] 90  Resp:  [7-47] 16  SpO2:  [72 %-100 %] 97 %  BP: ()/(50-85) 110/55     Weight: 67.1 kg (147 lb 14.9 oz)  Body mass index is 25.39 kg/m².      Intake/Output Summary (Last 24 hours) at 4/4/2023 1054  Last data filed at 4/4/2023 0800  Gross per 24 hour   Intake 2886.33 ml   Output 64 ml   Net 2822.33 ml     Physical Exam  Vitals reviewed.   Constitutional:       General: She is awake.      Appearance: She is ill-appearing.   HENT:      Head: Normocephalic and atraumatic.      Nose: Nose normal.   Eyes:      Pupils: Pupils are equal, round, and reactive to light.   Cardiovascular:      Rate and Rhythm: Tachycardia present. Rhythm irregular.   Pulmonary:      Effort: Pulmonary effort is normal. No respiratory distress.      Comments: Symmetric chest rise.    Abdominal:      General: Abdomen is flat. There is no distension.      Palpations: Abdomen is soft.   Musculoskeletal:         General: No deformity or signs of injury.      Right lower leg: No edema.      Left lower leg: No edema.   Skin:     General: Skin is warm and dry.      Comments: Scattered bruising on the L side, most notably over the hip and left upper chest (known left clavicular fracture)      Neurological:      Mental Status: She is alert.      Comments: Confused.  Answers basic questions like what is her name.  Does not consistently follow commands.  Moves all extremities.       Vents:  Vent Mode: Spont (04/03/23 1630)  Set Rate: 0 BPM (04/03/23 1630)  Vt Set: 420 mL (04/03/23 1630)  Pressure Support: 5 cmH20 (04/03/23 1630)  PEEP/CPAP: 5 cmH20  (04/03/23 1630)  Oxygen Concentration (%): 28 (04/04/23 0809)  Peak Airway Pressure: 11 cmH20 (04/03/23 1630)  Plateau Pressure: 16 cmH20 (04/03/23 1630)  Total Ve: 6.65 L/m (04/03/23 1630)  F/VT Ratio<105 (RSBI): (!) 23.26 (04/03/23 1630)    Lines/Drains/Airways       Central Venous Catheter Line  Duration             Percutaneous Central Line Insertion/Assessment - Triple Lumen  04/02/23 Internal Jugular Left 2 days              Drain  Duration                  Urethral Catheter 04/02/23 1500 Double-lumen 16 Fr. 1 day              Peripheral Intravenous Line  Duration                  Peripheral IV - Single Lumen 04/02/23 1200 20 G Right Antecubital 1 day                    Significant Labs:    CBC/Anemia Profile:  Recent Labs   Lab 04/02/23  1201 04/03/23  0442 04/04/23  0323   WBC 13.01* 19.42* 13.37*   HGB 10.9* 9.4* 9.4*   HCT 35.0* 28.7* 29.2*    154 129*   MCV 91 88 88   RDW 14.6* 14.4 14.6*        Chemistries:  Recent Labs   Lab 04/02/23  1201 04/02/23  1516 04/03/23 0442 04/03/23  2202 04/04/23  0323   *   < > 124* 130* 130*   K 7.6*   < > 4.8 5.1 5.0   CL 96   < > 96 101 99   CO2 15*   < > 18* 17* 20*   BUN 32*   < > 43* 52* 54*   CREATININE 2.3*   < > 2.7* 3.5* 3.6*   CALCIUM 9.2   < > 8.0* 7.6* 7.2*   ALBUMIN 3.6  --  2.8*  --  2.7*   PROT 7.0  --  5.5*  --  5.2*   BILITOT 1.3*  --  1.5*  --  1.6*   ALKPHOS 93  --  81  --  92   *  --  3,954*  --  3,683*   *  --  5,190*  --  4,113*   MG  --   --   --   --  2.1    < > = values in this interval not displayed.       A1C: No results for input(s): HGBA1C in the last 48 hours.  Blood Culture:   Recent Labs   Lab 04/02/23  1201 04/02/23  1308   LABBLOO No Growth to date  No Growth to date No Growth to date  No Growth to date     Coagulation:   Recent Labs   Lab 04/02/23  1201 04/04/23  0323   INR 1.4* 1.7*   APTT 35.2*  --      Lactic Acid:   Recent Labs   Lab 04/02/23  2049 04/03/23  0033 04/03/23  0442   LACTATE 7.2* 3.6* 1.8      Pathology Results  (Last 10 years)                 10/22/21 1008  Specimen to Pathology, Dermatology Final result    Narrative:  Number of Specimens:->1   ------------------------->-------------------------   Spec 1 Procedure:->Biopsy   Spec 1 Clinical Impression:->skin tag vs NMSC   Spec 1 Source:->left upper eyelid (margin)   Release to patient->Immediate       10/11/18 1400  Tissue Specimen to Pathology Final result    10/11/18 1400  Tissue Specimen to Pathology Final result          POCT Glucose:   Recent Labs   Lab 04/04/23  0038 04/04/23  0306 04/04/23  0715   POCTGLUCOSE 88 91 107     Respiratory Culture: No results for input(s): GSRESP, RESPIRATORYC in the last 48 hours.  Troponin:   Recent Labs   Lab 04/02/23  1201 04/02/23  1516   TROPONINI 0.195* 0.340*     Urine Culture:   Recent Labs   Lab 04/02/23  1512   LABURIN ESCHERICHIA COLI  >100,000 cfu/ml  *     Urine Studies:   Recent Labs   Lab 04/02/23  1512   COLORU Yellow   APPEARANCEUA Cloudy*   PHUR 6.0   SPECGRAV 1.015   PROTEINUA 1+*   GLUCUA Negative   KETONESU Negative   BILIRUBINUA Negative   OCCULTUA 2+*   NITRITE Negative   UROBILINOGEN Negative   LEUKOCYTESUR 3+*   RBCUA 24*   WBCUA >100*   BACTERIA Many*   HYALINECASTS 0       Significant Imaging:  I have reviewed all pertinent imaging results/findings within the past 24 hours..

## 2023-04-04 NOTE — PROGRESS NOTES
2025 Notified by RT of increased work of breathing and mild retractions    - bedside exam reveals mild stridor, mild retractions, RR upper 20s   - ABG  Recent Labs   Lab 04/03/23 2019   PH 7.206*   PO2 76*   PCO2 52.9*   HCO3 20.9*   BE -7     Ordered racemic epi neb x 1 and NIPPV    2115 Called to bedside for cardiac rhythm change, atrial fib with RVR on monitor   - EKG confirms atrial fib with    - pt with known history atrial fib, off home cardizem 60mg q8h and metoprolol 25mg since morning of 4/2   - NIBP MAP on monitor low 70s   - ordered metoprolol 2.5mg IV   - stop epi infusion   - repeated additional 2 doses metoprolol IV over next 30 minutes   - MAP initially holding but fell < 65 after 3rd dose metoprolol   - add neosynephrine infusion, titrate as needed to keep MAP 65 or greater   - ordered cardizem, IV Push 10mg   - repeat abg ABG  Recent Labs   Lab 04/03/23 2220   PH 7.257*   PO2 178*   PCO2 44.6   HCO3 19.9*   BE -7     HR after cardizem push , atrial fib  Adequate MAP on low dose neosynephrine infusion   - continue close monitoring   - repeat abg in 2 hours    Critical s/t atrial fib with RVR, acute hypercapnic respiratory failure, septic shock  Critical care time 60 minutes spent by myself on the following activities: development of treatment plan with patient or surrogate and bedside caregivers, discussions with consultants, evaluation of patient's response to treatment, examination of patient, ordering and performing treatments and interventions, ordering and review of laboratory studies, ordering and review of radiographic studies, pulse oximetry, re-evaluation of patient's condition.  This critical care time did not overlap with that of any other provider.      Piedad Haines, ACNP-BC [facility]

## 2023-04-04 NOTE — PLAN OF CARE
Pt is oriented to self. Vital signs stable on continuous BiPAP. Afib on monitor, MAP >65 with Jose gtt infusing. Oliguria, bass changed at 1500. Nephrology consulted. Palliative also consulted with plan for family meeting at 0800 in am. POC reviewed with pt and family at bedside. Pt turned q2h for skin breakdown prevention. Bed is locked in lowest position, side rails up x2, bed alarm set.  Temp:  [97.7 °F (36.5 °C)-99.5 °F (37.5 °C)]   Pulse:  []   Resp:  [10-50]   BP: ()/(50-70)   SpO2:  [78 %-99 %]    Problem: Infection  Goal: Absence of Infection Signs and Symptoms  Outcome: Ongoing, Not Progressing     Problem: Adult Inpatient Plan of Care  Goal: Plan of Care Review  Outcome: Ongoing, Not Progressing  Goal: Patient-Specific Goal (Individualized)  Outcome: Ongoing, Not Progressing  Goal: Absence of Hospital-Acquired Illness or Injury  Outcome: Ongoing, Not Progressing  Goal: Optimal Comfort and Wellbeing  Outcome: Ongoing, Not Progressing  Goal: Readiness for Transition of Care  Outcome: Ongoing, Not Progressing     Problem: Adjustment to Illness (Sepsis/Septic Shock)  Goal: Optimal Coping  Outcome: Ongoing, Not Progressing     Problem: Bleeding (Sepsis/Septic Shock)  Goal: Absence of Bleeding  Outcome: Ongoing, Not Progressing     Problem: Glycemic Control Impaired (Sepsis/Septic Shock)  Goal: Blood Glucose Level Within Desired Range  Outcome: Ongoing, Not Progressing     Problem: Infection Progression (Sepsis/Septic Shock)  Goal: Absence of Infection Signs and Symptoms  Outcome: Ongoing, Not Progressing     Problem: Nutrition Impaired (Sepsis/Septic Shock)  Goal: Optimal Nutrition Intake  Outcome: Ongoing, Not Progressing     Problem: Fluid and Electrolyte Imbalance (Acute Kidney Injury/Impairment)  Goal: Fluid and Electrolyte Balance  Outcome: Ongoing, Not Progressing     Problem: Oral Intake Inadequate (Acute Kidney Injury/Impairment)  Goal: Optimal Nutrition Intake  Outcome: Ongoing, Not  Progressing     Problem: Renal Function Impairment (Acute Kidney Injury/Impairment)  Goal: Effective Renal Function  Outcome: Ongoing, Not Progressing     Problem: Fall Injury Risk  Goal: Absence of Fall and Fall-Related Injury  Outcome: Ongoing, Not Progressing     Problem: Skin Injury Risk Increased  Goal: Skin Health and Integrity  Outcome: Ongoing, Not Progressing     Problem: Impaired Wound Healing  Goal: Optimal Wound Healing  Outcome: Ongoing, Not Progressing     Problem: Coping Ineffective  Goal: Effective Coping  Outcome: Ongoing, Not Progressing

## 2023-04-04 NOTE — PLAN OF CARE
See ACNP note regarding Pt. Afib RVR and bipap orders.   Pt. Nonverbal for PM shift- responds to pain. Medicated for pain twice per PRN orders.   Pt. Remains on Jose drip for blood pressure control and IV fluids. D10 bolus x1 for low blood sugars- will monitor q4h.  Low urine output, ACNP Pastora informed.          Problem: Infection  Goal: Absence of Infection Signs and Symptoms  Outcome: Ongoing, Not Progressing     Problem: Adult Inpatient Plan of Care  Goal: Plan of Care Review  Outcome: Ongoing, Not Progressing  Goal: Patient-Specific Goal (Individualized)  Outcome: Ongoing, Not Progressing  Goal: Absence of Hospital-Acquired Illness or Injury  Outcome: Ongoing, Not Progressing  Goal: Optimal Comfort and Wellbeing  Outcome: Ongoing, Not Progressing  Goal: Readiness for Transition of Care  Outcome: Ongoing, Not Progressing     Problem: Adjustment to Illness (Sepsis/Septic Shock)  Goal: Optimal Coping  Outcome: Ongoing, Not Progressing     Problem: Bleeding (Sepsis/Septic Shock)  Goal: Absence of Bleeding  Outcome: Ongoing, Not Progressing     Problem: Glycemic Control Impaired (Sepsis/Septic Shock)  Goal: Blood Glucose Level Within Desired Range  Outcome: Ongoing, Not Progressing     Problem: Infection Progression (Sepsis/Septic Shock)  Goal: Absence of Infection Signs and Symptoms  Outcome: Ongoing, Not Progressing     Problem: Nutrition Impaired (Sepsis/Septic Shock)  Goal: Optimal Nutrition Intake  Outcome: Ongoing, Not Progressing     Problem: Fluid and Electrolyte Imbalance (Acute Kidney Injury/Impairment)  Goal: Fluid and Electrolyte Balance  Outcome: Ongoing, Not Progressing     Problem: Oral Intake Inadequate (Acute Kidney Injury/Impairment)  Goal: Optimal Nutrition Intake  Outcome: Ongoing, Not Progressing     Problem: Renal Function Impairment (Acute Kidney Injury/Impairment)  Goal: Effective Renal Function  Outcome: Ongoing, Not Progressing     Problem: Fall Injury Risk  Goal: Absence of Fall and  Fall-Related Injury  Outcome: Ongoing, Not Progressing     Problem: Skin Injury Risk Increased  Goal: Skin Health and Integrity  Outcome: Ongoing, Not Progressing     Problem: Impaired Wound Healing  Goal: Optimal Wound Healing  Outcome: Ongoing, Not Progressing

## 2023-04-04 NOTE — ASSESSMENT & PLAN NOTE
Patient with Hypoxic Respiratory failure which is Acute.. Supplemental oxygen was provided and noted- Vent Mode: Spont  Oxygen Concentration (%):  [28-40] 28  Resp Rate Total:  [7.3 br/min-23 br/min] 13 br/min  Vt Set:  [420 mL] 420 mL  PEEP/CPAP:  [5 cmH20] 5 cmH20  Pressure Support:  [0 [id number] cmH20] 5 cmH20  Mean Airway Pressure:  [7 [id number] cmH20] 7.1 cmH20    .   Signs/symptoms of respiratory failure include- tachypnea and respiratory distress. Contributing diagnoses includes - sepsis Labs and images were reviewed. Patient Has recent ABG, which has been reviewed. Will treat underlying causes and adjust management of respiratory failure as follows- vent support.    - intubated 4/2 in the ER and extubated 4/3  -However, now on AVAPS  - FiO2 28% and 5 of PEEP

## 2023-04-05 PROBLEM — E87.20 LACTIC ACIDOSIS: Status: RESOLVED | Noted: 2023-04-02 | Resolved: 2023-04-05

## 2023-04-05 LAB
25(OH)D3+25(OH)D2 SERPL-MCNC: 13 NG/ML (ref 30–96)
ALBUMIN SERPL BCP-MCNC: 2.4 G/DL (ref 3.5–5.2)
ALP SERPL-CCNC: 98 U/L (ref 55–135)
ALT SERPL W/O P-5'-P-CCNC: 2568 U/L (ref 10–44)
ANION GAP SERPL CALC-SCNC: 13 MMOL/L (ref 8–16)
AST SERPL-CCNC: 2040 U/L (ref 10–40)
BASOPHILS # BLD AUTO: 0.02 K/UL (ref 0–0.2)
BASOPHILS NFR BLD: 0.2 % (ref 0–1.9)
BILIRUB DIRECT SERPL-MCNC: 1.8 MG/DL (ref 0.1–0.3)
BILIRUB SERPL-MCNC: 2.2 MG/DL (ref 0.1–1)
BUN SERPL-MCNC: 57 MG/DL (ref 8–23)
CALCIUM SERPL-MCNC: 7.1 MG/DL (ref 8.7–10.5)
CHLORIDE SERPL-SCNC: 92 MMOL/L (ref 95–110)
CO2 SERPL-SCNC: 28 MMOL/L (ref 23–29)
CREAT SERPL-MCNC: 4.6 MG/DL (ref 0.5–1.4)
DIFFERENTIAL METHOD: ABNORMAL
EOSINOPHIL # BLD AUTO: 0.2 K/UL (ref 0–0.5)
EOSINOPHIL NFR BLD: 2 % (ref 0–8)
EOSINOPHIL URNS QL WRIGHT STN: NORMAL
ERYTHROCYTE [DISTWIDTH] IN BLOOD BY AUTOMATED COUNT: 14.3 % (ref 11.5–14.5)
EST. GFR  (NO RACE VARIABLE): 9 ML/MIN/1.73 M^2
GLUCOSE SERPL-MCNC: 107 MG/DL (ref 70–110)
HCT VFR BLD AUTO: 26.6 % (ref 37–48.5)
HGB BLD-MCNC: 9 G/DL (ref 12–16)
IMM GRANULOCYTES # BLD AUTO: 0.09 K/UL (ref 0–0.04)
IMM GRANULOCYTES NFR BLD AUTO: 0.9 % (ref 0–0.5)
INR PPP: 1.4 (ref 0.8–1.2)
LYMPHOCYTES # BLD AUTO: 0.9 K/UL (ref 1–4.8)
LYMPHOCYTES NFR BLD: 9.8 % (ref 18–48)
MAGNESIUM SERPL-MCNC: 1.8 MG/DL (ref 1.6–2.6)
MCH RBC QN AUTO: 28.7 PG (ref 27–31)
MCHC RBC AUTO-ENTMCNC: 33.8 G/DL (ref 32–36)
MCV RBC AUTO: 85 FL (ref 82–98)
MONOCYTES # BLD AUTO: 0.6 K/UL (ref 0.3–1)
MONOCYTES NFR BLD: 6.2 % (ref 4–15)
NEUTROPHILS # BLD AUTO: 7.7 K/UL (ref 1.8–7.7)
NEUTROPHILS NFR BLD: 80.9 % (ref 38–73)
NRBC BLD-RTO: 0 /100 WBC
PLATELET # BLD AUTO: 110 K/UL (ref 150–450)
PMV BLD AUTO: 11.1 FL (ref 9.2–12.9)
POCT GLUCOSE: 113 MG/DL (ref 70–110)
POCT GLUCOSE: 131 MG/DL (ref 70–110)
POCT GLUCOSE: 86 MG/DL (ref 70–110)
POCT GLUCOSE: 96 MG/DL (ref 70–110)
POTASSIUM SERPL-SCNC: 4.5 MMOL/L (ref 3.5–5.1)
PROT SERPL-MCNC: 5 G/DL (ref 6–8.4)
PROTHROMBIN TIME: 15.2 SEC (ref 9–12.5)
RBC # BLD AUTO: 3.14 M/UL (ref 4–5.4)
SODIUM SERPL-SCNC: 133 MMOL/L (ref 136–145)
WBC # BLD AUTO: 9.48 K/UL (ref 3.9–12.7)

## 2023-04-05 PROCEDURE — 99233 SBSQ HOSP IP/OBS HIGH 50: CPT | Mod: ,,, | Performed by: INTERNAL MEDICINE

## 2023-04-05 PROCEDURE — 85610 PROTHROMBIN TIME: CPT | Performed by: NURSE PRACTITIONER

## 2023-04-05 PROCEDURE — 94761 N-INVAS EAR/PLS OXIMETRY MLT: CPT

## 2023-04-05 PROCEDURE — 51702 INSERT TEMP BLADDER CATH: CPT

## 2023-04-05 PROCEDURE — 99497 PR ADVNCD CARE PLAN 30 MIN: ICD-10-PCS | Mod: ,,,

## 2023-04-05 PROCEDURE — 99497 ADVNCD CARE PLAN 30 MIN: CPT | Mod: ,,,

## 2023-04-05 PROCEDURE — 63600175 PHARM REV CODE 636 W HCPCS: Performed by: INTERNAL MEDICINE

## 2023-04-05 PROCEDURE — 85025 COMPLETE CBC W/AUTO DIFF WBC: CPT | Performed by: INTERNAL MEDICINE

## 2023-04-05 PROCEDURE — 25000003 PHARM REV CODE 250: Performed by: INTERNAL MEDICINE

## 2023-04-05 PROCEDURE — 99233 PR SUBSEQUENT HOSPITAL CARE,LEVL III: ICD-10-PCS | Mod: ,,,

## 2023-04-05 PROCEDURE — 80048 BASIC METABOLIC PNL TOTAL CA: CPT | Performed by: INTERNAL MEDICINE

## 2023-04-05 PROCEDURE — 80076 HEPATIC FUNCTION PANEL: CPT | Performed by: INTERNAL MEDICINE

## 2023-04-05 PROCEDURE — 99900035 HC TECH TIME PER 15 MIN (STAT)

## 2023-04-05 PROCEDURE — 25000003 PHARM REV CODE 250: Performed by: NURSE PRACTITIONER

## 2023-04-05 PROCEDURE — 27000221 HC OXYGEN, UP TO 24 HOURS

## 2023-04-05 PROCEDURE — 83735 ASSAY OF MAGNESIUM: CPT | Performed by: INTERNAL MEDICINE

## 2023-04-05 PROCEDURE — 99233 PR SUBSEQUENT HOSPITAL CARE,LEVL III: ICD-10-PCS | Mod: ,,, | Performed by: INTERNAL MEDICINE

## 2023-04-05 PROCEDURE — 11000001 HC ACUTE MED/SURG PRIVATE ROOM

## 2023-04-05 PROCEDURE — 99233 SBSQ HOSP IP/OBS HIGH 50: CPT | Mod: ,,,

## 2023-04-05 PROCEDURE — 63600175 PHARM REV CODE 636 W HCPCS: Performed by: NURSE PRACTITIONER

## 2023-04-05 RX ORDER — FUROSEMIDE 10 MG/ML
80 INJECTION INTRAMUSCULAR; INTRAVENOUS ONCE
Status: COMPLETED | OUTPATIENT
Start: 2023-04-05 | End: 2023-04-05

## 2023-04-05 RX ADMIN — FAMOTIDINE 20 MG: 10 INJECTION, SOLUTION INTRAVENOUS at 08:04

## 2023-04-05 RX ADMIN — MORPHINE SULFATE 1 MG: 2 INJECTION, SOLUTION INTRAMUSCULAR; INTRAVENOUS at 04:04

## 2023-04-05 RX ADMIN — DILTIAZEM HYDROCHLORIDE 10 MG: 5 INJECTION, SOLUTION INTRAVENOUS at 04:04

## 2023-04-05 RX ADMIN — CEFTRIAXONE 1 G: 1 INJECTION, POWDER, FOR SOLUTION INTRAMUSCULAR; INTRAVENOUS at 05:04

## 2023-04-05 RX ADMIN — SODIUM BICARBONATE: 84 INJECTION, SOLUTION INTRAVENOUS at 03:04

## 2023-04-05 RX ADMIN — MUPIROCIN: 20 OINTMENT TOPICAL at 08:04

## 2023-04-05 RX ADMIN — ENOXAPARIN SODIUM 30 MG: 30 INJECTION SUBCUTANEOUS at 05:04

## 2023-04-05 RX ADMIN — DILTIAZEM HYDROCHLORIDE 10 MG: 5 INJECTION, SOLUTION INTRAVENOUS at 11:04

## 2023-04-05 RX ADMIN — MORPHINE SULFATE 1 MG: 2 INJECTION, SOLUTION INTRAMUSCULAR; INTRAVENOUS at 01:04

## 2023-04-05 RX ADMIN — MORPHINE SULFATE 1 MG: 2 INJECTION, SOLUTION INTRAMUSCULAR; INTRAVENOUS at 08:04

## 2023-04-05 RX ADMIN — FUROSEMIDE 80 MG: 10 INJECTION, SOLUTION INTRAMUSCULAR; INTRAVENOUS at 10:04

## 2023-04-05 NOTE — PROGRESS NOTES
..Rachel Long is a 82 y.o. female for whom nephrology consult has been requested to evaluate and give opinion.     HPI:  82 year old white female currently on Bipap who cannot provide history so EHR was reviewed in detail who was admitted with AMS on 4/2/23: Scr at that time was 0.9 then 2.3, 2.2; up to 2.4, 2.7, 3.5 and 3.6 this am. Had a CT scan but this was without contrast; showed normal kidneys but lung mass noted. UOP poor; remains * liters TBV + since admission; Nephrology consult requested for concerns of DANN associated with sepsis; she is on bicarb gtt and Rocephin dosing. Meds and labs reviewed. E. Coli UTI noted on urine cultures. Nephrology consult requested for further evaluation. Currently, now on Levo and Epi.     *For 4/5/23: Little change clinically overnight.     PAST MEDICAL HISTORY:  She  has a past medical history of Acute upper respiratory infection, Anemia, Arthritis, Bipolar mood disorder, Cataract, Coordination abnormal, COPD exacerbation (5/9/2016), Coronary artery disease, Difficulty walking, Dysphasia, GERD (gastroesophageal reflux disease), Hyperlipidemia, Hypertension, Hypothyroidism, adult, Insomnia, Malignant neoplasm of colon, Muscle weakness, Neuralgia and neuritis, Parkinson disease, Pulmonary embolism, Schizoaffective disorder, SOB (shortness of breath), Spinal stenosis, Stroke, Thyroid disease, and Urinary tract infection.    PAST SURGICAL HISTORY:  She  has a past surgical history that includes Coronary stent placement; Colon surgery; Hysterectomy (1970's); Fracture surgery; Esophagogastroduodenoscopy (N/A, 7/9/2020); Colonoscopy (N/A, 7/9/2020); Cataract extraction (Right, 03/23/2022); and Cataract extraction w/  intraocular lens implant (Left, 06/02/2022).    SOCIAL HISTORY:  She  reports that she has quit smoking. Her smoking use included cigarettes. She has quit using smokeless tobacco. She reports that she does not drink alcohol and does not use drugs.      FAMILY MEDICAL  HISTORY:  Her family history includes Asthma in her father; Eczema in her father; No Known Problems in her mother; Other in her brother.    Review of patient's allergies indicates:   Allergen Reactions    Benadryl [diphenhydramine hcl] Anxiety    Sulfa (sulfonamide antibiotics) Rash        bisacodyL  10 mg Rectal Q48H    buPROPion  100 mg Oral QAM    cefTRIAXone (ROCEPHIN) IVPB  1 g Intravenous Q24H    enoxaparin  30 mg Subcutaneous Daily    famotidine (PF)  20 mg Intravenous Daily    furosemide (LASIX) injection  80 mg Intravenous Once    mupirocin   Nasal BID    thiamine  100 mg Oral Daily       Prior to Admission medications    Medication Sig Start Date End Date Taking? Authorizing Provider   albuterol (PROVENTIL/VENTOLIN HFA) 90 mcg/actuation inhaler Inhale 2 puffs into the lungs every 6 (six) hours as needed for Wheezing. Rescue   Yes Historical Provider   atorvastatin (LIPITOR) 80 MG tablet Take 1 tablet (80 mg total) by mouth every evening. 8/11/22 9/23/23 Yes Duy Lui MD   benztropine (COGENTIN) 1 MG tablet Take 1.5 mg by mouth every morning and 1 mg nightly   Yes Historical Provider   budesonide (PULMICORT) 0.25 mg/2 mL nebulizer solution Take 0.25 mg by nebulization 2 (two) times a day. 3/28/23  Yes Historical Provider   buPROPion (WELLBUTRIN SR) 100 MG TBSR 12 hr tablet Take 100 mg by mouth every morning. 4/1/23  Yes Historical Provider   busPIRone (BUSPAR) 5 MG Tab Take 5 mg by mouth 2 (two) times daily. 6/12/20  Yes Historical Provider   cycloSPORINE (RESTASIS) 0.05 % ophthalmic emulsion Place 1 drop into both eyes 2 (two) times daily.   Yes Historical Provider   diltiaZEM (CARDIZEM) 60 MG tablet Take 1 tablet (60 mg total) by mouth every 8 (eight) hours. 8/11/22 8/11/23 Yes Duy Lui MD   DULoxetine (CYMBALTA) 60 MG capsule Take 60 mg by mouth 2 (two) times daily. 7/30/18  Yes Historical Provider   HYDROcodone-acetaminophen (NORCO) 5-325 mg per tablet Take 1 tablet by mouth every 6 (six) hours  as needed for Pain. 4/1/23 4/8/23 Yes Alfonso Jenkins MD   meclizine (ANTIVERT) 25 mg tablet Take 1 tablet (25 mg total) by mouth 3 (three) times daily as needed for Dizziness or Nausea. 1/22/23 4/2/23 Yes Alfonso Jenkins MD   melatonin 3 mg Cap Take 1 tablet by mouth nightly as needed.   Yes Historical Provider   metoprolol succinate (TOPROL-XL) 25 MG 24 hr tablet Take 25 mg by mouth once daily. 3/31/23  Yes Historical Provider   ondansetron (ZOFRAN-ODT) 4 MG TbDL Take 1 tablet (4 mg total) by mouth every 8 (eight) hours as needed (nausea/vomiting). 1/22/23  Yes Alfonso Jenkins MD   pantoprazole (PROTONIX) 40 MG tablet Take 40 mg by mouth once daily. 6/12/19 4/2/23 Yes Historical Provider   QUEtiapine 150 mg Tab Take 150 mg by mouth every evening. 12/22/18  Yes Historical Provider   saliva substitute combo no.9 (BIOTENE DRY MOUTH ORAL RINSE MM) 15 mLs by Mucous Membrane route 3 (three) times daily.   Yes Historical Provider   spironolactone (ALDACTONE) 25 MG tablet Take 1 tablet (25 mg total) by mouth once daily.  Patient taking differently: Take 12.5 mg by mouth once daily. 8/11/22  Yes Duy Lui MD   thiamine 100 MG tablet Take 100 mg by mouth once daily.   Yes Historical Provider   acetaminophen (TYLENOL) 325 MG tablet Take 650 mg by mouth 4 (four) times daily as needed for Pain.    Historical Provider   albuterol (PROVENTIL) 2.5 mg /3 mL (0.083 %) nebulizer solution Take 2.5 mg by nebulization 4 (four) times daily as needed. 2/17/23   Historical Provider   budesonide-formoterol 80-4.5 mcg (SYMBICORT) 80-4.5 mcg/actuation HFAA Inhale 2 puffs into the lungs once daily. Controller 8/6/21 8/6/22  Vikcie Vidal MD   dextran 70-hypromellose 0.1-0.3 % Drop Apply 1 drop to eye 2 (two) times daily.    Historical Provider   ibuprofen (ADVIL,MOTRIN) 400 MG tablet Take 400 mg by mouth every 6 (six) hours as needed for Other.    Historical Provider   loperamide (IMODIUM A-D) 2 mg Tab Take 2 mg by mouth  "daily as needed (give one po after each episode of loose stool with max of 8 tabs/daily).    Historical Provider        REVIEW OF SYSTEMS:    Unobtainable to mental status.    Patient has no fever, fatigue, visual changes, chest pain, edema, cough, dyspnea, nausea, vomiting, constipation, diarrhea, arthralgias, pruritis, dizziness, weakness, depression, confusion.        PHYSICAL EXAM:   height is 5' 4" (1.626 m) and weight is 71.4 kg (157 lb 6.5 oz). Her temperature is 97.9 °F (36.6 °C). Her blood pressure is 111/65 and her pulse is 72. Her respiration is 23 (abnormal) and oxygen saturation is 97%.   Gen: WDWN female in apparent distress on Bipap.     Psych: Normal mood and affect  Skin: No rashes or ulcers  Eyes: Normal conjunctiva and lids, PERRLA  ENT: Normal hearing with no oropharyngeal lesions  Neck: No JVD  Chest: Clear with no rales, rhonchi, wheezing with normal effort  CV: Regular with no murmurs, gallops or rubs  Abd: Soft, nontender, no distension, positive bowel sounds  Ext: No cyanosis, clubbing or edema          IMPRESSION AND RECOMMENDATIONS:    DANN - oligoanuric  Septic  shock on pressors (UTI)  Hypotension  AMS  EARL lung CA  Hyponatremia  Hyperkalemia    Plan: Patient seen and examined; at this point we are likely dealing with sepsis associated DANN and not contrast induced nephropathy, which was my original concern given rapid decline in eGFR temporally associated with CT scan but this was without contrast; supportive care on board with bicarb gtt and pressors; not a HD Candidate at present time; palliative care will be meeting with family I am told; will check basic urine testing (eos) but suspect we are dealing with sepsis associated DANN and not a GN or obstruction. Shock liver and elevated CPK noted; will re-check as needed.  Please call with interim concerns.    *For 4/5/23: Volume overload apparent with 10+ liters TBV since admission; not an ideal HD Candidate due to multiple medical " conditions and co-morbidities; will d/w crit care colleagues regarding goals of care; for now dc IVF and attempt diuretic gtt for volume overload.  Following closely with you.     Carlos Starkey MD  [phone]          Carlos Starkey MD

## 2023-04-05 NOTE — SUBJECTIVE & OBJECTIVE
Interval History:   Unable to obtain interval history or ROS from patient due to confusion.    Objective:     Vital Signs (Most Recent):  Temp: 97.9 °F (36.6 °C) (04/05/23 0800)  Pulse: 72 (04/05/23 0800)  Resp: (!) 23 (04/05/23 0841)  BP: 111/65 (04/05/23 0800)  SpO2: 97 % (04/05/23 0800)   Vital Signs (24h Range):  Temp:  [97.3 °F (36.3 °C)-99.7 °F (37.6 °C)] 97.9 °F (36.6 °C)  Pulse:  [] 72  Resp:  [7-50] 23  SpO2:  [81 %-100 %] 97 %  BP: ()/(50-80) 111/65     Weight: 71.4 kg (157 lb 6.5 oz)  Body mass index is 27.02 kg/m².      Intake/Output Summary (Last 24 hours) at 4/5/2023 1032  Last data filed at 4/5/2023 0800  Gross per 24 hour   Intake 2903.94 ml   Output 295 ml   Net 2608.94 ml       Physical Exam  Vitals reviewed.   Constitutional:       General: She is awake.      Appearance: She is ill-appearing.   HENT:      Head: Normocephalic and atraumatic.      Nose: Nose normal.   Eyes:      Pupils: Pupils are equal, round, and reactive to light.   Cardiovascular:      Rate and Rhythm: Tachycardia present. Rhythm irregular.   Pulmonary:      Effort: Pulmonary effort is normal. No respiratory distress.      Comments: Symmetric chest rise.    Abdominal:      General: Abdomen is flat. There is no distension.      Palpations: Abdomen is soft.   Musculoskeletal:         General: No deformity or signs of injury.      Right lower leg: No edema.      Left lower leg: No edema.   Skin:     General: Skin is warm and dry.      Comments: Scattered bruising on the L side, most notably over the hip and left upper chest (known left clavicular fracture)      Neurological:      Mental Status: She is alert.      Comments: Confused.  Answers basic questions like what is her name.  Does not consistently follow commands.  Moves all extremities.       Vents:  Vent Mode: Spont (04/03/23 1630)  Set Rate: 0 BPM (04/03/23 1630)  Vt Set: 420 mL (04/03/23 1630)  Pressure Support: 5 cmH20 (04/03/23 1630)  PEEP/CPAP: 5 cmH20  (04/03/23 1630)  Oxygen Concentration (%): 28 (04/04/23 2110)  Peak Airway Pressure: 11 cmH20 (04/03/23 1630)  Plateau Pressure: 16 cmH20 (04/03/23 1630)  Total Ve: 6.65 L/m (04/03/23 1630)  F/VT Ratio<105 (RSBI): (!) 23.26 (04/03/23 1630)    Lines/Drains/Airways       Central Venous Catheter Line  Duration             Percutaneous Central Line Insertion/Assessment - Triple Lumen  04/02/23 Internal Jugular Left 3 days              Drain  Duration                  Urethral Catheter 04/04/23 1500 Temperature probe <1 day                    Significant Labs:    CBC/Anemia Profile:  Recent Labs   Lab 04/04/23 0323 04/05/23 0345   WBC 13.37* 9.48   HGB 9.4* 9.0*   HCT 29.2* 26.6*   * 110*   MCV 88 85   RDW 14.6* 14.3        Chemistries:  Recent Labs   Lab 04/03/23  2202 04/04/23 0323 04/04/23  1216 04/05/23  0345   * 130*  --  133*   K 5.1 5.0  --  4.5    99  --  92*   CO2 17* 20*  --  28   BUN 52* 54*  --  57*   CREATININE 3.5* 3.6*  --  4.6*   CALCIUM 7.6* 7.2*  --  7.1*   ALBUMIN  --  2.7*  --  2.4*   PROT  --  5.2*  --  5.0*   BILITOT  --  1.6*  --  2.2*   ALKPHOS  --  92  --  98   ALT  --  3,683*  --  2,568*   AST  --  4,113*  --  2,040*   MG  --  2.1  --  1.8   PHOS  --   --  3.4  --        A1C: No results for input(s): HGBA1C in the last 48 hours.  Blood Culture: No results for input(s): LABBLOO in the last 48 hours.  Coagulation:   Recent Labs   Lab 04/05/23 0345   INR 1.4*     Lactic Acid: No results for input(s): LACTATE in the last 48 hours.  Pathology Results  (Last 10 years)                 10/22/21 1008  Specimen to Pathology, Dermatology Final result    Narrative:  Number of Specimens:->1   ------------------------->-------------------------   Spec 1 Procedure:->Biopsy   Spec 1 Clinical Impression:->skin tag vs NMSC   Spec 1 Source:->left upper eyelid (margin)   Release to patient->Immediate       10/11/18 1400  Tissue Specimen to Pathology Final result    10/11/18 1400  Tissue  Specimen to Pathology Final result          POCT Glucose:   Recent Labs   Lab 04/04/23  2311 04/05/23  0347 04/05/23  0818   POCTGLUCOSE 91 113* 131*     Respiratory Culture: No results for input(s): GSRESP, RESPIRATORYC in the last 48 hours.  Troponin: No results for input(s): TROPONINI in the last 48 hours.  Urine Culture: No results for input(s): LABURIN in the last 48 hours.  Urine Studies: No results for input(s): COLORU, APPEARANCEUA, PHUR, SPECGRAV, PROTEINUA, GLUCUA, KETONESU, BILIRUBINUA, OCCULTUA, NITRITE, UROBILINOGEN, LEUKOCYTESUR, RBCUA, WBCUA, BACTERIA, SQUAMEPITHEL, HYALINECASTS in the last 48 hours.    Invalid input(s): TRANG    Significant Imaging:  I have reviewed all pertinent imaging results/findings within the past 24 hours.

## 2023-04-05 NOTE — NURSING
Spoke with Dr. Begum regarding central line removal. He approved waiting until discharge to inpatient hospice for removal.

## 2023-04-05 NOTE — CONSULTS
"Advance Care Planning    Consult Note  Palliative Medicine      Consult Requested By: Reg Patton MD  Reason for Consult: Goals of care and Advance care planning    SUBJECTIVE:     History of Present Illness:  Rachel Long is a 82 y.o. year old woman with CAD, HTN, COPD, bipolar disorder, history of PE (not on anticoagulation seemingly due to frequent falls), history of lung cancer and Parkinson's disease. She is a resident of [facility]. She presented to ER 04/01/2023 following a fall and subsequent left clavicle fracture. She was discharged on the same day. EMS was called 04/02/2023 due to altered mental status.  Per EMS, she was having erratic movements and had a near syncopal episode in route.  Blood glucose at the time of 65 she was given D10.  Also some reports of hypoxia and bradycardia while EN route.  He had some further bradycardia in the low 30s and hypoxia into the 60s, and was intubated in the ER 04/02/2023. She was given volume resuscitation along with Zosyn from looks like a UTI.  Also given medical correction for hyperkalemia and started on vasopressors. Imaging in the ER notable for 5.7 cm left upper lobe lung mass as well as some other spiculated lesions on the right concerning for metastatic disease. Patient was subsequently admitted to ICU on 04/02/2023.     Hospital Course:  Patient was initially DNR on 4/02/2023, but changed to Partial Code, with intubation only.    Palliative care encounter 4/4/2023: The palliative care team was consulted for advance care planning and goals of care discussion, given patient's condition. I proceeded to call pt's son, Anam Long, as pt was too confused to converse and she was wearing BiPAP mask. I informed Mr. Long of the role of palliative care team in supporting patients with serious illnesses and their families. Mr. Long stated that he is aware that pt is "is not doing well", but he wanted her to remain on BiPAP and be " re-intubated if needed. I explained to Mr. Long that supportive ventilation will not reverse patient's comorbidities including lung cancer. Furthermore, I explained that patient's cardiopulmonary illnesses and current diagnoses, put her risk for dying soon, and re-intubation will not guarantee neurological recovery. Mr. Long stated that he is aware of the risks, and he wants patient to remain partial code until his brother, patient's son, Reg Long arrives on 04/05/2023 from [place]. We scheduled a family meeting with all pt's children, Anam Long, Reg Long (lives in [place]), Manuela Long (lives in [place]), and Bharath Long (lives in [facility], and would be joining meeting by phone).       Past Medical History:   Diagnosis Date    Acute upper respiratory infection     Anemia     Arthritis     osteo    Bipolar mood disorder     Cataract     Coordination abnormal     COPD exacerbation 5/9/2016    Coronary artery disease     Difficulty walking     Dysphasia     GERD (gastroesophageal reflux disease)     Hyperlipidemia     Hypertension     on meds    Hypothyroidism, adult     Insomnia     Malignant neoplasm of colon     Muscle weakness     Neuralgia and neuritis     Parkinson disease     Pulmonary embolism     Schizoaffective disorder     SOB (shortness of breath)     Spinal stenosis     Stroke     pt states mini strokes    Thyroid disease     Urinary tract infection      Past Surgical History:   Procedure Laterality Date    CATARACT EXTRACTION Right 03/23/2022    CATARACT EXTRACTION W/  INTRAOCULAR LENS IMPLANT Left 06/02/2022    COLON SURGERY      COLONOSCOPY N/A 7/9/2020    Procedure: COLONOSCOPY;  Surgeon: Cesar Montague III, MD;  Location: [facility];  Service: Endoscopy;  Laterality: N/A;    CORONARY STENT PLACEMENT      ESOPHAGOGASTRODUODENOSCOPY N/A 7/9/2020    Procedure: EGD (ESOPHAGOGASTRODUODENOSCOPY);  Surgeon: Cesar Montague III, MD;  Location: [facility];  Service: Endoscopy;  Laterality: N/A;     FRACTURE SURGERY      HYSTERECTOMY  1970's     Family History   Problem Relation Age of Onset    Asthma Father     Eczema Father     No Known Problems Mother     Other Brother         shoulder surgery        Social History     Socioeconomic History    Marital status: Single   Tobacco Use    Smoking status: Former     Years: 50.00     Types: Cigarettes    Smokeless tobacco: Former   Substance and Sexual Activity    Alcohol use: No    Drug use: No    Sexual activity: Never     Social Determinants of Health     Financial Resource Strain: Low Risk     Difficulty of Paying Living Expenses: Not hard at all   Food Insecurity: No Food Insecurity    Worried About Running Out of Food in the Last Year: Never true    Ran Out of Food in the Last Year: Never true   Transportation Needs: No Transportation Needs    Lack of Transportation (Medical): No    Lack of Transportation (Non-Medical): No   Physical Activity: Inactive    Days of Exercise per Week: 0 days    Minutes of Exercise per Session: 0 min   Stress: No Stress Concern Present    Feeling of Stress : Not at all   Housing Stability: Low Risk     Unable to Pay for Housing in the Last Year: No    Number of Places Lived in the Last Year: 1    Unstable Housing in the Last Year: No      Review of patient's allergies indicates:   Allergen Reactions    Benadryl [diphenhydramine hcl] Anxiety    Sulfa (sulfonamide antibiotics) Rash       Medications:    Current Facility-Administered Medications:     0.9%  NaCl infusion (for blood administration), , Intravenous, Q24H PRN, Reg Patton MD    albuterol nebulizer solution 2.5 mg, 2.5 mg, Nebulization, Q4H PRN, Ivan Dixon MD, 2.5 mg at 04/03/23 1647    bisacodyL suppository 10 mg, 10 mg, Rectal, Q48H, Reg Patton MD, 10 mg at 04/04/23 1015    buPROPion TBSR 12 hr tablet 100 mg, 100 mg, Oral, QAM, Reg Patton MD    [COMPLETED] cefTRIAXone (ROCEPHIN) 2 g in dextrose 5 % in water (D5W) 5 % 50 mL IVPB (MB+), 2  g, Intravenous, Q24H, Stopped at 04/04/23 1722 **FOLLOWED BY** cefTRIAXone (ROCEPHIN) 1 g in dextrose 5 % in water (D5W) 5 % 50 mL IVPB (MB+), 1 g, Intravenous, Q24H, Reg Patton MD    dextrose 10% bolus 125 mL 125 mL, 12.5 g, Intravenous, PRN, Piedad Haines ACNP-BC, Stopped at 04/03/23 2341    dextrose 10% bolus 250 mL 250 mL, 25 g, Intravenous, PRN, Piedad Haines, ACNP-BC    dextrose 40 % gel 15,000 mg, 15 g, Oral, PRN, Piedad Zaki, ACNP-BC    dextrose 40 % gel 30,000 mg, 30 g, Oral, PRN, Piedad Zaki, ACNP-BC    diltiaZEM injection 10 mg, 10 mg, Intravenous, Q2H PRN, Reg Patton MD, 10 mg at 04/05/23 1129    enoxaparin injection 30 mg, 30 mg, Subcutaneous, Daily, Ivan Dixon MD, 30 mg at 04/04/23 1655    famotidine (PF) injection 20 mg, 20 mg, Intravenous, Daily, Reg Patton MD, 20 mg at 04/05/23 0810    glucagon (human recombinant) injection 1 mg, 1 mg, Intramuscular, PRN, KENDY AvalosP-BC    haloperidol lactate injection 5 mg, 5 mg, Intravenous, Q6H PRN, Piedad Haines ACNP-BC, 5 mg at 04/04/23 2308    morphine injection 1 mg, 1 mg, Intravenous, Q3H PRN, KENDY AvalosP-BC, 1 mg at 04/05/23 0841    mupirocin 2 % ointment, , Nasal, BID, Ivan Dixon MD, Given at 04/05/23 0810    sodium chloride 0.9% flush 10 mL, 10 mL, Intravenous, PRN, Ivan Dixon MD    thiamine tablet 100 mg, 100 mg, Oral, Daily, Reg Patton MD    ROS:  Review of Systems   Reason unable to perform ROS: Pt was confused and wearing BiPAP mask.     OBJECTIVE:     Physical Exam:  Vitals: Temp: 99 °F (37.2 °C) (04/05/23 1115)  Pulse: (!) 121 (04/05/23 1115)  Resp: (!) 8 (04/05/23 1115)  BP: 123/67 (04/05/23 1115)  SpO2: 95 % (04/05/23 1115)    Physical Exam  Vitals and nursing note reviewed.   Constitutional:       Appearance: She is ill-appearing.      Comments: Limited PE as patient was confused and wearing BiPAP mask.   HENT:      Head: Normocephalic.   Eyes:      Pupils: Pupils are  equal, round, and reactive to light.   Cardiovascular:      Rate and Rhythm: Rhythm irregular.      Pulses: Normal pulses.      Heart sounds: Normal heart sounds. No murmur heard.  Pulmonary:      Breath sounds: Normal breath sounds. No wheezing.      Comments: Pt was BiPAP dependent  Chest:      Chest wall: No tenderness.   Abdominal:      General: Bowel sounds are normal. There is distension.      Palpations: Abdomen is soft.   Genitourinary:     Comments: Pierre Catheter in-situ. Luba urine noted   Musculoskeletal:         General: No swelling.      Cervical back: Normal range of motion and neck supple.      Comments: Pt was in bed.    Skin:     General: Skin is warm and dry.      Capillary Refill: Capillary refill takes less than 2 seconds.      Findings: Bruising (hands) present.   Neurological:      Mental Status: She is alert. She is disoriented.      Motor: Weakness (Generalized) present.      Comments: Alert to person only. Pt responded to her name.    Psychiatric:      Comments: Pt was confused and intermittently attempted to remove BiPAP mask         Review of Symptoms      Symptom Assessment (ESAS 0-10 Scale)  Unable to complete assessment due to Mental status change     CAM / Delirium:  Positive      Pain Assessment in Advanced Demential Scale (PAINAD)   Breathing - Independent of vocalization:  1  Negative vocalization:  1  Facial expression:  2  Body language:  1  Consolability:  1  Total:  6    ARIADNA Scale (Modified): Pt was on BiPAP at time of initial assessment.    Performance Status:  20    Living Arrangements:  Lives in nursing home    Psychosocial/Cultural:   See Palliative Psychosocial Note: Yes  Pt lives in in [facility] for the past 12 years. Her son, Anam Long and Daughter, in law, Macie have been her primary caregivers. Pt also has three other children, Reg Long (lives in [place]), Bharath Long (lives in [facility]), and Manuela Long (lives in [place]).   **Primary Social  "Worker to Follow**  Palliative Care  Consult: No    Advance Directives:   Living Will: Yes        Copy on chart: Yes        Oral Declaration: No    LaPOST: No    Do Not Resuscitate Status: No    Medical Power of : No (Son, Anam Long is the primary contact for discussing patient's medical status.)      Decision Making:  Family answered questions and Patient unable to communicate due to disease severity/cognitive impairment  Goals of Care: As of 04/04/2023, Anam Long endorsed that what is most important right now is to focus on extending patient's life as long as possible, even it it means sacrificing quality. He stated that he "wants the mask on to help her (patient) breathe until my brother, Reg (patient's other son) gets here from [place]."    Accordingly, we have decided that the best plan to meet the patient's goals includes continuing with treatment pending family meeting on 04/05/2023 at 8:30am.     Labs:  WBC   Date Value Ref Range Status   04/05/2023 9.48 3.90 - 12.70 K/uL Final       Hemoglobin   Date Value Ref Range Status   04/05/2023 9.0 (L) 12.0 - 16.0 g/dL Final       Hematocrit   Date Value Ref Range Status   04/05/2023 26.6 (L) 37.0 - 48.5 % Final       MCV   Date Value Ref Range Status   04/05/2023 85 82 - 98 fL Final       Platelets   Date Value Ref Range Status   04/05/2023 110 (L) 150 - 450 K/uL Final       BMP  Lab Results   Component Value Date     (L) 04/05/2023    K 4.5 04/05/2023    CL 92 (L) 04/05/2023    CO2 28 04/05/2023    BUN 57 (H) 04/05/2023    CREATININE 4.6 (H) 04/05/2023    CALCIUM 7.1 (L) 04/05/2023    ANIONGAP 13 04/05/2023    EGFRNORACEVR 9 (A) 04/05/2023       Lab Results   Component Value Date    AST 2,040 (H) 04/05/2023    ALKPHOS 98 04/05/2023    BILITOT 2.2 (H) 04/05/2023       Albumin   Date Value Ref Range Status   04/05/2023 2.4 (L) 3.5 - 5.2 g/dL Final       Radiology:I have reviewed all pertinent imaging results/findings within the past 24 " hours.      ASSESSMENT   Sepsis/Acute Hypoxemic Respiratory Failure/Shock Liver/Acute Renal Failure  Palliative Care Encounter    PLAN   1.  Sepsis/Acute Hypoxemic Respiratory Failure/Shock Liver/Acute Renal Failure   -ICU team managing. Pt extubated 04/04/23 on BiPAP with periods of agitation   -Nephrology following- Stated pt is not HD candidate at this time  2.  Palliative Care Encounter   -Code status- Partial Code: No chest compressions, No Defibrillation/Cardioversion, No Internal or External Pacemaker. Patient's son explained to me via phone on 4/4/2023 that he wants pt to remain on supported ventilation, but does not want chest compression in case of cardiac arrest. He stated he and his siblings will decide pt's code status following family meeting on 4/5/2023 @ [address] [place] [address]- None assigned. Pt's son, Anam Long and daughter in-law, Macie have been primary contact.    -Palliative care team will facilitate family meeting with pt's children, Anam Long, Reg Long and Manuela Long on 04/05/2023.     Discussed case and visit details with Dr. Patton     Thank you for allowing Palliative Medicine to be involved in the care of Rachel Long.         Medical decision making: HIGH based on high risk of death poor prognosis management of more than one chronic illness in exacerbation or progression of disease    Plan required increased review of medication choice, interaction, dosing, frequency, and route due to patient complexity. Patient complexity increased by: advanced age, altered mentation, at risk for delirium, and renal insufficiency    20 min ACP time spent discussing: code status, assessed patient specific goals and addressed the best way to achieve them, coordination of care and emotional support, formulating and communicating prognosis, exploring burden/ benefit of various approaches of treatment, inquired about existing or willingness to complete advance directive documents.        ROLDAN LEMON,  NP  Palliative Medicine

## 2023-04-05 NOTE — PROGRESS NOTES
[facility] ([facility])  Critical Care Medicine  Progress Note    Patient Name: Rachel Long  MRN: [id number]  Admission Date: 4/2/2023  Hospital Length of Stay: 3 days  Code Status: Partial Code  Attending Provider: Reg Patton MD  Primary Care Provider: Sharmaine English MD   Principal Problem: Septic shock    Subjective:     HPI:  Ms. Long is an 82-year-old nursing home resident with CAD, HTN, COPD, bipolar disorder, history of PE (not on anticoagulation seemingly due to frequent falls), history of lung cancer and Parkinson's disease who presented to the ER today via EMS for altered mental status.  History is taken from the medical record, as patient is unable to provide history and no family at bedside or reachable by telephone at this time.  Mr. Long was in the ER yesterday after a fall where she broke her left clavicle.  She was otherwise well and discharged home.  EMS was called today due to altered mental status.  Per EMS, she was having erratic movements and had a near syncopal episode in route.  Blood glucose at the time of 65 she was given D10.  Also some reports of hypoxia and bradycardia while EN route.  He had some further bradycardia in the low 30s and hypoxia into the 60s, and was intubated in the ER.  She was given volume resuscitation along with Zosyn from looks like a UTI.  Also given medical correction for hyperkalemia and started on vasopressors.    At the time my exam, she is intubated and sedated on propofol.  Ventilator weaned down to 40% FiO2 and 5 of PEEP.  She is on 0.25 mcg/kg/min of levo and 0.25 mcg/kg/min Epi.  Heart rate initially in the high 30s, but improved to the mid 40s during the course of my exam.    Imaging in the ER notable for 5.7 cm left upper lobe lung mass as well as some other spiculated lesions on the right concerning for metastatic disease.      Hospital/ICU Course:  4/2:  Intubated.  Pressors. Zosyn.  DANN.  4/3:  Remains on vent.  Epi down to 0.05.   Continue Zosyn.  Cr up to 2.7.  4/4:  Pt had episode of afib RVR last night.  Switched epi to phenylephrine. Also received metoprolol and diltiazem with improvement in rate.  Had to go on AVAPS.  Ur Cx with Ecoli.  Switch  Zosyn >> CTX.  Cr up to 3.6.  Renal consulted.  LFTs mildly improved.    4/5:   Last night pt was agitated c/w delirium.  Was able to come off NIPPV and also got haldol which helped.    Cr now up to 4.6.  Stop bicarb and give lasix.  Continue ceftriaxone.  Palliative to meet with family today.      Interval History:   Unable to obtain interval history or ROS from patient due to confusion.    Objective:     Vital Signs (Most Recent):  Temp: 97.9 °F (36.6 °C) (04/05/23 0800)  Pulse: 72 (04/05/23 0800)  Resp: (!) 23 (04/05/23 0841)  BP: 111/65 (04/05/23 0800)  SpO2: 97 % (04/05/23 0800)   Vital Signs (24h Range):  Temp:  [97.3 °F (36.3 °C)-99.7 °F (37.6 °C)] 97.9 °F (36.6 °C)  Pulse:  [] 72  Resp:  [7-50] 23  SpO2:  [81 %-100 %] 97 %  BP: ()/(50-80) 111/65     Weight: 71.4 kg (157 lb 6.5 oz)  Body mass index is 27.02 kg/m².      Intake/Output Summary (Last 24 hours) at 4/5/2023 1032  Last data filed at 4/5/2023 0800  Gross per 24 hour   Intake 2903.94 ml   Output 295 ml   Net 2608.94 ml       Physical Exam  Vitals reviewed.   Constitutional:       General: She is awake.      Appearance: She is ill-appearing.   HENT:      Head: Normocephalic and atraumatic.      Nose: Nose normal.   Eyes:      Pupils: Pupils are equal, round, and reactive to light.   Cardiovascular:      Rate and Rhythm: Tachycardia present. Rhythm irregular.   Pulmonary:      Effort: Pulmonary effort is normal. No respiratory distress.      Comments: Symmetric chest rise.    Abdominal:      General: Abdomen is flat. There is no distension.      Palpations: Abdomen is soft.   Musculoskeletal:         General: No deformity or signs of injury.      Right lower leg: No edema.      Left lower leg: No edema.   Skin:      General: Skin is warm and dry.      Comments: Scattered bruising on the L side, most notably over the hip and left upper chest (known left clavicular fracture)      Neurological:      Mental Status: She is alert.      Comments: Confused.  Answers basic questions like what is her name.  Does not consistently follow commands.  Moves all extremities.       Vents:  Vent Mode: Spont (04/03/23 1630)  Set Rate: 0 BPM (04/03/23 1630)  Vt Set: 420 mL (04/03/23 1630)  Pressure Support: 5 cmH20 (04/03/23 1630)  PEEP/CPAP: 5 cmH20 (04/03/23 1630)  Oxygen Concentration (%): 28 (04/04/23 2110)  Peak Airway Pressure: 11 cmH20 (04/03/23 1630)  Plateau Pressure: 16 cmH20 (04/03/23 1630)  Total Ve: 6.65 L/m (04/03/23 1630)  F/VT Ratio<105 (RSBI): (!) 23.26 (04/03/23 1630)    Lines/Drains/Airways       Central Venous Catheter Line  Duration             Percutaneous Central Line Insertion/Assessment - Triple Lumen  04/02/23 Internal Jugular Left 3 days              Drain  Duration                  Urethral Catheter 04/04/23 1500 Temperature probe <1 day                    Significant Labs:    CBC/Anemia Profile:  Recent Labs   Lab 04/04/23 0323 04/05/23  0345   WBC 13.37* 9.48   HGB 9.4* 9.0*   HCT 29.2* 26.6*   * 110*   MCV 88 85   RDW 14.6* 14.3        Chemistries:  Recent Labs   Lab 04/03/23 2202 04/04/23 0323 04/04/23  1216 04/05/23  0345   * 130*  --  133*   K 5.1 5.0  --  4.5    99  --  92*   CO2 17* 20*  --  28   BUN 52* 54*  --  57*   CREATININE 3.5* 3.6*  --  4.6*   CALCIUM 7.6* 7.2*  --  7.1*   ALBUMIN  --  2.7*  --  2.4*   PROT  --  5.2*  --  5.0*   BILITOT  --  1.6*  --  2.2*   ALKPHOS  --  92  --  98   ALT  --  3,683*  --  2,568*   AST  --  4,113*  --  2,040*   MG  --  2.1  --  1.8   PHOS  --   --  3.4  --        A1C: No results for input(s): HGBA1C in the last 48 hours.  Blood Culture: No results for input(s): LABBLOO in the last 48 hours.  Coagulation:   Recent Labs   Lab 04/05/23  0345   INR 1.4*      Lactic Acid: No results for input(s): LACTATE in the last 48 hours.  Pathology Results  (Last 10 years)                 10/22/21 1008  Specimen to Pathology, Dermatology Final result    Narrative:  Number of Specimens:->1   ------------------------->-------------------------   Spec 1 Procedure:->Biopsy   Spec 1 Clinical Impression:->skin tag vs NMSC   Spec 1 Source:->left upper eyelid (margin)   Release to patient->Immediate       10/11/18 1400  Tissue Specimen to Pathology Final result    10/11/18 1400  Tissue Specimen to Pathology Final result          POCT Glucose:   Recent Labs   Lab 04/04/23  2311 04/05/23  0347 04/05/23  0818   POCTGLUCOSE 91 113* 131*     Respiratory Culture: No results for input(s): GSRESP, RESPIRATORYC in the last 48 hours.  Troponin: No results for input(s): TROPONINI in the last 48 hours.  Urine Culture: No results for input(s): LABURIN in the last 48 hours.  Urine Studies: No results for input(s): COLORU, APPEARANCEUA, PHUR, SPECGRAV, PROTEINUA, GLUCUA, KETONESU, BILIRUBINUA, OCCULTUA, NITRITE, UROBILINOGEN, LEUKOCYTESUR, RBCUA, WBCUA, BACTERIA, SQUAMEPITHEL, HYALINECASTS in the last 48 hours.    Invalid input(s): WRIGHTSUR    Significant Imaging:  I have reviewed all pertinent imaging results/findings within the past 24 hours.      AB  Recent Labs   Lab 04/04/23  0309   PH 7.251*   PO2 81   PCO2 47.7*   HCO3 20.9*   BE -6     Assessment/Plan:     Psychiatric  Schizoaffective disorder  - holding most of her home meds due to renal dysfxn  -Numerous home meds listed.  Will attempt to clarify home regimen and resume as able.    Pulmonary  COPD, very severe  - not in exacerbation  - vent support  - continue bronchodilators    Acute hypoxemic respiratory failure  Patient with Hypoxic Respiratory failure which is Acute.. Supplemental oxygen was provided and noted- Oxygen Concentration (%):  [28] 28    .   Signs/symptoms of respiratory failure include- tachypnea and respiratory distress.  Contributing diagnoses includes - sepsis Labs and images were reviewed. Patient Has recent ABG, which has been reviewed. Will treat underlying causes and adjust management of respiratory failure as follows- vent support.    - intubated 4/2 in the ER and extubated 4/3  - Currently tolerating of NIPPV  - Supplemental O2 as needed.        Cardiac/Vascular  Atrial fibrillation with RVR  Diltiazem for rate control as needed  Defer AC for now  On prophylactic enoxaparin    HLD (hyperlipidemia)  - Hold statin 2/2 shock liver    Hypertension  - holding meds due to shock  - can restart, as necessary    Renal/  Hyperkalemia  - Improved    Lactic acidosis  -Improved    Acute renal failure  Due to septic shock    - Cr 2.2 on admission 4/2/23; Had been 0.9 on  4/1/23  - Cr now up to 4.6  -Lasix 80 IV x1 today.  Discussed with renal      ID  * Septic shock  - source: Urine  - Ur cx with E coli.  - Cotninue CTX  - Now off pressors  - goal MAP > 65  - CVL placed 4/2 in the ER  - Lactic acid now normalized    Oncology  Malignant neoplasm of upper lobe of left lung  Noted on CT imaging.  Further discussion and follow up when acute issues resolved.    GI  Shock liver  Due to septic shock.  Trend LFTs  Improving.    Prophylaxis Measures:  GI ppx: Famotidine  VTE ppx: Enoxaparin  Glucose control: Monitor blood glucose    Code Status: Partial Code         Reg Patton MD  Critical Care Medicine  [facility] ([facility])

## 2023-04-05 NOTE — ACP (ADVANCE CARE PLANNING)
Advance Care Planning   [facility] ([facility])  Palliative Care       Patient Name: Rachel Long  MRN: [id number]  Admission Date: 4/2/2023  Hospital Length of Stay: 3 days  Code Status: Partial Code   Attending Provider: Reg Patton MD  Palliative Care Provider: Ben Turcios NP   Primary Care Physician: Sharmaine English MD  Principal Problem:Septic shock    Follow up with pt's family to assist with completion of LaPOST and discuss inpatient hospice options. Family confirms they will wish for pt to have a natural, peaceful death and ICU MD was present to witness this. LaPOST completed and copy given to family. Family had also researched hospice options and report they would like [facility] for GIP. ICU MD, RN, and CM updated on plan for d/c to inpatient hospice tomorrow following last dose of IV abx.      KARLA Mcpherson, [facility]  Palliative Medicine  [phone]

## 2023-04-05 NOTE — CONSULTS
[facility] - Intensive Care ([facility] Medicine  Consult Note    Patient Name: Rachel Long  MRN: [id number]  Admission Date: 4/2/2023  Hospital Length of Stay: 3 days  Attending Physician: Ortiz Begum MD  Primary Care Provider: Sharmaine English MD           Patient information was obtained from patient and ER records.     Consults  Subjective:     Principal Problem: Septic shock    Chief Complaint:   Chief Complaint   Patient presents with    Altered Mental Status     Altered per [facility]. CBG 35, given D10 in route. Recently discharged yesterday.         HPI: Patient is an 82-year-old nursing home female with CAD, HTN, COPD, bipolar disorder, history of PE (not on anticoagulation seemingly due to frequent falls), history of lung cancer and Parkinson's disease who presented to the ED on 4/2/23 for altered mental status.  Blood glucose was low when ems arrived and she was given d10. Patient was bradycardic and hypoxic and intubated in the ED. She was found to be in septic shock due to UTI. Pt was started on pressors and zosyn. Imaging performed was concerning for recurrent metastatic lung disease. She had declining renal function and LFTS were indicative of shock liver. Creatinine continued to trend up and nephrology was consulted on the case. She was deemed a poor dialysis candidate. Patient was weaned off of the vent. Family discussion held with palliative and family. Decision was made to pursue hospice with comfort care after receiving last dose of antibiotics tomorrow.  consulted for management as patient was stepped down from icu.           Past Medical History:   Diagnosis Date    Acute upper respiratory infection     Anemia     Arthritis     osteo    Bipolar mood disorder     Cataract     Coordination abnormal     COPD exacerbation 5/9/2016    Coronary artery disease     Difficulty walking     Dysphasia     GERD (gastroesophageal reflux disease)     Hyperlipidemia     Hypertension     on meds    Hypothyroidism,  adult     Insomnia     Malignant neoplasm of colon     Muscle weakness     Neuralgia and neuritis     Parkinson disease     Pulmonary embolism     Schizoaffective disorder     SOB (shortness of breath)     Spinal stenosis     Stroke     pt states mini strokes    Thyroid disease     Urinary tract infection        Past Surgical History:   Procedure Laterality Date    CATARACT EXTRACTION Right 03/23/2022    CATARACT EXTRACTION W/  INTRAOCULAR LENS IMPLANT Left 06/02/2022    COLON SURGERY      COLONOSCOPY N/A 7/9/2020    Procedure: COLONOSCOPY;  Surgeon: Cesar Montague III, MD;  Location: [facility];  Service: Endoscopy;  Laterality: N/A;    CORONARY STENT PLACEMENT      ESOPHAGOGASTRODUODENOSCOPY N/A 7/9/2020    Procedure: EGD (ESOPHAGOGASTRODUODENOSCOPY);  Surgeon: Cesar Montague III, MD;  Location: [facility];  Service: Endoscopy;  Laterality: N/A;    FRACTURE SURGERY      HYSTERECTOMY  1970's       Review of patient's allergies indicates:   Allergen Reactions    Benadryl [diphenhydramine hcl] Anxiety    Sulfa (sulfonamide antibiotics) Rash       No current facility-administered medications on file prior to encounter.     Current Outpatient Medications on File Prior to Encounter   Medication Sig    albuterol (PROVENTIL/VENTOLIN HFA) 90 mcg/actuation inhaler Inhale 2 puffs into the lungs every 6 (six) hours as needed for Wheezing. Rescue    atorvastatin (LIPITOR) 80 MG tablet Take 1 tablet (80 mg total) by mouth every evening.    benztropine (COGENTIN) 1 MG tablet Take 1.5 mg by mouth every morning and 1 mg nightly    budesonide (PULMICORT) 0.25 mg/2 mL nebulizer solution Take 0.25 mg by nebulization 2 (two) times a day.    buPROPion (WELLBUTRIN SR) 100 MG TBSR 12 hr tablet Take 100 mg by mouth every morning.    busPIRone (BUSPAR) 5 MG Tab Take 5 mg by mouth 2 (two) times daily.    cycloSPORINE (RESTASIS) 0.05 % ophthalmic emulsion Place 1 drop into both eyes 2 (two) times daily.    diltiaZEM (CARDIZEM) 60 MG tablet  Take 1 tablet (60 mg total) by mouth every 8 (eight) hours.    DULoxetine (CYMBALTA) 60 MG capsule Take 60 mg by mouth 2 (two) times daily.    HYDROcodone-acetaminophen (NORCO) 5-325 mg per tablet Take 1 tablet by mouth every 6 (six) hours as needed for Pain.    meclizine (ANTIVERT) 25 mg tablet Take 1 tablet (25 mg total) by mouth 3 (three) times daily as needed for Dizziness or Nausea.    melatonin 3 mg Cap Take 1 tablet by mouth nightly as needed.    metoprolol succinate (TOPROL-XL) 25 MG 24 hr tablet Take 25 mg by mouth once daily.    ondansetron (ZOFRAN-ODT) 4 MG TbDL Take 1 tablet (4 mg total) by mouth every 8 (eight) hours as needed (nausea/vomiting).    pantoprazole (PROTONIX) 40 MG tablet Take 40 mg by mouth once daily.    QUEtiapine 150 mg Tab Take 150 mg by mouth every evening.    saliva substitute combo no.9 (BIOTENE DRY MOUTH ORAL RINSE MM) 15 mLs by Mucous Membrane route 3 (three) times daily.    spironolactone (ALDACTONE) 25 MG tablet Take 1 tablet (25 mg total) by mouth once daily. (Patient taking differently: Take 12.5 mg by mouth once daily.)    thiamine 100 MG tablet Take 100 mg by mouth once daily.    acetaminophen (TYLENOL) 325 MG tablet Take 650 mg by mouth 4 (four) times daily as needed for Pain.    albuterol (PROVENTIL) 2.5 mg /3 mL (0.083 %) nebulizer solution Take 2.5 mg by nebulization 4 (four) times daily as needed.    budesonide-formoterol 80-4.5 mcg (SYMBICORT) 80-4.5 mcg/actuation HFAA Inhale 2 puffs into the lungs once daily. Controller    dextran 70-hypromellose 0.1-0.3 % Drop Apply 1 drop to eye 2 (two) times daily.    ibuprofen (ADVIL,MOTRIN) 400 MG tablet Take 400 mg by mouth every 6 (six) hours as needed for Other.    loperamide (IMODIUM A-D) 2 mg Tab Take 2 mg by mouth daily as needed (give one po after each episode of loose stool with max of 8 tabs/daily).     Family History       Problem Relation (Age of Onset)    Asthma Father    Eczema Father    No Known Problems Mother     Other Brother          Tobacco Use    Smoking status: Former     Years: 50.00     Types: Cigarettes    Smokeless tobacco: Former   Substance and Sexual Activity    Alcohol use: No    Drug use: No    Sexual activity: Never     Review of Systems   Unable to perform ROS: Acuity of condition   Objective:     Vital Signs (Most Recent):  Temp: 99.3 °F (37.4 °C) (04/05/23 1400)  Pulse: (!) 118 (04/05/23 1400)  Resp: (!) 6 (04/05/23 1400)  BP: 123/66 (04/05/23 1400)  SpO2: 95 % (04/05/23 1400)   Vital Signs (24h Range):  Temp:  [97.3 °F (36.3 °C)-99.7 °F (37.6 °C)] 99.3 °F (37.4 °C)  Pulse:  [] 118  Resp:  [6-50] 6  SpO2:  [81 %-100 %] 95 %  BP: ()/(50-88) 123/66     Weight: 71.4 kg (157 lb 6.5 oz)  Body mass index is 27.02 kg/m².    Physical Exam  Constitutional:       General: She is not in acute distress.     Appearance: She is well-developed. She is ill-appearing and toxic-appearing. She is not diaphoretic.   HENT:      Head: Normocephalic and atraumatic.   Eyes:      Pupils: Pupils are equal, round, and reactive to light.   Cardiovascular:      Rate and Rhythm: Normal rate and regular rhythm.      Heart sounds: Normal heart sounds. No murmur heard.    No friction rub. No gallop.   Pulmonary:      Effort: Pulmonary effort is normal. No respiratory distress.      Breath sounds: Normal breath sounds. No stridor. No wheezing or rales.   Abdominal:      General: Bowel sounds are normal. There is no distension.      Palpations: Abdomen is soft. There is no mass.      Tenderness: There is no abdominal tenderness. There is no guarding.   Musculoskeletal:      Comments: Left arm in sling   Skin:     General: Skin is warm.      Findings: No erythema.   Neurological:      Mental Status: She is disoriented.       Significant Labs: All pertinent labs within the past 24 hours have been reviewed.    Significant Imaging: I have reviewed all pertinent imaging results/findings within the past 24 hours.      Assessment/Plan:      * Septic shock  Patient with septic shock  Worsening renal functions  Source UTI  Last dose of rocephin tomorrow  Family opting for comfort care   With inpatient hospice  [place] consulted  Plans for dc to the [facility] tomorrow       Atrial fibrillation with RVR  Currently not rate controlled  Not on anticoagulation due to falls  Unable to take po  Cardizem prn      Shock liver  LFTs trending down       Acute renal failure  Worsening renal functions noted  Not a dialysis candidate per nephro       Malignant neoplasm of upper lobe of left lung  Likely recurrent metastatic disease noted on   Ct of lung   Hospice/comfort care initiated       VTE Risk Mitigation (From admission, onward)           Ordered     enoxaparin injection 30 mg  Daily         04/02/23 1710     IP VTE HIGH RISK PATIENT  Once         04/02/23 1710     Place sequential compression device  Until discontinued         04/02/23 1710                    Critical care time spent on the evaluation and treatment of severe organ dysfunction, review of pertinent labs and imaging studies, discussions with consulting providers and discussions with patient/family: 38 minutes.    Thank you for your consult. I will follow-up with patient. Please contact us if you have any additional questions.    Ortiz Begum MD  Department of Hospital Medicine   [facility] - Intensive Care ([facility])

## 2023-04-05 NOTE — PLAN OF CARE
Pt is oriented to self, VSS on 2L nasal cannula. UOP 20ml/hr. Afib on the monitor, treated with PRN Cardizem. Pt is NPO. Code status changed to DNR today with plans to transfer to [facility] (inpatient hospice). Family is at bedside. Pt turned q2h for skin breakdown prevention. POC reviewed with ean. Bed is locked in lowest position, bed alarm set, side rails up x2, call light/personal items within reach.   Temp:  [97.7 °F (36.5 °C)-99.7 °F (37.6 °C)]   Pulse:  []   Resp:  [6-37]   BP: ()/(53-88)   SpO2:  [87 %-99 %]      Problem: Infection  Goal: Absence of Infection Signs and Symptoms  Outcome: Ongoing, Not Progressing     Problem: Adult Inpatient Plan of Care  Goal: Plan of Care Review  Outcome: Ongoing, Not Progressing  Goal: Patient-Specific Goal (Individualized)  Outcome: Ongoing, Not Progressing  Goal: Absence of Hospital-Acquired Illness or Injury  Outcome: Ongoing, Not Progressing  Goal: Optimal Comfort and Wellbeing  Outcome: Ongoing, Not Progressing  Goal: Readiness for Transition of Care  Outcome: Ongoing, Not Progressing     Problem: Adjustment to Illness (Sepsis/Septic Shock)  Goal: Optimal Coping  Outcome: Ongoing, Not Progressing     Problem: Bleeding (Sepsis/Septic Shock)  Goal: Absence of Bleeding  Outcome: Ongoing, Not Progressing     Problem: Glycemic Control Impaired (Sepsis/Septic Shock)  Goal: Blood Glucose Level Within Desired Range  Outcome: Ongoing, Not Progressing     Problem: Infection Progression (Sepsis/Septic Shock)  Goal: Absence of Infection Signs and Symptoms  Outcome: Ongoing, Not Progressing     Problem: Nutrition Impaired (Sepsis/Septic Shock)  Goal: Optimal Nutrition Intake  Outcome: Ongoing, Not Progressing     Problem: Fluid and Electrolyte Imbalance (Acute Kidney Injury/Impairment)  Goal: Fluid and Electrolyte Balance  Outcome: Ongoing, Not Progressing     Problem: Oral Intake Inadequate (Acute Kidney Injury/Impairment)  Goal: Optimal Nutrition  Intake  Outcome: Ongoing, Not Progressing     Problem: Renal Function Impairment (Acute Kidney Injury/Impairment)  Goal: Effective Renal Function  Outcome: Ongoing, Not Progressing     Problem: Fall Injury Risk  Goal: Absence of Fall and Fall-Related Injury  Outcome: Ongoing, Not Progressing     Problem: Skin Injury Risk Increased  Goal: Skin Health and Integrity  Outcome: Ongoing, Not Progressing     Problem: Impaired Wound Healing  Goal: Optimal Wound Healing  Outcome: Ongoing, Not Progressing     Problem: Coping Ineffective  Goal: Effective Coping  Outcome: Ongoing, Not Progressing

## 2023-04-05 NOTE — PLAN OF CARE
Disoriented patient transitioned from bipap to NC overnight. Jose weaned off. Bicarb gtt continued. Prn haldol added for agitation. CBG monitoring performed. Pierre in place. Oliguria noted. Plan for family meeting this am.

## 2023-04-05 NOTE — HPI
Patient is an 82-year-old nursing home female with CAD, HTN, COPD, bipolar disorder, history of PE (not on anticoagulation seemingly due to frequent falls), history of lung cancer and Parkinson's disease who presented to the ED on 4/2/23 for altered mental status.  Blood glucose was low when ems arrived and she was given d10. Patient was bradycardic and hypoxic and intubated in the ED. She was found to be in septic shock due to UTI. Pt was started on pressors and zosyn. Imaging performed was concerning for recurrent metastatic lung disease. She had declining renal function and LFTS were indicative of shock liver. Creatinine continued to trend up and nephrology was consulted on the case. She was deemed a poor dialysis candidate. Patient was weaned off of the vent. Family discussion held with palliative and family. Decision was made to pursue hospice with comfort care after receiving last dose of antibiotics tomorrow.  consulted for management as patient was stepped down from icu.

## 2023-04-05 NOTE — CARE UPDATE
Palliative care team had extensive discussion with patient's family yesterday and today. They stated pt had wanted to be DNR in the past, and she had a DNR in [place] 12 years ago. Pt's sons, ENMA, Bharath, and Anam stated they want pt to be DNR with comfort care/inpatient hospice focus. They are requesting time to discuss with pt's daughter, Manuela, who is on her way to hospital today.   I have asked TAMIKA Villeda to assist with [facility] and hospice placement coordination.     Full consult note to follow.

## 2023-04-05 NOTE — ASSESSMENT & PLAN NOTE
Patient with septic shock  Worsening renal functions  Source UTI  Last dose of rocephin tomorrow  Family opting for comfort care   With inpatient hospice  [place] consulted  Plans for dc to the [facility] tomorrow

## 2023-04-05 NOTE — ASSESSMENT & PLAN NOTE
- holding most of her home meds due to renal dysfxn  -Numerous home meds listed.  Will attempt to clarify home regimen and resume as able.

## 2023-04-05 NOTE — SUBJECTIVE & OBJECTIVE
Past Medical History:   Diagnosis Date    Acute upper respiratory infection     Anemia     Arthritis     osteo    Bipolar mood disorder     Cataract     Coordination abnormal     COPD exacerbation 5/9/2016    Coronary artery disease     Difficulty walking     Dysphasia     GERD (gastroesophageal reflux disease)     Hyperlipidemia     Hypertension     on meds    Hypothyroidism, adult     Insomnia     Malignant neoplasm of colon     Muscle weakness     Neuralgia and neuritis     Parkinson disease     Pulmonary embolism     Schizoaffective disorder     SOB (shortness of breath)     Spinal stenosis     Stroke     pt states mini strokes    Thyroid disease     Urinary tract infection        Past Surgical History:   Procedure Laterality Date    CATARACT EXTRACTION Right 03/23/2022    CATARACT EXTRACTION W/  INTRAOCULAR LENS IMPLANT Left 06/02/2022    COLON SURGERY      COLONOSCOPY N/A 7/9/2020    Procedure: COLONOSCOPY;  Surgeon: Cesar Montague III, MD;  Location: [facility];  Service: Endoscopy;  Laterality: N/A;    CORONARY STENT PLACEMENT      ESOPHAGOGASTRODUODENOSCOPY N/A 7/9/2020    Procedure: EGD (ESOPHAGOGASTRODUODENOSCOPY);  Surgeon: Cesar Montague III, MD;  Location: [facility];  Service: Endoscopy;  Laterality: N/A;    FRACTURE SURGERY      HYSTERECTOMY  1970's       Review of patient's allergies indicates:   Allergen Reactions    Benadryl [diphenhydramine hcl] Anxiety    Sulfa (sulfonamide antibiotics) Rash       No current facility-administered medications on file prior to encounter.     Current Outpatient Medications on File Prior to Encounter   Medication Sig    albuterol (PROVENTIL/VENTOLIN HFA) 90 mcg/actuation inhaler Inhale 2 puffs into the lungs every 6 (six) hours as needed for Wheezing. Rescue    atorvastatin (LIPITOR) 80 MG tablet Take 1 tablet (80 mg total) by mouth every evening.    benztropine (COGENTIN) 1 MG tablet Take 1.5 mg by mouth every morning and 1 mg nightly    budesonide (PULMICORT)  0.25 mg/2 mL nebulizer solution Take 0.25 mg by nebulization 2 (two) times a day.    buPROPion (WELLBUTRIN SR) 100 MG TBSR 12 hr tablet Take 100 mg by mouth every morning.    busPIRone (BUSPAR) 5 MG Tab Take 5 mg by mouth 2 (two) times daily.    cycloSPORINE (RESTASIS) 0.05 % ophthalmic emulsion Place 1 drop into both eyes 2 (two) times daily.    diltiaZEM (CARDIZEM) 60 MG tablet Take 1 tablet (60 mg total) by mouth every 8 (eight) hours.    DULoxetine (CYMBALTA) 60 MG capsule Take 60 mg by mouth 2 (two) times daily.    HYDROcodone-acetaminophen (NORCO) 5-325 mg per tablet Take 1 tablet by mouth every 6 (six) hours as needed for Pain.    meclizine (ANTIVERT) 25 mg tablet Take 1 tablet (25 mg total) by mouth 3 (three) times daily as needed for Dizziness or Nausea.    melatonin 3 mg Cap Take 1 tablet by mouth nightly as needed.    metoprolol succinate (TOPROL-XL) 25 MG 24 hr tablet Take 25 mg by mouth once daily.    ondansetron (ZOFRAN-ODT) 4 MG TbDL Take 1 tablet (4 mg total) by mouth every 8 (eight) hours as needed (nausea/vomiting).    pantoprazole (PROTONIX) 40 MG tablet Take 40 mg by mouth once daily.    QUEtiapine 150 mg Tab Take 150 mg by mouth every evening.    saliva substitute combo no.9 (BIOTENE DRY MOUTH ORAL RINSE MM) 15 mLs by Mucous Membrane route 3 (three) times daily.    spironolactone (ALDACTONE) 25 MG tablet Take 1 tablet (25 mg total) by mouth once daily. (Patient taking differently: Take 12.5 mg by mouth once daily.)    thiamine 100 MG tablet Take 100 mg by mouth once daily.    acetaminophen (TYLENOL) 325 MG tablet Take 650 mg by mouth 4 (four) times daily as needed for Pain.    albuterol (PROVENTIL) 2.5 mg /3 mL (0.083 %) nebulizer solution Take 2.5 mg by nebulization 4 (four) times daily as needed.    budesonide-formoterol 80-4.5 mcg (SYMBICORT) 80-4.5 mcg/actuation [facility] Inhale 2 puffs into the lungs once daily. Controller    dextran 70-hypromellose 0.1-0.3 % Drop Apply 1 drop to eye 2 (two)  times daily.    ibuprofen (ADVIL,MOTRIN) 400 MG tablet Take 400 mg by mouth every 6 (six) hours as needed for Other.    loperamide (IMODIUM A-D) 2 mg Tab Take 2 mg by mouth daily as needed (give one po after each episode of loose stool with max of 8 tabs/daily).     Family History       Problem Relation (Age of Onset)    Asthma Father    Eczema Father    No Known Problems Mother    Other Brother          Tobacco Use    Smoking status: Former     Years: 50.00     Types: Cigarettes    Smokeless tobacco: Former   Substance and Sexual Activity    Alcohol use: No    Drug use: No    Sexual activity: Never     Review of Systems   Unable to perform ROS: Acuity of condition   Objective:     Vital Signs (Most Recent):  Temp: 99.3 °F (37.4 °C) (04/05/23 1400)  Pulse: (!) 118 (04/05/23 1400)  Resp: (!) 6 (04/05/23 1400)  BP: 123/66 (04/05/23 1400)  SpO2: 95 % (04/05/23 1400)   Vital Signs (24h Range):  Temp:  [97.3 °F (36.3 °C)-99.7 °F (37.6 °C)] 99.3 °F (37.4 °C)  Pulse:  [] 118  Resp:  [6-50] 6  SpO2:  [81 %-100 %] 95 %  BP: ()/(50-88) 123/66     Weight: 71.4 kg (157 lb 6.5 oz)  Body mass index is 27.02 kg/m².    Physical Exam  Constitutional:       General: She is not in acute distress.     Appearance: She is well-developed. She is ill-appearing and toxic-appearing. She is not diaphoretic.   HENT:      Head: Normocephalic and atraumatic.   Eyes:      Pupils: Pupils are equal, round, and reactive to light.   Cardiovascular:      Rate and Rhythm: Normal rate and regular rhythm.      Heart sounds: Normal heart sounds. No murmur heard.    No friction rub. No gallop.   Pulmonary:      Effort: Pulmonary effort is normal. No respiratory distress.      Breath sounds: Normal breath sounds. No stridor. No wheezing or rales.   Abdominal:      General: Bowel sounds are normal. There is no distension.      Palpations: Abdomen is soft. There is no mass.      Tenderness: There is no abdominal tenderness. There is no guarding.    Musculoskeletal:      Comments: Left arm in sling   Skin:     General: Skin is warm.      Findings: No erythema.   Neurological:      Mental Status: She is disoriented.       Significant Labs: All pertinent labs within the past 24 hours have been reviewed.    Significant Imaging: I have reviewed all pertinent imaging results/findings within the past 24 hours.

## 2023-04-05 NOTE — PROGRESS NOTES
[facility] - Intensive Care ([facility])  Adult Nutrition  Progress Note    SUMMARY       Recommendations    Recommendation/Intervention:   1. If warranted, recommend Peptamen AF via NG/OG tube, goal rate 65 mL/hr, starting at 10 mL/hr, then advance within 24 hrs if pt is tolerating, or Per MD/NP  -Formula provides: 1872 kcals/day (100% EEN), 119 g protein/day (100% protein needs), 1267 free formula water/day (71% fluid needs)  -130 mL q6h free water flushes (520 mL/day) = total from formula + FWF = 1787 mL water/day (100% fluid needs)    - Check Mg, Na, K+, Phos, and Glu before and during initiation, correct as indicated  2. If pt transitioned to comfort care and nutrition warranted, recommend providing pt with the safest diet texture of desired diet  3. Recommend pt receives Rick BID if warranted for wound healing   4. Recommend pt receives feeding assistance   5. Recommend daily weights     Goals:   1.Pt will be initiated onto EN or comfort care within 24 hrs   2. Pt will tolerate >50% of est. Needs by RD follow up  3. If pt is on comfort care, pt will consume desired diet with safest diet texture during admit   Nutrition Goal Status: new/ continues   Communication of RD Recs: other (comment) (POC: Sticky Note)    Assessment and Plan    Nutrition Problem  Inadequate protein-energy intake     Related to (etiology):   Decreased ability to consume sufficient nutrients.      Signs and Symptoms (as evidenced by):   NPO x 3 days      Interventions(treatment strategy):  1. Modify rate and composition of enteral nutrition   2. Modify composition of meals/snacks  3. Wound healing Medical Food Supplement Therapy   4. Assistance in eating and menu selection   5. Collaboration of care with medical providers     Nutrition Diagnosis Status:   New          Malnutrition Assessment     Skin (Micronutrient): dry, wounds unhealed, bruised                   Edema (Fluid Accumulation): 1-->trace             Reason for Assessment    Reason  "For Assessment: consult, new tube feeding  Diagnosis: infection/sepsis  Relevant Medical History: Septic shock, HTN, COPD, HLD, Acute Hypoxemic repiratory, Lactic acidosis, TIA, CAD, Schizoaffective disorder  Interdisciplinary Rounds: did not attend  General Information Comments:   4/3: 82 y.o female admitted w/ current principal problem of septic shock. Pt admitted to ICU x15hrs ago intubated; Total Ve: 9.56 (9:00am, 4/3). and sedated; propofol: 15.4ml/hr (9:00am, 4/3). NFPE not performed per pt being intubated. Last charted wt for pt is 145lbs, BMI 24.90 (underweight for age). LBM is JULIUS. Pt is charted to have multiple skin tears/abrasions to LUE and RUE, abrasion to R side of head. bruising to L chest/shoulder and L upper leg/hip and L big toe. Michael score 11 (High risk). Dietitian will continue to monitor the pt vent status, TF tolerance, weight status, fluid shift/ edema, wounds, and labs.  Nutrition Discharge Planning: Cardiac    Follow up   4/5: Spoke to RN, pt noted with confusion/AMS. RN stated she put in a consult with speech to determine safest diet texture, pt did have a NG tube while she was intubated, confirmed pt family to meet with palliative care today. Informed RN that TF recommendations can be found in RD note and on sticky note if warranted. Nephrology Physician noted on 4/5 that sepsis is likely associated with DANN and that she is not an hemodialysis candidate at this time. Pulmonology Physician noted on 4/5 that pt was agitated with delirium last night, stated palliative to meet with pt family today and that the pt was intubated on 4/2 and extubated on 4/3. Per Family Medicine NP care update 4/5 " Pt's sons, ENMA, Bharath, and Anam stated they want pt to be DNR with comfort care/inpatient hospice focus. They are requesting time to discuss with pt's daughter, Manuela, who is on her way to hospital today". Reviewed chart: LBM unknown; Skin: dry, bruised; Altered skin: L arm skin tear- " "clean/dry/intact; Michael score increase to 12 (high risk); Edema: 1+ trace L arm, generalized. Labs, meds, weight reviewed. Na (L), Cl (L), Calcium (L), Total protein (L), Albumin (L), BUN (H), Cr (H), Bilirubin total/direct (H), AST (H), ALT (H); eGFR 9. Note thiamine, famotidine, enoxaparin, Abx, bisacodyl, PRN haloperidol lactate. Weight charted 4/1 128 lbs, 4/5 157 lbs, +29 lb wt gain x 4 days, re weigh for accuracy warranted. RD will continue to monitor.     Nutrition Risk Screen    Nutrition Risk Screen: other (see comments) (intubated)    Nutrition/Diet History    Spiritual, Cultural Beliefs, Oriental orthodox Practices, Values that Affect Care: no  Food Allergies: NKFA  Factors Affecting Nutritional Intake: impaired cognitive status/motor control, NPO, on mechanical ventilation    Anthropometrics    Temp: 99 °F (37.2 °C)  Height Method: Estimated  Height: 5' 4" (162.6 cm)  Height (inches): 64 in  Weight Method: Bed Scale  Weight: 71.4 kg (157 lb 6.5 oz)  Weight (lb): 157.41 lb  Ideal Body Weight (IBW), Female: 120 lb  % Ideal Body Weight, Female (lb): 120.83 %  BMI (Calculated): 27  BMI Grade: 18.5-24.9 - normal     Wt Readings from Last 15 Encounters:   04/05/23 71.4 kg (157 lb 6.5 oz)   04/01/23 58.4 kg (128 lb 12 oz)   08/11/22 64 kg (141 lb 1.5 oz)   05/12/22 68 kg (150 lb)   02/08/22 71.5 kg (157 lb 10.1 oz)   12/06/21 73.2 kg (161 lb 6 oz)   10/19/21 75.3 kg (166 lb)   10/07/21 75.4 kg (166 lb 3.6 oz)   08/05/21 78.9 kg (173 lb 15.1 oz)   04/01/21 79.9 kg (176 lb 2.4 oz)   12/29/20 82.7 kg (182 lb 5.1 oz)   11/10/20 83.8 kg (184 lb 13.7 oz)   07/23/20 79.5 kg (175 lb 4.3 oz)   07/09/20 77.7 kg (171 lb 4.8 oz)   06/17/20 78.4 kg (172 lb 13.5 oz)     Lab/Procedures/Meds    Pertinent Labs Reviewed: reviewed  Pertinent Medications Reviewed: reviewed  BMP  Lab Results   Component Value Date     (L) 04/05/2023    K 4.5 04/05/2023    CL 92 (L) 04/05/2023    CO2 28 04/05/2023    BUN 57 (H) 04/05/2023    " CREATININE 4.6 (H) 04/05/2023    CALCIUM 7.1 (L) 04/05/2023    ANIONGAP 13 04/05/2023    EGFRNORACEVR 9 (A) 04/05/2023     Lab Results   Component Value Date    ALBUMIN 2.4 (L) 04/05/2023     Lab Results   Component Value Date    ALT 2,568 (H) 04/05/2023    AST 2,040 (H) 04/05/2023    ALKPHOS 98 04/05/2023    BILITOT 2.2 (H) 04/05/2023     Recent Labs   Lab 04/05/23  1128   POCTGLUCOSE 86    Scheduled Meds:   bisacodyL  10 mg Rectal Q48H    buPROPion  100 mg Oral QAM    cefTRIAXone (ROCEPHIN) IVPB  1 g Intravenous Q24H    enoxaparin  30 mg Subcutaneous Daily    famotidine (PF)  20 mg Intravenous Daily    mupirocin   Nasal BID    thiamine  100 mg Oral Daily     Continuous Infusions:  PRN Meds:.sodium chloride, albuterol sulfate, dextrose 10%, dextrose 10%, dextrose, dextrose, diltiaZEM, glucagon (human recombinant), haloperidol lactate, morphine, sodium chloride 0.9%    Physical Findings/Assessment         Estimated/Assessed Needs    Weight Used For Calorie Calculations: 71.4 kg (157 lb 6.5 oz)  Energy Calorie Requirements (kcal): 7042-0579 kcals (25-30 kcals/kg ABW (DANN, nonobese, critical care)  Energy Need Method: Kcal/kg  Protein Requirements: 107-122 g (1.5-1.7 g/kg ABW (DANN, critcal care)  Weight Used For Protein Calculations: 71.4 kg (157 lb 6.5 oz)  Fluid Requirements (mL): 500 mL + total output or Per MD/NP (Fluid restriction (DANN)  Estimated Fluid Requirement Method: other (see comments)  RDA Method (mL): 1785  CHO Requirement: 223-268 (3708-4584 kcals/8)      Nutrition Prescription Ordered    Current Diet Order: NPO    Evaluation of Received Nutrient/Fluid Intake  I/O (Net since admit)  4/5: +95271.8 mL    % Kcal Needs: 0  % Protein Needs: 0  Energy Calories Required: not meeting needs  Protein Required: not meeting needs  Fluid Required: exceeds needs  Tolerance: Currently no intake   % Intake of Estimated Energy Needs: Currently no intake   % Meal Intake: NPO    Nutrition Risk    Level of Risk/Frequency  of Follow-up: high (x2 weekly)     Monitor and Evaluation    Food and Nutrient Intake: energy intake, enteral nutrition intake  Food and Nutrient Adminstration: enteral and parenteral nutrition administration  Knowledge/Beliefs/Attitudes: food and nutrition knowledge/skill, beliefs and attitudes  Physical Activity and Function: nutrition-related ADLs and IADLs  Anthropometric Measurements: weight, weight change, body mass index  Biochemical Data, Medical Tests and Procedures: electrolyte and renal panel, gastrointestinal profile, glucose/endocrine profile, inflammatory profile, lipid profile  Nutrition-Focused Physical Findings: overall appearance, skin     Nutrition Follow-Up    RD Follow-up?: Yes  Monique Hernandez, Registration Eligible, Provisional LDN

## 2023-04-05 NOTE — ASSESSMENT & PLAN NOTE
- source: Urine  - Ur cx with E coli.  - Cotninue CTX  - Now off pressors  - goal MAP > 65  - CVL placed 4/2 in the ER  - Lactic acid now normalized

## 2023-04-05 NOTE — PLAN OF CARE
Nutrition Recommendations 4/5:  1. If warranted, recommend Peptamen AF via NG/OG tube, goal rate 65 mL/hr, starting at 10 mL/hr, then advance within 24 hrs if pt is tolerating, or Per MD/NP  -Formula provides: 1872 kcals/day (100% EEN), 119 g protein/day (100% protein needs), 1267 free formula water/day (71% fluid needs)  -130 mL q6h free water flushes (520 mL/day) = total from formula + FWF = 1787 mL water/day (100% fluid needs)    - Check Mg, Na, K+, Phos, and Glu before and during initiation, correct as indicated  2. If pt transitioned to comfort care and nutrition warranted, recommend providing pt with the safest diet texture of desired diet  3. Recommend pt receives Rick BID if warranted for wound healing   4. Recommend pt receives feeding assistance   5. Recommend daily weights   6. Collaboration of care with medical providers     Goals:   1.Pt will be initiated onto EN or comfort care within 24 hrs   2. Pt will tolerate >50% of est. Needs by RD follow up  3. If pt is on comfort care, pt will consume desired diet with safest diet texture during admit     Monique Hernandez, Registration Eligible, Provisional LDN

## 2023-04-05 NOTE — ASSESSMENT & PLAN NOTE
Due to septic shock    - Cr 2.2 on admission 4/2/23; Had been 0.9 on  4/1/23  - Cr now up to 4.6  -Lasix 80 IV x1 today.  Discussed with renal

## 2023-04-05 NOTE — PHYSICIAN QUERY
PT Name: Rachel Long  MR #: [id number]     DOCUMENTATION CLARIFICATION     CDS/: Shirley Everett RN               Contact information: [email or web address]  This form is a permanent document in the medical record.    Query Date: April 5, 2023    By submitting this query, we are merely seeking further clarification of documentation.  Please utilize your independent clinical judgment when addressing the question(s) below.    The Medical Record contains the following:   Indicator Supporting Clinical Findings Location in Medical Record    Kidney (Renal) Insufficiency     x Kidney (Renal) Failure/Injury Acute renal failure- Due to septic shock    DANN-oligoanuric  Septic shock on pressors (UTI)   4/5 Pulm MD PN    4/5 Renal MD PN    Nephrotoxic Agents        BUN/Creatinine           GFR Cr 2.2 on admission 4/2/23; Had been 0.9 on  4/1/23 04/02 04/03 04/03 04/04 04/05   BUN 32  43  52  54  57    Cr 2.3  2.7 3.5  3.6  4.6    eGFR 21  17  13  12  9       4/5 PUlmohini SUBRAMANIAN PN    4/2- 4/5 Lab                  Urine: Casts         Eosinophils      Dehydration      Nausea/Vomiting      Dialysis/CRRT     x Treatment LR 1611mL bolus 4/2  LR 1000mL bolus 4/2  Piperacillin IVPB 4/2  Norepinephrine gtt 4/2  Epinephrine gtt 4/3-3  LR 1000mL bolus 4/2  Piperacillin IVPB 4/2-4  Bolud 1000mL bolus 4/2  Ceftriaxone IVPB 4/4      BMP  Urinalysis  Input and output   4/2-4 MAR                    4/2 NSG orders   x Other BP= 83/48, 79/43, 82/50, 85/50, 85/52, 86/53 4/2-3 Flowsheet         [facility] approved diagnostic criteria for acute kidney injury is based on KDIGO criteria:    An increase in serum creatinine > 0.3mg/dl within 48 hours  OR  Increase in serum creatinine to > 1.5x baseline, which is known or presumed to have occurred within the prior 7 days  OR  Urine volume <0.5 ml/kg/hr for 6 hours       The clinical guidelines noted above are only a system guideline. It does not replace the providers clinical judgment.      Provider- If possible, please further specify type of DANN:      [    ] Acute Kidney Failure/Injury with Tubular Necrosis  -   Damage to the tubule cells of the kidney. Common triggers: shock, hypotension, IV contrast, rhabdomyolysis, medications     [    ] Unspecified Acute Kidney Failure/Injury       [    ] Other Acute Kidney Failure/Injury (please specify): ____________       [    ] Other (please specify): _______________________________   [ x ] Clinically Undetermined           Please document in your progress notes daily for the duration of treatment until resolved and include in your discharge summary.    References:   KDIGO Clinical Practice Guideline for Acute Kidney Injury. (2012, March). Retrieved October 21, 2020, from https://kdigo.org/wp-content/uploads/[email or web address]/10/TWZQU-1485-EAN-Guideline-English.pdf    HERNANDEZ Stephen MD, JAIMIE Hravey MD, & BREANNE Love MD. (1960). Renal medullary necrosis [Abstract]. The American Journal of Medicine, 29(1), 132-156. Doi:https://www.sciencedirect.com/science/article/abs/pii/3253338884032326    BREANNE Gomez MD, & JOSE ALFREDO Jolley MD, MS. (2020, June 18). Definition and staging of chronic kidney disease in adults ([id number] JAIMIE Haddad MD, ScD & [id number] JOBY Palomo MD, MSc, Eds.). Retrieved October 21, 2020, from [email or web address]://[email or web address].Key [email or web address]/contents/definition-and-staging-of-chronic-kidney-disease-in-adults?search=ckd%20staging&source=search_result&selectedTitle=1~150&usage_type=default&display_rank=1     SARAH Truong MD, FACP. (2015, Anitra 15). Acute kidney injury revisited. Retrieved October 21, 2020, from https://acphospitalist.org/archives/2015/06/coding-acute-kidney-injury.htm    REN Tabares MD. (2019, July). Renal Cortical Necrosis. Retrieved October 21, 2020, from [email or web address]/[email or web address]/renovascular-disorders/renal-cortical-necrosis    Form No. 26335

## 2023-04-05 NOTE — PROGRESS NOTES
"Advance Care Planning    Progress Note  Palliative Medicine      Consult Requested By: Reg Patton MD  Reason for Consult: Goals of care and Advance care planning    SUBJECTIVE:     History of Present Illness:  Rachel Long is a 82 y.o. year old woman with CAD, HTN, COPD, bipolar disorder, history of PE (not on anticoagulation seemingly due to frequent falls), history of lung cancer and Parkinson's disease. She is a resident of [facility]. She presented to ER 04/01/2023 following a fall and subsequent left clavicle fracture. She was discharged on the same day. EMS was called 04/02/2023 due to altered mental status.  Per EMS, she was having erratic movements and had a near syncopal episode in route.  Blood glucose at the time of 65 she was given D10.  Also some reports of hypoxia and bradycardia while EN route.  He had some further bradycardia in the low 30s and hypoxia into the 60s, and was intubated in the ER 04/02/2023. She was given volume resuscitation along with Zosyn from looks like a UTI.  Also given medical correction for hyperkalemia and started on vasopressors. Imaging in the ER notable for 5.7 cm left upper lobe lung mass as well as some other spiculated lesions on the right concerning for metastatic disease. Patient was subsequently admitted to ICU on 04/02/2023.     Hospital Course:  Patient was initially DNR on 4/02/2023, but changed to Partial Code, with intubation only.    Palliative care follow-up and family meeting 4/5/2023 at 0830: I had a family meeting with patient's children, Reg Long, Bharath Long (joined via phone), Anam Long, and Manuela Long (joined via phone). Patient's daughter's in-law, were also present in-person. Macie (patient's daughter in-law and wife of Anam Long) stated patient had signed a DNR in [place] 12 years ago, and verbalized to her that she (patient) "did not want to be connected to machines." Pt's family stated that they are aware that " "patient is seriously ill and likely to die soon. They stated that they want her to be comfortable, and "die with dignity and nemo." They stated that they want to be DNR with focus on comfort care. I informed them that they will need to sign LaPOST to confirm DNR status and palliative care LCSW will assist with this. We discussed the risks and benefits of transitioning to comfort care focus, and the family agreed that they want pt's symptoms managed and UTI treated. I explained to the family that patient can transition to inpatient hospice once she is DNR, as she will need parenteral medication administration. Pt's family agreed and stated they want a hospice closest to [facility]. I informed them that [facility] can also assist with hospice placement.     Palliative care encounter 4/4/2023: The palliative care team was consulted for advance care planning and goals of care discussion, given patient's condition. I proceeded to call pt's son, Anam Long, as pt was too confused to converse and she was wearing BiPAP mask. I informed Mr. Long of the role of palliative care team in supporting patients with serious illnesses and their families. Mr. Long stated that he is aware that pt is "is not doing well", but he wanted her to remain on BiPAP and be re-intubated if needed. I explained to Mr. Long that supportive ventilation will not reverse patient's comorbidities including lung cancer. Furthermore, I explained that patient's cardiopulmonary illnesses and current diagnoses, put her risk for dying soon, and re-intubation will not guarantee neurological recovery. Mr. Long stated that he is aware of the risks, and he wants patient to remain partial code until his brother, patient's son, Reg Long arrives on 04/05/2023 from [place]. We scheduled a family meeting with all pt's children, Reg Stacy (lives in [place]), Manuela Long (lives in [place]), and Bharath Long (lives in [facility], and would be joining meeting " by phone).       Past Medical History:   Diagnosis Date    Acute upper respiratory infection     Anemia     Arthritis     osteo    Bipolar mood disorder     Cataract     Coordination abnormal     COPD exacerbation 5/9/2016    Coronary artery disease     Difficulty walking     Dysphasia     GERD (gastroesophageal reflux disease)     Hyperlipidemia     Hypertension     on meds    Hypothyroidism, adult     Insomnia     Malignant neoplasm of colon     Muscle weakness     Neuralgia and neuritis     Parkinson disease     Pulmonary embolism     Schizoaffective disorder     SOB (shortness of breath)     Spinal stenosis     Stroke     pt states mini strokes    Thyroid disease     Urinary tract infection      Past Surgical History:   Procedure Laterality Date    CATARACT EXTRACTION Right 03/23/2022    CATARACT EXTRACTION W/  INTRAOCULAR LENS IMPLANT Left 06/02/2022    COLON SURGERY      COLONOSCOPY N/A 7/9/2020    Procedure: COLONOSCOPY;  Surgeon: Cesar Montague III, MD;  Location: [facility];  Service: Endoscopy;  Laterality: N/A;    CORONARY STENT PLACEMENT      ESOPHAGOGASTRODUODENOSCOPY N/A 7/9/2020    Procedure: EGD (ESOPHAGOGASTRODUODENOSCOPY);  Surgeon: Cesar Montague III, MD;  Location: [facility];  Service: Endoscopy;  Laterality: N/A;    FRACTURE SURGERY      HYSTERECTOMY  1970's     Family History   Problem Relation Age of Onset    Asthma Father     Eczema Father     No Known Problems Mother     Other Brother         shoulder surgery        Social History     Socioeconomic History    Marital status: Single   Tobacco Use    Smoking status: Former     Years: 50.00     Types: Cigarettes    Smokeless tobacco: Former   Substance and Sexual Activity    Alcohol use: No    Drug use: No    Sexual activity: Never     Social Determinants of Health     Financial Resource Strain: Low Risk     Difficulty of Paying Living Expenses: Not hard at all   Food Insecurity: No Food Insecurity    Worried About Running Out of Food in the  Last Year: Never true    Ran Out of Food in the Last Year: Never true   Transportation Needs: No Transportation Needs    Lack of Transportation (Medical): No    Lack of Transportation (Non-Medical): No   Physical Activity: Inactive    Days of Exercise per Week: 0 days    Minutes of Exercise per Session: 0 min   Stress: No Stress Concern Present    Feeling of Stress : Not at all   Housing Stability: Low Risk     Unable to Pay for Housing in the Last Year: No    Number of Places Lived in the Last Year: 1    Unstable Housing in the Last Year: No      Review of patient's allergies indicates:   Allergen Reactions    Benadryl [diphenhydramine hcl] Anxiety    Sulfa (sulfonamide antibiotics) Rash       Medications:    Current Facility-Administered Medications:     0.9%  NaCl infusion (for blood administration), , Intravenous, Q24H PRN, Reg Patton MD    albuterol nebulizer solution 2.5 mg, 2.5 mg, Nebulization, Q4H PRN, Ivan Dixon MD, 2.5 mg at 04/03/23 1647    bisacodyL suppository 10 mg, 10 mg, Rectal, Q48H, Reg Patton MD, 10 mg at 04/04/23 1015    buPROPion TBSR 12 hr tablet 100 mg, 100 mg, Oral, QAM, Reg Patton MD    [COMPLETED] cefTRIAXone (ROCEPHIN) 2 g in dextrose 5 % in water (D5W) 5 % 50 mL IVPB (MB+), 2 g, Intravenous, Q24H, Stopped at 04/04/23 1722 **FOLLOWED BY** cefTRIAXone (ROCEPHIN) 1 g in dextrose 5 % in water (D5W) 5 % 50 mL IVPB (MB+), 1 g, Intravenous, Q24H, Reg Patton MD    dextrose 10% bolus 125 mL 125 mL, 12.5 g, Intravenous, PRN, KENDY AvalosP-BC, Stopped at 04/03/23 2341    dextrose 10% bolus 250 mL 250 mL, 25 g, Intravenous, PRN, Piedad Haines ACNP-BC    dextrose 40 % gel 15,000 mg, 15 g, Oral, PRN, Piedad Zaki, ACNP-BC    dextrose 40 % gel 30,000 mg, 30 g, Oral, PRN, Piedadjudit Haines ACNP-BC    diltiaZEM injection 10 mg, 10 mg, Intravenous, Q2H PRN, Reg Patton MD, 10 mg at 04/05/23 1129    enoxaparin injection 30 mg, 30 mg, Subcutaneous, Daily,  Ivan Dixon MD, 30 mg at 04/04/23 1655    famotidine (PF) injection 20 mg, 20 mg, Intravenous, Daily, Reg Patton MD, 20 mg at 04/05/23 0810    glucagon (human recombinant) injection 1 mg, 1 mg, Intramuscular, PRN, Piedad Haines ACNP-BC    haloperidol lactate injection 5 mg, 5 mg, Intravenous, Q6H PRN, Piedad Haines ACNP-BC, 5 mg at 04/04/23 2308    influenza 65up-adj (QUADRIVALENT ADJUVANTED PF) vaccine 0.5 mL, 0.5 mL, Intramuscular, vaccine x 1 dose, Reg Patton MD    morphine injection 1 mg, 1 mg, Intravenous, Q3H PRN, KENDY AvalosP-BC, 1 mg at 04/05/23 1350    mupirocin 2 % ointment, , Nasal, BID, Ivan Dixon MD, Given at 04/05/23 0810    pneumoc 20-michelle conj-dip cr(PF) (PREVNAR-20 (PF)) injection Syrg 0.5 mL, 0.5 mL, Intramuscular, vaccine x 1 dose, Reg Patton MD    sodium chloride 0.9% flush 10 mL, 10 mL, Intravenous, PRN, Ivan Dixon MD    thiamine tablet 100 mg, 100 mg, Oral, Daily, Reg Patton MD    ROS:  Review of Systems   Reason unable to perform ROS: Pt was confused and wearing BiPAP mask.     OBJECTIVE:     Physical Exam:  Vitals: Temp: 99.3 °F (37.4 °C) (04/05/23 1400)  Pulse: (!) 118 (04/05/23 1400)  Resp: (!) 6 (04/05/23 1400)  BP: 123/66 (04/05/23 1400)  SpO2: 95 % (04/05/23 1400)    Physical Exam  Vitals and nursing note reviewed.   Constitutional:       Appearance: She is ill-appearing.      Comments: Limited PE as patient was confused. She is now on 2L oxygen via nasal prongs.   Family at bedside.    HENT:      Head: Normocephalic.   Eyes:      Pupils: Pupils are equal, round, and reactive to light.   Cardiovascular:      Rate and Rhythm: Rhythm irregular.      Pulses: Normal pulses.      Heart sounds: Normal heart sounds. No murmur heard.  Pulmonary:      Breath sounds: Rales present. No wheezing.      Comments: Pt on 2L O2 via nasal prongs  Chest:      Chest wall: No tenderness.   Abdominal:      General: Bowel sounds are normal. There  is distension.      Palpations: Abdomen is soft.   Genitourinary:     Comments: Pierre Catheter in-situ. Luba urine noted   Musculoskeletal:         General: No swelling.      Cervical back: Normal range of motion and neck supple.      Comments: Pt was in bed.    Skin:     General: Skin is warm and dry.      Capillary Refill: Capillary refill takes less than 2 seconds.      Findings: Bruising (hands) present.   Neurological:      Mental Status: She is alert. She is disoriented.      Motor: Weakness (Generalized) present.      Comments: Alert to person only. Pt responded to her name.    Psychiatric:      Comments: Pt was confused and agitated.          Review of Symptoms      Symptom Assessment (ESAS 0-10 Scale)  Pain:  0  Dyspnea:  0  Anxiety:  0  Nausea:  0  Depression:  0  Anorexia:  0  Fatigue:  0  Insomnia:  0  Restlessness:  0  Agitation:  0  Unable to complete assessment due to Mental status change     CAM / Delirium:  Positive      Pain Assessment in Advanced Demential Scale (PAINAD)   Breathing - Independent of vocalization:  1  Negative vocalization:  1  Facial expression:  2  Body language:  1  Consolability:  1  Total:  6    Performance Status:  20    ECOG Performance Status thGthrthathdtheth:th th5th Functional Assessment Scale (FAST):  7a    Karnofsky Performance Scale:  20%    Living Arrangements:  Lives in nursing home    Psychosocial/Cultural:   See Palliative Psychosocial Note: Yes  Pt lives in in [facility] for the past 12 years. Her son, Anam Long and Daughter, in law, Macie have been her primary caregivers. Pt also has three other children, Reg Long (lives in [place]), Bharath Long (lives in [facility]), and Manuela Long (lives in [place]).   **Primary  to Follow**  Palliative Care  Consult: No    Advance Directives:   Living Will: Yes        Copy on chart: Yes        Oral Declaration: No    LaPOST: Yes    Do Not Resuscitate Status: No    Medical Power of : No  (Son, Anam Long is the primary contact for discussing patient's medical status.)      Decision Making:  Family answered questions and Patient unable to communicate due to disease severity/cognitive impairment  Goals of Care: The family endorses that what is most important right now is to focus on symptom/pain control    Accordingly, we have decided that the best plan to meet the patient's goals includes enrolling in hospice care.      Labs:  WBC   Date Value Ref Range Status   04/05/2023 9.48 3.90 - 12.70 K/uL Final       Hemoglobin   Date Value Ref Range Status   04/05/2023 9.0 (L) 12.0 - 16.0 g/dL Final       Hematocrit   Date Value Ref Range Status   04/05/2023 26.6 (L) 37.0 - 48.5 % Final       MCV   Date Value Ref Range Status   04/05/2023 85 82 - 98 fL Final       Platelets   Date Value Ref Range Status   04/05/2023 110 (L) 150 - 450 K/uL Final       BMP  Lab Results   Component Value Date     (L) 04/05/2023    K 4.5 04/05/2023    CL 92 (L) 04/05/2023    CO2 28 04/05/2023    BUN 57 (H) 04/05/2023    CREATININE 4.6 (H) 04/05/2023    CALCIUM 7.1 (L) 04/05/2023    ANIONGAP 13 04/05/2023    EGFRNORACEVR 9 (A) 04/05/2023       Lab Results   Component Value Date    AST 2,040 (H) 04/05/2023    ALKPHOS 98 04/05/2023    BILITOT 2.2 (H) 04/05/2023       Albumin   Date Value Ref Range Status   04/05/2023 2.4 (L) 3.5 - 5.2 g/dL Final       Radiology:I have reviewed all pertinent imaging results/findings within the past 24 hours.      ASSESSMENT   Sepsis/Acute Hypoxemic Respiratory Failure/Shock Liver/Acute Renal Failure  Palliative Care Encounter    PLAN   1.  Sepsis/Acute Hypoxemic Respiratory Failure/Shock Liver/Acute Renal Failure   -ICU team managing. Pt now on nasal prongs, but remains agitated  2.  Palliative Care Encounter   -Code status- -Pt is now DNR following family meeting. LaPOST completed today.   -Pt was Partial Code: No chest compressions, No Defibrillation/Cardioversion, No Internal or External  Pacemaker.    -Pt's family want to change to comfort care with focus on symptom management and UTI treatment.   -Pt's family stated they will choose which hospice they prefer, and [facility] can assist with this.   -HCPOA- None assigned. Pt's son, Anam Long and daughter in-law, Macie have been primary contact.    -Palliative care team will follow-up as needed.     Discussed case and visit details with Dr. Patton     Thank you for allowing Palliative Medicine to be involved in the care of Rachel Long.         Medical decision making: HIGH based on high risk of death poor prognosis management of more than one chronic illness in exacerbation or progression of disease    Plan required increased review of medication choice, interaction, dosing, frequency, and route due to patient complexity. Patient complexity increased by: advanced age, altered mentation, at risk for delirium, and renal insufficiency    20 min ACP time spent discussing: code status, assessed patient specific goals and addressed the best way to achieve them, coordination of care and emotional support, formulating and communicating prognosis, exploring burden/ benefit of various approaches of treatment, inquired about existing or willingness to complete advance directive documents.        ROLDAN LEMON NP  Palliative Medicine

## 2023-04-05 NOTE — ASSESSMENT & PLAN NOTE
Patient with Hypoxic Respiratory failure which is Acute.. Supplemental oxygen was provided and noted- Oxygen Concentration (%):  [28] 28    .   Signs/symptoms of respiratory failure include- tachypnea and respiratory distress. Contributing diagnoses includes - sepsis Labs and images were reviewed. Patient Has recent ABG, which has been reviewed. Will treat underlying causes and adjust management of respiratory failure as follows- vent support.    - intubated 4/2 in the ER and extubated 4/3  - Currently tolerating of NIPPV  - Supplemental O2 as needed.

## 2023-04-06 VITALS
OXYGEN SATURATION: 93 % | TEMPERATURE: 97 F | WEIGHT: 157.44 LBS | RESPIRATION RATE: 31 BRPM | HEIGHT: 64 IN | SYSTOLIC BLOOD PRESSURE: 125 MMHG | BODY MASS INDEX: 26.88 KG/M2 | DIASTOLIC BLOOD PRESSURE: 64 MMHG | HEART RATE: 121 BPM

## 2023-04-06 LAB
ALBUMIN SERPL BCP-MCNC: 2.5 G/DL (ref 3.5–5.2)
ALP SERPL-CCNC: 125 U/L (ref 55–135)
ALT SERPL W/O P-5'-P-CCNC: 1985 U/L (ref 10–44)
ANION GAP SERPL CALC-SCNC: 16 MMOL/L (ref 8–16)
AST SERPL-CCNC: 968 U/L (ref 10–40)
BASOPHILS # BLD AUTO: 0.03 K/UL (ref 0–0.2)
BASOPHILS NFR BLD: 0.3 % (ref 0–1.9)
BILIRUB DIRECT SERPL-MCNC: 1.9 MG/DL (ref 0.1–0.3)
BILIRUB SERPL-MCNC: 2.6 MG/DL (ref 0.1–1)
BUN SERPL-MCNC: 59 MG/DL (ref 8–23)
CALCIUM SERPL-MCNC: 8.2 MG/DL (ref 8.7–10.5)
CHLORIDE SERPL-SCNC: 90 MMOL/L (ref 95–110)
CO2 SERPL-SCNC: 26 MMOL/L (ref 23–29)
CREAT SERPL-MCNC: 5.4 MG/DL (ref 0.5–1.4)
DIFFERENTIAL METHOD: ABNORMAL
EOSINOPHIL # BLD AUTO: 0.1 K/UL (ref 0–0.5)
EOSINOPHIL NFR BLD: 1 % (ref 0–8)
ERYTHROCYTE [DISTWIDTH] IN BLOOD BY AUTOMATED COUNT: 14.3 % (ref 11.5–14.5)
EST. GFR  (NO RACE VARIABLE): 7 ML/MIN/1.73 M^2
GLUCOSE SERPL-MCNC: 62 MG/DL (ref 70–110)
HCT VFR BLD AUTO: 30.6 % (ref 37–48.5)
HGB BLD-MCNC: 10.4 G/DL (ref 12–16)
IMM GRANULOCYTES # BLD AUTO: 0.09 K/UL (ref 0–0.04)
IMM GRANULOCYTES NFR BLD AUTO: 1 % (ref 0–0.5)
INR PPP: 1.2 (ref 0.8–1.2)
LYMPHOCYTES # BLD AUTO: 0.7 K/UL (ref 1–4.8)
LYMPHOCYTES NFR BLD: 6.9 % (ref 18–48)
MAGNESIUM SERPL-MCNC: 1.8 MG/DL (ref 1.6–2.6)
MCH RBC QN AUTO: 28.3 PG (ref 27–31)
MCHC RBC AUTO-ENTMCNC: 34 G/DL (ref 32–36)
MCV RBC AUTO: 83 FL (ref 82–98)
MONOCYTES # BLD AUTO: 0.9 K/UL (ref 0.3–1)
MONOCYTES NFR BLD: 9.5 % (ref 4–15)
NEUTROPHILS # BLD AUTO: 7.6 K/UL (ref 1.8–7.7)
NEUTROPHILS NFR BLD: 81.3 % (ref 38–73)
NRBC BLD-RTO: 0 /100 WBC
PLATELET # BLD AUTO: 119 K/UL (ref 150–450)
PMV BLD AUTO: 11.5 FL (ref 9.2–12.9)
POCT GLUCOSE: 68 MG/DL (ref 70–110)
POCT GLUCOSE: 86 MG/DL (ref 70–110)
POTASSIUM SERPL-SCNC: 4.6 MMOL/L (ref 3.5–5.1)
PROT SERPL-MCNC: 5.7 G/DL (ref 6–8.4)
PROTHROMBIN TIME: 12.8 SEC (ref 9–12.5)
RBC # BLD AUTO: 3.67 M/UL (ref 4–5.4)
SODIUM SERPL-SCNC: 132 MMOL/L (ref 136–145)
WBC # BLD AUTO: 9.36 K/UL (ref 3.9–12.7)

## 2023-04-06 PROCEDURE — 27000221 HC OXYGEN, UP TO 24 HOURS

## 2023-04-06 PROCEDURE — 80048 BASIC METABOLIC PNL TOTAL CA: CPT | Performed by: INTERNAL MEDICINE

## 2023-04-06 PROCEDURE — 25000003 PHARM REV CODE 250: Performed by: FAMILY MEDICINE

## 2023-04-06 PROCEDURE — 83735 ASSAY OF MAGNESIUM: CPT | Performed by: INTERNAL MEDICINE

## 2023-04-06 PROCEDURE — 99900035 HC TECH TIME PER 15 MIN (STAT)

## 2023-04-06 PROCEDURE — 63600175 PHARM REV CODE 636 W HCPCS: Performed by: FAMILY MEDICINE

## 2023-04-06 PROCEDURE — 80076 HEPATIC FUNCTION PANEL: CPT | Performed by: INTERNAL MEDICINE

## 2023-04-06 PROCEDURE — 63600175 PHARM REV CODE 636 W HCPCS: Performed by: NURSE PRACTITIONER

## 2023-04-06 PROCEDURE — 85610 PROTHROMBIN TIME: CPT | Performed by: NURSE PRACTITIONER

## 2023-04-06 PROCEDURE — 25000003 PHARM REV CODE 250: Performed by: NURSE PRACTITIONER

## 2023-04-06 PROCEDURE — 25000003 PHARM REV CODE 250: Performed by: INTERNAL MEDICINE

## 2023-04-06 PROCEDURE — 85025 COMPLETE CBC W/AUTO DIFF WBC: CPT | Performed by: INTERNAL MEDICINE

## 2023-04-06 RX ADMIN — BUPROPION HYDROCHLORIDE 100 MG: 100 TABLET, FILM COATED, EXTENDED RELEASE ORAL at 08:04

## 2023-04-06 RX ADMIN — MORPHINE SULFATE 1 MG: 2 INJECTION, SOLUTION INTRAMUSCULAR; INTRAVENOUS at 12:04

## 2023-04-06 RX ADMIN — DILTIAZEM HYDROCHLORIDE 10 MG: 5 INJECTION, SOLUTION INTRAVENOUS at 01:04

## 2023-04-06 RX ADMIN — DEXTROSE MONOHYDRATE 125 ML: 100 INJECTION, SOLUTION INTRAVENOUS at 05:04

## 2023-04-06 RX ADMIN — FAMOTIDINE 20 MG: 10 INJECTION, SOLUTION INTRAVENOUS at 08:04

## 2023-04-06 RX ADMIN — THIAMINE HCL TAB 100 MG 100 MG: 100 TAB at 08:04

## 2023-04-06 RX ADMIN — MUPIROCIN: 20 OINTMENT TOPICAL at 08:04

## 2023-04-06 RX ADMIN — CEFTRIAXONE 1 G: 1 INJECTION, POWDER, FOR SOLUTION INTRAMUSCULAR; INTRAVENOUS at 08:04

## 2023-04-06 NOTE — PROGRESS NOTES
Care assumed. Pt supine . Son at bedside. Pt confused to situation. Reorients. VSS. Antibiotic given. Safety maintained. Report callled to Dea at hospice house and AA notified of pickup.

## 2023-04-06 NOTE — PLAN OF CARE
[facility] - Intensive Care (Hospital)  Discharge Final Note    Primary Care Provider: Sharmaine English MD    Expected Discharge Date: 4/6/2023    Final Discharge Note (most recent)       Final Note - 04/06/23 0832          Final Note    Assessment Type Final Discharge Note     Anticipated Discharge Disposition Hospice/Medical Facility     Hospital Resources/Appts/Education Provided Post-Acute resouces added to AVS        Post-Acute Status    Post-Acute Authorization Hospice     Hospice Status Set-up Complete/Auth obtained     Discharge Delays None known at this time                     Important Message from Medicare             Contact Info       [facility]   Specialty: Hospice and Palliative Medicine, Hospice Services    [id number] [place] [id number]   Phone: [phone]       Next Steps: Follow up          DC disposition: [facility]   Family notified: son at bedside   Transportation: BROOKE Lucio 1030  Nurse provided with phone number for report.  All DC info uploaded into [employer].     LANEY Davis

## 2023-04-06 NOTE — PLAN OF CARE
PT remains Afib on monitor, normotensive and afebrile.  PRN medications given, see mar.  PT remains confused to situation, place, and time.   All safety interventions in place, all alarms audible and appropriate.

## 2023-04-07 LAB
BACTERIA BLD CULT: NORMAL
BACTERIA BLD CULT: NORMAL

## 2023-04-14 NOTE — HOSPITAL COURSE
Patient is an 82-year-old nursing home female with CAD, HTN, COPD, bipolar disorder, history of PE (not on anticoagulation seemingly due to frequent falls), history of lung cancer and Parkinson's disease who presented to the ED on 4/2/23 for altered mental status.  Blood glucose was low when ems arrived and she was given d10. Patient was bradycardic and hypoxic and intubated in the ED. She was found to be in septic shock due to UTI. Pt was started on pressors and zosyn. Imaging performed was concerning for recurrent metastatic lung disease. She had declining renal function and LFTS were indicative of shock liver. Creatinine continued to trend up and nephrology was consulted on the case. She was deemed a poor dialysis candidate. Patient was weaned off of the vent. Family discussion held with palliative and family. Decision was made to pursue hospice with comfort care after receiving last dose of antibiotics tomorrow.  consulted for management as patient was stepped down from icu.     Patient was discharged after receiving last dose of abx.

## 2023-04-14 NOTE — DISCHARGE SUMMARY
[facility] - Intensive Care ([facility])  [facility] Medicine  Discharge Summary      Patient Name: Rachel Long  MRN: [id number]  WILMER: [id number]  Patient Class: IP- Inpatient  Admission Date: 4/2/2023  Hospital Length of Stay: 4 days  Discharge Date and Time: 4/6/2023 11:00 AM  Attending Physician: Lluvia att. providers found   Discharging Provider: Ortiz Begum MD  Primary Care Provider: Sharmaine English MD    Primary Care Team: Networked reference to record PCT     HPI:   Patient is an 82-year-old nursing home female with CAD, HTN, COPD, bipolar disorder, history of PE (not on anticoagulation seemingly due to frequent falls), history of lung cancer and Parkinson's disease who presented to the ED on 4/2/23 for altered mental status.  Blood glucose was low when ems arrived and she was given d10. Patient was bradycardic and hypoxic and intubated in the ED. She was found to be in septic shock due to UTI. Pt was started on pressors and zosyn. Imaging performed was concerning for recurrent metastatic lung disease. She had declining renal function and LFTS were indicative of shock liver. Creatinine continued to trend up and nephrology was consulted on the case. She was deemed a poor dialysis candidate. Patient was weaned off of the vent. Family discussion held with palliative and family. Decision was made to pursue hospice with comfort care after receiving last dose of antibiotics tomorrow.  consulted for management as patient was stepped down from icu.           * No surgery found *      Hospital Course:   Patient is an 82-year-old nursing home female with CAD, HTN, COPD, bipolar disorder, history of PE (not on anticoagulation seemingly due to frequent falls), history of lung cancer and Parkinson's disease who presented to the ED on 4/2/23 for altered mental status.  Blood glucose was low when ems arrived and she was given d10. Patient was bradycardic and hypoxic and intubated in the ED. She was found to be in septic shock due to UTI.  Pt was started on pressors and zosyn. Imaging performed was concerning for recurrent metastatic lung disease. She had declining renal function and LFTS were indicative of shock liver. Creatinine continued to trend up and nephrology was consulted on the case. She was deemed a poor dialysis candidate. Patient was weaned off of the vent. Family discussion held with palliative and family. Decision was made to pursue hospice with comfort care after receiving last dose of antibiotics tomorrow. HM consulted for management as patient was stepped down from icu.     Patient was discharged after receiving last dose of abx.        Goals of Care Treatment Preferences:  Code Status: DNR    Living Will: Yes  [place]: Yes  What is most important right now is to focus on symptom/pain control.  Accordingly, we have decided that the best plan to meet the patient's goals includes enrolling in hospice care.      Consults:   Consults (From admission, onward)        Status Ordering Provider     Inpatient consult to Palliative Care  Once        Provider:  (Not yet assigned)    Completed DAVEY RICHARD     Inpatient consult to Nephrology  Once        Provider:  (Not yet assigned)    Completed DAVEY RICHARD     IP consult to case management  Once        Provider:  (Not yet assigned)    Completed AMIRA FALK     Inpatient consult to Registered Dietitian/Nutritionist  Once        Provider:  (Not yet assigned)    Completed AMIRA FALK          No new Assessment & Plan notes have been filed under this hospital service since the last note was generated.  Service: Hospital Medicine    Final Active Diagnoses:    Diagnosis Date Noted POA    PRINCIPAL PROBLEM:  Septic shock [A41.9, R65.21] 04/02/2023 Yes    Atrial fibrillation with RVR [I48.91] 04/04/2023 Yes    Shock liver [K72.00] 04/03/2023 Yes    Acute hypoxemic respiratory failure [J96.01] 04/02/2023 Yes    Acute renal failure [N17.9] 04/02/2023 Yes    Hyperkalemia  [E87.5] 04/02/2023 Yes    HLD (hyperlipidemia) [E78.5] 08/05/2021 Yes    Schizoaffective disorder [F25.9] 11/28/2018 Yes    Malignant neoplasm of upper lobe of left lung [C34.12] 06/23/2016 Yes     Chronic    COPD, very severe [J44.9] 06/23/2016 Yes     Chronic    Hypertension [I10]  Yes      Problems Resolved During this Admission:    Diagnosis Date Noted Date Resolved POA    Lactic acidosis [E87.20] 04/02/2023 04/05/2023 Yes       Discharged Condition: poor    Disposition: Hospice/Medical Facility    Follow Up:   Follow-up Information     [facility] Follow up.    Specialties: Hospice and Palliative Medicine, Hospice Services  Contact information:  [id number]  [place] [address] [phone]                       Patient Instructions:   No discharge procedures on file.    Significant Diagnostic Studies: N/A    Pending Diagnostic Studies:     None         Medications:  Reconciled Home Medications:      Medication List      CONTINUE taking these medications    buPROPion 100 MG TBSR 12 hr tablet  Commonly known as: WELLBUTRIN SR  Take 100 mg by mouth every morning.     busPIRone 5 MG Tab  Commonly known as: BUSPAR  Take 5 mg by mouth 2 (two) times daily.     metoprolol succinate 25 MG 24 hr tablet  Commonly known as: TOPROL-XL  Take 25 mg by mouth once daily.     QUEtiapine 150 mg Tab  Take 150 mg by mouth every evening.        STOP taking these medications    acetaminophen 325 MG tablet  Commonly known as: TYLENOL     albuterol 2.5 mg /3 mL (0.083 %) nebulizer solution  Commonly known as: PROVENTIL     albuterol 90 mcg/actuation inhaler  Commonly known as: PROVENTIL/VENTOLIN HFA     atorvastatin 80 MG tablet  Commonly known as: LIPITOR     benztropine 1 MG tablet  Commonly known as: COGENTIN     BIOTENE DRY MOUTH ORAL RINSE MM     budesonide 0.25 mg/2 mL nebulizer solution  Commonly known as: PULMICORT     budesonide-formoterol 80-4.5 mcg 80-4.5 mcg/actuation Hfaa  Commonly known  as: SYMBICORT     cycloSPORINE 0.05 % ophthalmic emulsion  Commonly known as: RESTASIS     dextran 70-hypromellose 0.1-0.3 % Drop     diltiaZEM 60 MG tablet  Commonly known as: CARDIZEM     DULoxetine 60 MG capsule  Commonly known as: CYMBALTA     HYDROcodone-acetaminophen 5-325 mg per tablet  Commonly known as: NORCO     ibuprofen 400 MG tablet  Commonly known as: ADVIL,MOTRIN     loperamide 2 mg Tab  Commonly known as: IMODIUM A-D     meclizine 25 mg tablet  Commonly known as: ANTIVERT     melatonin 3 mg Cap     ondansetron 4 MG Tbdl  Commonly known as: ZOFRAN-ODT     pantoprazole 40 MG tablet  Commonly known as: PROTONIX     spironolactone 25 MG tablet  Commonly known as: ALDACTONE     thiamine 100 MG tablet            Indwelling Lines/Drains at time of discharge:   Lines/Drains/Airways     Central Venous Catheter Line  Duration           Percutaneous Central Line Insertion/Assessment - Triple Lumen  04/02/23 Internal Jugular Left 12 days                Time spent on the discharge of patient: 37 minutes    Critical care time spent on the evaluation and treatment of severe organ dysfunction, review of pertinent labs and imaging studies, discussions with consulting providers and discussions with patient/family: 37 minutes.     Ortiz Begum MD  Department of Hospital Medicine  [facility] - [facility] ([facility])

## 2023-07-03 PROBLEM — A41.9 SEPTIC SHOCK: Status: RESOLVED | Noted: 2023-04-02 | Resolved: 2023-07-03

## 2023-07-03 PROBLEM — J96.01 ACUTE HYPOXEMIC RESPIRATORY FAILURE: Status: RESOLVED | Noted: 2023-04-02 | Resolved: 2023-07-03

## 2023-07-03 PROBLEM — N17.9 ACUTE RENAL FAILURE: Status: RESOLVED | Noted: 2023-04-02 | Resolved: 2023-07-03

## 2023-07-03 PROBLEM — R65.21 SEPTIC SHOCK: Status: RESOLVED | Noted: 2023-04-02 | Resolved: 2023-07-03

## 2023-10-25 NOTE — PROGRESS NOTES
Pharmacist Renal Dose Adjustment Note    Rachel Long is a 82 y.o. female being treated with the medication zosyn    Patient Data:    Vital Signs (Most Recent):  Temp: 99.3 °F (37.4 °C) (04/02/23 1203)  Pulse: (!) 50 (04/02/23 1711)  Resp: (!) 22 (04/02/23 1656)  BP: (!) 114/57 (04/02/23 1656)  SpO2: 100 % (04/02/23 1656)   Vital Signs (72h Range):  Temp:  [98.3 °F (36.8 °C)-99.3 °F (37.4 °C)]   Pulse:  [34-82]   Resp:  [17-33]   BP: ()/(43-88)   SpO2:  [79 %-100 %]      Recent Labs   Lab 04/01/23  1311 04/02/23  1201 04/02/23  1516   CREATININE 0.9 2.3* 2.2*     Serum creatinine: 2.2 mg/dL (H) 04/02/23 1516  Estimated creatinine clearance: 16.7 mL/min (A)    Medication:zosyn 4.5 gm iv q8hrs will be changed to medication:zosyn 4.5 gm iv q12hrs per renal dosing protocol.   Pharmacist's Name: Adry Iglesias [facility]  Pharmacist's Extension: [phone]     Xenograft Text: Because of the size and depth of the defect and to facilitate healing by granulation, a tissue-cultured epidermal autograft was planned.  After prep and local anesthesia, Xenograft material was trimmed to fi the defect and secured